# Patient Record
Sex: FEMALE | Race: WHITE | NOT HISPANIC OR LATINO | Employment: OTHER | URBAN - METROPOLITAN AREA
[De-identification: names, ages, dates, MRNs, and addresses within clinical notes are randomized per-mention and may not be internally consistent; named-entity substitution may affect disease eponyms.]

---

## 2017-03-17 ENCOUNTER — HOSPITAL ENCOUNTER (EMERGENCY)
Facility: HOSPITAL | Age: 62
Discharge: HOME | End: 2017-03-17
Attending: EMERGENCY MEDICINE

## 2017-03-17 VITALS
DIASTOLIC BLOOD PRESSURE: 79 MMHG | HEIGHT: 64 IN | RESPIRATION RATE: 20 BRPM | OXYGEN SATURATION: 94 % | TEMPERATURE: 98 F | BODY MASS INDEX: 31.58 KG/M2 | HEART RATE: 79 BPM | WEIGHT: 185 LBS | SYSTOLIC BLOOD PRESSURE: 213 MMHG

## 2017-03-17 DIAGNOSIS — I10 ESSENTIAL HYPERTENSION: Primary | ICD-10-CM

## 2017-03-17 DIAGNOSIS — J45.901 ASTHMA ATTACK: ICD-10-CM

## 2017-03-17 LAB
ALBUMIN SERPL BCP-MCNC: 3.9 G/DL
ALP SERPL-CCNC: 122 U/L
ALT SERPL W/O P-5'-P-CCNC: 55 U/L
ANION GAP SERPL CALC-SCNC: 8 MMOL/L
AST SERPL-CCNC: 35 U/L
BASOPHILS # BLD AUTO: 0 K/UL
BASOPHILS NFR BLD: 0.4 %
BILIRUB SERPL-MCNC: 0.2 MG/DL
BUN SERPL-MCNC: 15 MG/DL
CALCIUM SERPL-MCNC: 10 MG/DL
CHLORIDE SERPL-SCNC: 103 MMOL/L
CO2 SERPL-SCNC: 27 MMOL/L
CREAT SERPL-MCNC: 0.8 MG/DL
DIFFERENTIAL METHOD: ABNORMAL
EOSINOPHIL # BLD AUTO: 0.1 K/UL
EOSINOPHIL NFR BLD: 1.4 %
ERYTHROCYTE [DISTWIDTH] IN BLOOD BY AUTOMATED COUNT: 14.4 %
EST. GFR  (AFRICAN AMERICAN): >60 ML/MIN/1.73 M^2
EST. GFR  (NON AFRICAN AMERICAN): >60 ML/MIN/1.73 M^2
GLUCOSE SERPL-MCNC: 109 MG/DL
HCT VFR BLD AUTO: 41.1 %
HGB BLD-MCNC: 13.1 G/DL
LYMPHOCYTES # BLD AUTO: 1.4 K/UL
LYMPHOCYTES NFR BLD: 19.3 %
MCH RBC QN AUTO: 28 PG
MCHC RBC AUTO-ENTMCNC: 31.8 %
MCV RBC AUTO: 88 FL
MONOCYTES # BLD AUTO: 0.8 K/UL
MONOCYTES NFR BLD: 10.4 %
NEUTROPHILS # BLD AUTO: 5 K/UL
NEUTROPHILS NFR BLD: 68.5 %
PLATELET # BLD AUTO: 214 K/UL
PMV BLD AUTO: 8.1 FL
POTASSIUM SERPL-SCNC: 4.1 MMOL/L
PROT SERPL-MCNC: 7.5 G/DL
RBC # BLD AUTO: 4.66 M/UL
SODIUM SERPL-SCNC: 138 MMOL/L
WBC # BLD AUTO: 7.3 K/UL

## 2017-03-17 PROCEDURE — 99284 EMERGENCY DEPT VISIT MOD MDM: CPT | Mod: 25

## 2017-03-17 PROCEDURE — 63600175 PHARM REV CODE 636 W HCPCS: Performed by: EMERGENCY MEDICINE

## 2017-03-17 PROCEDURE — 94761 N-INVAS EAR/PLS OXIMETRY MLT: CPT

## 2017-03-17 PROCEDURE — 80053 COMPREHEN METABOLIC PANEL: CPT

## 2017-03-17 PROCEDURE — 94640 AIRWAY INHALATION TREATMENT: CPT

## 2017-03-17 PROCEDURE — 25000242 PHARM REV CODE 250 ALT 637 W/ HCPCS: Performed by: EMERGENCY MEDICINE

## 2017-03-17 PROCEDURE — 85025 COMPLETE CBC W/AUTO DIFF WBC: CPT

## 2017-03-17 PROCEDURE — 36415 COLL VENOUS BLD VENIPUNCTURE: CPT

## 2017-03-17 PROCEDURE — 96374 THER/PROPH/DIAG INJ IV PUSH: CPT

## 2017-03-17 RX ORDER — IPRATROPIUM BROMIDE AND ALBUTEROL SULFATE 2.5; .5 MG/3ML; MG/3ML
3 SOLUTION RESPIRATORY (INHALATION)
Status: DISCONTINUED | OUTPATIENT
Start: 2017-03-17 | End: 2017-03-17

## 2017-03-17 RX ORDER — ALBUTEROL SULFATE 90 UG/1
2 AEROSOL, METERED RESPIRATORY (INHALATION) EVERY 6 HOURS PRN
COMMUNITY
End: 2019-01-04

## 2017-03-17 RX ORDER — IPRATROPIUM BROMIDE AND ALBUTEROL SULFATE 2.5; .5 MG/3ML; MG/3ML
3 SOLUTION RESPIRATORY (INHALATION)
Status: COMPLETED | OUTPATIENT
Start: 2017-03-17 | End: 2017-03-17

## 2017-03-17 RX ORDER — METHYLPREDNISOLONE SOD SUCC 125 MG
125 VIAL (EA) INJECTION
Status: COMPLETED | OUTPATIENT
Start: 2017-03-17 | End: 2017-03-17

## 2017-03-17 RX ORDER — BUPRENORPHINE HYDROCHLORIDE AND NALOXONE HYDROCHLORIDE DIHYDRATE 8; 2 MG/1; MG/1
1 TABLET SUBLINGUAL 3 TIMES DAILY PRN
COMMUNITY
End: 2018-12-05 | Stop reason: DRUGHIGH

## 2017-03-17 RX ORDER — LISINOPRIL AND HYDROCHLOROTHIAZIDE 20; 25 MG/1; MG/1
1 TABLET ORAL DAILY
Qty: 30 TABLET | Refills: 0 | Status: SHIPPED | OUTPATIENT
Start: 2017-03-17 | End: 2018-10-18

## 2017-03-17 RX ORDER — BUPRENORPHINE HYDROCHLORIDE AND NALOXONE HYDROCHLORIDE DIHYDRATE 2; .5 MG/1; MG/1
TABLET SUBLINGUAL EVERY 6 HOURS PRN
COMMUNITY
End: 2017-03-17 | Stop reason: DRUGHIGH

## 2017-03-17 RX ORDER — ALBUTEROL SULFATE 90 UG/1
1-2 AEROSOL, METERED RESPIRATORY (INHALATION) EVERY 6 HOURS PRN
Qty: 1 INHALER | Refills: 0 | Status: SHIPPED | OUTPATIENT
Start: 2017-03-17 | End: 2018-03-17

## 2017-03-17 RX ORDER — PREDNISONE 20 MG/1
40 TABLET ORAL DAILY
Qty: 10 TABLET | Refills: 0 | Status: SHIPPED | OUTPATIENT
Start: 2017-03-17 | End: 2017-03-22

## 2017-03-17 RX ADMIN — METHYLPREDNISOLONE SODIUM SUCCINATE 125 MG: 125 INJECTION, POWDER, FOR SOLUTION INTRAMUSCULAR; INTRAVENOUS at 04:03

## 2017-03-17 RX ADMIN — IPRATROPIUM BROMIDE AND ALBUTEROL SULFATE 3 ML: .5; 3 SOLUTION RESPIRATORY (INHALATION) at 04:03

## 2017-03-17 NOTE — ED NOTES
MD at bedside for explanation of test results, pt verbalizes understanding of new medications and discharge instructions and reports no further questions or complaints at this time. Awaiting further instructions

## 2017-03-17 NOTE — ED PROVIDER NOTES
Encounter Date: 3/17/2017    SCRIBE #1 NOTE: ISury, am scribing for, and in the presence of, Dr. La.       History     Chief Complaint   Patient presents with    Asthma    Sore Throat     Review of patient's allergies indicates:  No Known Allergies  HPI Comments: 03/17/2017  4:25 PM     Chief Complaint: Asthma Exacerbation      The patient is a 61 y.o. female with a PMHx of asthma who is presenting with an acute exacerbation of her asthma for the past couple of days. Pt reported that she has Ventolin in nebulizer and inhaler, but she has been out of her inhaler recently. Pt first started to c/o a sore throat 4 days ago prior to exacerbation of her asthma. Associated symptoms of sinus congestion, rhinorrhea, and SOB on exertion. She also c/o sneezing that started today. No CP or leg swelling. No hx of hospitalizations for asthma exacerbation. No PCP or pulmonologist, pt denied having insurance. Pt denied taking BP medication. Social hx of smoking. Pt has no pertinent past surgical history.      The history is provided by the patient.     Past Medical History:   Diagnosis Date    Asthma      Past Surgical History:   Procedure Laterality Date    HYSTERECTOMY       History reviewed. No pertinent family history.  Social History   Substance Use Topics    Smoking status: Current Every Day Smoker    Smokeless tobacco: None    Alcohol use None     Review of Systems   Constitutional: Negative for fever.   HENT: Positive for congestion (Sinus congestion.), rhinorrhea, sneezing and sore throat.    Respiratory: Positive for shortness of breath (SOB on exertion.).         +Exacerbation of asthma.   Cardiovascular: Negative for chest pain and leg swelling.   Gastrointestinal: Negative for nausea.   Musculoskeletal: Negative for back pain.   Skin: Negative for rash.   Neurological: Negative for weakness.   Hematological: Does not bruise/bleed easily.   Psychiatric/Behavioral: Negative for confusion.   All  other systems reviewed and are negative.      Physical Exam   Initial Vitals   BP Pulse Resp Temp SpO2   03/17/17 1454 03/17/17 1454 03/17/17 1454 03/17/17 1454 03/17/17 1454   225/111 78 24 98.4 °F (36.9 °C) 97 %     Physical Exam    Nursing note and vitals reviewed.  Constitutional: She appears well-developed and well-nourished. She is not diaphoretic. No distress.   HENT:   Head: Normocephalic and atraumatic.   Neck: Neck supple.   Cardiovascular: Normal rate, regular rhythm, normal heart sounds and intact distal pulses. Exam reveals no gallop and no friction rub.    No murmur heard.  Pulmonary/Chest: She has decreased breath sounds. She has wheezes (Faint wheeze on the right.).   Poor air movement.   Musculoskeletal: Normal range of motion.   Neurological: She is alert and oriented to person, place, and time.   Skin: No rash noted. No erythema.   Psychiatric: She has a normal mood and affect.         ED Course   Procedures  Labs Reviewed - No data to display          Medical Decision Making:   Clinical Tests:   Lab Tests: Ordered and Reviewed  Radiological Study: Ordered and Reviewed            Scribe Attestation:   Scribe #1: I performed the above scribed service and the documentation accurately describes the services I performed. I attest to the accuracy of the note.    Attending Attestation:           Physician Attestation for Scribe:  Physician Attestation Statement for Scribe #1: I, Dr. La, reviewed documentation, as scribed by Sury De La Torre in my presence, and it is both accurate and complete.                 ED Course     Clinical Impression:   The primary encounter diagnosis was Essential hypertension. A diagnosis of Asthma attack was also pertinent to this visit.          61-year-old-year-old female with a history of asthma presents to the ER with an asthma exacerbation.  Shortness of breath and wheezing greatly improved after treatment.  Ambulatory around the emergency room with no distress and  no objective shortness of breath.  Markedly hypertensive in the emergency department with no sign of hypertensive emergency.  Patient has been off of her lisinopril for several years.  She is unfunded and has no primary care doctor although she does say that Dr. aviles cardiology use to see her.  Patient was started back on lisinopril and HCTZ after discussion with Dr. Latham of [facility] medicine regarding restarting the patient on antihypertensives.  Patient is acceptable for discharge home.  No indication for admission at this time.  I did discuss the case in detail with both the patient and her daughter who is at the bedside.  No sign of status asthmaticus or hypertensive emergency at this time in the ER     Harpreet La MD  03/17/17 2029

## 2017-03-17 NOTE — ED AVS SNAPSHOT
[facility] [address] [place] [address]               Kendra Lawrence   3/17/2017  4:05 PM   ED    Description:  Female : 1955   Department:  [facility]           Your Care was Coordinated By:     Provider Role From To    Harpreet La MD Attending Provider 17 2110 --      Reason for Visit     Asthma     Sore Throat           Diagnoses this Visit        Comments    Essential hypertension    -  Primary     Asthma attack           ED Disposition     None           To Do List           Follow-up Information     Follow up with [facility].    Specialty:  Emergency Medicine    Why:  As needed, If symptoms worsen    Contact information:    [address] [place] [id number] [phone]        Schedule an appointment as soon as possible for a visit with [facility].    Why:  for primary care    Contact information:    [address] [place] [id number]  [phone]         These Medications        Disp Refills Start End    albuterol 90 mcg/actuation inhaler 1 Inhaler 0 3/17/2017 3/17/2018    Inhale 1-2 puffs into the lungs every 6 (six) hours as needed for Wheezing. Rescue - Inhalation    Pharmacy: [id number] - [address] Ph #: [phone]       predniSONE (DELTASONE) 20 MG tablet 10 tablet 0 3/17/2017 3/22/2017    Take 2 tablets (40 mg total) by mouth once daily. - Oral    Pharmacy: [id number] - [address] Ph #: [phone]       lisinopril-hydrochlorothiazide (PRINZIDE,ZESTORETIC) 20-25 mg Tab 30 tablet 0 3/17/2017 3/17/2018    Take 1 tablet by mouth once daily. - Oral    Pharmacy: [id number] - [address] Ph #: [phone]         [facility]     [facility] Care [facility] -  Assistance  Registered nurses in the [facility] provide clinical advisement,  health education, appointment booking, and other advisory services.  Call for this free service at [phone].             Medications           Message regarding Medications     Verify the changes and/or additions to your medication regime listed below are the same as discussed with your clinician today.  If any of these changes or additions are incorrect, please notify your healthcare provider.        START taking these NEW medications        Refills    albuterol 90 mcg/actuation inhaler 0    Sig: Inhale 1-2 puffs into the lungs every 6 (six) hours as needed for Wheezing. Rescue    Class: Print    Route: Inhalation    predniSONE (DELTASONE) 20 MG tablet 0    Sig: Take 2 tablets (40 mg total) by mouth once daily.    Class: Print    Route: Oral    lisinopril-hydrochlorothiazide (PRINZIDE,ZESTORETIC) 20-25 mg Tab 0    Sig: Take 1 tablet by mouth once daily.    Class: Print    Route: Oral      These medications were administered today        Dose Freq    methylPREDNISolone sodium succinate injection 125 mg 125 mg ED 1 Time    Sig: Inject 125 mg into the vein ED 1 Time.    Class: Normal    Route: Intravenous    albuterol-ipratropium 2.5mg-0.5mg/3mL nebulizer solution 3 mL 3 mL Every 5 min    Sig: Take 3 mLs by nebulization every 5 (five) minutes.    Class: Normal    Route: Nebulization      STOP taking these medications     buprenorphine-naloxone 2-0.5 mg (SUBOXONE) 2-0.5 mg Subl Place under the tongue every 6 (six) hours as needed.           Verify that the below list of medications is an accurate representation of the medications you are currently taking.  If none reported, the list may be blank. If incorrect, please contact your healthcare provider. Carry this list with you in case of emergency.           Current Medications     albuterol (VENTOLIN HFA) 90 mcg/actuation inhaler Inhale 2 puffs into the lungs every 6 (six) hours as needed for Wheezing. Rescue    buprenorphine-naloxone 8-2 mg (SUBOXONE) 8-2 mg Subl  "Place 1 tablet under the tongue 3 (three) times daily as needed (pain).     albuterol 90 mcg/actuation inhaler Inhale 1-2 puffs into the lungs every 6 (six) hours as needed for Wheezing. Rescue    lisinopril-hydrochlorothiazide (PRINZIDE,ZESTORETIC) 20-25 mg Tab Take 1 tablet by mouth once daily.    predniSONE (DELTASONE) 20 MG tablet Take 2 tablets (40 mg total) by mouth once daily.           Clinical Reference Information           Your Vitals Were     BP Pulse Temp Resp Height Weight    223/102 86 98.4 °F (36.9 °C) (Oral) 20 5' 4" (1.626 m) 83.9 kg (185 lb)    SpO2 BMI             97% 31.76 kg/m2         Allergies as of 3/17/2017     No Known Allergies      Immunizations Administered on Date of Encounter - 3/17/2017     None      ED Micro, Lab, POCT     Start Ordered       Status Ordering Provider    03/17/17 1631 03/17/17 1631  CBC auto differential  STAT      Final result     03/17/17 1631 03/17/17 1631  Comprehensive metabolic panel  STAT      Final result       ED Imaging Orders     Start Ordered       Status Ordering Provider    03/17/17 1631 03/17/17 1631  X-Ray Chest AP Portable  1 time imaging      Final result         Discharge Instructions         Asthma (Adult)  Asthma is a disease where the medium and  small air passages within the lung go into spasm and restrict the flow of air. Inflammation and swelling of the airways cause further restriction. During an acute asthma attack, these factors cause difficulty breathing, wheezing, cough and chest tightness.    An asthma attack can be triggered by many things. Common triggers include infections such as the common cold, bronchitis, pneumonia. Irritants such as smoke or pollutants in the air, emotional upset, and exercise can also trigger an attack. In many adults with asthma, allergies to dust, mold, pollen and animal dander can cause an asthma attack. Skipping doses of daily asthma medicine can also bring on an asthma attack.  Asthma can be controlled " "using the proper medicines prescribed by your healthcare provider and avoiding exposure to known triggers including allergens and irritants.  Home care  · Take prescribed medicine exactly at the times advised. If you need medicine such as from a hand held inhaler or aerosol breathing machine more than every 4 hours, contact your healthcare provider or seek immediate medical attention. If prescribed an antibiotic or prednisone, take all of the medicine as prescribed, even if you are feeling better after a few days.  · Do not smoke. Avoid being exposed to the smoke of others.  · Some people with asthma have worsening of their symptoms when they take aspirin and non-steroidal or fever-reducing medicines like ibuprofen and naproxen. Talk to your healthcare provider if you think this may apply to you.  Follow-up care  Follow up with your healthcare provider, or as advised. Always bring all of your current medicines to any appointments with your healthcare provider. Also bring a complete list of medications even those not taken for asthma. If you do not already have one, talk to your healthcare provider about developing a personalized "Asthma Action Plan."  A pneumococcal (pneumonia) vaccine and yearly flu shot (every fall) are recommended. Ask your doctor about this.  When to seek medical advice  Call your healthcare provider right away if any of these occur:   · Increased wheezing or shortness of breath  · Need to use your inhalers more often than usual without relief  · Fever of 100.4ºF (38ºC) or higher, or as directed by your healthcare provider  · Coughing up lots of dark-colored or bloody sputum (mucus)  · Chest pain with each breath  · If you use a peak flow meter as part of an Asthma Action Plan, and you are still in the yellow zone (50% to 80%) 15 minutes after using inhaler medicine.  Call 911  Call 911 if any of the following occur  · Trouble walking or talking because of shortness of breath  · If you use a " peak flow meter as part of an Asthma Action Plan and you are still in the red zone (less than 50%) 15 minutes after using inhaler medicine  · Lips or fingernails turning gray or blue  Date Last Reviewed: 12/2/2015  © 7501-3032 [employer]. [address] [place] [address], [place] [address]. All rights reserved. This information is not intended as a substitute for professional medical care. Always follow your healthcare professional's instructions.        Discharge Instructions for High Blood Pressure (Hypertension)  You have been diagnosed with high blood pressure (also called hypertension). This means the force of blood against your artery walls is too strong. It also means your heart is working hard to move blood. High blood pressure usually has no symptoms, but over time, it can damage your heart, blood vessels, eyes, kidneys, and other organs. With help from your doctor, you can manage your blood pressure and protect your health.  Taking medicine  · Learn to take your own blood pressure. Keep a record of your results. Ask your doctor which readings mean that you need medical attention.  · Take your blood pressure medicine exactly as directed. Dont skip doses. Missing doses can cause your blood pressure to get out of control.  · If you do miss a dose (or doses) check with your healthcare provider about what to do.  · Avoid medicine that contain heart stimulants, including over-the-counter drugs. Check for warnings about high blood pressure on the label. Ask the pharmacist before purchasing something you haven't used before  · Check with your doctor or pharmacist before taking a decongestant. Some decongestants can worsen high blood pressure.  Lifestyle changes  · Maintain a healthy weight. Get help to lose any extra pounds.  · Cut back on salt.  ¨ Limit canned, dried, packaged, and fast foods.  ¨ Dont add salt to your food at the table.  ¨ Season foods with herbs instead of salt when you cook.  ¨ Request  "no added salt when you go to a restaurant.  ¨ The American Heart Associations (AHA) "ideal" sodium intake recommendation is 1,500 milligrams per day.  However, since American's eat so much salt, the AHA says a positive change can occur by cutting back to even 2,400 milligrams of sodium a day.   · Follow the DASH (Dietary Approaches to Stop Hypertension) eating plan. This plan recommends vegetables, fruits, whole gains, and other heart healthy foods.  · Begin an exercise program. Ask your doctor how to get started. The American Heart Association recommends aerobic exercise 3 to 4 times a week for an average of 40 minutes at a time, with your doctor's approval. Simple activities like walking or gardening can help.  · Break the smoking habit. Enroll in a stop-smoking program to improve your chances of success. Ask your healthcare provider about programs and medicines to help you stop smoking.  · Limit drinks that contain caffeine (coffee, black or green tea, cola) to 2 per day.  · Never take stimulants such as amphetamines or cocaine; these drugs can be deadly for someone with high blood pressure.  · Control your stress. Learn stress-management techniques.  · Limit alcohol to no more than 1 drink a day for women and 2 drinks a day for men.  Follow-up care  Make a follow-up appointment as directed by our staff.     When to seek medical care  Call your doctor immediately or seek emergency care if you have any of the following:  · Chest pain or shortness of breath (call 911)  · Moderate to severe headache  · Weakness in the muscles of your face, arms, or legs  · Trouble speaking  · Extreme drowsiness  · Confusion  · Fainting or dizziness  · Pulsating or rushing sound in your ears  · Unexplained nosebleed  · Weakness, tingling, or numbness of your face, arms, or legs  · Change in vision  · Blood pressure measured at home that is greater than 180/110   Date Last Reviewed: 4/27/2016  © 8105-6394 [employer] " [address] [place] [address]. All rights reserved. This information is not intended as a substitute for professional medical care. Always follow your healthcare professional's instructions.          [facility] Sign-Up     Activating your [facility] account is as easy as 1-2-3!     1) Visit [email or web address], select Sign Up Now, enter this activation code and your date of birth, then select Next.  [email or web address]  Expires: 5/1/2017  6:31 PM      2) Create a username and password to use when you visit [facility] in the future and select a security question in case you lose your password and select Next.    3) Enter your e-mail address and click Sign Up!    Additional Information  If you have questions, please e-mail [email or web address] or call [phone] to talk to our [facility] staff. Remember, [facility] is NOT to be used for urgent needs. For medical emergencies, dial 911.          [facility] complies with applicable Federal civil rights laws and does not discriminate on the basis of race, color, national origin, age, disability, or sex.        Language Assistance Services     ATTENTION: Language assistance services are available, free of charge. Please call [phone].      ATENCIÓN: Si habla español, tiene a howell disposición servicios gratuitos de asistencia lingüística. Llame al [phone].     CHÚ Ý: N?u b?n nói Ti?ng Vi?t, có các d?ch v? h? tr? ngôn ng? mi?n phí dành cho b?n. G?i s? [phone].

## 2017-03-17 NOTE — ED NOTES
Patient identifiers for Kendra Lawrence checked and correct.  LOC: Patient is awake, alert, and aware of environment with an appropriate affect. Patient is oriented x 3 and speaking appropriately.  APPEARANCE: Patient resting comfortably and in no acute distress. Patient is clean and well groomed, patient's clothing is properly fastened.  SKIN: The skin is warm and dry. Patient has normal skin turgor and moist mucus membrances. Skin is intact; no bruising or breakdown noted.  MUSCULOSKELETAL: Patient is moving all extremities well, no obvious deformities noted. Pulses intact.   RESPIRATORY: Airway is open and patent. Respirations are spontaneous and non-labored with normal effort and rate.  CARDIAC: Patient has a normal rate and rhythm. No peripheral edema noted. Capillary refill < 3 seconds. HTN noted on the monitor, NSR without ectopy, HR 83  ABDOMEN: No distention noted. Bowel sounds active in all 4 quadrants. Soft and non-tender upon palpation.  NEUROLOGICAL: PERRL. Facial expression is symmetrical. Hand grasps are equal bilaterally. Normal sensation in all extremities when touched with finger.  Allergies reported: Review of patient's allergies indicates:  No Known Allergies

## 2017-03-17 NOTE — DISCHARGE INSTRUCTIONS
"  Asthma (Adult)  Asthma is a disease where the medium and  small air passages within the lung go into spasm and restrict the flow of air. Inflammation and swelling of the airways cause further restriction. During an acute asthma attack, these factors cause difficulty breathing, wheezing, cough and chest tightness.    An asthma attack can be triggered by many things. Common triggers include infections such as the common cold, bronchitis, pneumonia. Irritants such as smoke or pollutants in the air, emotional upset, and exercise can also trigger an attack. In many adults with asthma, allergies to dust, mold, pollen and animal dander can cause an asthma attack. Skipping doses of daily asthma medicine can also bring on an asthma attack.  Asthma can be controlled using the proper medicines prescribed by your healthcare provider and avoiding exposure to known triggers including allergens and irritants.  Home care  · Take prescribed medicine exactly at the times advised. If you need medicine such as from a hand held inhaler or aerosol breathing machine more than every 4 hours, contact your healthcare provider or seek immediate medical attention. If prescribed an antibiotic or prednisone, take all of the medicine as prescribed, even if you are feeling better after a few days.  · Do not smoke. Avoid being exposed to the smoke of others.  · Some people with asthma have worsening of their symptoms when they take aspirin and non-steroidal or fever-reducing medicines like ibuprofen and naproxen. Talk to your healthcare provider if you think this may apply to you.  Follow-up care  Follow up with your healthcare provider, or as advised. Always bring all of your current medicines to any appointments with your healthcare provider. Also bring a complete list of medications even those not taken for asthma. If you do not already have one, talk to your healthcare provider about developing a personalized "Asthma Action Plan."  A " pneumococcal (pneumonia) vaccine and yearly flu shot (every fall) are recommended. Ask your doctor about this.  When to seek medical advice  Call your healthcare provider right away if any of these occur:   · Increased wheezing or shortness of breath  · Need to use your inhalers more often than usual without relief  · Fever of 100.4ºF (38ºC) or higher, or as directed by your healthcare provider  · Coughing up lots of dark-colored or bloody sputum (mucus)  · Chest pain with each breath  · If you use a peak flow meter as part of an Asthma Action Plan, and you are still in the yellow zone (50% to 80%) 15 minutes after using inhaler medicine.  Call 911  Call 911 if any of the following occur  · Trouble walking or talking because of shortness of breath  · If you use a peak flow meter as part of an Asthma Action Plan and you are still in the red zone (less than 50%) 15 minutes after using inhaler medicine  · Lips or fingernails turning gray or blue  Date Last Reviewed: 12/2/2015  © 1728-6623 [employer]. [address] [place] [address]. All rights reserved. This information is not intended as a substitute for professional medical care. Always follow your healthcare professional's instructions.        Discharge Instructions for High Blood Pressure (Hypertension)  You have been diagnosed with high blood pressure (also called hypertension). This means the force of blood against your artery walls is too strong. It also means your heart is working hard to move blood. High blood pressure usually has no symptoms, but over time, it can damage your heart, blood vessels, eyes, kidneys, and other organs. With help from your doctor, you can manage your blood pressure and protect your health.  Taking medicine  · Learn to take your own blood pressure. Keep a record of your results. Ask your doctor which readings mean that you need medical attention.  · Take your blood pressure medicine exactly as directed. Dont  "skip doses. Missing doses can cause your blood pressure to get out of control.  · If you do miss a dose (or doses) check with your healthcare provider about what to do.  · Avoid medicine that contain heart stimulants, including over-the-counter drugs. Check for warnings about high blood pressure on the label. Ask the pharmacist before purchasing something you haven't used before  · Check with your doctor or pharmacist before taking a decongestant. Some decongestants can worsen high blood pressure.  Lifestyle changes  · Maintain a healthy weight. Get help to lose any extra pounds.  · Cut back on salt.  ¨ Limit canned, dried, packaged, and fast foods.  ¨ Dont add salt to your food at the table.  ¨ Season foods with herbs instead of salt when you cook.  ¨ Request no added salt when you go to a restaurant.  ¨ The American Heart Associations (AHA) "ideal" sodium intake recommendation is 1,500 milligrams per day.  However, since American's eat so much salt, the AHA says a positive change can occur by cutting back to even 2,400 milligrams of sodium a day.   · Follow the DASH (Dietary Approaches to Stop Hypertension) eating plan. This plan recommends vegetables, fruits, whole gains, and other heart healthy foods.  · Begin an exercise program. Ask your doctor how to get started. The American Heart Association recommends aerobic exercise 3 to 4 times a week for an average of 40 minutes at a time, with your doctor's approval. Simple activities like walking or gardening can help.  · Break the smoking habit. Enroll in a stop-smoking program to improve your chances of success. Ask your healthcare provider about programs and medicines to help you stop smoking.  · Limit drinks that contain caffeine (coffee, black or green tea, cola) to 2 per day.  · Never take stimulants such as amphetamines or cocaine; these drugs can be deadly for someone with high blood pressure.  · Control your stress. Learn stress-management " techniques.  · Limit alcohol to no more than 1 drink a day for women and 2 drinks a day for men.  Follow-up care  Make a follow-up appointment as directed by our staff.     When to seek medical care  Call your doctor immediately or seek emergency care if you have any of the following:  · Chest pain or shortness of breath (call 911)  · Moderate to severe headache  · Weakness in the muscles of your face, arms, or legs  · Trouble speaking  · Extreme drowsiness  · Confusion  · Fainting or dizziness  · Pulsating or rushing sound in your ears  · Unexplained nosebleed  · Weakness, tingling, or numbness of your face, arms, or legs  · Change in vision  · Blood pressure measured at home that is greater than 180/110   Date Last Reviewed: 4/27/2016  © 7535-5772 [employer]. [address] [place] [address], [place] [address]. All rights reserved. This information is not intended as a substitute for professional medical care. Always follow your healthcare professional's instructions.

## 2017-03-17 NOTE — PLAN OF CARE
03/17/17 1657   Patient Assessment/Suction   Level of Consciousness (AVPU) alert   Respiratory Effort Shallow   Expansion/Accessory Muscles/Retractions no retractions;no use of accessory muscles   All Lung Fields Breath Sounds diminished   Rhythm/Pattern, Respiratory shortness of breath reported   Cough Frequency frequent   Cough Type good;nonproductive   PRE-TX-O2-ETCO2   O2 Device (Oxygen Therapy) room air   SpO2 100 %   Pulse Oximetry Type Continuous   Pulse 75   Resp 20   Aerosol Therapy   $ Aerosol Therapy Charges Aerosol Treatment   Respiratory Treatment Status given   SVN/Inhaler Treatment Route mask;with oxygen   Position During Treatment HOB at 45 degrees   Patient Tolerance good   Post-Treatment   Post-treatment Heart Rate (beats/min) 71   Post-treatment Resp Rate (breaths/min) 20   All Fields Breath Sounds aeration increased       Aerosol treatments x3 completed. Patient tolerated all treatments well.

## 2018-08-22 ENCOUNTER — DOCUMENTATION ONLY (OUTPATIENT)
Dept: FAMILY MEDICINE | Facility: CLINIC | Age: 63
End: 2018-08-22

## 2018-10-03 ENCOUNTER — PATIENT OUTREACH (OUTPATIENT)
Dept: ADMINISTRATIVE | Facility: HOSPITAL | Age: 63
End: 2018-10-03

## 2018-10-03 NOTE — LETTER
October 15, 2018    Kendra Lawrence  [address] [place] [address]             [facility]  [address]  [place] [address]  Phone: [phone] Kendra Lawrence       [facility].  To help you get the most out of each of your visits, we will review your information to make sure you are up to date on all of your recommended tests and/or procedures.       As a new patient to Dr. Urena, we may not have your complete medical records.  He has found that your chart shows you may be due for Colonoscopy,Dex Scan, Mammogram, Lipid Panel, & Hep C. Screen.     If you have had any of the above done at another facility, please bring the records or information with you so that your record at [facility] will be complete.       If you are currently taking medication, please bring it with you to your appointment for review.     Also, if you have any type of Advanced Directives, please bring them with you to your office visit so we may scan them into your chart.         Keke Becerra LPN Clinical Care Coordinator   [facility] [address] [place] [address]   Phone [phone]   Fax [phone]

## 2018-10-15 ENCOUNTER — OFFICE VISIT (OUTPATIENT)
Dept: FAMILY MEDICINE | Facility: CLINIC | Age: 63
End: 2018-10-15
Payer: MEDICARE

## 2018-10-15 ENCOUNTER — DOCUMENTATION ONLY (OUTPATIENT)
Dept: FAMILY MEDICINE | Facility: CLINIC | Age: 63
End: 2018-10-15

## 2018-10-15 VITALS
BODY MASS INDEX: 27.89 KG/M2 | HEIGHT: 64 IN | HEART RATE: 80 BPM | TEMPERATURE: 98 F | RESPIRATION RATE: 16 BRPM | DIASTOLIC BLOOD PRESSURE: 102 MMHG | OXYGEN SATURATION: 94 % | SYSTOLIC BLOOD PRESSURE: 148 MMHG | WEIGHT: 163.38 LBS

## 2018-10-15 DIAGNOSIS — Z78.0 POSTMENOPAUSAL: ICD-10-CM

## 2018-10-15 DIAGNOSIS — Z11.59 ENCOUNTER FOR HEPATITIS C SCREENING TEST FOR LOW RISK PATIENT: ICD-10-CM

## 2018-10-15 DIAGNOSIS — F32.A ANXIETY AND DEPRESSION: ICD-10-CM

## 2018-10-15 DIAGNOSIS — F11.20 NARCOTIC DEPENDENCE: Primary | ICD-10-CM

## 2018-10-15 DIAGNOSIS — Z12.39 BREAST CANCER SCREENING: ICD-10-CM

## 2018-10-15 DIAGNOSIS — F41.9 ANXIETY AND DEPRESSION: ICD-10-CM

## 2018-10-15 DIAGNOSIS — R10.13 EPIGASTRIC PAIN: ICD-10-CM

## 2018-10-15 DIAGNOSIS — I10 ESSENTIAL HYPERTENSION: ICD-10-CM

## 2018-10-15 PROCEDURE — 99214 OFFICE O/P EST MOD 30 MIN: CPT | Mod: 25,S$GLB,, | Performed by: INTERNAL MEDICINE

## 2018-10-15 PROCEDURE — 80305 DRUG TEST PRSMV DIR OPT OBS: CPT | Mod: QW,S$GLB,, | Performed by: INTERNAL MEDICINE

## 2018-10-15 RX ORDER — LISINOPRIL 10 MG/1
10 TABLET ORAL DAILY
Qty: 90 TABLET | Refills: 3 | Status: SHIPPED | OUTPATIENT
Start: 2018-10-15 | End: 2019-01-04

## 2018-10-15 RX ORDER — DULOXETIN HYDROCHLORIDE 30 MG/1
30 CAPSULE, DELAYED RELEASE ORAL DAILY
Qty: 30 CAPSULE | Refills: 11 | Status: SHIPPED | OUTPATIENT
Start: 2018-10-15 | End: 2019-01-04

## 2018-10-15 RX ORDER — AMITRIPTYLINE HYDROCHLORIDE 25 MG/1
1 TABLET, FILM COATED ORAL DAILY
Refills: 5 | COMMUNITY
Start: 2018-10-03 | End: 2018-10-15

## 2018-10-15 RX ORDER — NALOXONE HYDROCHLORIDE 4 MG/.1ML
1 SPRAY NASAL ONCE
Qty: 1 EACH | Refills: 0 | Status: SHIPPED | OUTPATIENT
Start: 2018-10-15 | End: 2018-10-15

## 2018-10-15 RX ORDER — PANTOPRAZOLE SODIUM 40 MG/1
40 TABLET, DELAYED RELEASE ORAL DAILY
Qty: 30 TABLET | Refills: 11 | Status: SHIPPED | OUTPATIENT
Start: 2018-10-15 | End: 2019-01-04
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
## 2018-10-15 NOTE — PATIENT INSTRUCTIONS
Senokot over the counter for constipation twice a day.      Stop Elavil    You need a  note from both previous providers of Suboxone they will no longer write you for the Suboxone

## 2018-10-15 NOTE — PROGRESS NOTES
Health Maintenance Due   Topic Date Due    Hepatitis C Screening  1955    Lipid Panel  1955    TETANUS VACCINE  09/28/1971    Mammogram  09/28/1993    DEXA SCAN  09/28/1993    Colonoscopy  09/28/2003    Zoster Vaccine  09/28/2013    Influenza Vaccine  08/01/2018    Pneumococcal (65+) (1 of 2 - PCV13) 09/28/2018

## 2018-10-16 ENCOUNTER — PATIENT OUTREACH (OUTPATIENT)
Dept: ADMINISTRATIVE | Facility: HOSPITAL | Age: 63
End: 2018-10-16

## 2018-10-16 DIAGNOSIS — Z12.11 COLON CANCER SCREENING: Primary | ICD-10-CM

## 2018-10-16 LAB
AMP D-AMPHETAMINE 1000 NG/ML: NEGATIVE
BAR SECOBARBITAL 300 NG/ML: NEGATIVE
BUP BUPRENORPHINE 10 NG/ML: POSITIVE
BZO OXAZEPAM 300 NG/ML: NEGATIVE
COC BENZOYLECGONINE 300 NG/ML: NEGATIVE
CTP QC/QA: YES
MET D-METHAMPHETAMINE 500 NG/ML: NEGATIVE
MOP MORPHINE 300 NG/ML: NEGATIVE
MTD METHADONE 300 NG/ML: NEGATIVE
QXY OXYCODONE 100 NG/ML: NEGATIVE
THC 11-NOR-9-TETRAHYDROCANNABINOL-9-CARBOXYLIC ACID: NEGATIVE

## 2018-10-16 NOTE — LETTER
October 24, 2018    Kendra Lawrence  [address] [place] [address]             [facility]  [address]  [place] [address]  Phone: [phone]   [facility] is committed to your overall health and would like to ensure that you are up to date on your recommended test and/or procedures.   Louie Urena MD  has found that your chart shows you may be due for the following:     MAMMOGRAM   DEXA SCREEN   Colorectal Cancer Screening   LIPID PANEL   HEP. C SCREENING     If you have had any of the above done at another facility, please let us know so that we may obtain copies from that facility.  If you have a copy of these records, please provide a copy for us to scan into your chart.  You are welcome to request that the report be faxed to us at  ([phone]).     Otherwise, please schedule these appointments at your earliest convenience by calling [phone] or going to [email or web address].         Sincerely,   [facility]   MD Keke Moreno LPN Clinical Care Coordinator   [facility]   [address] [place] [address]   Phone [phone]   Fax [phone]

## 2018-10-18 ENCOUNTER — OFFICE VISIT (OUTPATIENT)
Dept: FAMILY MEDICINE | Facility: CLINIC | Age: 63
End: 2018-10-18
Payer: MEDICARE

## 2018-10-18 VITALS
WEIGHT: 162.94 LBS | HEART RATE: 80 BPM | DIASTOLIC BLOOD PRESSURE: 88 MMHG | BODY MASS INDEX: 27.82 KG/M2 | OXYGEN SATURATION: 94 % | RESPIRATION RATE: 16 BRPM | TEMPERATURE: 98 F | HEIGHT: 64 IN | SYSTOLIC BLOOD PRESSURE: 140 MMHG

## 2018-10-18 DIAGNOSIS — R11.2 NON-INTRACTABLE VOMITING WITH NAUSEA, UNSPECIFIED VOMITING TYPE: ICD-10-CM

## 2018-10-18 DIAGNOSIS — Z23 NEED FOR VACCINATION WITH 13-POLYVALENT PNEUMOCOCCAL CONJUGATE VACCINE: ICD-10-CM

## 2018-10-18 DIAGNOSIS — Z23 NEEDS FLU SHOT: ICD-10-CM

## 2018-10-18 DIAGNOSIS — J44.9 CHRONIC OBSTRUCTIVE PULMONARY DISEASE, UNSPECIFIED COPD TYPE: ICD-10-CM

## 2018-10-18 DIAGNOSIS — K86.1 CHRONIC PANCREATITIS, UNSPECIFIED PANCREATITIS TYPE: Primary | ICD-10-CM

## 2018-10-18 PROCEDURE — G0009 ADMIN PNEUMOCOCCAL VACCINE: HCPCS | Mod: S$GLB,,, | Performed by: INTERNAL MEDICINE

## 2018-10-18 PROCEDURE — 99214 OFFICE O/P EST MOD 30 MIN: CPT | Mod: 25,S$GLB,, | Performed by: INTERNAL MEDICINE

## 2018-10-18 PROCEDURE — 90662 IIV NO PRSV INCREASED AG IM: CPT | Mod: S$GLB,,, | Performed by: INTERNAL MEDICINE

## 2018-10-18 PROCEDURE — G0008 ADMIN INFLUENZA VIRUS VAC: HCPCS | Mod: S$GLB,,, | Performed by: INTERNAL MEDICINE

## 2018-10-18 PROCEDURE — 90670 PCV13 VACCINE IM: CPT | Mod: S$GLB,,, | Performed by: INTERNAL MEDICINE

## 2018-10-18 RX ORDER — ONDANSETRON 4 MG/1
4 TABLET, FILM COATED ORAL EVERY 8 HOURS PRN
Qty: 50 TABLET | Refills: 5 | Status: SHIPPED | OUTPATIENT
Start: 2018-10-18 | End: 2018-12-05 | Stop reason: SDUPTHER

## 2018-10-18 NOTE — PROGRESS NOTES
Subjective:       Patient ID: Kendra Lawrence is a 63 y.o. female.    Chief Complaint: Establish Care and Nausea    HPI       CHIEF COMPLAINT: Dyspnea    .   HPI:     ONSET/TIMIN y        ago.  It occurs with: .    DURATION: . intermittant    QUALITY/COURSE:   unchanged      INTENSITY/SEVERITY:  5 (0 to 10)               MODIFIERS/TREATMENTS:Precipitating factors: exercise and exertion,  Weights:   Wt Readings from Last 1 Encounters:   10/18/18 1128 73.9 kg (162 lb 14.7 oz)     BNP    @LABRCNTIP(BNP,BNPTRIAGEBLO)@  D-dimer  No results found for: DDIMER      SYMPTOMS/RELATED: . --Possible medication side effects include:    The following symptoms/statements are positive if in BOLD, negative if not.          CONTEXT/WHEN:  Similar_problems . Tobacco_use. Seasonal_pattern.  Copd. CHF.   thomboembolic disease.  Allergies/Hayfever.. Sinusitis. . Asthma.  Exposure_to_others_with_similar_symptoms.      TREATMENTS:  OTC_Cough/Decongestants..  Rx_Cough/Decongestants. Bronchodilators.. Inhaled_Corticosteroids. Oral_Corticosteroids... .Antibiotics. Diuretics. Ace inhibitor or ARB. Beta-blocker.     REVIEW OF SYMPTOMS:    . Dyspnea on exertion. Paroxysmal_Nocturnal_Dyspnea.. Orthopnea...  Purulent_Sputum. . Hemoptysis.  Rhinorrhea/Purulent.   Stressed. Weight_loss. Weight_gain. Leg_Pain.     \          CHIEF COMPLAINT: nausea  HPI:     ONSET/TIMING: Trauma: no. . Onset   20 y     ago. Sudden: no.      DURATION: .  Intermittent     QUALITY/COURSE:    unchanged  .     LOCATION:     INTENSITY/SEVERITY:  #   5   / 10 (on 1 to 10 scale).  Vomited:  0 x/day.  diarhia:  0 x/d.            The following symptoms/statements  are positive if BOLD, negative otherwise.      MODIFIERS/TREATMENTS: . Eating worsens.. . Drinking worsens.  Possible contaminated food.      CONTEXT/WHEN: . Similar problems.  Exposure_to_others_with_similar_symptoms. . .  Recent_contaminated_food. .  Alcohol_ingestion.   Stopped_adrenal_steroids . .  Antibiotics.  Travel    REVIEW OF SYMPTOMS:    . Fever(subjective) .  Vertigo .  Headache . Hematemesis .. Bloody_stools . Watery_stools . Loose_stools . Abdominal_pain hx of ulcer on pancreas, 8/10 chronic since 2002, had surgery.. . Chest_Pain . Dyspnea .  Urinary_Problems . Jaundice . dizziness .      Abdominal Pain.    ONSET:  20 y   ago.    DURATION:      QUALITY/COURSE: . unchanged    LOCATION:   Left upper quadrant  .--Radiation:  Left scapular area    INTENSITY/SEVERITY: .Severity is #   8   (10 point scale).  Character:  Sharp  CONTEXT/WHEN: .--Similar problems: no. Trauma: no.    The following symptoms/statements  are positive if BOLD, negative otherwise.    AGGRAVATING FACTORS: food . medications. alcohol. movement. position . bowel movements . emotional stress .  RELIEF WITH: food or milk, antacids . medications .  position . bowel movements . Eructation.  passing gas .  PAST TREATMENT OR EVALUATION: barium+_enema . Upper_gastrointestinal_series . CT_scans . sonograms . Endoscopic_procedures .  ASSOCIATED SYMPTOMS:      Weight_loss . jaundice .   he  HISTORY OF: Diabetes. CAD. prior abdomina surgery. Kidney_stones.  Gallbladder_disease. Hiatal_hernia. Peptic_ulcer. colitis. Liver_disease.  FH  Colitis . . enteritis .     Last labs:  Lab Results   Component Value Date    WBC 7.30 03/17/2017    HGB 13.1 03/17/2017    HCT 41.1 03/17/2017     03/17/2017    ALT 55 (H) 03/17/2017    AST 35 03/17/2017     03/17/2017    K 4.1 03/17/2017     03/17/2017    CREATININE 0.8 03/17/2017    BUN 15 03/17/2017    CO2 27 03/17/2017                           CHIEF COMPLAINT: Hypertension  HPI:     ONSET:      QUALITY/COURSE:   Unchanged.     INTENSITY/SEVERITY:  Average blood pressure is ? .     MODIFIERS/TREATMENTS:  Taking medications: yes. .High sodium intake: no. alcohol: no      The following symptoms are positive only if BOLDED, otherwise are negative.      SYMPTOMS/RELATED: Possible medication side  "effects include:   Depression..  . Cough. . Constipation.    REVIEW OF SYMPTOMS: . Weight_loss . Weight_gain . Leg_cramps .Potency_problems .    TARGET ORGAN DAMAGE:: angina/ prior myocardial infarction, chronic kidney disease, heart failure, left ventricular hypertrophy, peripheral artery disease, prior coronary revascularization, retinopathy, stroke. transient ischemic attack.        Review of Systems   Constitutional: Positive for fatigue. Negative for chills, diaphoresis and fever.   HENT: Negative for sore throat.    Respiratory: Positive for cough, shortness of breath and wheezing. Negative for chest tightness.    Cardiovascular: Negative for chest pain and palpitations.   Gastrointestinal: Positive for diarrhea (Stools frequently float), nausea and vomiting (Occasional twice last night).   Neurological: Negative for dizziness and weakness.   Psychiatric/Behavioral: The patient is not nervous/anxious.          Objective:      Vitals:    10/18/18 1128   BP: (!) 140/88   Pulse: 80   Resp: 16   Temp: 98.1 °F (36.7 °C)   TempSrc: Oral   SpO2: (!) 94%   Weight: 73.9 kg (162 lb 14.7 oz)   Height: 5' 4" (1.626 m)   PainSc:   8   PainLoc: Abdomen     Physical Exam   Constitutional: She appears well-developed and well-nourished.   Cardiovascular: Normal rate, regular rhythm and normal heart sounds.   Pulmonary/Chest: Effort normal and breath sounds normal.   Abdominal: Soft. There is tenderness (left upper quadrant tenderness).   Neurological: She is alert.   Psychiatric: She has a normal mood and affect. Her behavior is normal. Thought content normal.   Nursing note and vitals reviewed.        Assessment:       1. Chronic pancreatitis, unspecified pancreatitis type    2. Chronic obstructive pulmonary disease, unspecified COPD type    3. Non-intractable vomiting with nausea, unspecified vomiting type    4. Needs flu shot    5. Need for vaccination with 13-polyvalent pneumococcal conjugate vaccine          Plan: "       Chronic pancreatitis, unspecified pancreatitis type  -     Ambulatory consult to Gastroenterology  -     lipase-protease-amylase 24,000-76,000-120,000 units (CREON) 24,000-76,000 -120,000 unit capsule; Take 1 capsule by mouth 3 (three) times daily with meals.  Dispense: 90 capsule; Refill: 11    Chronic obstructive pulmonary disease, unspecified COPD type  -     fluticasone-umeclidin-vilanter (TRELEGY ELLIPTA) 100-62.5-25 mcg DsDv; Inhale 1 puff into the lungs once daily.  Dispense: 3 each; Refill: 1  -     Complete PFT with bronchodilator; Future  -     inhalation spacing device; Use as directed for inhalation.  Dispense: 1 Device; Refill: 0    Non-intractable vomiting with nausea, unspecified vomiting type  -     ondansetron (ZOFRAN) 4 MG tablet; Take 1 tablet (4 mg total) by mouth every 8 (eight) hours as needed for Nausea.  Dispense: 50 tablet; Refill: 5    Needs flu shot  -     Influenza - High Dose (65+) (PF) (IM)    Need for vaccination with 13-polyvalent pneumococcal conjugate vaccine  -     Pneumococcal Conjugate Vaccine (13 Valent) (IM)      Follow-up in about 2 weeks (around 11/1/2018).

## 2018-10-30 ENCOUNTER — OFFICE VISIT (OUTPATIENT)
Dept: FAMILY MEDICINE | Facility: CLINIC | Age: 63
End: 2018-10-30
Payer: MEDICARE

## 2018-10-30 ENCOUNTER — DOCUMENTATION ONLY (OUTPATIENT)
Dept: FAMILY MEDICINE | Facility: CLINIC | Age: 63
End: 2018-10-30

## 2018-10-30 VITALS
SYSTOLIC BLOOD PRESSURE: 158 MMHG | BODY MASS INDEX: 27.21 KG/M2 | DIASTOLIC BLOOD PRESSURE: 92 MMHG | OXYGEN SATURATION: 96 % | WEIGHT: 159.38 LBS | RESPIRATION RATE: 16 BRPM | HEART RATE: 71 BPM | TEMPERATURE: 98 F | HEIGHT: 64 IN

## 2018-10-30 DIAGNOSIS — F11.20 NARCOTIC DEPENDENCE: Primary | ICD-10-CM

## 2018-10-30 PROCEDURE — 99214 OFFICE O/P EST MOD 30 MIN: CPT | Mod: S$GLB,,, | Performed by: INTERNAL MEDICINE

## 2018-10-30 PROCEDURE — 80305 DRUG TEST PRSMV DIR OPT OBS: CPT | Mod: QW,S$GLB,, | Performed by: INTERNAL MEDICINE

## 2018-10-30 RX ORDER — BUPRENORPHINE AND NALOXONE 8; 2 MG/1; MG/1
FILM, SOLUBLE BUCCAL; SUBLINGUAL
Qty: 75 PACKET | Refills: 0 | Status: SHIPPED | OUTPATIENT
Start: 2018-10-30 | End: 2018-12-05 | Stop reason: SDUPTHER

## 2018-10-30 NOTE — PROGRESS NOTES
Health Maintenance Due   Topic Date Due    Hepatitis C Screening  1955    Lipid Panel  1955    TETANUS VACCINE  09/28/1971    Mammogram  09/28/1993    DEXA SCAN  09/28/1993    Colonoscopy  09/28/2003    Zoster Vaccine  09/28/2013
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
## 2018-10-31 ENCOUNTER — HOSPITAL ENCOUNTER (OUTPATIENT)
Dept: RADIOLOGY | Facility: CLINIC | Age: 63
Discharge: HOME | End: 2018-10-31
Attending: INTERNAL MEDICINE
Payer: MEDICARE

## 2018-10-31 DIAGNOSIS — Z12.39 BREAST CANCER SCREENING: ICD-10-CM

## 2018-10-31 PROCEDURE — 77067 SCR MAMMO BI INCL CAD: CPT | Mod: TC,PO

## 2018-10-31 PROCEDURE — 77063 BREAST TOMOSYNTHESIS BI: CPT | Mod: 26,,, | Performed by: RADIOLOGY

## 2018-10-31 PROCEDURE — 77067 SCR MAMMO BI INCL CAD: CPT | Mod: 26,,, | Performed by: RADIOLOGY

## 2018-10-31 PROCEDURE — 77063 BREAST TOMOSYNTHESIS BI: CPT | Mod: TC,PO

## 2018-11-15 ENCOUNTER — HOSPITAL ENCOUNTER (OUTPATIENT)
Dept: RADIOLOGY | Facility: CLINIC | Age: 63
Discharge: HOME | End: 2018-11-15
Attending: INTERNAL MEDICINE
Payer: MEDICARE

## 2018-11-15 DIAGNOSIS — R10.13 EPIGASTRIC PAIN: ICD-10-CM

## 2018-11-15 DIAGNOSIS — Z78.0 POSTMENOPAUSAL: ICD-10-CM

## 2018-11-15 PROCEDURE — 77080 DXA BONE DENSITY AXIAL: CPT | Mod: TC,PO

## 2018-11-15 PROCEDURE — 77080 DXA BONE DENSITY AXIAL: CPT | Mod: 26,,, | Performed by: RADIOLOGY

## 2018-11-15 PROCEDURE — 76700 US EXAM ABDOM COMPLETE: CPT | Mod: 26,,, | Performed by: RADIOLOGY

## 2018-11-15 PROCEDURE — 76700 US EXAM ABDOM COMPLETE: CPT | Mod: TC,PO

## 2018-11-16 DIAGNOSIS — R10.12 LEFT UPPER QUADRANT ABDOMINAL PAIN OF UNKNOWN ETIOLOGY: ICD-10-CM

## 2018-11-16 DIAGNOSIS — M81.0 AGE-RELATED OSTEOPOROSIS WITHOUT CURRENT PATHOLOGICAL FRACTURE: ICD-10-CM

## 2018-11-16 DIAGNOSIS — M81.0 OSTEOPOROSIS, UNSPECIFIED OSTEOPOROSIS TYPE, UNSPECIFIED PATHOLOGICAL FRACTURE PRESENCE: Primary | ICD-10-CM

## 2018-11-16 RX ORDER — ALENDRONATE SODIUM 70 MG/1
70 TABLET ORAL
Qty: 4 TABLET | Refills: 11 | Status: SHIPPED | OUTPATIENT
Start: 2018-11-16 | End: 2019-06-03 | Stop reason: SDUPTHER

## 2018-11-27 ENCOUNTER — HOSPITAL ENCOUNTER (OUTPATIENT)
Dept: RADIOLOGY | Facility: HOSPITAL | Age: 63
Discharge: HOME | End: 2018-11-27
Attending: INTERNAL MEDICINE
Payer: MEDICARE

## 2018-11-27 DIAGNOSIS — R10.12 LEFT UPPER QUADRANT ABDOMINAL PAIN OF UNKNOWN ETIOLOGY: ICD-10-CM

## 2018-11-27 PROCEDURE — 74170 CT ABD WO CNTRST FLWD CNTRST: CPT | Mod: TC

## 2018-11-27 PROCEDURE — 74170 CT ABD WO CNTRST FLWD CNTRST: CPT | Mod: 26,,, | Performed by: RADIOLOGY

## 2018-11-27 PROCEDURE — 25500020 PHARM REV CODE 255

## 2018-11-27 RX ORDER — SODIUM CHLORIDE 9 MG/ML
INJECTION, SOLUTION INTRAVENOUS
Status: DISPENSED
Start: 2018-11-27 | End: 2018-11-28

## 2018-11-27 RX ADMIN — IOHEXOL 75 ML: 350 INJECTION, SOLUTION INTRAVENOUS at 01:11

## 2018-12-05 ENCOUNTER — DOCUMENTATION ONLY (OUTPATIENT)
Dept: FAMILY MEDICINE | Facility: CLINIC | Age: 63
End: 2018-12-05

## 2018-12-05 ENCOUNTER — OFFICE VISIT (OUTPATIENT)
Dept: FAMILY MEDICINE | Facility: CLINIC | Age: 63
End: 2018-12-05
Payer: MEDICARE

## 2018-12-05 VITALS
HEART RATE: 89 BPM | BODY MASS INDEX: 27.36 KG/M2 | WEIGHT: 160.25 LBS | DIASTOLIC BLOOD PRESSURE: 92 MMHG | OXYGEN SATURATION: 95 % | HEIGHT: 64 IN | RESPIRATION RATE: 16 BRPM | SYSTOLIC BLOOD PRESSURE: 156 MMHG

## 2018-12-05 DIAGNOSIS — K86.89 PANCREATIC MASS: Primary | ICD-10-CM

## 2018-12-05 DIAGNOSIS — R11.2 NON-INTRACTABLE VOMITING WITH NAUSEA, UNSPECIFIED VOMITING TYPE: ICD-10-CM

## 2018-12-05 DIAGNOSIS — F11.20 NARCOTIC DEPENDENCE: ICD-10-CM

## 2018-12-05 PROCEDURE — 99214 OFFICE O/P EST MOD 30 MIN: CPT | Mod: S$GLB,,, | Performed by: INTERNAL MEDICINE

## 2018-12-05 RX ORDER — ONDANSETRON 4 MG/1
4 TABLET, FILM COATED ORAL EVERY 8 HOURS PRN
Qty: 50 TABLET | Refills: 5 | Status: SHIPPED | OUTPATIENT
Start: 2018-12-05 | End: 2019-01-01

## 2018-12-05 RX ORDER — BUPRENORPHINE AND NALOXONE 8; 2 MG/1; MG/1
FILM, SOLUBLE BUCCAL; SUBLINGUAL
Qty: 70 PACKET | Refills: 0 | Status: SHIPPED | OUTPATIENT
Start: 2018-12-05 | End: 2019-01-04 | Stop reason: SDUPTHER

## 2018-12-05 NOTE — PROGRESS NOTES
Health Maintenance Due   Topic Date Due    TETANUS VACCINE  09/28/1971    Colonoscopy  09/28/2003    Zoster Vaccine  09/28/2013

## 2018-12-05 NOTE — PROGRESS NOTES
"Subjective:       Patient ID: Kendra Lawrence is a 63 y.o. female.    Chief Complaint: opioid dependence    HPI     The patient presents for medical management of opioid dependency. she is receiving maintenance therapy with buprenorphine.      CHIEF COMPLAINT: opoid dependence  HPI:     ONSET/TIMING:     DURATION: . Continuous(+).    QUALITY/COURSE:  unchanged.     INTENSITY/SEVERITY:  controlled.    CONTEXT/WHEN:     MODIFIERS/TREATMENTS:  Taking medications(+) Suboxone  8/2 # 75,   last rx given 10/31/18. . .    SYMPTOMS/RELATED: no withdrawal symptoms. no cravings . No substance abuse.  No alcohol use     pnp checked: last month:  yes      Review of Systems   Constitutional: Negative for diaphoresis, fatigue and unexpected weight change.   Gastrointestinal: Positive for abdominal pain and nausea. Negative for constipation, diarrhea and vomiting.   Neurological: Negative for dizziness, light-headedness and headaches.   Psychiatric/Behavioral: Negative for dysphoric mood and sleep disturbance. The patient is not nervous/anxious.          Objective:      Vitals:    12/05/18 1501   BP: (!) 156/92   Pulse: 89   Resp: 16   SpO2: 95%   Weight: 72.7 kg (160 lb 4.4 oz)   Height: 5' 4" (1.626 m)   PainSc: 0-No pain     Physical Exam   Constitutional: She appears well-developed and well-nourished.   Cardiovascular: Normal rate, regular rhythm and normal heart sounds.   Pulmonary/Chest: Effort normal and breath sounds normal.   Abdominal: Soft. There is tenderness (epigastric).   Neurological: She is alert.   Psychiatric: She has a normal mood and affect. Her behavior is normal. Thought content normal.   Nursing note and vitals reviewed.        Assessment:       1. Pancreatic mass    2. Narcotic dependence    3. Non-intractable vomiting with nausea, unspecified vomiting type          Plan:   (+) pt taking medication as prescribed.    (+) dose is approproate.    (-) urine drug screen done. Our fault , will get full " prescription.     (+) discussed risks of buprenorphine, including to others.     (+) assessed if benefits outweigh risks of buprenorphine. .     (+) reviewed safe storage of medication.     (+) discussed proper use of buprenorphine, including missed doses.     Pancreatic mass  -     Ambulatory Referral to Gastroenterology    Narcotic dependence  -     buprenorphine-naloxone (SUBOXONE) 8-2 mg Film; 1 sl qam and  QHS, half sl q 3 pm.  Dispense: 70 packet; Refill: 0    Non-intractable vomiting with nausea, unspecified vomiting type  -     ondansetron (ZOFRAN) 4 MG tablet; Take 1 tablet (4 mg total) by mouth every 8 (eight) hours as needed for Nausea.  Dispense: 50 tablet; Refill: 5      More than 20 min spent with the patient over half in counseling.    Follow-up in about 1 month (around 1/5/2019).

## 2018-12-11 ENCOUNTER — TELEPHONE (OUTPATIENT)
Dept: FAMILY MEDICINE | Facility: CLINIC | Age: 63
End: 2018-12-11

## 2018-12-11 NOTE — TELEPHONE ENCOUNTER
----- Message from Ezio Wood sent at 12/11/2018 10:15 AM CST -----  Type: Needs Medical Advice    Who Called:  Daughter  Best Call Back Number: [phone]  Additional Information: Patient needs new referral - Dr. Valera is out of the office. Please call to advise.

## 2018-12-12 ENCOUNTER — TELEPHONE (OUTPATIENT)
Dept: FAMILY MEDICINE | Facility: CLINIC | Age: 63
End: 2018-12-12

## 2018-12-12 NOTE — TELEPHONE ENCOUNTER
----- Message from Oralia Francois sent at 12/12/2018  8:55 AM CST -----  Contact: Patients daughter  Type: Needs Medical Advice    Who Called:  Patients daughter  Symptoms (please be specific):  na  How long has patient had these symptoms:  scottie  Pharmacy name and phone #:  [facility]  Best Call Back Number: [phone]    Additional Information: Patient needs a new referral for Gastro, Dr. Valera is out on maternity leave until January. Patients daughter is requesting a call to update. Please call to advise. Thank you!

## 2018-12-12 NOTE — TELEPHONE ENCOUNTER
Message sent to providers on daniele cota.  Daughter found provider at [facility] also.

## 2018-12-14 ENCOUNTER — TELEPHONE (OUTPATIENT)
Dept: ENDOSCOPY | Facility: HOSPITAL | Age: 63
End: 2018-12-14

## 2018-12-14 DIAGNOSIS — R93.89 ABNORMAL FINDING ON IMAGING: Primary | ICD-10-CM

## 2018-12-14 NOTE — TELEPHONE ENCOUNTER
----- Message from Yariel Arango MD sent at 12/12/2018  4:47 PM CST -----  Regarding: RE: pancreatic mass  Need EUS for abnormal CT scan suspected pancreas mass.  Yariel Arango MD  ----- Message -----  From: Shelby Lara MA  Sent: 12/12/2018   2:35 PM  To: Yariel Arango MD  Subject: FW: pancreatic mass                               Please review and advise  ----- Message -----  From: Kavita Neumann LPN  Sent: 12/12/2018   2:25 PM  To: Jorge ELIZALDE Staff [facility], #  Subject: pancreatic mass                                  I scheduled first available.  Can you please contact patient for sooner appointment if possible.  Thanks for your help

## 2018-12-17 ENCOUNTER — TELEPHONE (OUTPATIENT)
Dept: FAMILY MEDICINE | Facility: CLINIC | Age: 63
End: 2018-12-17

## 2018-12-17 NOTE — TELEPHONE ENCOUNTER
----- Message from Carl Rios sent at 12/17/2018 10:24 AM CST -----  Contact: Patient  Advised she is having an endoscope with Dr. Negrete on 12-18-18 at 2:PM.  Patient wanted to let  know about this procedure.    Please call [phone] (C)

## 2018-12-18 PROBLEM — R93.3 ABNORMAL FINDINGS ON DIAGNOSTIC IMAGING OF DIGESTIVE SYSTEM: Status: ACTIVE | Noted: 2018-12-18

## 2018-12-20 ENCOUNTER — TELEPHONE (OUTPATIENT)
Dept: FAMILY MEDICINE | Facility: CLINIC | Age: 63
End: 2018-12-20

## 2018-12-21 ENCOUNTER — CLINICAL SUPPORT (OUTPATIENT)
Dept: FAMILY MEDICINE | Facility: CLINIC | Age: 63
End: 2018-12-21
Payer: MEDICARE

## 2018-12-21 DIAGNOSIS — F11.20 NARCOTIC DEPENDENCE: Primary | ICD-10-CM

## 2018-12-21 PROBLEM — R93.5 ABNORMAL FINDINGS ON DIAGNOSTIC IMAGING OF ABDOMEN: Status: ACTIVE | Noted: 2018-12-21

## 2018-12-21 PROCEDURE — 80305 DRUG TEST PRSMV DIR OPT OBS: CPT | Mod: QW,S$GLB,, | Performed by: INTERNAL MEDICINE

## 2019-01-01 ENCOUNTER — CLINICAL SUPPORT (OUTPATIENT)
Dept: FAMILY MEDICINE | Facility: CLINIC | Age: 64
End: 2019-01-01
Payer: MEDICARE

## 2019-01-01 ENCOUNTER — DOCUMENTATION ONLY (OUTPATIENT)
Dept: FAMILY MEDICINE | Facility: CLINIC | Age: 64
End: 2019-01-01

## 2019-01-01 ENCOUNTER — OFFICE VISIT (OUTPATIENT)
Dept: FAMILY MEDICINE | Facility: CLINIC | Age: 64
End: 2019-01-01
Payer: MEDICARE

## 2019-01-01 ENCOUNTER — TELEPHONE (OUTPATIENT)
Dept: FAMILY MEDICINE | Facility: CLINIC | Age: 64
End: 2019-01-01

## 2019-01-01 VITALS
SYSTOLIC BLOOD PRESSURE: 136 MMHG | OXYGEN SATURATION: 97 % | TEMPERATURE: 98 F | BODY MASS INDEX: 28.68 KG/M2 | WEIGHT: 168 LBS | HEART RATE: 80 BPM | RESPIRATION RATE: 16 BRPM | HEIGHT: 64 IN | DIASTOLIC BLOOD PRESSURE: 66 MMHG

## 2019-01-01 VITALS
HEIGHT: 64 IN | HEART RATE: 83 BPM | WEIGHT: 170 LBS | RESPIRATION RATE: 16 BRPM | DIASTOLIC BLOOD PRESSURE: 68 MMHG | OXYGEN SATURATION: 96 % | BODY MASS INDEX: 29.02 KG/M2 | SYSTOLIC BLOOD PRESSURE: 130 MMHG | TEMPERATURE: 98 F

## 2019-01-01 DIAGNOSIS — F11.20 NARCOTIC DEPENDENCE: Primary | ICD-10-CM

## 2019-01-01 DIAGNOSIS — R11.2 NON-INTRACTABLE VOMITING WITH NAUSEA, UNSPECIFIED VOMITING TYPE: ICD-10-CM

## 2019-01-01 DIAGNOSIS — F11.20 NARCOTIC DEPENDENCE: ICD-10-CM

## 2019-01-01 DIAGNOSIS — R53.83 FATIGUE, UNSPECIFIED TYPE: ICD-10-CM

## 2019-01-01 DIAGNOSIS — Z23 NEED FOR 23-POLYVALENT PNEUMOCOCCAL POLYSACCHARIDE VACCINE: Primary | ICD-10-CM

## 2019-01-01 DIAGNOSIS — K59.03 DRUG-INDUCED CONSTIPATION: ICD-10-CM

## 2019-01-01 LAB
AMP D-AMPHETAMINE 1000 NG/ML: NEGATIVE
AMP D-AMPHETAMINE 1000 NG/ML: NEGATIVE
BAR SECOBARBITAL 300 NG/ML: NEGATIVE
BAR SECOBARBITAL 300 NG/ML: NEGATIVE
BUP BUPRENORPHINE 10 NG/ML: POSITIVE
BUP BUPRENORPHINE 10 NG/ML: POSITIVE
BZO OXAZEPAM 300 NG/ML: NEGATIVE
BZO OXAZEPAM 300 NG/ML: NEGATIVE
COC BENZOYLECGONINE 300 NG/ML: NEGATIVE
COC BENZOYLECGONINE 300 NG/ML: NEGATIVE
CTP QC/QA: YES
MET D-METHAMPHETAMINE 500 NG/ML: NEGATIVE
MET D-METHAMPHETAMINE 500 NG/ML: NEGATIVE
MOP MORPHINE 300 NG/ML: NEGATIVE
MOP MORPHINE 300 NG/ML: NEGATIVE
MTD METHADONE 300 NG/ML: NEGATIVE
MTD METHADONE 300 NG/ML: NEGATIVE
QXY OXYCODONE 100 NG/ML: NEGATIVE
QXY OXYCODONE 100 NG/ML: NEGATIVE
THC 11-NOR-9-TETRAHYDROCANNABINOL-9-CARBOXYLIC ACID: NEGATIVE
THC 11-NOR-9-TETRAHYDROCANNABINOL-9-CARBOXYLIC ACID: NEGATIVE

## 2019-01-01 PROCEDURE — 1125F PR PAIN SEVERITY QUANTIFIED, PAIN PRESENT: ICD-10-PCS | Mod: S$GLB,,, | Performed by: INTERNAL MEDICINE

## 2019-01-01 PROCEDURE — 99213 PR OFFICE/OUTPT VISIT, EST, LEVL III, 20-29 MIN: ICD-10-PCS | Mod: 25,S$GLB,, | Performed by: INTERNAL MEDICINE

## 2019-01-01 PROCEDURE — 80305 POCT BUP URINE DRUG TEST: ICD-10-PCS | Mod: QW,S$GLB,, | Performed by: INTERNAL MEDICINE

## 2019-01-01 PROCEDURE — G0009 PNEUMOCOCCAL POLYSACCHARIDE VACCINE 23-VALENT =>2YO SQ IM: ICD-10-PCS | Mod: S$GLB,,, | Performed by: INTERNAL MEDICINE

## 2019-01-01 PROCEDURE — 1125F AMNT PAIN NOTED PAIN PRSNT: CPT | Mod: S$GLB,,, | Performed by: INTERNAL MEDICINE

## 2019-01-01 PROCEDURE — 99213 OFFICE O/P EST LOW 20 MIN: CPT | Mod: 25,S$GLB,, | Performed by: INTERNAL MEDICINE

## 2019-01-01 PROCEDURE — 80305 DRUG TEST PRSMV DIR OPT OBS: CPT | Mod: QW,S$GLB,, | Performed by: INTERNAL MEDICINE

## 2019-01-01 PROCEDURE — 1159F PR MEDICATION LIST DOCUMENTED IN MEDICAL RECORD: ICD-10-PCS | Mod: S$GLB,,, | Performed by: INTERNAL MEDICINE

## 2019-01-01 PROCEDURE — G0009 ADMIN PNEUMOCOCCAL VACCINE: HCPCS | Mod: S$GLB,,, | Performed by: INTERNAL MEDICINE

## 2019-01-01 PROCEDURE — 99213 OFFICE O/P EST LOW 20 MIN: CPT | Mod: S$GLB,,, | Performed by: INTERNAL MEDICINE

## 2019-01-01 PROCEDURE — 90732 PPSV23 VACC 2 YRS+ SUBQ/IM: CPT | Mod: S$GLB,,, | Performed by: INTERNAL MEDICINE

## 2019-01-01 PROCEDURE — 90732 PNEUMOCOCCAL POLYSACCHARIDE VACCINE 23-VALENT =>2YO SQ IM: ICD-10-PCS | Mod: S$GLB,,, | Performed by: INTERNAL MEDICINE

## 2019-01-01 PROCEDURE — 99213 PR OFFICE/OUTPT VISIT, EST, LEVL III, 20-29 MIN: ICD-10-PCS | Mod: S$GLB,,, | Performed by: INTERNAL MEDICINE

## 2019-01-01 PROCEDURE — 1159F MED LIST DOCD IN RCRD: CPT | Mod: S$GLB,,, | Performed by: INTERNAL MEDICINE

## 2019-01-01 RX ORDER — BUPRENORPHINE AND NALOXONE 8; 2 MG/1; MG/1
1 FILM, SOLUBLE BUCCAL; SUBLINGUAL 2 TIMES DAILY
Qty: 60 PACKET | Refills: 0 | Status: SHIPPED | OUTPATIENT
Start: 2019-01-01 | End: 2019-01-01 | Stop reason: SDUPTHER

## 2019-01-01 RX ORDER — AMOXICILLIN 250 MG
2 CAPSULE ORAL 2 TIMES DAILY
Qty: 60 TABLET | Refills: 5 | Status: ON HOLD | COMMUNITY
Start: 2019-01-01 | End: 2020-01-01 | Stop reason: HOSPADM

## 2019-01-01 RX ORDER — BUPRENORPHINE AND NALOXONE 8; 2 MG/1; MG/1
1 FILM, SOLUBLE BUCCAL; SUBLINGUAL 2 TIMES DAILY
Qty: 60 PACKET | Refills: 0 | Status: SHIPPED | OUTPATIENT
Start: 2019-01-01 | End: 2020-01-01 | Stop reason: SDUPTHER

## 2019-01-01 RX ORDER — ONDANSETRON 4 MG/1
TABLET, FILM COATED ORAL
Qty: 50 TABLET | Refills: 5 | Status: SHIPPED | OUTPATIENT
Start: 2019-01-01 | End: 2020-01-01

## 2019-01-04 ENCOUNTER — OFFICE VISIT (OUTPATIENT)
Dept: FAMILY MEDICINE | Facility: CLINIC | Age: 64
End: 2019-01-04
Payer: MEDICARE

## 2019-01-04 VITALS
WEIGHT: 160.25 LBS | SYSTOLIC BLOOD PRESSURE: 128 MMHG | HEIGHT: 64 IN | RESPIRATION RATE: 14 BRPM | HEART RATE: 81 BPM | DIASTOLIC BLOOD PRESSURE: 68 MMHG | TEMPERATURE: 98 F | BODY MASS INDEX: 27.36 KG/M2 | OXYGEN SATURATION: 96 %

## 2019-01-04 DIAGNOSIS — F32.A ANXIETY AND DEPRESSION: ICD-10-CM

## 2019-01-04 DIAGNOSIS — F41.9 ANXIETY AND DEPRESSION: ICD-10-CM

## 2019-01-04 DIAGNOSIS — F11.20 NARCOTIC DEPENDENCE: Primary | ICD-10-CM

## 2019-01-04 PROCEDURE — 99213 OFFICE O/P EST LOW 20 MIN: CPT | Mod: S$GLB,,, | Performed by: INTERNAL MEDICINE

## 2019-01-04 PROCEDURE — 99213 PR OFFICE/OUTPT VISIT, EST, LEVL III, 20-29 MIN: ICD-10-PCS | Mod: S$GLB,,, | Performed by: INTERNAL MEDICINE

## 2019-01-04 RX ORDER — BUPRENORPHINE AND NALOXONE 8; 2 MG/1; MG/1
FILM, SOLUBLE BUCCAL; SUBLINGUAL
Qty: 65 PACKET | Refills: 0 | Status: SHIPPED | OUTPATIENT
Start: 2019-01-04 | End: 2019-02-01 | Stop reason: SDUPTHER

## 2019-01-04 RX ORDER — LISINOPRIL AND HYDROCHLOROTHIAZIDE 20; 25 MG/1; MG/1
TABLET ORAL
COMMUNITY
End: 2019-01-04

## 2019-01-04 RX ORDER — PANTOPRAZOLE SODIUM 40 MG/1
TABLET, DELAYED RELEASE ORAL
COMMUNITY
End: 2019-04-02

## 2019-01-04 RX ORDER — DULOXETIN HYDROCHLORIDE 60 MG/1
60 CAPSULE, DELAYED RELEASE ORAL DAILY
Qty: 30 CAPSULE | Refills: 11 | Status: SHIPPED | OUTPATIENT
Start: 2019-01-04 | End: 2020-01-01

## 2019-01-04 NOTE — PROGRESS NOTES
"Subjective:       Patient ID: Kendra Lawrence is a 63 y.o. female.    Chief Complaint: Drug Problem    Drug Problem   Associated symptoms include nausea. Pertinent negatives include no vomiting.      The patient presents for medical management of opioid dependency. she is receiving maintenance therapy with buprenorphine.      CHIEF COMPLAINT: opoid dependence  HPI:     ONSET/TIMING:     DURATION: . Continuous(+).    QUALITY/COURSE:  unchanged.     INTENSITY/SEVERITY:  controlled.    CONTEXT/WHEN:     MODIFIERS/TREATMENTS:  Taking medications(+) Suboxone  8/2 # 70,   last rx given 12/5/18    SYMPTOMS/RELATED: no withdrawal symptoms. no cravings . No substance abuse.  No alcohol use     pnp checked: last month:  yes      Review of Systems   Constitutional: Negative for diaphoresis, fatigue and unexpected weight change.   Gastrointestinal: Positive for abdominal pain and nausea. Negative for constipation, diarrhea and vomiting.   Neurological: Negative for dizziness, light-headedness and headaches.   Psychiatric/Behavioral: Negative for dysphoric mood and sleep disturbance. The patient is not nervous/anxious.          Objective:      Vitals:    01/04/19 1335   BP: 128/68   Pulse: 81   Resp: 14   Temp: 97.9 °F (36.6 °C)   TempSrc: Oral   SpO2: 96%   Weight: 72.7 kg (160 lb 4.4 oz)   Height: 5' 4" (1.626 m)   PainSc: 0-No pain     Physical Exam   Constitutional: She appears well-developed and well-nourished.   Cardiovascular: Normal rate, regular rhythm and normal heart sounds.   Pulmonary/Chest: Effort normal and breath sounds normal.   Abdominal: Soft. There is no tenderness.   Neurological: She is alert.   Psychiatric: She has a normal mood and affect. Her behavior is normal. Thought content normal.   Nursing note and vitals reviewed.        Assessment:       1. Narcotic dependence    2. Anxiety and depression          Plan:   (+) pt taking medication as prescribed.    (+) dose is approproate.    (+) urine drug screen " done.     (+) discussed risks of buprenorphine, including to others.     (+) assessed if benefits outweigh risks of buprenorphine. .     (+) reviewed safe storage of medication.     (+) discussed proper use of buprenorphine, including missed doses.     Narcotic dependence  -     buprenorphine-naloxone (SUBOXONE) 8-2 mg Film; 1 sl qam and  QHS, half sl q 3 pm.  Dispense: 65 packet; Refill: 0    Anxiety and depression  -     DULoxetine (CYMBALTA) 60 MG capsule; Take 1 capsule (60 mg total) by mouth once daily.  Dispense: 30 capsule; Refill: 11      More than 20 min spent with the patient over half in counseling.    Follow-up in about 1 month (around 2/4/2019).

## 2019-01-08 ENCOUNTER — TELEPHONE (OUTPATIENT)
Dept: SURGERY | Facility: CLINIC | Age: 64
End: 2019-01-08

## 2019-01-09 ENCOUNTER — TELEPHONE (OUTPATIENT)
Dept: FAMILY MEDICINE | Facility: CLINIC | Age: 64
End: 2019-01-09

## 2019-01-16 ENCOUNTER — INITIAL CONSULT (OUTPATIENT)
Dept: SURGICAL ONCOLOGY | Facility: CLINIC | Age: 64
End: 2019-01-16
Payer: MEDICARE

## 2019-01-16 ENCOUNTER — TELEPHONE (OUTPATIENT)
Dept: FAMILY MEDICINE | Facility: CLINIC | Age: 64
End: 2019-01-16

## 2019-01-16 VITALS
HEART RATE: 71 BPM | HEIGHT: 63 IN | BODY MASS INDEX: 28.82 KG/M2 | TEMPERATURE: 98 F | SYSTOLIC BLOOD PRESSURE: 189 MMHG | WEIGHT: 162.69 LBS | DIASTOLIC BLOOD PRESSURE: 93 MMHG

## 2019-01-16 DIAGNOSIS — K31.84 GASTROPARESIS: ICD-10-CM

## 2019-01-16 DIAGNOSIS — R93.3 ABNORMAL FINDINGS ON DIAGNOSTIC IMAGING OF DIGESTIVE SYSTEM: Primary | ICD-10-CM

## 2019-01-16 PROCEDURE — 99999 PR PBB SHADOW E&M-EST. PATIENT-LVL III: CPT | Mod: PBBFAC,,, | Performed by: SURGERY

## 2019-01-16 PROCEDURE — 99213 OFFICE O/P EST LOW 20 MIN: CPT | Mod: PBBFAC,PN | Performed by: SURGERY

## 2019-01-16 PROCEDURE — 99999 PR PBB SHADOW E&M-EST. PATIENT-LVL III: ICD-10-PCS | Mod: PBBFAC,,, | Performed by: SURGERY

## 2019-01-16 PROCEDURE — 99204 PR OFFICE/OUTPT VISIT, NEW, LEVL IV, 45-59 MIN: ICD-10-PCS | Mod: S$PBB,,, | Performed by: SURGERY

## 2019-01-16 PROCEDURE — 99204 OFFICE O/P NEW MOD 45 MIN: CPT | Mod: S$PBB,,, | Performed by: SURGERY

## 2019-01-16 RX ORDER — LISINOPRIL 10 MG/1
10 TABLET ORAL DAILY
COMMUNITY
End: 2019-06-26

## 2019-01-16 NOTE — LETTER
January 16, 2019      Paras Negrete MD  [address]   [facility], [facility] [address]           [facility]  [facility], [address]  [facility] [address]  Phone: [phone]  Fax: [phone]          Patient: Kendra Lawrence   MR Number: [id number]   YOB: 1955   Date of Visit: 1/16/2019       Dear Dr. Paras Negrete:    Thank you for referring Kendra Lawrence to me for evaluation. Attached you will find relevant portions of my assessment and plan of care.    If you have questions, please do not hesitate to call me. I look forward to following Kendra Lawrence along with you.    Sincerely,    Haseeb Su MD    Enclosure  CC:  No Recipients    If you would like to receive this communication electronically, please contact [email or web address] or [phone] to request more information on [facility] Link access.    For providers and/or their staff who would like to refer a patient to [facility], please contact us through our one-stop-shop provider referral line, [facility], at [phone].    If you feel you have received this communication in error or would no longer like to receive these types of communications, please e-mail [email or web address]

## 2019-01-16 NOTE — PROGRESS NOTES
"64 yo lady, self-referred, for evaluation of possible periampullary mass. She has a long history of bloating, nausea, and periodic vomiting and as part of the workup of this an ultrasound was done showing biliary dilation. This lead to a CT scan in late November which confirmed dilation of the intrahepatic and extrahepatic biliary tree down to the ampulla. The PD was only minimally dilated, and the pancreas was generally atrophic with no mass. The radiologist thought there might be a small mass at the ampulla (dictated as "suspected" and "probable"). Also noted was rather massive dilation of the stomach with retained food.   In 2002, Mrs Lawrence underwent urgent surgery at [facility] by Dr Ruiz for what she was told was pancreatic cancer. Postop, she was told that a large stomach ulcer was found and "repaired". She does not know any other details. The hospital records were lost during [facility]. Prior to that surgery, she was having bloating, nausea, and vomiting much like she is today. She tells me that these symptoms got much better after the surgery and remained better for years, but have started to get worse over the past few years.   In spite of her epigastric symptoms, her weight is stable, she has no pain, and is fully active. She does vomit, but infrequently (once or twice a month). Overall, she is living pretty well with he GI symptoms,  But now is frightened that she may have cancer.  She is on Suboxone as a recovering opiod addict. Initially became addicted because she has had many ('more than twenty") various operation during the years and became addicted as a result. She does not currently have a chronic pain problem.     Past Medical History:   Diagnosis Date    Asthma     COPD (chronic obstructive pulmonary disease)     Hypertension    opiod addiction       Medication List           Accurate as of 1/16/19  2:46 PM. If you have any questions, ask your nurse or doctor.             " "  CONTINUE taking these medications    alendronate 70 MG tablet  Commonly known as:  FOSAMAX  Take 1 tablet (70 mg total) by mouth every 7 days.     buprenorphine-naloxone 8-2 mg Film  Commonly known as:  SUBOXONE  1 sl qam and  QHS, half sl q 3 pm.     DULoxetine 60 MG capsule  Commonly known as:  CYMBALTA  Take 1 capsule (60 mg total) by mouth once daily.     fluticasone-umeclidin-vilanter 100-62.5-25 mcg Dsdv  Commonly known as:  TRELEGY ELLIPTA  Inhale 1 puff into the lungs once daily.     inhalation spacing device  Use as directed for inhalation.     LINZESS 290 mcg Cap  Generic drug:  linaclotide     lipase-protease-amylase 24,000-76,000-120,000 units 24,000-76,000 -120,000 unit capsule  Commonly known as:  CREON  Take 1 capsule by mouth 3 (three) times daily with meals.     lisinopril 10 MG tablet     ondansetron 4 MG tablet  Commonly known as:  ZOFRAN  Take 1 tablet (4 mg total) by mouth every 8 (eight) hours as needed for Nausea.     pantoprazole 40 MG tablet  Commonly known as:  PROTONIX     polyethylene glycol 17 gram/dose powder  Commonly known as:  GLYCOLAX  Take 17 g by mouth once daily.          Review of patient's allergies indicates:  No Known Allergies    ROS negative other than HPI    Vitals:    01/16/19 1410   BP: (!) 189/93   Pulse: 71   Temp: 98 °F (36.7 °C)     WD, mildly overweight lady in NAD  Anicteric sclera  Neck supple  Chest clear to ausc  Heart: RR&R with no m  Abd: obese, soft, with well healed Chevron incision  Ext neg    Labs include normal LFTs. I personally reviewed her CT scan with the finding noted above. The "mass" at the ampulla is very subtle, and I am not certain that a mass is present.     Imp:  1. Gastroparesis  2. Dilated biliary tree, of unknown clinical significance in view of normal LFTs  3. ?? Ampullary mass??    Rec:  Dr Negrete has not been able to do either an EUS (because of retained food stuff in the stomach), or a direct endoscopic visualization with biopsy of " the ampulla (probably because of altered post-surgical anatomy (could she have a B-II reconstruction?), so we have not been able to confirm (or refute) the presence of a neoplasm at the [facility]. I agree with Dr Negrete's recommendation for MRI/MRCP. I'm not suure this will provide a definitive answer. She may need a third attempt at direct endoscopic visualization of the papilla and/or EUS. I do not think surgical explorations/Whipple is indicated with more evidence that a clinically significant (I.e. Neoplasm) problem exists   Long talk with patient and her daughter.     Copy Dr Negrete

## 2019-01-17 ENCOUNTER — CLINICAL SUPPORT (OUTPATIENT)
Dept: FAMILY MEDICINE | Facility: CLINIC | Age: 64
End: 2019-01-17
Payer: MEDICARE

## 2019-01-17 DIAGNOSIS — F11.20 NARCOTIC DEPENDENCE: Primary | ICD-10-CM

## 2019-01-17 PROCEDURE — 80305 DRUG TEST PRSMV DIR OPT OBS: CPT | Mod: QW,S$GLB,, | Performed by: INTERNAL MEDICINE

## 2019-01-17 PROCEDURE — 80305 POCT BUP URINE DRUG TEST: ICD-10-PCS | Mod: QW,S$GLB,, | Performed by: INTERNAL MEDICINE

## 2019-01-21 ENCOUNTER — PATIENT OUTREACH (OUTPATIENT)
Dept: ADMINISTRATIVE | Facility: HOSPITAL | Age: 64
End: 2019-01-21

## 2019-02-01 ENCOUNTER — DOCUMENTATION ONLY (OUTPATIENT)
Dept: FAMILY MEDICINE | Facility: CLINIC | Age: 64
End: 2019-02-01

## 2019-02-01 ENCOUNTER — OFFICE VISIT (OUTPATIENT)
Dept: FAMILY MEDICINE | Facility: CLINIC | Age: 64
End: 2019-02-01
Payer: MEDICARE

## 2019-02-01 VITALS
OXYGEN SATURATION: 96 % | WEIGHT: 161.81 LBS | HEIGHT: 63 IN | SYSTOLIC BLOOD PRESSURE: 136 MMHG | BODY MASS INDEX: 28.67 KG/M2 | DIASTOLIC BLOOD PRESSURE: 88 MMHG | HEART RATE: 79 BPM | RESPIRATION RATE: 16 BRPM

## 2019-02-01 DIAGNOSIS — F11.20 NARCOTIC DEPENDENCE: ICD-10-CM

## 2019-02-01 PROCEDURE — 99213 PR OFFICE/OUTPT VISIT, EST, LEVL III, 20-29 MIN: ICD-10-PCS | Mod: S$GLB,,, | Performed by: INTERNAL MEDICINE

## 2019-02-01 PROCEDURE — 99213 OFFICE O/P EST LOW 20 MIN: CPT | Mod: S$GLB,,, | Performed by: INTERNAL MEDICINE

## 2019-02-01 RX ORDER — BUPRENORPHINE AND NALOXONE 8; 2 MG/1; MG/1
1 FILM, SOLUBLE BUCCAL; SUBLINGUAL 2 TIMES DAILY
Qty: 60 PACKET | Refills: 0 | Status: SHIPPED | OUTPATIENT
Start: 2019-02-01 | End: 2019-03-01 | Stop reason: SDUPTHER

## 2019-02-01 RX ORDER — BUPRENORPHINE AND NALOXONE 8; 2 MG/1; MG/1
FILM, SOLUBLE BUCCAL; SUBLINGUAL
Qty: 60 PACKET | Refills: 0 | Status: SHIPPED | OUTPATIENT
Start: 2019-02-01 | End: 2019-02-01

## 2019-02-01 NOTE — PROGRESS NOTES
"Subjective:       Patient ID: eKndra Lawrence is a 63 y.o. female.    Chief Complaint: opioid dependence    Drug Problem   Associated symptoms include nausea. Pertinent negatives include no vomiting.      The patient presents for medical management of opioid dependency. she is receiving maintenance therapy with buprenorphine.      CHIEF COMPLAINT: opoid dependence  HPI:     ONSET/TIMING:     DURATION: . Continuous(+).    QUALITY/COURSE:  unchanged.     INTENSITY/SEVERITY:  controlled.    CONTEXT/WHEN:     MODIFIERS/TREATMENTS:  Taking medications(+) Suboxone  8/2 # 65,   last rx given 1/4/19    SYMPTOMS/RELATED: no withdrawal symptoms. no cravings . No substance abuse.  No alcohol use     pnp checked: last month:  yes      Review of Systems   Constitutional: Negative for diaphoresis, fatigue and unexpected weight change.   Gastrointestinal: Positive for abdominal pain and nausea. Negative for constipation, diarrhea and vomiting.   Neurological: Negative for dizziness, light-headedness and headaches.   Psychiatric/Behavioral: Negative for dysphoric mood and sleep disturbance. The patient is not nervous/anxious.          Objective:      Vitals:    02/01/19 1546   BP: 136/88   Pulse: 79   Resp: 16   SpO2: 96%   Weight: 73.4 kg (161 lb 13.1 oz)   Height: 5' 3" (1.6 m)   PainSc: 0-No pain     Physical Exam   Constitutional: She appears well-developed and well-nourished.   Cardiovascular: Normal rate, regular rhythm and normal heart sounds.   Pulmonary/Chest: Effort normal and breath sounds normal.   Abdominal: Soft. There is no tenderness.   Neurological: She is alert.   Psychiatric: She has a normal mood and affect. Her behavior is normal. Thought content normal.   Nursing note and vitals reviewed.        Assessment:       1. Narcotic dependence          Plan:   (+) pt taking medication as prescribed.    (+) dose is approproate.    (+) urine drug screen done.     (+) discussed risks of buprenorphine, including to others. "     (+) assessed if benefits outweigh risks of buprenorphine. .     (+) reviewed safe storage of medication.     (+) discussed proper use of buprenorphine, including missed doses.     Narcotic dependence  -     buprenorphine-naloxone (SUBOXONE) 8-2 mg Film; 1 sl qam and  QHS, half sl q 3 pm.  Dispense: 60 packet; Refill: 0          Follow-up in about 1 month (around 3/1/2019).

## 2019-02-27 ENCOUNTER — TELEPHONE (OUTPATIENT)
Dept: FAMILY MEDICINE | Facility: CLINIC | Age: 64
End: 2019-02-27

## 2019-02-27 ENCOUNTER — CLINICAL SUPPORT (OUTPATIENT)
Dept: FAMILY MEDICINE | Facility: CLINIC | Age: 64
End: 2019-02-27
Payer: MEDICARE

## 2019-02-27 DIAGNOSIS — F11.20 NARCOTIC DEPENDENCE: Primary | ICD-10-CM

## 2019-02-27 PROCEDURE — 80305 POCT BUP URINE DRUG TEST: ICD-10-PCS | Mod: QW,S$GLB,, | Performed by: INTERNAL MEDICINE

## 2019-02-27 PROCEDURE — 80305 DRUG TEST PRSMV DIR OPT OBS: CPT | Mod: QW,S$GLB,, | Performed by: INTERNAL MEDICINE

## 2019-03-01 ENCOUNTER — OFFICE VISIT (OUTPATIENT)
Dept: FAMILY MEDICINE | Facility: CLINIC | Age: 64
End: 2019-03-01
Payer: MEDICARE

## 2019-03-01 ENCOUNTER — DOCUMENTATION ONLY (OUTPATIENT)
Dept: FAMILY MEDICINE | Facility: CLINIC | Age: 64
End: 2019-03-01

## 2019-03-01 VITALS
RESPIRATION RATE: 16 BRPM | OXYGEN SATURATION: 96 % | SYSTOLIC BLOOD PRESSURE: 126 MMHG | HEIGHT: 64 IN | DIASTOLIC BLOOD PRESSURE: 68 MMHG | BODY MASS INDEX: 27.92 KG/M2 | WEIGHT: 163.56 LBS | HEART RATE: 85 BPM

## 2019-03-01 DIAGNOSIS — F11.20 NARCOTIC DEPENDENCE: ICD-10-CM

## 2019-03-01 PROCEDURE — 99213 OFFICE O/P EST LOW 20 MIN: CPT | Mod: S$GLB,,, | Performed by: INTERNAL MEDICINE

## 2019-03-01 PROCEDURE — 99213 PR OFFICE/OUTPT VISIT, EST, LEVL III, 20-29 MIN: ICD-10-PCS | Mod: S$GLB,,, | Performed by: INTERNAL MEDICINE

## 2019-03-01 RX ORDER — SUCRALFATE 1 G/1
1 TABLET ORAL 2 TIMES DAILY
Refills: 3 | COMMUNITY
Start: 2019-02-28 | End: 2020-01-01 | Stop reason: CLARIF

## 2019-03-01 RX ORDER — BUPRENORPHINE AND NALOXONE 8; 2 MG/1; MG/1
1 FILM, SOLUBLE BUCCAL; SUBLINGUAL 2 TIMES DAILY
Qty: 60 PACKET | Refills: 0 | Status: SHIPPED | OUTPATIENT
Start: 2019-03-01 | End: 2019-03-31

## 2019-03-01 RX ORDER — AMITRIPTYLINE HYDROCHLORIDE 10 MG/1
10 TABLET, FILM COATED ORAL NIGHTLY PRN
Qty: 30 TABLET | Refills: 1 | Status: SHIPPED | OUTPATIENT
Start: 2019-03-01 | End: 2019-04-26 | Stop reason: SDUPTHER

## 2019-03-01 RX ORDER — OMEPRAZOLE 40 MG/1
1 CAPSULE, DELAYED RELEASE ORAL 2 TIMES DAILY
Refills: 5 | COMMUNITY
Start: 2019-02-28 | End: 2020-01-01

## 2019-03-01 NOTE — PROGRESS NOTES
"Subjective:       Patient ID: Kendra Lawrence is a 63 y.o. female.    Chief Complaint: opioid dependence    Drug Problem   Associated symptoms include nausea. Pertinent negatives include no vomiting.      The patient presents for medical management of opioid dependency. she is receiving maintenance therapy with buprenorphine.      CHIEF COMPLAINT: opoid dependence  HPI: having abdominal pain making her feel like the suboxone is not helping the pain.     ONSET/TIMING:     DURATION: . Continuous(+).    QUALITY/COURSE:  unchanged.     INTENSITY/SEVERITY:  controlled.    CONTEXT/WHEN:     MODIFIERS/TREATMENTS:  Taking medications(+) Suboxone  8/2 # 65,   last rx given 1/4/19    SYMPTOMS/RELATED: no withdrawal symptoms.  cravings . No substance abuse.  No alcohol use     pnp checked: last month:  yes        The patient has epigastric pain and is now on Nexium twice a day.  However she takes 2 Motrin in the morning.  This is for arthritis.  Review of Systems   Constitutional: Positive for activity change. Negative for diaphoresis, fatigue and unexpected weight change.   Gastrointestinal: Positive for abdominal pain (had egd, taken off protonix and on bid nexium. ) and nausea. Negative for constipation, diarrhea and vomiting.   Neurological: Negative for dizziness, light-headedness and headaches.   Psychiatric/Behavioral: Negative for dysphoric mood and sleep disturbance. The patient is not nervous/anxious.          Objective:      Vitals:    03/01/19 1610   BP: 126/68   Pulse: 85   Resp: 16   SpO2: 96%   Weight: 74.2 kg (163 lb 9.3 oz)   Height: 5' 4" (1.626 m)   PainSc:   8   PainLoc: Back     Physical Exam   Constitutional: She appears well-developed and well-nourished.   Cardiovascular: Normal rate, regular rhythm and normal heart sounds.   Pulmonary/Chest: Effort normal and breath sounds normal.   Abdominal: Soft. There is tenderness (Epigastric).   Neurological: She is alert.   Psychiatric: She has a normal mood and " affect. Her behavior is normal. Thought content normal.   Nursing note and vitals reviewed.        Assessment:       1. Narcotic dependence          Plan:   (+) pt taking medication as prescribed.    (+) dose is approproate.    (+) urine drug screen done.     (+) discussed risks of buprenorphine, including to others.     (+) assessed if benefits outweigh risks of buprenorphine. .     (+) reviewed safe storage of medication.     (+) discussed proper use of buprenorphine, including missed doses.     Narcotic dependence  -     buprenorphine-naloxone (SUBOXONE) 8-2 mg Film; Place 1 packet (1 each total) under the tongue 2 (two) times daily.  Dispense: 60 packet; Refill: 0    Other orders  -     amitriptyline (ELAVIL) 10 MG tablet; Take 1 tablet (10 mg total) by mouth nightly as needed for Insomnia.  Dispense: 30 tablet; Refill: 1          Follow-up in about 1 month (around 4/1/2019).

## 2019-03-01 NOTE — PATIENT INSTRUCTIONS
Stop Motrin.  Stop smoking. An order for a urine drug screen with buprenorphine was given.  The patient is to use the order when called, on a random day.

## 2019-03-14 ENCOUNTER — TELEPHONE (OUTPATIENT)
Dept: FAMILY MEDICINE | Facility: CLINIC | Age: 64
End: 2019-03-14

## 2019-03-14 ENCOUNTER — CLINICAL SUPPORT (OUTPATIENT)
Dept: FAMILY MEDICINE | Facility: CLINIC | Age: 64
End: 2019-03-14
Payer: MEDICARE

## 2019-03-14 DIAGNOSIS — F11.20 NARCOTIC DEPENDENCE: Primary | ICD-10-CM

## 2019-03-14 PROCEDURE — 80305 POCT BUP URINE DRUG TEST: ICD-10-PCS | Mod: QW,S$GLB,, | Performed by: INTERNAL MEDICINE

## 2019-03-14 PROCEDURE — 80305 DRUG TEST PRSMV DIR OPT OBS: CPT | Mod: QW,S$GLB,, | Performed by: INTERNAL MEDICINE

## 2019-03-14 NOTE — TELEPHONE ENCOUNTER
----- Message from Omari Carreno sent at 3/14/2019  2:11 PM CDT -----  Contact: patient  Type:  Patient Returning Call    Who Called:  Patient  Who Left Message for Patient:  Kavita  Does the patient know what this is regarding?:  yes  Best Call Back Number:  [phone]  Additional Information:  Place call to pod,patient stated that she need to know if she needs to come back in?

## 2019-03-19 ENCOUNTER — PATIENT OUTREACH (OUTPATIENT)
Dept: ADMINISTRATIVE | Facility: HOSPITAL | Age: 64
End: 2019-03-19

## 2019-04-02 ENCOUNTER — DOCUMENTATION ONLY (OUTPATIENT)
Dept: FAMILY MEDICINE | Facility: CLINIC | Age: 64
End: 2019-04-02

## 2019-04-02 ENCOUNTER — OFFICE VISIT (OUTPATIENT)
Dept: FAMILY MEDICINE | Facility: CLINIC | Age: 64
End: 2019-04-02
Payer: MEDICARE

## 2019-04-02 VITALS
RESPIRATION RATE: 16 BRPM | WEIGHT: 162.69 LBS | SYSTOLIC BLOOD PRESSURE: 126 MMHG | HEIGHT: 64 IN | OXYGEN SATURATION: 97 % | DIASTOLIC BLOOD PRESSURE: 86 MMHG | BODY MASS INDEX: 27.77 KG/M2 | HEART RATE: 70 BPM

## 2019-04-02 DIAGNOSIS — Z12.11 COLON CANCER SCREENING: ICD-10-CM

## 2019-04-02 DIAGNOSIS — F11.20 NARCOTIC DEPENDENCE: Primary | ICD-10-CM

## 2019-04-02 PROCEDURE — 99213 PR OFFICE/OUTPT VISIT, EST, LEVL III, 20-29 MIN: ICD-10-PCS | Mod: S$GLB,,, | Performed by: INTERNAL MEDICINE

## 2019-04-02 PROCEDURE — 99213 OFFICE O/P EST LOW 20 MIN: CPT | Mod: S$GLB,,, | Performed by: INTERNAL MEDICINE

## 2019-04-02 RX ORDER — BUPRENORPHINE AND NALOXONE 8; 2 MG/1; MG/1
1 FILM, SOLUBLE BUCCAL; SUBLINGUAL 2 TIMES DAILY
Qty: 60 PACKET | Refills: 0 | Status: SHIPPED | OUTPATIENT
Start: 2019-04-02 | End: 2019-05-01 | Stop reason: SDUPTHER

## 2019-04-02 NOTE — PROGRESS NOTES
"Subjective:       Patient ID: Kendra Lawrence is a 63 y.o. female.    Chief Complaint: opioid dependence    HPI   The patient presents for medical management of opioid dependency. she is receiving maintenance therapy with buprenorphine.      CHIEF COMPLAINT: opoid dependence  HPI:     ONSET/TIMING:     DURATION: . Continuous(+).    QUALITY/COURSE:  unchanged.     INTENSITY/SEVERITY:  controlled.    CONTEXT/WHEN:     MODIFIERS/TREATMENTS:  Taking medications(+) Suboxone  8/2 # 60,   last rx given 3/1/19. . .    SYMPTOMS/RELATED: no withdrawal symptoms. no cravings . No substance abuse.  No alcohol use     pnp checked: last month:  yes    Review of Systems   Constitutional: Negative for activity change, diaphoresis, fatigue and unexpected weight change.   HENT: Positive for rhinorrhea. Negative for hearing loss and trouble swallowing.    Eyes: Negative for discharge and visual disturbance.   Respiratory: Negative for chest tightness and wheezing.    Cardiovascular: Negative for chest pain and palpitations.   Gastrointestinal: Positive for diarrhea. Negative for blood in stool, constipation, nausea and vomiting.   Endocrine: Negative for polydipsia and polyuria.   Genitourinary: Negative for difficulty urinating, dysuria, hematuria and menstrual problem.   Musculoskeletal: Negative for arthralgias, joint swelling and neck pain.   Neurological: Negative for dizziness, weakness, light-headedness and headaches.   Psychiatric/Behavioral: Negative for confusion, dysphoric mood and sleep disturbance. The patient is not nervous/anxious.          Objective:      Vitals:    04/02/19 1346   BP: 126/86   Pulse: 70   Resp: 16   SpO2: 97%   Weight: 73.8 kg (162 lb 11.2 oz)   Height: 5' 4" (1.626 m)   PainSc: 0-No pain     Physical Exam   Constitutional: She appears well-developed and well-nourished.   Cardiovascular: Normal rate, regular rhythm and normal heart sounds.   Pulmonary/Chest: Effort normal and breath sounds normal. "   Abdominal: Soft. There is no tenderness.   Neurological: She is alert.   Psychiatric: She has a normal mood and affect. Her behavior is normal. Thought content normal.   Nursing note and vitals reviewed.        Assessment:       1. Narcotic dependence    2. Colon cancer screening          Plan:   (+) pt taking medication as prescribed.    (+) dose is approproate.    (+) urine drug screen done.   (+) discussed risks of buprenorphine, including to others.     (+) assessed if benefits outweigh risks of buprenorphine. .     (+) reviewed safe storage of medication.     (+) discussed proper use of buprenorphine, including missed doses.      Narcotic dependence  -     buprenorphine-naloxone (SUBOXONE) 8-2 mg Film; Place 1 packet (1 each total) under the tongue 2 (two) times daily.  Dispense: 60 packet; Refill: 0    Colon cancer screening  -     Fecal Immunochemical Test (iFOBT); Future; Expected date: 04/02/2019      Follow up in about 1 month (around 5/2/2019).

## 2019-04-12 ENCOUNTER — DOCUMENTATION ONLY (OUTPATIENT)
Dept: FAMILY MEDICINE | Facility: CLINIC | Age: 64
End: 2019-04-12

## 2019-04-24 ENCOUNTER — TELEPHONE (OUTPATIENT)
Dept: FAMILY MEDICINE | Facility: CLINIC | Age: 64
End: 2019-04-24

## 2019-04-25 ENCOUNTER — CLINICAL SUPPORT (OUTPATIENT)
Dept: FAMILY MEDICINE | Facility: CLINIC | Age: 64
End: 2019-04-25
Payer: MEDICARE

## 2019-04-25 DIAGNOSIS — F11.20 NARCOTIC DEPENDENCE: Primary | ICD-10-CM

## 2019-04-25 PROCEDURE — 80305 POCT BUP URINE DRUG TEST: ICD-10-PCS | Mod: QW,S$GLB,, | Performed by: INTERNAL MEDICINE

## 2019-04-25 PROCEDURE — 80305 DRUG TEST PRSMV DIR OPT OBS: CPT | Mod: QW,S$GLB,, | Performed by: INTERNAL MEDICINE

## 2019-04-26 RX ORDER — AMITRIPTYLINE HYDROCHLORIDE 10 MG/1
TABLET, FILM COATED ORAL
Qty: 30 TABLET | Refills: 1 | Status: SHIPPED | OUTPATIENT
Start: 2019-04-26 | End: 2019-07-01 | Stop reason: SDUPTHER

## 2019-05-01 ENCOUNTER — OFFICE VISIT (OUTPATIENT)
Dept: FAMILY MEDICINE | Facility: CLINIC | Age: 64
End: 2019-05-01
Payer: MEDICARE

## 2019-05-01 ENCOUNTER — DOCUMENTATION ONLY (OUTPATIENT)
Dept: FAMILY MEDICINE | Facility: CLINIC | Age: 64
End: 2019-05-01

## 2019-05-01 VITALS
BODY MASS INDEX: 28.27 KG/M2 | WEIGHT: 165.56 LBS | SYSTOLIC BLOOD PRESSURE: 132 MMHG | HEIGHT: 64 IN | TEMPERATURE: 98 F | DIASTOLIC BLOOD PRESSURE: 94 MMHG | RESPIRATION RATE: 16 BRPM | OXYGEN SATURATION: 99 % | HEART RATE: 76 BPM

## 2019-05-01 DIAGNOSIS — F11.20 NARCOTIC DEPENDENCE: ICD-10-CM

## 2019-05-01 PROCEDURE — 99213 OFFICE O/P EST LOW 20 MIN: CPT | Mod: S$GLB,,, | Performed by: INTERNAL MEDICINE

## 2019-05-01 PROCEDURE — 99213 PR OFFICE/OUTPT VISIT, EST, LEVL III, 20-29 MIN: ICD-10-PCS | Mod: S$GLB,,, | Performed by: INTERNAL MEDICINE

## 2019-05-01 RX ORDER — BUPRENORPHINE AND NALOXONE 8; 2 MG/1; MG/1
1 FILM, SOLUBLE BUCCAL; SUBLINGUAL 2 TIMES DAILY
Qty: 60 PACKET | Refills: 0 | Status: SHIPPED | OUTPATIENT
Start: 2019-05-01 | End: 2019-05-20 | Stop reason: SDUPTHER

## 2019-05-01 NOTE — PROGRESS NOTES
"Subjective:       Patient ID: Kendra Lawrence is a 63 y.o. female.    Chief Complaint: opioid dependence    HPI     The patient presents for medical management of opioid dependency. she is receiving maintenance therapy with buprenorphine.      CHIEF COMPLAINT: opoid dependence  HPI:     ONSET/TIMING:     DURATION: . Continuous(+).    QUALITY/COURSE:  unchanged.     INTENSITY/SEVERITY:  controlled.    CONTEXT/WHEN:     MODIFIERS/TREATMENTS:  Taking medications(+) Suboxone  8/2 # 60,   last rx given 4/2/19. . .    SYMPTOMS/RELATED: no withdrawal symptoms. no cravings . No substance abuse.  No alcohol use     pnp checked: last month:  yes    Review of Systems   Constitutional: Negative for diaphoresis, fatigue and unexpected weight change.   Gastrointestinal: Negative for constipation, diarrhea, nausea and vomiting.   Neurological: Positive for headaches. Negative for dizziness and light-headedness.   Psychiatric/Behavioral: Negative for dysphoric mood and sleep disturbance. The patient is not nervous/anxious.          Objective:      Vitals:    05/01/19 1559   BP: (!) 132/94   Pulse: 76   Resp: 16   Temp: 97.8 °F (36.6 °C)   TempSrc: Oral   SpO2: 99%   Weight: 75.1 kg (165 lb 9.1 oz)   Height: 5' 4" (1.626 m)   PainSc: 0-No pain     Physical Exam   Constitutional: She appears well-developed and well-nourished.   Cardiovascular: Normal rate, regular rhythm and normal heart sounds.   Pulmonary/Chest: Effort normal and breath sounds normal.   Abdominal: Soft. There is no tenderness.   Neurological: She is alert.   Psychiatric: She has a normal mood and affect. Her behavior is normal. Thought content normal.   Nursing note and vitals reviewed.        Assessment:       1. Narcotic dependence          Plan:   (+) pt taking medication as prescribed.    (+) dose is approproate.    (+) urine drug screen done.   (+) discussed risks of buprenorphine, including to others.     (+) assessed if benefits outweigh risks of " buprenorphine. .     (+) reviewed safe storage of medication.     (+) discussed proper use of buprenorphine, including missed doses.      Narcotic dependence  -     buprenorphine-naloxone (SUBOXONE) 8-2 mg Film; Place 1 packet (1 each total) under the tongue 2 (two) times daily.  Dispense: 60 packet; Refill: 0      Follow up in about 1 month (around 6/1/2019).

## 2019-05-14 ENCOUNTER — LAB VISIT (OUTPATIENT)
Dept: LAB | Facility: HOSPITAL | Age: 64
End: 2019-05-14
Attending: INTERNAL MEDICINE
Payer: MEDICARE

## 2019-05-14 ENCOUNTER — TELEPHONE (OUTPATIENT)
Dept: FAMILY MEDICINE | Facility: CLINIC | Age: 64
End: 2019-05-14

## 2019-05-14 DIAGNOSIS — Z12.11 COLON CANCER SCREENING: ICD-10-CM

## 2019-05-14 DIAGNOSIS — R19.5 POSITIVE FIT (FECAL IMMUNOCHEMICAL TEST): Primary | ICD-10-CM

## 2019-05-14 LAB — HEMOCCULT STL QL IA: POSITIVE

## 2019-05-14 PROCEDURE — 82274 ASSAY TEST FOR BLOOD FECAL: CPT

## 2019-05-15 ENCOUNTER — CLINICAL SUPPORT (OUTPATIENT)
Dept: FAMILY MEDICINE | Facility: CLINIC | Age: 64
End: 2019-05-15
Payer: MEDICARE

## 2019-05-15 DIAGNOSIS — F11.20 NARCOTIC DEPENDENCE: Primary | ICD-10-CM

## 2019-05-15 PROCEDURE — 80305 POCT BUP URINE DRUG TEST: ICD-10-PCS | Mod: QW,S$GLB,, | Performed by: INTERNAL MEDICINE

## 2019-05-15 PROCEDURE — 80305 DRUG TEST PRSMV DIR OPT OBS: CPT | Mod: QW,S$GLB,, | Performed by: INTERNAL MEDICINE

## 2019-05-20 ENCOUNTER — OFFICE VISIT (OUTPATIENT)
Dept: FAMILY MEDICINE | Facility: CLINIC | Age: 64
End: 2019-05-20
Payer: MEDICARE

## 2019-05-20 ENCOUNTER — DOCUMENTATION ONLY (OUTPATIENT)
Dept: FAMILY MEDICINE | Facility: CLINIC | Age: 64
End: 2019-05-20

## 2019-05-20 VITALS
DIASTOLIC BLOOD PRESSURE: 86 MMHG | OXYGEN SATURATION: 97 % | BODY MASS INDEX: 29.06 KG/M2 | SYSTOLIC BLOOD PRESSURE: 142 MMHG | RESPIRATION RATE: 16 BRPM | TEMPERATURE: 98 F | HEART RATE: 74 BPM | WEIGHT: 170.19 LBS | HEIGHT: 64 IN

## 2019-05-20 DIAGNOSIS — F11.20 NARCOTIC DEPENDENCE: ICD-10-CM

## 2019-05-20 PROCEDURE — 99213 OFFICE O/P EST LOW 20 MIN: CPT | Mod: S$GLB,,, | Performed by: INTERNAL MEDICINE

## 2019-05-20 PROCEDURE — 99213 PR OFFICE/OUTPT VISIT, EST, LEVL III, 20-29 MIN: ICD-10-PCS | Mod: S$GLB,,, | Performed by: INTERNAL MEDICINE

## 2019-05-20 RX ORDER — BUPRENORPHINE AND NALOXONE 8; 2 MG/1; MG/1
1 FILM, SOLUBLE BUCCAL; SUBLINGUAL 2 TIMES DAILY
Qty: 60 PACKET | Refills: 0 | Status: SHIPPED | OUTPATIENT
Start: 2019-05-20 | End: 2019-06-19

## 2019-05-20 NOTE — PROGRESS NOTES
"Subjective:       Patient ID: Kendra Lawrence is a 63 y.o. female.    Chief Complaint: opioid dependence    HPI     The patient presents for medical management of opioid dependency. she is receiving maintenance therapy with buprenorphine.      CHIEF COMPLAINT: opoid dependence  HPI:     ONSET/TIMING:     DURATION: . Continuous(+).    QUALITY/COURSE:  unchanged.     INTENSITY/SEVERITY:  controlled.    CONTEXT/WHEN:     MODIFIERS/TREATMENTS:  Taking medications(+) Suboxone  8/2 # 60,   last rx given 5/1/19. . .    SYMPTOMS/RELATED: no withdrawal symptoms. no cravings . No substance abuse.  No alcohol use     pnp checked: last month:  yes    Review of Systems   Constitutional: Negative for diaphoresis, fatigue and unexpected weight change.   Gastrointestinal: Negative for constipation, diarrhea, nausea and vomiting.   Neurological: Positive for headaches. Negative for dizziness and light-headedness.   Psychiatric/Behavioral: Negative for dysphoric mood and sleep disturbance. The patient is not nervous/anxious.          Objective:      Vitals:    05/20/19 1532   BP: (!) 140/92   Pulse: 74   Resp: 16   Temp: 98.1 °F (36.7 °C)   TempSrc: Oral   SpO2: 97%   Weight: 77.2 kg (170 lb 3.1 oz)   Height: 5' 4" (1.626 m)   PainSc: 0-No pain     Physical Exam   Constitutional: She appears well-developed and well-nourished.   Cardiovascular: Normal rate, regular rhythm and normal heart sounds.   Pulmonary/Chest: Effort normal and breath sounds normal.   Abdominal: Soft. There is no tenderness.   Neurological: She is alert.   Psychiatric: She has a normal mood and affect. Her behavior is normal. Thought content normal.   Nursing note and vitals reviewed.    Urine drug screen negative except buprenorphine.        Assessment:       1. Narcotic dependence          Plan:   (+) pt taking medication as prescribed.    (+) dose is approproate.    (+) urine drug screen done.   (+) discussed risks of buprenorphine, including to others.     (+) " assessed if benefits outweigh risks of buprenorphine. .     (+) reviewed safe storage of medication.     (+) discussed proper use of buprenorphine, including missed doses.      Narcotic dependence  -     buprenorphine-naloxone (SUBOXONE) 8-2 mg Film; Place 1 packet (1 each total) under the tongue 2 (two) times daily.  Dispense: 60 packet; Refill: 0      Follow up in about 1 month (around 6/20/2019).

## 2019-05-20 NOTE — PROGRESS NOTES
Health Maintenance Due   Topic Date Due    TETANUS VACCINE  09/28/1973    Colonoscopy  09/28/2003

## 2019-05-29 ENCOUNTER — TELEPHONE (OUTPATIENT)
Dept: FAMILY MEDICINE | Facility: CLINIC | Age: 64
End: 2019-05-29

## 2019-05-29 NOTE — TELEPHONE ENCOUNTER
----- Message from Luzma Dyson sent at 5/29/2019  1:14 PM CDT -----  Contact: [phone]  Patient is returning nurse's phone call.  Please call patient back at [phone].

## 2019-06-03 DIAGNOSIS — M81.0 OSTEOPOROSIS, UNSPECIFIED OSTEOPOROSIS TYPE, UNSPECIFIED PATHOLOGICAL FRACTURE PRESENCE: ICD-10-CM

## 2019-06-04 RX ORDER — ALENDRONATE SODIUM 70 MG/1
70 TABLET ORAL
Qty: 12 TABLET | Refills: 3 | Status: SHIPPED | OUTPATIENT
Start: 2019-06-04 | End: 2020-01-01

## 2019-06-06 ENCOUNTER — CLINICAL SUPPORT (OUTPATIENT)
Dept: FAMILY MEDICINE | Facility: CLINIC | Age: 64
End: 2019-06-06
Payer: MEDICARE

## 2019-06-06 ENCOUNTER — TELEPHONE (OUTPATIENT)
Dept: FAMILY MEDICINE | Facility: CLINIC | Age: 64
End: 2019-06-06

## 2019-06-06 DIAGNOSIS — F11.20 NARCOTIC DEPENDENCE: Primary | ICD-10-CM

## 2019-06-06 PROCEDURE — 80305 DRUG TEST PRSMV DIR OPT OBS: CPT | Mod: QW,S$GLB,, | Performed by: INTERNAL MEDICINE

## 2019-06-06 PROCEDURE — 80305 POCT BUP URINE DRUG TEST: ICD-10-PCS | Mod: QW,S$GLB,, | Performed by: INTERNAL MEDICINE

## 2019-06-26 ENCOUNTER — DOCUMENTATION ONLY (OUTPATIENT)
Dept: FAMILY MEDICINE | Facility: CLINIC | Age: 64
End: 2019-06-26

## 2019-06-26 ENCOUNTER — OFFICE VISIT (OUTPATIENT)
Dept: FAMILY MEDICINE | Facility: CLINIC | Age: 64
End: 2019-06-26
Payer: MEDICARE

## 2019-06-26 VITALS
HEART RATE: 84 BPM | DIASTOLIC BLOOD PRESSURE: 115 MMHG | TEMPERATURE: 98 F | WEIGHT: 163.38 LBS | SYSTOLIC BLOOD PRESSURE: 196 MMHG | HEIGHT: 64 IN | OXYGEN SATURATION: 96 % | BODY MASS INDEX: 27.89 KG/M2 | RESPIRATION RATE: 16 BRPM

## 2019-06-26 DIAGNOSIS — F11.20 NARCOTIC DEPENDENCE: Primary | ICD-10-CM

## 2019-06-26 DIAGNOSIS — I10 ACCELERATED HYPERTENSION: ICD-10-CM

## 2019-06-26 PROCEDURE — 99213 PR OFFICE/OUTPT VISIT, EST, LEVL III, 20-29 MIN: ICD-10-PCS | Mod: S$GLB,,, | Performed by: INTERNAL MEDICINE

## 2019-06-26 PROCEDURE — 99213 OFFICE O/P EST LOW 20 MIN: CPT | Mod: S$GLB,,, | Performed by: INTERNAL MEDICINE

## 2019-06-26 RX ORDER — BUPRENORPHINE AND NALOXONE 8; 2 MG/1; MG/1
FILM, SOLUBLE BUCCAL; SUBLINGUAL 2 TIMES DAILY
COMMUNITY
End: 2019-06-26 | Stop reason: SDUPTHER

## 2019-06-26 RX ORDER — BUPRENORPHINE AND NALOXONE 8; 2 MG/1; MG/1
1 FILM, SOLUBLE BUCCAL; SUBLINGUAL 2 TIMES DAILY
Qty: 60 PACKET | Refills: 0 | Status: SHIPPED | OUTPATIENT
Start: 2019-06-26 | End: 2019-07-24 | Stop reason: SDUPTHER

## 2019-06-26 RX ORDER — LOSARTAN POTASSIUM AND HYDROCHLOROTHIAZIDE 25; 100 MG/1; MG/1
1 TABLET ORAL DAILY
Qty: 90 TABLET | Refills: 3 | Status: SHIPPED | OUTPATIENT
Start: 2019-06-26 | End: 2020-01-01 | Stop reason: CLARIF

## 2019-06-26 NOTE — PROGRESS NOTES
"Subjective:       Patient ID: Kendra Lawrence is a 63 y.o. female.    Chief Complaint: narcotic dependence    HPI     The patient presents for medical management of opioid dependency. she is receiving maintenance therapy with buprenorphine.      CHIEF COMPLAINT: opoid dependence  HPI:     ONSET/TIMING:     DURATION: . Continuous(+).    QUALITY/COURSE:  unchanged.     INTENSITY/SEVERITY:  controlled.    CONTEXT/WHEN:     MODIFIERS/TREATMENTS:  Taking medications(+) Suboxone  8/2 # 60,   last rx given 5/20/19. . .    SYMPTOMS/RELATED: no withdrawal symptoms. no cravings . No substance abuse.  No alcohol use     pnp checked: last month:  yes    Review of Systems   Constitutional: Negative for activity change, diaphoresis, fatigue and unexpected weight change.   HENT: Positive for rhinorrhea. Negative for hearing loss and trouble swallowing.    Eyes: Negative for discharge and visual disturbance.   Respiratory: Negative for chest tightness, shortness of breath and wheezing.    Cardiovascular: Negative for chest pain and palpitations.   Gastrointestinal: Negative for blood in stool, constipation, diarrhea, nausea and vomiting.   Endocrine: Negative for polydipsia and polyuria.   Genitourinary: Negative for difficulty urinating, dysuria, hematuria and menstrual problem.   Musculoskeletal: Negative for arthralgias, joint swelling and neck pain.   Neurological: Positive for headaches. Negative for dizziness, weakness and light-headedness.   Psychiatric/Behavioral: Negative for confusion, dysphoric mood and sleep disturbance. The patient is not nervous/anxious.          Objective:      Vitals:    06/26/19 1307   BP: (!) 166/110   Pulse: 84   Resp: 16   Temp: 98.1 °F (36.7 °C)   TempSrc: Oral   SpO2: 96%   Weight: 74.1 kg (163 lb 5.8 oz)   Height: 5' 4" (1.626 m)   PainSc: 0-No pain     Physical Exam   Constitutional: She appears well-developed and well-nourished.   Cardiovascular: Normal rate, regular rhythm and normal heart " sounds.   Pulmonary/Chest: Effort normal and breath sounds normal.   Abdominal: Soft. There is no tenderness.   Neurological: She is alert.   Psychiatric: She has a normal mood and affect. Her behavior is normal. Thought content normal.   Nursing note and vitals reviewed.    Urine drug screen negative except buprenorphine.        Assessment:       1. Narcotic dependence    2. Accelerated hypertension          Plan:   (+) pt taking medication as prescribed.    (+) dose is approproate.    (+) urine drug screen done.   (+) discussed risks of buprenorphine, including to others.     (+) assessed if benefits outweigh risks of buprenorphine. .     (+) reviewed safe storage of medication.     (+) discussed proper use of buprenorphine, including missed doses.      Narcotic dependence  -     buprenorphine-naloxone (SUBOXONE) 8-2 mg Film; Place 1 packet (1 each total) under the tongue 2 (two) times daily.  Dispense: 60 packet; Refill: 0    Accelerated hypertension  -     losartan-hydrochlorothiazide 100-25 mg (HYZAAR) 100-25 mg per tablet; Take 1 tablet by mouth once daily.  Dispense: 90 tablet; Refill: 3      Follow up in about 1 month (around 7/26/2019).

## 2019-06-26 NOTE — PATIENT INSTRUCTIONS
An order for a urine drug screen with buprenorphine was given.  The patient is to use the order when called, on a random day.       Stop lisinopril

## 2019-07-01 RX ORDER — AMITRIPTYLINE HYDROCHLORIDE 10 MG/1
TABLET, FILM COATED ORAL
Qty: 30 TABLET | Refills: 1 | Status: SHIPPED | OUTPATIENT
Start: 2019-07-01 | End: 2019-08-14 | Stop reason: SDUPTHER

## 2019-07-09 ENCOUNTER — CLINICAL SUPPORT (OUTPATIENT)
Dept: FAMILY MEDICINE | Facility: CLINIC | Age: 64
End: 2019-07-09
Payer: MEDICARE

## 2019-07-09 VITALS — DIASTOLIC BLOOD PRESSURE: 88 MMHG | SYSTOLIC BLOOD PRESSURE: 136 MMHG

## 2019-07-09 DIAGNOSIS — I10 ACCELERATED HYPERTENSION: Primary | ICD-10-CM

## 2019-07-09 NOTE — PROGRESS NOTES
Patient came in for a nurse BP check. Patient's BP was 136/88 manually on RA. Advised patient to continue current medication and f/u as scheduled.

## 2019-07-23 ENCOUNTER — TELEPHONE (OUTPATIENT)
Dept: FAMILY MEDICINE | Facility: CLINIC | Age: 64
End: 2019-07-23

## 2019-07-24 ENCOUNTER — DOCUMENTATION ONLY (OUTPATIENT)
Dept: FAMILY MEDICINE | Facility: CLINIC | Age: 64
End: 2019-07-24

## 2019-07-24 ENCOUNTER — OFFICE VISIT (OUTPATIENT)
Dept: FAMILY MEDICINE | Facility: CLINIC | Age: 64
End: 2019-07-24
Payer: MEDICARE

## 2019-07-24 VITALS
HEIGHT: 64 IN | TEMPERATURE: 98 F | WEIGHT: 158.5 LBS | BODY MASS INDEX: 27.06 KG/M2 | DIASTOLIC BLOOD PRESSURE: 76 MMHG | SYSTOLIC BLOOD PRESSURE: 128 MMHG | RESPIRATION RATE: 16 BRPM | OXYGEN SATURATION: 98 % | HEART RATE: 89 BPM

## 2019-07-24 DIAGNOSIS — F11.20 NARCOTIC DEPENDENCE: ICD-10-CM

## 2019-07-24 DIAGNOSIS — K59.03 DRUG-INDUCED CONSTIPATION: Primary | ICD-10-CM

## 2019-07-24 PROCEDURE — 80305 POCT BUP URINE DRUG TEST: ICD-10-PCS | Mod: QW,S$GLB,, | Performed by: INTERNAL MEDICINE

## 2019-07-24 PROCEDURE — 99213 OFFICE O/P EST LOW 20 MIN: CPT | Mod: S$GLB,,, | Performed by: INTERNAL MEDICINE

## 2019-07-24 PROCEDURE — 99213 PR OFFICE/OUTPT VISIT, EST, LEVL III, 20-29 MIN: ICD-10-PCS | Mod: S$GLB,,, | Performed by: INTERNAL MEDICINE

## 2019-07-24 PROCEDURE — 80305 DRUG TEST PRSMV DIR OPT OBS: CPT | Mod: QW,S$GLB,, | Performed by: INTERNAL MEDICINE

## 2019-07-24 RX ORDER — AMOXICILLIN 250 MG
1 CAPSULE ORAL 2 TIMES DAILY
Qty: 60 TABLET | Refills: 5 | COMMUNITY
Start: 2019-07-24 | End: 2020-01-01 | Stop reason: CLARIF

## 2019-07-24 RX ORDER — BUPRENORPHINE AND NALOXONE 8; 2 MG/1; MG/1
1 FILM, SOLUBLE BUCCAL; SUBLINGUAL 2 TIMES DAILY
Qty: 60 PACKET | Refills: 0 | Status: SHIPPED | OUTPATIENT
Start: 2019-07-24 | End: 2019-08-20 | Stop reason: SDUPTHER

## 2019-07-24 NOTE — PROGRESS NOTES
Patient came in for random drug screen. 2 Patient identifiers used. Patient witnessed testing process.

## 2019-07-24 NOTE — PATIENT INSTRUCTIONS
An order for a urine drug screen with buprenorphine was given.  The patient is to use the order when called, on a random day.           Please fill out the patient experience survey.

## 2019-07-24 NOTE — PROGRESS NOTES
"Subjective:       Patient ID: Kendra Lawrence is a 63 y.o. female.    Chief Complaint: narcotic dependence    HPI     The patient presents for medical management of opioid dependency. she is receiving maintenance therapy with buprenorphine.      CHIEF COMPLAINT: opoid dependence  HPI:     ONSET/TIMING:     DURATION: . Continuous(+).    QUALITY/COURSE:  unchanged.     INTENSITY/SEVERITY:  controlled.    CONTEXT/WHEN:     MODIFIERS/TREATMENTS:  Taking medications(+) Suboxone  8/2 # 60,   last rx given 6/26/19. . .    SYMPTOMS/RELATED: no withdrawal symptoms. no cravings . No substance abuse.  No alcohol use     pnp checked: last month:  yes    Review of Systems   Constitutional: Negative for activity change, diaphoresis, fatigue and unexpected weight change.   HENT: Positive for rhinorrhea. Negative for hearing loss and trouble swallowing.    Eyes: Negative for discharge and visual disturbance.   Respiratory: Negative for chest tightness, shortness of breath and wheezing.    Cardiovascular: Negative for chest pain and palpitations.   Gastrointestinal: Positive for constipation. Negative for blood in stool, diarrhea, nausea and vomiting.   Endocrine: Negative for polydipsia and polyuria.   Genitourinary: Negative for difficulty urinating, dysuria, hematuria and menstrual problem.   Musculoskeletal: Negative for arthralgias, joint swelling and neck pain.   Neurological: Positive for headaches. Negative for dizziness, weakness and light-headedness.   Psychiatric/Behavioral: Negative for confusion, dysphoric mood and sleep disturbance. The patient is not nervous/anxious.          Objective:      Vitals:    07/24/19 1518   BP: 128/76   Pulse: 89   Resp: 16   Temp: 97.8 °F (36.6 °C)   TempSrc: Oral   SpO2: 98%   Weight: 71.9 kg (158 lb 8.2 oz)   Height: 5' 4" (1.626 m)   PainSc: 0-No pain     Physical Exam   Constitutional: She appears well-developed and well-nourished.   Cardiovascular: Normal rate, regular rhythm and normal " heart sounds.   Pulmonary/Chest: Effort normal and breath sounds normal.   Abdominal: Soft. There is no tenderness.   Neurological: She is alert.   Psychiatric: She has a normal mood and affect. Her behavior is normal. Thought content normal.   Nursing note and vitals reviewed.    Urine drug screen negative except buprenorphine.        Assessment:       1. Drug-induced constipation    2. Narcotic dependence          Plan:   (+) pt taking medication as prescribed.    (+) dose is approproate.    (+) urine drug screen done.   (+) discussed risks of buprenorphine, including to others.     (+) assessed if benefits outweigh risks of buprenorphine. .     (+) reviewed safe storage of medication.     (+) discussed proper use of buprenorphine, including missed doses.      Drug-induced constipation  -     linaCLOtide (LINZESS) 290 mcg Cap capsule; Linzess 290 mcg capsule   Take 1 capsule every day by oral route.  Dispense: 30 capsule; Refill: 1  -     senna-docusate 8.6-50 mg (SENNA WITH DOCUSATE SODIUM) 8.6-50 mg per tablet; Take 1 tablet by mouth 2 (two) times daily.  Dispense: 60 tablet; Refill: 5    Narcotic dependence  -     buprenorphine-naloxone (SUBOXONE) 8-2 mg Film; Place 1 packet (1 each total) under the tongue 2 (two) times daily.  Dispense: 60 packet; Refill: 0      Follow up in about 1 month (around 8/24/2019).

## 2019-07-28 ENCOUNTER — HOSPITAL ENCOUNTER (EMERGENCY)
Facility: HOSPITAL | Age: 64
Discharge: HOME | End: 2019-07-28
Attending: EMERGENCY MEDICINE
Payer: MEDICARE

## 2019-07-28 VITALS
HEART RATE: 93 BPM | DIASTOLIC BLOOD PRESSURE: 87 MMHG | SYSTOLIC BLOOD PRESSURE: 138 MMHG | BODY MASS INDEX: 26.98 KG/M2 | TEMPERATURE: 98 F | HEIGHT: 64 IN | WEIGHT: 158 LBS | OXYGEN SATURATION: 95 % | RESPIRATION RATE: 16 BRPM

## 2019-07-28 DIAGNOSIS — R52 PAIN: ICD-10-CM

## 2019-07-28 DIAGNOSIS — S60.222A CONTUSION OF LEFT HAND, INITIAL ENCOUNTER: Primary | ICD-10-CM

## 2019-07-28 PROCEDURE — 99283 EMERGENCY DEPT VISIT LOW MDM: CPT

## 2019-07-28 PROCEDURE — 25000003 PHARM REV CODE 250: Performed by: NURSE PRACTITIONER

## 2019-07-28 RX ORDER — ACETAMINOPHEN 500 MG
1000 TABLET ORAL
Status: COMPLETED | OUTPATIENT
Start: 2019-07-28 | End: 2019-07-28

## 2019-07-28 RX ADMIN — ACETAMINOPHEN 1000 MG: 500 TABLET ORAL at 03:07

## 2019-07-28 NOTE — ED PROVIDER NOTES
Encounter Date: 2019    SCRIBE #1 NOTE: I, Haseeb Busby, am scribing for, and in the presence of, Lilibeth TOMLIN.       History     Chief Complaint   Patient presents with    Hand Injury     object fell on  left hand      Time seen by provider: 3:07 PM on 2019      Kendra Lawrence is a 63 y.o. female with a PMHx of COPD, HTN, CAD, and GERD who presents to the ED for left hand pain that started < 1 hour PTA. The patient reports that she was moving and object, when a shadow box fell off the wall and struck her left hand. The patient reports that the pain is mainly on the dorsum side of the left hand. She states that the pain is more of a soreness like pain that is worst with movement. She denies numbness, weakness, swelling, color change, or any other complaint at this time. The patient has a PSHx of EGD, hysterectomy, cholecystectomy, and . The patient does admit to being on Suboxone.    The history is provided by the patient.     Review of patient's allergies indicates:  No Known Allergies  Past Medical History:   Diagnosis Date    Asthma     Cardiac angina     Chronic abdominal pain     Claustrophobia     COPD (chronic obstructive pulmonary disease)     Coronary artery disease     GERD (gastroesophageal reflux disease)     History of drug dependence     Hypertension      Past Surgical History:   Procedure Laterality Date    BREAST BIOPSY Bilateral 20 yrs ago    benign     SECTION      CHOLECYSTECTOMY      EGD (ESOPHAGOGASTRODUODENOSCOPY) N/A 2018    [facility]    EGD (ESOPHAGOGASTRODUODENOSCOPY) N/A 2018    [facility] ENDO    HYSTERECTOMY      MRI (Magnetic Resonance Imagine) needs anesthesia N/A 2019    Per[facility]affy Charles [facility] at [facility] CATH    OOPHORECTOMY      TEMPOROMANDIBULAR JOINT SURGERY      TONSILLECTOMY      ULTRASOUND, UPPER GI TRACT, ENDOSCOPIC Left  12/21/2018    [facility]     Family History   Problem Relation Age of Onset    Breast cancer Sister 55    Breast cancer Sister 50     Social History     Tobacco Use    Smoking status: Current Every Day Smoker     Types: Vaping w/o nicotine    Smokeless tobacco: Never Used   Substance Use Topics    Alcohol use: No     Frequency: Never    Drug use: Yes     Types: Hydrocodone     Review of Systems   Constitutional: Negative for activity change, appetite change, chills and fever.   HENT: Negative for congestion, rhinorrhea and sore throat.    Eyes: Negative for redness and visual disturbance.   Respiratory: Negative for cough, chest tightness and shortness of breath.    Cardiovascular: Negative for chest pain.   Gastrointestinal: Negative for abdominal pain, diarrhea, nausea and vomiting.   Genitourinary: Negative for dysuria and frequency.   Musculoskeletal: Positive for arthralgias. Negative for back pain, neck pain and neck stiffness.   Skin: Negative for rash.   Neurological: Negative for dizziness, syncope, numbness and headaches.       Physical Exam     Initial Vitals [07/28/19 1456]   BP Pulse Resp Temp SpO2   138/87 93 16 98.4 °F (36.9 °C) 95 %      MAP       --         Physical Exam    Nursing note and vitals reviewed.  Constitutional: Vital signs are normal. She appears well-developed and well-nourished.   HENT:   Head: Normocephalic and atraumatic.   Eyes: Pupils are equal, round, and reactive to light.   Neck: Neck supple.   Cardiovascular: Normal rate, regular rhythm, normal heart sounds and intact distal pulses. Exam reveals no gallop and no friction rub.    No murmur heard.  Pulmonary/Chest: Breath sounds normal. She has no wheezes. She has no rhonchi. She has no rales.   Abdominal: Normal appearance.   Musculoskeletal: Normal range of motion. She exhibits tenderness.        Left wrist: Normal.        Left hand: She exhibits tenderness, bony tenderness and  swelling. She exhibits normal range of motion, normal two-point discrimination, normal capillary refill, no deformity and no laceration. Normal sensation noted. Normal strength noted.   Neurological: She is alert and oriented to person, place, and time. She has normal strength.   Skin: Skin is warm and dry. Capillary refill takes less than 2 seconds. Abrasion noted.        Psychiatric: She has a normal mood and affect. Her speech is normal and behavior is normal.         ED Course   Procedures  Labs Reviewed - No data to display       Imaging Results    None          Medical Decision Making:   History:   Old Medical Records: I decided to obtain old medical records.  Differential Diagnosis:   Fracture  Contusion  Dislocation   Clinical Tests:   Radiological Study: Ordered and Reviewed       APC / Resident Notes:   Patient is a 63 y.o. female who presents to the ED 07/28/2019 who underwent emergent evaluation for left hand pain status post something falling on her pain and today.  On her left 4th finger she has tenderness at the PIP joint with a mild amount of swelling.  She has normal flexion and extension at this joint.  She also has tenderness at the base of this finger with some swelling and ecchymosis. I do not suspect tendon injury. Patient has normal 2 point discrimination in sensation over the left hand.  She has +2 radial pulse to the left hand with no signs of neurovascular compromise and no pain out of proportion.  Patient has x-rays of left hand without acute findings.  I do not think acute fracture dislocation.  There is no apparent laceration.  Patient does have small abrasion.  Patient instructed to continue use of ice and pain medication at home and follow up with her PCP for this. Based on my clinical evaluation, I do not appreciate any immediate, emergent, or life threatening condition or etiology that warrants additional workup today and feel that the patient can be discharged with close follow up  care. Case discussed with Dr. Lezama who also evaluated patient and who is agreeable to plan of care. Follow up and return precautions discussed; patient verbalized understanding and is agreeable to plan of care. Patient discharged home in stable condition.               Scribe Attestation:   Scribe #1: I performed the above scribed service and the documentation accurately describes the services I performed. I attest to the accuracy of the note.    Attending Attestation:     Physician Attestation Statement for NP/PA:   I have conducted a face to face encounter with this patient in addition to the NP/PA, due to NP/PA Request    Other NP/PA Attestation Additions:      Medical Decision Making: Kendra Lawrence is a 63 y.o. female presenting with left hand contusion with pain of the left finger and left metacarpal.  This is of the 4th finger.  There is some ecchymosis and edema to the finger.  She has full active range of motion of the finger.  Flexion extension tested individually and intact at the MCP, PIP, and the DIP of the 4th finger.  Brisk distal capillary refill.  No sign of fracture or dislocation on hand x-ray.  I suspect contusion with sprain.  I do not think splint immobilization is indicated.  Follow up with PCP or orthopedics.  Return precautions reviewed.       Physician Attestation for Scribe:  Physician Attestation Statement for Scribe #1: I, Lilibeth Earl, reviewed documentation, as scribed by in my presence, and it is both accurate and complete.     Comments: I, NABOR Blevins, personally performed the services described in this documentation. All medical record entries made by the scribe were at my direction and in my presence.  I have reviewed the chart and agree that the record reflects my personal performance and is accurate and complete. NABOR Blevins.  7:53 PM 07/28/2019 e            ED Course as of Jul 28 1807   Sun Jul 28, 2019   1531 XR L hand: No fx or dislocation.  (my read)     [MR]      ED Course User Index  [MR] Garcia Lezama MD     Clinical Impression:       ICD-10-CM ICD-9-CM   1. Contusion of left hand, initial encounter S60.222A 923.20   2. Pain R52 780.96         Disposition:   Disposition: Discharged  Condition: Stable                        Lilibeth Earl NP  07/1955

## 2019-08-14 RX ORDER — AMITRIPTYLINE HYDROCHLORIDE 10 MG/1
TABLET, FILM COATED ORAL
Qty: 30 TABLET | Refills: 1 | Status: SHIPPED | OUTPATIENT
Start: 2019-08-14

## 2019-08-20 ENCOUNTER — OFFICE VISIT (OUTPATIENT)
Dept: FAMILY MEDICINE | Facility: CLINIC | Age: 64
End: 2019-08-20
Payer: MEDICARE

## 2019-08-20 ENCOUNTER — DOCUMENTATION ONLY (OUTPATIENT)
Dept: FAMILY MEDICINE | Facility: CLINIC | Age: 64
End: 2019-08-20

## 2019-08-20 VITALS
SYSTOLIC BLOOD PRESSURE: 138 MMHG | HEART RATE: 91 BPM | RESPIRATION RATE: 16 BRPM | HEIGHT: 64 IN | BODY MASS INDEX: 27.25 KG/M2 | DIASTOLIC BLOOD PRESSURE: 84 MMHG | OXYGEN SATURATION: 96 % | WEIGHT: 159.63 LBS | TEMPERATURE: 98 F

## 2019-08-20 DIAGNOSIS — F11.20 NARCOTIC DEPENDENCE: ICD-10-CM

## 2019-08-20 PROCEDURE — 99213 PR OFFICE/OUTPT VISIT, EST, LEVL III, 20-29 MIN: ICD-10-PCS | Mod: S$GLB,,, | Performed by: INTERNAL MEDICINE

## 2019-08-20 PROCEDURE — 99213 OFFICE O/P EST LOW 20 MIN: CPT | Mod: S$GLB,,, | Performed by: INTERNAL MEDICINE

## 2019-08-20 RX ORDER — BUPRENORPHINE AND NALOXONE 8; 2 MG/1; MG/1
1 FILM, SOLUBLE BUCCAL; SUBLINGUAL 2 TIMES DAILY
Qty: 60 PACKET | Refills: 0 | Status: SHIPPED | OUTPATIENT
Start: 2019-08-20 | End: 2019-09-20 | Stop reason: SDUPTHER

## 2019-08-20 NOTE — PROGRESS NOTES
"Subjective:       Patient ID: Kendra Lawrence is a 63 y.o. female.    Chief Complaint: narcotic dependence    HPI     The patient presents for medical management of opioid dependency. she is receiving maintenance therapy with buprenorphine.      CHIEF COMPLAINT: opoid dependence  HPI:     ONSET/TIMING:     DURATION: . Continuous(+).    QUALITY/COURSE:  unchanged.     INTENSITY/SEVERITY:  controlled.    CONTEXT/WHEN:     MODIFIERS/TREATMENTS:  Taking medications(+) Suboxone  8/2 # 60,   last rx given 7/24/19. . .    SYMPTOMS/RELATED: no withdrawal symptoms. no cravings . No substance abuse.  No alcohol use     pnp checked: last month:  yes    Review of Systems   Constitutional: Negative for diaphoresis, fatigue and unexpected weight change.   Gastrointestinal: Positive for nausea (chronic). Negative for constipation, diarrhea and vomiting.   Neurological: Negative for dizziness, light-headedness and headaches.   Psychiatric/Behavioral: Negative for dysphoric mood and sleep disturbance. The patient is not nervous/anxious.          Objective:      Vitals:    08/20/19 1122   BP: 138/84   Pulse: 91   Resp: 16   Temp: 98 °F (36.7 °C)   TempSrc: Oral   SpO2: 96%   Weight: 72.4 kg (159 lb 9.8 oz)   Height: 5' 4" (1.626 m)   PainSc: 0-No pain     Physical Exam   Constitutional: She appears well-developed and well-nourished.   Cardiovascular: Normal rate, regular rhythm and normal heart sounds.   Pulmonary/Chest: Effort normal and breath sounds normal.   Abdominal: Soft. There is no tenderness.   Neurological: She is alert.   Psychiatric: She has a normal mood and affect. Her behavior is normal. Thought content normal.   Nursing note and vitals reviewed.    Urine drug screen negative except buprenorphine.        Assessment:       1. Narcotic dependence          Plan:   (+) pt taking medication as prescribed.    (+) dose is approproate.    (+) urine drug screen done.   (+) discussed risks of buprenorphine, including to others. "     (+) assessed if benefits outweigh risks of buprenorphine. .     (+) reviewed safe storage of medication.     (+) discussed proper use of buprenorphine, including missed doses.      Narcotic dependence  -     buprenorphine-naloxone (SUBOXONE) 8-2 mg Film; Place 1 packet (1 each total) under the tongue 2 (two) times daily.  Dispense: 60 packet; Refill: 0      Follow up in about 1 month (around 9/20/2019).

## 2019-08-22 DIAGNOSIS — K59.03 DRUG-INDUCED CONSTIPATION: ICD-10-CM

## 2019-08-29 ENCOUNTER — TELEPHONE (OUTPATIENT)
Dept: FAMILY MEDICINE | Facility: CLINIC | Age: 64
End: 2019-08-29

## 2019-09-17 ENCOUNTER — CLINICAL SUPPORT (OUTPATIENT)
Dept: FAMILY MEDICINE | Facility: CLINIC | Age: 64
End: 2019-09-17
Payer: MEDICARE

## 2019-09-17 ENCOUNTER — TELEPHONE (OUTPATIENT)
Dept: FAMILY MEDICINE | Facility: CLINIC | Age: 64
End: 2019-09-17

## 2019-09-17 DIAGNOSIS — F11.20 NARCOTIC DEPENDENCE: Primary | ICD-10-CM

## 2019-09-17 LAB
AMP D-AMPHETAMINE 1000 NG/ML: NEGATIVE
BAR SECOBARBITAL 300 NG/ML: NEGATIVE
BUP BUPRENORPHINE 10 NG/ML: POSITIVE
BZO OXAZEPAM 300 NG/ML: NEGATIVE
COC BENZOYLECGONINE 300 NG/ML: NEGATIVE
CTP QC/QA: YES
MET D-METHAMPHETAMINE 500 NG/ML: POSITIVE
MOP MORPHINE 300 NG/ML: NEGATIVE
MTD METHADONE 300 NG/ML: NEGATIVE
QXY OXYCODONE 100 NG/ML: NEGATIVE
THC 11-NOR-9-TETRAHYDROCANNABINOL-9-CARBOXYLIC ACID: NEGATIVE

## 2019-09-17 PROCEDURE — 80305 DRUG TEST PRSMV DIR OPT OBS: CPT | Mod: QW,S$GLB,, | Performed by: INTERNAL MEDICINE

## 2019-09-17 PROCEDURE — 80305 POCT BUP URINE DRUG TEST: ICD-10-PCS | Mod: QW,S$GLB,, | Performed by: INTERNAL MEDICINE

## 2019-09-20 ENCOUNTER — OFFICE VISIT (OUTPATIENT)
Dept: FAMILY MEDICINE | Facility: CLINIC | Age: 64
End: 2019-09-20
Payer: MEDICARE

## 2019-09-20 ENCOUNTER — DOCUMENTATION ONLY (OUTPATIENT)
Dept: FAMILY MEDICINE | Facility: CLINIC | Age: 64
End: 2019-09-20

## 2019-09-20 VITALS
OXYGEN SATURATION: 96 % | TEMPERATURE: 98 F | SYSTOLIC BLOOD PRESSURE: 132 MMHG | HEIGHT: 64 IN | WEIGHT: 162.94 LBS | RESPIRATION RATE: 16 BRPM | HEART RATE: 72 BPM | BODY MASS INDEX: 27.82 KG/M2 | DIASTOLIC BLOOD PRESSURE: 84 MMHG

## 2019-09-20 DIAGNOSIS — F11.20 NARCOTIC DEPENDENCE: ICD-10-CM

## 2019-09-20 PROCEDURE — G0008 ADMIN INFLUENZA VIRUS VAC: HCPCS | Mod: S$GLB,,, | Performed by: INTERNAL MEDICINE

## 2019-09-20 PROCEDURE — 90662 IIV NO PRSV INCREASED AG IM: CPT | Mod: S$GLB,,, | Performed by: INTERNAL MEDICINE

## 2019-09-20 PROCEDURE — 90662 FLU VACCINE - HIGH DOSE (65+) PRESERVATIVE FREE IM: ICD-10-PCS | Mod: S$GLB,,, | Performed by: INTERNAL MEDICINE

## 2019-09-20 PROCEDURE — 99213 OFFICE O/P EST LOW 20 MIN: CPT | Mod: S$GLB,,, | Performed by: INTERNAL MEDICINE

## 2019-09-20 PROCEDURE — 99213 PR OFFICE/OUTPT VISIT, EST, LEVL III, 20-29 MIN: ICD-10-PCS | Mod: S$GLB,,, | Performed by: INTERNAL MEDICINE

## 2019-09-20 PROCEDURE — G0008 FLU VACCINE - HIGH DOSE (65+) PRESERVATIVE FREE IM: ICD-10-PCS | Mod: S$GLB,,, | Performed by: INTERNAL MEDICINE

## 2019-09-20 RX ORDER — BUPRENORPHINE AND NALOXONE 8; 2 MG/1; MG/1
1 FILM, SOLUBLE BUCCAL; SUBLINGUAL 2 TIMES DAILY
Qty: 60 PACKET | Refills: 0 | Status: SHIPPED | OUTPATIENT
Start: 2019-09-20 | End: 2019-10-18 | Stop reason: SDUPTHER

## 2019-09-20 NOTE — PROGRESS NOTES
"Subjective:       Patient ID: Kendra Lawrence is a 63 y.o. female.    Chief Complaint: narcotic dependence    HPI     The patient presents for medical management of opioid dependency. she is receiving maintenance therapy with buprenorphine.      CHIEF COMPLAINT: opoid dependence  HPI:     ONSET/TIMING:     DURATION: . Continuous(+).    QUALITY/COURSE:  unchanged.     INTENSITY/SEVERITY:  controlled.    CONTEXT/WHEN:     MODIFIERS/TREATMENTS:  Taking medications(+) Suboxone  8/2 # 60,   last rx given 8.20.19 . .    SYMPTOMS/RELATED: no withdrawal symptoms. no cravings . No substance abuse.  No alcohol use     pnp checked: last month:  yes    Review of Systems   Constitutional: Negative for diaphoresis, fatigue and unexpected weight change.   Gastrointestinal: Positive for nausea (chronic). Negative for constipation, diarrhea and vomiting.   Neurological: Negative for dizziness, light-headedness and headaches.   Psychiatric/Behavioral: Negative for dysphoric mood and sleep disturbance. The patient is not nervous/anxious.          Objective:      Vitals:    09/20/19 1649   BP: 132/84   Pulse: 72   Resp: 16   Temp: 98.3 °F (36.8 °C)   TempSrc: Oral   SpO2: 96%   Weight: 73.9 kg (162 lb 14.7 oz)   Height: 5' 4" (1.626 m)   PainSc: 0-No pain     Physical Exam   Constitutional: She appears well-developed and well-nourished.   Cardiovascular: Normal rate, regular rhythm and normal heart sounds.   Pulmonary/Chest: Effort normal and breath sounds normal.   Abdominal: Soft. There is no tenderness.   Neurological: She is alert.   Psychiatric: She has a normal mood and affect. Her behavior is normal. Thought content normal.   Nursing note and vitals reviewed.    Urine drug screen negative except buprenorphine.  And methamphetamine but no amphetamine      Assessment:       No diagnosis found.      Plan:   Since methamphetamine breaks down into amphetamine doubtful that she is taking methamphetamine illegally    (+) pt taking " medication as prescribed.    (+) dose is approproate.    (+) urine drug screen done.   (+) discussed risks of buprenorphine, including to others.     (+) assessed if benefits outweigh risks of buprenorphine. .     (+) reviewed safe storage of medication.     (+) discussed proper use of buprenorphine, including missed doses.      There are no diagnoses linked to this encounter.  No follow-ups on file.

## 2019-09-20 NOTE — PATIENT INSTRUCTIONS
An order for a urine drug screen with buprenorphine was given.  The patient is to use the order when called, on a random day.       Thank you for choosing [facility].     Please fill out the patient experience survey.

## 2019-10-18 ENCOUNTER — OFFICE VISIT (OUTPATIENT)
Dept: FAMILY MEDICINE | Facility: CLINIC | Age: 64
End: 2019-10-18
Payer: MEDICARE

## 2019-10-18 VITALS
HEIGHT: 64 IN | RESPIRATION RATE: 16 BRPM | TEMPERATURE: 98 F | BODY MASS INDEX: 28.83 KG/M2 | SYSTOLIC BLOOD PRESSURE: 134 MMHG | DIASTOLIC BLOOD PRESSURE: 74 MMHG | WEIGHT: 168.88 LBS | OXYGEN SATURATION: 95 % | HEART RATE: 80 BPM

## 2019-10-18 DIAGNOSIS — F11.20 NARCOTIC DEPENDENCE: ICD-10-CM

## 2019-10-18 PROCEDURE — 99213 PR OFFICE/OUTPT VISIT, EST, LEVL III, 20-29 MIN: ICD-10-PCS | Mod: S$GLB,,, | Performed by: INTERNAL MEDICINE

## 2019-10-18 PROCEDURE — 99213 OFFICE O/P EST LOW 20 MIN: CPT | Mod: S$GLB,,, | Performed by: INTERNAL MEDICINE

## 2019-10-18 RX ORDER — BUPRENORPHINE AND NALOXONE 8; 2 MG/1; MG/1
1 FILM, SOLUBLE BUCCAL; SUBLINGUAL 2 TIMES DAILY
Qty: 60 PACKET | Refills: 0 | Status: SHIPPED | OUTPATIENT
Start: 2019-10-18 | End: 2019-01-01 | Stop reason: SDUPTHER

## 2019-10-18 NOTE — PROGRESS NOTES
"Subjective:       Patient ID: Kendra Lawrence is a 64 y.o. female.    Chief Complaint: narcotic dependence    HPI     The patient presents for medical management of opioid dependency. she is receiving maintenance therapy with buprenorphine.      CHIEF COMPLAINT: opoid dependence  HPI:     ONSET/TIMING:     DURATION: . Continuous(+).    QUALITY/COURSE:  unchanged.     INTENSITY/SEVERITY:  controlled.    CONTEXT/WHEN:     MODIFIERS/TREATMENTS:  Taking medications(+) Suboxone  8/2 # 60,   last rx given 9.20.19 . .    SYMPTOMS/RELATED: no withdrawal symptoms. no cravings . No substance abuse.  No alcohol use     pnp checked: last month:  yes    Review of Systems   Constitutional: Negative for diaphoresis, fatigue and unexpected weight change.   Gastrointestinal: Positive for nausea (chronic). Negative for constipation, diarrhea and vomiting.   Neurological: Negative for dizziness, light-headedness and headaches.   Psychiatric/Behavioral: Negative for dysphoric mood and sleep disturbance. The patient is not nervous/anxious.          Objective:      Vitals:    10/18/19 1403   BP: 134/74   Pulse: 80   Resp: 16   Temp: 98.3 °F (36.8 °C)   TempSrc: Oral   SpO2: 95%   Weight: 76.6 kg (168 lb 14 oz)   Height: 5' 4" (1.626 m)   PainSc: 0-No pain     Physical Exam   Constitutional: She appears well-developed and well-nourished.   Cardiovascular: Normal rate, regular rhythm and normal heart sounds.   Pulmonary/Chest: Effort normal and breath sounds normal.   Abdominal: Soft. There is no tenderness.   Neurological: She is alert.   Psychiatric: She has a normal mood and affect. Her behavior is normal. Thought content normal.   Nursing note and vitals reviewed.    Urine drug screen negative except buprenorphine.  And methamphetamine but no amphetamine      Assessment:       1. Narcotic dependence          Plan:   Since methamphetamine breaks down into amphetamine doubtful that she is taking methamphetamine illegally    (+) pt taking " medication as prescribed.    (+) dose is approproate.    (+) urine drug screen done.   (+) discussed risks of buprenorphine, including to others.     (+) assessed if benefits outweigh risks of buprenorphine. .     (+) reviewed safe storage of medication.     (+) discussed proper use of buprenorphine, including missed doses.      Narcotic dependence  -     buprenorphine-naloxone (SUBOXONE) 8-2 mg Film; Place 1 packet (1 each total) under the tongue 2 (two) times daily.  Dispense: 60 packet; Refill: 0      Follow up in about 1 month (around 11/18/2019).

## 2019-11-15 NOTE — PROGRESS NOTES
"Subjective:       Patient ID: Kendra Lawrence is a 64 y.o. female.    Chief Complaint: narcotic dependence    HPI     The patient presents for medical management of opioid dependency. she is receiving maintenance therapy with buprenorphine.      CHIEF COMPLAINT: opoid dependence  HPI:     ONSET/TIMING:     DURATION: . Continuous(+).    QUALITY/COURSE:  unchanged.     INTENSITY/SEVERITY:  controlled.    CONTEXT/WHEN:     MODIFIERS/TREATMENTS:  Taking medications(+) Suboxone  8/2 # 60,   last rx given 9.20.19 . .    SYMPTOMS/RELATED: no withdrawal symptoms. no cravings . No substance abuse.  No alcohol use     pnp checked: last month:  Yes    Patient says she is always tired and mid to late afternoon and is sleepy now.    Review of Systems   Constitutional: Negative for diaphoresis, fatigue and unexpected weight change.   Gastrointestinal: Positive for constipation and nausea (chronic). Negative for diarrhea and vomiting.   Neurological: Positive for headaches (chronic). Negative for dizziness and light-headedness.   Psychiatric/Behavioral: Negative for dysphoric mood and sleep disturbance. The patient is not nervous/anxious.          Objective:      Vitals:    11/15/19 1546   BP: 136/66   Pulse: 80   Resp: 16   Temp: 98 °F (36.7 °C)   TempSrc: Oral   SpO2: 97%   Weight: 76.2 kg (167 lb 15.9 oz)   Height: 5' 4" (1.626 m)   PainSc: 0-No pain     Physical Exam   Constitutional: She appears well-developed and well-nourished.   Cardiovascular: Normal rate, regular rhythm and normal heart sounds.   Pulmonary/Chest: Effort normal and breath sounds normal.   Abdominal: Soft. There is no tenderness.   Neurological: She is alert.   Psychiatric: She has a normal mood and affect. Her behavior is normal. Thought content normal.   Nursing note and vitals reviewed.     No drug screen done last month because he was not called.  No test strips available.  Glucose 107      Assessment:       1. Narcotic dependence    2. Drug-induced " constipation    3. Fatigue, unspecified type          Plan:       (+) pt taking medication as prescribed.    (+) dose is approproate.    (-) urine drug screen done.   (+) discussed risks of buprenorphine, including to others.  The    (+) assessed if benefits outweigh risks of buprenorphine. .     (+) reviewed safe storage of medication.     (+) discussed proper use of buprenorphine, including missed doses.      Narcotic dependence  -     buprenorphine-naloxone (SUBOXONE) 8-2 mg Film; Place 1 packet (1 each total) under the tongue 2 (two) times daily.  Dispense: 60 packet; Refill: 0    Drug-induced constipation  -     senna-docusate 8.6-50 mg (SENNA WITH DOCUSATE SODIUM) 8.6-50 mg per tablet; Take 2 tablets by mouth 2 (two) times daily.  Dispense: 60 tablet; Refill: 5    Fatigue, unspecified type  -     POCT Glucose, Hand-Held Device      Follow up in about 1 month (around 12/15/2019).

## 2019-12-13 NOTE — PROGRESS NOTES
Health Maintenance Due   Topic Date Due    TETANUS VACCINE  09/28/1973    Colonoscopy  09/28/2003    Pneumococcal Vaccine (65+ Low/Medium Risk) (2 of 2 - PPSV23) 10/18/2019

## 2019-12-13 NOTE — PROGRESS NOTES
"Subjective:       Patient ID: Kendra Lawrence is a 64 y.o. female.    Chief Complaint: narcotic dependence    HPI     The patient presents for medical management of opioid dependency. she is receiving maintenance therapy with buprenorphine.      CHIEF COMPLAINT: opoid dependence  HPI:     ONSET/TIMING:     DURATION: . Continuous(+).    QUALITY/COURSE:  unchanged.     INTENSITY/SEVERITY:  controlled.    CONTEXT/WHEN:     MODIFIERS/TREATMENTS:  Taking medications(+) Suboxone  8/2 # 60,   last rx given 11.15.19    SYMPTOMS/RELATED: no withdrawal symptoms. no cravings . No substance abuse.  No alcohol use     pnp checked: last month:  Yes    Patient says she is always tired and mid to late afternoon and is sleepy now.    Review of Systems   Constitutional: Negative for diaphoresis, fatigue and unexpected weight change.   Gastrointestinal: Positive for nausea (chronic). Negative for constipation, diarrhea and vomiting.   Neurological: Positive for headaches (chronic due to neck pain). Negative for dizziness and light-headedness.   Psychiatric/Behavioral: Negative for dysphoric mood and sleep disturbance. The patient is not nervous/anxious.          Objective:      Vitals:    12/13/19 1040   BP: 130/68   Pulse: 83   Resp: 16   Temp: 98.2 °F (36.8 °C)   TempSrc: Oral   SpO2: 96%   Weight: 77.1 kg (169 lb 15.6 oz)   Height: 5' 4" (1.626 m)   PainSc:   4   PainLoc: Neck     Physical Exam   Constitutional: She appears well-developed and well-nourished.   Cardiovascular: Normal rate, regular rhythm and normal heart sounds.   Pulmonary/Chest: Effort normal and breath sounds normal.   Abdominal: Soft. There is no tenderness.   Neurological: She is alert.   Psychiatric: She has a normal mood and affect. Her behavior is normal. Thought content normal.   Nursing note and vitals reviewed.             Assessment:       1. Narcotic dependence          Plan:       (+) pt taking medication as prescribed.    (+) dose is approproate.    (+) " urine drug screen done.   (+) discussed risks of buprenorphine, including to others.      (+) assessed if benefits outweigh risks of buprenorphine. .     (+) reviewed safe storage of medication.     (+) discussed proper use of buprenorphine, including missed doses.      Narcotic dependence  -     buprenorphine-naloxone (SUBOXONE) 8-2 mg Film; Place 1 packet (1 each total) under the tongue 2 (two) times daily.  Dispense: 60 packet; Refill: 0      Follow up in about 1 month (around 1/13/2020).

## 2020-01-01 ENCOUNTER — PATIENT MESSAGE (OUTPATIENT)
Dept: HEMATOLOGY/ONCOLOGY | Facility: CLINIC | Age: 65
End: 2020-01-01

## 2020-01-01 ENCOUNTER — HOSPITAL ENCOUNTER (INPATIENT)
Facility: HOSPITAL | Age: 65
LOS: 12 days | Discharge: ANOTHER HOSPITAL | DRG: 871 | End: 2020-08-25
Attending: EMERGENCY MEDICINE | Admitting: HOSPITALIST
Payer: MEDICARE

## 2020-01-01 ENCOUNTER — TREATMENT (OUTPATIENT)
Dept: RADIATION ONCOLOGY | Facility: CLINIC | Age: 65
End: 2020-01-01
Payer: MEDICARE

## 2020-01-01 ENCOUNTER — TELEPHONE (OUTPATIENT)
Dept: HEMATOLOGY/ONCOLOGY | Facility: CLINIC | Age: 65
End: 2020-01-01

## 2020-01-01 ENCOUNTER — LAB VISIT (OUTPATIENT)
Dept: LAB | Facility: HOSPITAL | Age: 65
End: 2020-01-01
Attending: INTERNAL MEDICINE
Payer: MEDICARE

## 2020-01-01 ENCOUNTER — TELEPHONE (OUTPATIENT)
Dept: FAMILY MEDICINE | Facility: CLINIC | Age: 65
End: 2020-01-01

## 2020-01-01 ENCOUNTER — ANESTHESIA EVENT (OUTPATIENT)
Dept: ENDOSCOPY | Facility: HOSPITAL | Age: 65
DRG: 871 | End: 2020-01-01
Payer: MEDICARE

## 2020-01-01 ENCOUNTER — DOCUMENT SCAN (OUTPATIENT)
Dept: HOME HEALTH SERVICES | Facility: HOSPITAL | Age: 65
End: 2020-01-01
Payer: MEDICARE

## 2020-01-01 ENCOUNTER — OFFICE VISIT (OUTPATIENT)
Dept: HEMATOLOGY/ONCOLOGY | Facility: CLINIC | Age: 65
End: 2020-01-01
Payer: MEDICARE

## 2020-01-01 ENCOUNTER — INFUSION (OUTPATIENT)
Dept: INFUSION THERAPY | Facility: HOSPITAL | Age: 65
End: 2020-01-01
Attending: INTERNAL MEDICINE
Payer: MEDICARE

## 2020-01-01 ENCOUNTER — TELEPHONE (OUTPATIENT)
Dept: INFUSION THERAPY | Facility: HOSPITAL | Age: 65
End: 2020-01-01

## 2020-01-01 ENCOUNTER — OFFICE VISIT (OUTPATIENT)
Dept: FAMILY MEDICINE | Facility: CLINIC | Age: 65
End: 2020-01-01
Payer: MEDICARE

## 2020-01-01 ENCOUNTER — DOCUMENTATION ONLY (OUTPATIENT)
Dept: HEMATOLOGY/ONCOLOGY | Facility: CLINIC | Age: 65
End: 2020-01-01

## 2020-01-01 ENCOUNTER — CLINICAL SUPPORT (OUTPATIENT)
Dept: FAMILY MEDICINE | Facility: CLINIC | Age: 65
End: 2020-01-01
Payer: MEDICARE

## 2020-01-01 ENCOUNTER — HOSPITAL ENCOUNTER (INPATIENT)
Facility: HOSPITAL | Age: 65
LOS: 1 days | DRG: 871 | End: 2020-11-01
Attending: INTERNAL MEDICINE | Admitting: INTERNAL MEDICINE
Payer: MEDICARE

## 2020-01-01 ENCOUNTER — HOSPITAL ENCOUNTER (OUTPATIENT)
Dept: RADIOLOGY | Facility: HOSPITAL | Age: 65
Discharge: HOME | End: 2020-09-17
Attending: INTERNAL MEDICINE
Payer: MEDICARE

## 2020-01-01 ENCOUNTER — PATIENT OUTREACH (OUTPATIENT)
Dept: ADMINISTRATIVE | Facility: HOSPITAL | Age: 65
End: 2020-01-01

## 2020-01-01 ENCOUNTER — TELEPHONE (OUTPATIENT)
Dept: BARIATRICS | Facility: CLINIC | Age: 65
End: 2020-01-01

## 2020-01-01 ENCOUNTER — LAB VISIT (OUTPATIENT)
Dept: LAB | Facility: HOSPITAL | Age: 65
End: 2020-01-01
Attending: CHIROPRACTOR
Payer: MEDICARE

## 2020-01-01 ENCOUNTER — ANESTHESIA (OUTPATIENT)
Dept: ENDOSCOPY | Facility: HOSPITAL | Age: 65
DRG: 871 | End: 2020-01-01
Payer: MEDICARE

## 2020-01-01 ENCOUNTER — PATIENT MESSAGE (OUTPATIENT)
Dept: ADMINISTRATIVE | Facility: HOSPITAL | Age: 65
End: 2020-01-01

## 2020-01-01 ENCOUNTER — PATIENT MESSAGE (OUTPATIENT)
Dept: FAMILY MEDICINE | Facility: CLINIC | Age: 65
End: 2020-01-01

## 2020-01-01 ENCOUNTER — TELEPHONE (OUTPATIENT)
Dept: MEDSURG UNIT | Facility: HOSPITAL | Age: 65
End: 2020-01-01

## 2020-01-01 ENCOUNTER — OUTSIDE PLACE OF SERVICE (OUTPATIENT)
Dept: PULMONOLOGY | Facility: CLINIC | Age: 65
End: 2020-01-01
Payer: MEDICARE

## 2020-01-01 ENCOUNTER — HOSPITAL ENCOUNTER (INPATIENT)
Facility: HOSPITAL | Age: 65
LOS: 2 days | Discharge: HOSPICE | DRG: 871 | End: 2020-11-01
Attending: EMERGENCY MEDICINE | Admitting: INTERNAL MEDICINE
Payer: MEDICARE

## 2020-01-01 ENCOUNTER — HOSPITAL ENCOUNTER (INPATIENT)
Facility: HOSPITAL | Age: 65
LOS: 2 days | Discharge: HOME HEALTH | DRG: 846 | End: 2020-08-28
Attending: INTERNAL MEDICINE | Admitting: INTERNAL MEDICINE
Payer: MEDICARE

## 2020-01-01 ENCOUNTER — OFFICE VISIT (OUTPATIENT)
Dept: PULMONOLOGY | Facility: CLINIC | Age: 65
End: 2020-01-01
Payer: MEDICARE

## 2020-01-01 ENCOUNTER — EXTERNAL HOME HEALTH (OUTPATIENT)
Dept: HOME HEALTH SERVICES | Facility: HOSPITAL | Age: 65
End: 2020-01-01
Payer: MEDICARE

## 2020-01-01 VITALS
DIASTOLIC BLOOD PRESSURE: 74 MMHG | HEART RATE: 87 BPM | RESPIRATION RATE: 18 BRPM | SYSTOLIC BLOOD PRESSURE: 116 MMHG | TEMPERATURE: 98 F | HEIGHT: 64 IN | BODY MASS INDEX: 24.62 KG/M2 | DIASTOLIC BLOOD PRESSURE: 83 MMHG | TEMPERATURE: 98 F | SYSTOLIC BLOOD PRESSURE: 138 MMHG | WEIGHT: 144.19 LBS | OXYGEN SATURATION: 98 % | RESPIRATION RATE: 18 BRPM | HEART RATE: 98 BPM

## 2020-01-01 VITALS
DIASTOLIC BLOOD PRESSURE: 66 MMHG | HEART RATE: 92 BPM | OXYGEN SATURATION: 98 % | TEMPERATURE: 98 F | BODY MASS INDEX: 24.75 KG/M2 | WEIGHT: 145 LBS | SYSTOLIC BLOOD PRESSURE: 105 MMHG | RESPIRATION RATE: 18 BRPM | HEIGHT: 64 IN

## 2020-01-01 VITALS
WEIGHT: 146.69 LBS | WEIGHT: 146 LBS | BODY MASS INDEX: 25.04 KG/M2 | HEART RATE: 104 BPM | TEMPERATURE: 98 F | RESPIRATION RATE: 18 BRPM | SYSTOLIC BLOOD PRESSURE: 146 MMHG | BODY MASS INDEX: 24.92 KG/M2 | RESPIRATION RATE: 18 BRPM | DIASTOLIC BLOOD PRESSURE: 83 MMHG | HEART RATE: 98 BPM | HEIGHT: 64 IN | TEMPERATURE: 98 F | SYSTOLIC BLOOD PRESSURE: 141 MMHG | HEIGHT: 64 IN | DIASTOLIC BLOOD PRESSURE: 77 MMHG

## 2020-01-01 VITALS
HEIGHT: 64 IN | SYSTOLIC BLOOD PRESSURE: 118 MMHG | BODY MASS INDEX: 29.59 KG/M2 | DIASTOLIC BLOOD PRESSURE: 86 MMHG | HEART RATE: 70 BPM | OXYGEN SATURATION: 97 % | WEIGHT: 173.31 LBS | TEMPERATURE: 98 F | RESPIRATION RATE: 16 BRPM

## 2020-01-01 VITALS
RESPIRATION RATE: 16 BRPM | HEIGHT: 64 IN | SYSTOLIC BLOOD PRESSURE: 124 MMHG | BODY MASS INDEX: 28.53 KG/M2 | OXYGEN SATURATION: 94 % | WEIGHT: 167.13 LBS | DIASTOLIC BLOOD PRESSURE: 68 MMHG | TEMPERATURE: 98 F | HEART RATE: 86 BPM

## 2020-01-01 VITALS
BODY MASS INDEX: 25.2 KG/M2 | HEART RATE: 104 BPM | WEIGHT: 144.19 LBS | WEIGHT: 146.81 LBS | DIASTOLIC BLOOD PRESSURE: 78 MMHG | HEART RATE: 106 BPM | BODY MASS INDEX: 24.75 KG/M2 | OXYGEN SATURATION: 98 % | SYSTOLIC BLOOD PRESSURE: 121 MMHG | TEMPERATURE: 97 F | RESPIRATION RATE: 18 BRPM | TEMPERATURE: 97 F | SYSTOLIC BLOOD PRESSURE: 131 MMHG | DIASTOLIC BLOOD PRESSURE: 73 MMHG

## 2020-01-01 VITALS
HEIGHT: 64 IN | RESPIRATION RATE: 18 BRPM | DIASTOLIC BLOOD PRESSURE: 71 MMHG | HEART RATE: 89 BPM | BODY MASS INDEX: 24.84 KG/M2 | TEMPERATURE: 98 F | WEIGHT: 145.5 LBS | SYSTOLIC BLOOD PRESSURE: 109 MMHG

## 2020-01-01 VITALS
DIASTOLIC BLOOD PRESSURE: 72 MMHG | RESPIRATION RATE: 20 BRPM | HEART RATE: 88 BPM | SYSTOLIC BLOOD PRESSURE: 118 MMHG | TEMPERATURE: 98 F | OXYGEN SATURATION: 99 %

## 2020-01-01 VITALS
OXYGEN SATURATION: 97 % | BODY MASS INDEX: 29.24 KG/M2 | HEIGHT: 64 IN | SYSTOLIC BLOOD PRESSURE: 136 MMHG | RESPIRATION RATE: 16 BRPM | HEART RATE: 81 BPM | TEMPERATURE: 98 F | DIASTOLIC BLOOD PRESSURE: 84 MMHG | WEIGHT: 171.31 LBS

## 2020-01-01 VITALS
SYSTOLIC BLOOD PRESSURE: 115 MMHG | OXYGEN SATURATION: 98 % | HEART RATE: 79 BPM | HEART RATE: 99 BPM | BODY MASS INDEX: 24.92 KG/M2 | DIASTOLIC BLOOD PRESSURE: 70 MMHG | WEIGHT: 160 LBS | DIASTOLIC BLOOD PRESSURE: 52 MMHG | BODY MASS INDEX: 27.46 KG/M2 | SYSTOLIC BLOOD PRESSURE: 108 MMHG | TEMPERATURE: 98 F | WEIGHT: 145.94 LBS | OXYGEN SATURATION: 97 % | HEIGHT: 64 IN

## 2020-01-01 VITALS
WEIGHT: 179.38 LBS | TEMPERATURE: 97 F | RESPIRATION RATE: 18 BRPM | BODY MASS INDEX: 30.63 KG/M2 | SYSTOLIC BLOOD PRESSURE: 119 MMHG | OXYGEN SATURATION: 99 % | DIASTOLIC BLOOD PRESSURE: 73 MMHG | HEART RATE: 104 BPM | HEIGHT: 64 IN | WEIGHT: 151.25 LBS | OXYGEN SATURATION: 95 % | DIASTOLIC BLOOD PRESSURE: 85 MMHG | BODY MASS INDEX: 25.82 KG/M2 | RESPIRATION RATE: 20 BRPM | TEMPERATURE: 98 F | HEART RATE: 90 BPM | SYSTOLIC BLOOD PRESSURE: 133 MMHG | HEIGHT: 64 IN

## 2020-01-01 VITALS
HEART RATE: 100 BPM | HEIGHT: 64 IN | TEMPERATURE: 98 F | BODY MASS INDEX: 26.29 KG/M2 | DIASTOLIC BLOOD PRESSURE: 71 MMHG | RESPIRATION RATE: 18 BRPM | BODY MASS INDEX: 24.46 KG/M2 | HEIGHT: 64 IN | WEIGHT: 154 LBS | OXYGEN SATURATION: 99 % | WEIGHT: 143.31 LBS | SYSTOLIC BLOOD PRESSURE: 106 MMHG

## 2020-01-01 VITALS
WEIGHT: 151 LBS | RESPIRATION RATE: 28 BRPM | HEART RATE: 116 BPM | BODY MASS INDEX: 25.78 KG/M2 | HEIGHT: 64 IN | OXYGEN SATURATION: 94 % | SYSTOLIC BLOOD PRESSURE: 85 MMHG | TEMPERATURE: 100 F | DIASTOLIC BLOOD PRESSURE: 53 MMHG

## 2020-01-01 VITALS
SYSTOLIC BLOOD PRESSURE: 133 MMHG | WEIGHT: 143.5 LBS | TEMPERATURE: 97 F | HEART RATE: 109 BPM | DIASTOLIC BLOOD PRESSURE: 79 MMHG | OXYGEN SATURATION: 99 % | BODY MASS INDEX: 24.64 KG/M2 | RESPIRATION RATE: 18 BRPM

## 2020-01-01 VITALS
BODY MASS INDEX: 27.85 KG/M2 | RESPIRATION RATE: 18 BRPM | HEIGHT: 64 IN | SYSTOLIC BLOOD PRESSURE: 127 MMHG | DIASTOLIC BLOOD PRESSURE: 62 MMHG | TEMPERATURE: 98 F | WEIGHT: 163.13 LBS | HEART RATE: 88 BPM | OXYGEN SATURATION: 98 %

## 2020-01-01 DIAGNOSIS — E44.0 MODERATE MALNUTRITION: ICD-10-CM

## 2020-01-01 DIAGNOSIS — C34.91 SMALL CELL LUNG CANCER, RIGHT: Primary | ICD-10-CM

## 2020-01-01 DIAGNOSIS — C34.91 SMALL CELL LUNG CANCER, RIGHT: ICD-10-CM

## 2020-01-01 DIAGNOSIS — C34.90 SMALL CELL LUNG CANCER: ICD-10-CM

## 2020-01-01 DIAGNOSIS — J18.9 OBSTRUCTIVE PNEUMONIA: Primary | ICD-10-CM

## 2020-01-01 DIAGNOSIS — D64.9 SYMPTOMATIC ANEMIA: ICD-10-CM

## 2020-01-01 DIAGNOSIS — E88.09 HYPOALBUMINEMIA: ICD-10-CM

## 2020-01-01 DIAGNOSIS — J18.9 OBSTRUCTIVE PNEUMONIA: ICD-10-CM

## 2020-01-01 DIAGNOSIS — F11.20 NARCOTIC DEPENDENCE: ICD-10-CM

## 2020-01-01 DIAGNOSIS — C34.2 SMALL CELL CARCINOMA OF MIDDLE LOBE OF RIGHT LUNG: ICD-10-CM

## 2020-01-01 DIAGNOSIS — R11.10 VOMITING, INTRACTABILITY OF VOMITING NOT SPECIFIED, PRESENCE OF NAUSEA NOT SPECIFIED, UNSPECIFIED VOMITING TYPE: ICD-10-CM

## 2020-01-01 DIAGNOSIS — R63.5 WEIGHT GAIN: ICD-10-CM

## 2020-01-01 DIAGNOSIS — R11.10 VOMITING, INTRACTABILITY OF VOMITING NOT SPECIFIED, PRESENCE OF NAUSEA NOT SPECIFIED, UNSPECIFIED VOMITING TYPE: Primary | ICD-10-CM

## 2020-01-01 DIAGNOSIS — J44.0 CHRONIC OBSTRUCTIVE PULMONARY DISEASE WITH ACUTE LOWER RESPIRATORY INFECTION: ICD-10-CM

## 2020-01-01 DIAGNOSIS — C34.2 SMALL CELL CARCINOMA OF MIDDLE LOBE OF RIGHT LUNG: Primary | ICD-10-CM

## 2020-01-01 DIAGNOSIS — E87.6 HYPOKALEMIA: ICD-10-CM

## 2020-01-01 DIAGNOSIS — A41.9 SEPSIS, DUE TO UNSPECIFIED ORGANISM, UNSPECIFIED WHETHER ACUTE ORGAN DYSFUNCTION PRESENT: ICD-10-CM

## 2020-01-01 DIAGNOSIS — J96.11 CHRONIC HYPOXEMIC RESPIRATORY FAILURE: ICD-10-CM

## 2020-01-01 DIAGNOSIS — F11.20 NARCOTIC DEPENDENCE: Primary | ICD-10-CM

## 2020-01-01 DIAGNOSIS — R59.0 MEDIASTINAL LYMPHADENOPATHY: ICD-10-CM

## 2020-01-01 DIAGNOSIS — K52.9 COLITIS: ICD-10-CM

## 2020-01-01 DIAGNOSIS — J96.21 ACUTE ON CHRONIC RESPIRATORY FAILURE WITH HYPOXIA AND HYPERCAPNIA: ICD-10-CM

## 2020-01-01 DIAGNOSIS — C34.11 MALIGNANT NEOPLASM OF UPPER LOBE OF RIGHT LUNG: ICD-10-CM

## 2020-01-01 DIAGNOSIS — K29.70 GASTRITIS, PRESENCE OF BLEEDING UNSPECIFIED, UNSPECIFIED CHRONICITY, UNSPECIFIED GASTRITIS TYPE: ICD-10-CM

## 2020-01-01 DIAGNOSIS — R50.9 FEVER: ICD-10-CM

## 2020-01-01 DIAGNOSIS — A41.9 SEPSIS: ICD-10-CM

## 2020-01-01 DIAGNOSIS — R06.02 SOB (SHORTNESS OF BREATH): ICD-10-CM

## 2020-01-01 DIAGNOSIS — D64.9 ANEMIA, UNSPECIFIED TYPE: ICD-10-CM

## 2020-01-01 DIAGNOSIS — K31.84 GASTROPARESIS: ICD-10-CM

## 2020-01-01 DIAGNOSIS — J18.9 PNEUMONIA OF RIGHT LOWER LOBE DUE TO INFECTIOUS ORGANISM: ICD-10-CM

## 2020-01-01 DIAGNOSIS — F41.9 ANXIETY AND DEPRESSION: ICD-10-CM

## 2020-01-01 DIAGNOSIS — G47.00 INSOMNIA, UNSPECIFIED TYPE: ICD-10-CM

## 2020-01-01 DIAGNOSIS — T45.1X5A CHEMOTHERAPY INDUCED DIARRHEA: ICD-10-CM

## 2020-01-01 DIAGNOSIS — J96.22 ACUTE ON CHRONIC RESPIRATORY FAILURE WITH HYPOXIA AND HYPERCAPNIA: ICD-10-CM

## 2020-01-01 DIAGNOSIS — J44.9 CHRONIC OBSTRUCTIVE PULMONARY DISEASE, UNSPECIFIED COPD TYPE: Primary | ICD-10-CM

## 2020-01-01 DIAGNOSIS — D64.81 ANEMIA FOLLOWING USE OF CHEMOTHERAPEUTIC DRUG: ICD-10-CM

## 2020-01-01 DIAGNOSIS — M81.0 AGE-RELATED OSTEOPOROSIS WITHOUT CURRENT PATHOLOGICAL FRACTURE: ICD-10-CM

## 2020-01-01 DIAGNOSIS — E87.6 HYPOKALEMIA: Primary | ICD-10-CM

## 2020-01-01 DIAGNOSIS — K52.1 CHEMOTHERAPY INDUCED DIARRHEA: ICD-10-CM

## 2020-01-01 DIAGNOSIS — D80.1 HYPOGAMMAGLOBULINEMIA: Primary | ICD-10-CM

## 2020-01-01 DIAGNOSIS — J96.01 ACUTE HYPOXEMIC RESPIRATORY FAILURE: Primary | ICD-10-CM

## 2020-01-01 DIAGNOSIS — D72.829 LEUKOCYTOSIS, UNSPECIFIED TYPE: ICD-10-CM

## 2020-01-01 DIAGNOSIS — K08.9 POOR DENTITION: ICD-10-CM

## 2020-01-01 DIAGNOSIS — R06.02 SHORTNESS OF BREATH: ICD-10-CM

## 2020-01-01 DIAGNOSIS — D50.8 IRON DEFICIENCY ANEMIA SECONDARY TO INADEQUATE DIETARY IRON INTAKE: ICD-10-CM

## 2020-01-01 DIAGNOSIS — C34.90 MALIGNANT NEOPLASM OF LUNG, UNSPECIFIED LATERALITY, UNSPECIFIED PART OF LUNG: ICD-10-CM

## 2020-01-01 DIAGNOSIS — R11.0 NAUSEA: ICD-10-CM

## 2020-01-01 DIAGNOSIS — C34.90 LUNG CANCER: ICD-10-CM

## 2020-01-01 DIAGNOSIS — Z09 CHEMOTHERAPY FOLLOW-UP EXAMINATION: ICD-10-CM

## 2020-01-01 DIAGNOSIS — J44.9 CHRONIC OBSTRUCTIVE PULMONARY DISEASE, UNSPECIFIED COPD TYPE: ICD-10-CM

## 2020-01-01 DIAGNOSIS — Z51.11 ENCOUNTER FOR CHEMOTHERAPY MANAGEMENT: ICD-10-CM

## 2020-01-01 DIAGNOSIS — R91.8 MASS OF MIDDLE LOBE OF RIGHT LUNG: ICD-10-CM

## 2020-01-01 DIAGNOSIS — R07.9 CHEST PAIN: ICD-10-CM

## 2020-01-01 DIAGNOSIS — C34.11 MALIGNANT NEOPLASM OF UPPER LOBE OF RIGHT LUNG: Primary | ICD-10-CM

## 2020-01-01 DIAGNOSIS — I70.0 ABDOMINAL AORTIC ATHEROSCLEROSIS: ICD-10-CM

## 2020-01-01 DIAGNOSIS — R91.8 MASS OF MIDDLE LOBE OF RIGHT LUNG: Primary | ICD-10-CM

## 2020-01-01 DIAGNOSIS — D75.839 THROMBOCYTOSIS: ICD-10-CM

## 2020-01-01 DIAGNOSIS — K80.50 CALCULUS OF BILE DUCT WITHOUT CHOLECYSTITIS AND WITHOUT OBSTRUCTION: ICD-10-CM

## 2020-01-01 DIAGNOSIS — F32.A ANXIETY AND DEPRESSION: ICD-10-CM

## 2020-01-01 DIAGNOSIS — R06.00 DYSPNEA: ICD-10-CM

## 2020-01-01 DIAGNOSIS — C79.9 METASTATIC CANCER: ICD-10-CM

## 2020-01-01 DIAGNOSIS — R91.8 LUNG MASS: ICD-10-CM

## 2020-01-01 DIAGNOSIS — R00.0 TACHYCARDIA: ICD-10-CM

## 2020-01-01 DIAGNOSIS — J18.9 PNEUMONIA OF RIGHT MIDDLE LOBE DUE TO INFECTIOUS ORGANISM: ICD-10-CM

## 2020-01-01 DIAGNOSIS — N39.0 URINARY TRACT INFECTION WITHOUT HEMATURIA, SITE UNSPECIFIED: ICD-10-CM

## 2020-01-01 DIAGNOSIS — R19.7 DIARRHEA, UNSPECIFIED TYPE: Primary | ICD-10-CM

## 2020-01-01 DIAGNOSIS — J96.21 ACUTE ON CHRONIC RESPIRATORY FAILURE WITH HYPOXIA: ICD-10-CM

## 2020-01-01 DIAGNOSIS — R73.9 HYPERGLYCEMIA: ICD-10-CM

## 2020-01-01 DIAGNOSIS — Z28.9 DELAYED VACCINATION: ICD-10-CM

## 2020-01-01 DIAGNOSIS — D70.9 SEVERE NEUTROPENIA: Primary | ICD-10-CM

## 2020-01-01 DIAGNOSIS — E87.1 HYPONATREMIA: ICD-10-CM

## 2020-01-01 DIAGNOSIS — Z71.89 COMPLEX CARE COORDINATION: ICD-10-CM

## 2020-01-01 LAB
ABO + RH BLD: NORMAL
ABO + RH BLD: NORMAL
ACID FAST MOD KINY STN SPEC: NORMAL
ALBUMIN SERPL BCP-MCNC: 1.8 G/DL (ref 3.5–5.2)
ALBUMIN SERPL BCP-MCNC: 1.9 G/DL (ref 3.5–5.2)
ALBUMIN SERPL BCP-MCNC: 1.9 G/DL (ref 3.5–5.2)
ALBUMIN SERPL BCP-MCNC: 2 G/DL (ref 3.5–5.2)
ALBUMIN SERPL BCP-MCNC: 2 G/DL (ref 3.5–5.2)
ALBUMIN SERPL BCP-MCNC: 2.1 G/DL (ref 3.5–5.2)
ALBUMIN SERPL BCP-MCNC: 2.1 G/DL (ref 3.5–5.2)
ALBUMIN SERPL BCP-MCNC: 2.2 G/DL (ref 3.5–5.2)
ALBUMIN SERPL BCP-MCNC: 2.2 G/DL (ref 3.5–5.2)
ALBUMIN SERPL BCP-MCNC: 2.3 G/DL (ref 3.5–5.2)
ALBUMIN SERPL BCP-MCNC: 2.3 G/DL (ref 3.5–5.2)
ALBUMIN SERPL BCP-MCNC: 2.4 G/DL (ref 3.5–5.2)
ALBUMIN SERPL BCP-MCNC: 2.5 G/DL (ref 3.5–5.2)
ALBUMIN SERPL BCP-MCNC: 2.5 G/DL (ref 3.5–5.2)
ALBUMIN SERPL BCP-MCNC: 2.6 G/DL (ref 3.5–5.2)
ALBUMIN SERPL BCP-MCNC: 2.6 G/DL (ref 3.5–5.2)
ALBUMIN SERPL BCP-MCNC: 2.7 G/DL (ref 3.5–5.2)
ALBUMIN SERPL BCP-MCNC: 2.8 G/DL (ref 3.5–5.2)
ALBUMIN SERPL BCP-MCNC: 2.9 G/DL (ref 3.5–5.2)
ALBUMIN SERPL BCP-MCNC: 2.9 G/DL (ref 3.5–5.2)
ALLENS TEST: ABNORMAL
ALP SERPL-CCNC: 111 U/L (ref 55–135)
ALP SERPL-CCNC: 118 U/L (ref 55–135)
ALP SERPL-CCNC: 122 U/L (ref 55–135)
ALP SERPL-CCNC: 126 U/L (ref 55–135)
ALP SERPL-CCNC: 135 U/L (ref 55–135)
ALP SERPL-CCNC: 137 U/L (ref 55–135)
ALP SERPL-CCNC: 138 U/L (ref 55–135)
ALP SERPL-CCNC: 143 U/L (ref 55–135)
ALP SERPL-CCNC: 147 U/L (ref 55–135)
ALP SERPL-CCNC: 43 U/L (ref 55–135)
ALP SERPL-CCNC: 56 U/L (ref 55–135)
ALP SERPL-CCNC: 57 U/L (ref 55–135)
ALP SERPL-CCNC: 74 U/L (ref 55–135)
ALP SERPL-CCNC: 74 U/L (ref 55–135)
ALP SERPL-CCNC: 76 U/L (ref 55–135)
ALP SERPL-CCNC: 81 U/L (ref 55–135)
ALP SERPL-CCNC: 81 U/L (ref 55–135)
ALP SERPL-CCNC: 84 U/L (ref 55–135)
ALP SERPL-CCNC: 89 U/L (ref 55–135)
ALP SERPL-CCNC: 90 U/L (ref 55–135)
ALP SERPL-CCNC: 94 U/L (ref 55–135)
ALP SERPL-CCNC: 99 U/L (ref 55–135)
ALT SERPL W/O P-5'-P-CCNC: 10 U/L (ref 10–44)
ALT SERPL W/O P-5'-P-CCNC: 13 U/L (ref 10–44)
ALT SERPL W/O P-5'-P-CCNC: 13 U/L (ref 10–44)
ALT SERPL W/O P-5'-P-CCNC: 17 U/L (ref 10–44)
ALT SERPL W/O P-5'-P-CCNC: 17 U/L (ref 10–44)
ALT SERPL W/O P-5'-P-CCNC: 18 U/L (ref 10–44)
ALT SERPL W/O P-5'-P-CCNC: 18 U/L (ref 10–44)
ALT SERPL W/O P-5'-P-CCNC: 21 U/L (ref 10–44)
ALT SERPL W/O P-5'-P-CCNC: 27 U/L (ref 10–44)
ALT SERPL W/O P-5'-P-CCNC: 29 U/L (ref 10–44)
ALT SERPL W/O P-5'-P-CCNC: 30 U/L (ref 10–44)
ALT SERPL W/O P-5'-P-CCNC: 30 U/L (ref 10–44)
ALT SERPL W/O P-5'-P-CCNC: 31 U/L (ref 10–44)
ALT SERPL W/O P-5'-P-CCNC: 31 U/L (ref 10–44)
ALT SERPL W/O P-5'-P-CCNC: 33 U/L (ref 10–44)
ALT SERPL W/O P-5'-P-CCNC: 35 U/L (ref 10–44)
ALT SERPL W/O P-5'-P-CCNC: 36 U/L (ref 10–44)
ALT SERPL W/O P-5'-P-CCNC: 39 U/L (ref 10–44)
ALT SERPL W/O P-5'-P-CCNC: 40 U/L (ref 10–44)
ALT SERPL W/O P-5'-P-CCNC: 44 U/L (ref 10–44)
AMP D-AMPHETAMINE 1000 NG/ML: NEGATIVE
ANION GAP SERPL CALC-SCNC: 10 MMOL/L (ref 8–16)
ANION GAP SERPL CALC-SCNC: 11 MMOL/L (ref 8–16)
ANION GAP SERPL CALC-SCNC: 12 MMOL/L (ref 8–16)
ANION GAP SERPL CALC-SCNC: 14 MMOL/L (ref 8–16)
ANION GAP SERPL CALC-SCNC: 14 MMOL/L (ref 8–16)
ANION GAP SERPL CALC-SCNC: 15 MMOL/L (ref 8–16)
ANION GAP SERPL CALC-SCNC: 17 MMOL/L (ref 8–16)
ANION GAP SERPL CALC-SCNC: 7 MMOL/L (ref 8–16)
ANION GAP SERPL CALC-SCNC: 9 MMOL/L (ref 8–16)
ANION GAP SERPL CALC-SCNC: 9 MMOL/L (ref 8–16)
ANISOCYTOSIS BLD QL SMEAR: ABNORMAL
ANISOCYTOSIS BLD QL SMEAR: SLIGHT
ANISOCYTOSIS BLD QL SMEAR: SLIGHT
AST SERPL-CCNC: 12 U/L (ref 10–40)
AST SERPL-CCNC: 13 U/L (ref 10–40)
AST SERPL-CCNC: 14 U/L (ref 10–40)
AST SERPL-CCNC: 16 U/L (ref 10–40)
AST SERPL-CCNC: 17 U/L (ref 10–40)
AST SERPL-CCNC: 18 U/L (ref 10–40)
AST SERPL-CCNC: 19 U/L (ref 10–40)
AST SERPL-CCNC: 19 U/L (ref 10–40)
AST SERPL-CCNC: 20 U/L (ref 10–40)
AST SERPL-CCNC: 22 U/L (ref 10–40)
AST SERPL-CCNC: 24 U/L (ref 10–40)
AST SERPL-CCNC: 25 U/L (ref 10–40)
AST SERPL-CCNC: 26 U/L (ref 10–40)
AST SERPL-CCNC: 27 U/L (ref 10–40)
AST SERPL-CCNC: 29 U/L (ref 10–40)
AST SERPL-CCNC: 30 U/L (ref 10–40)
AST SERPL-CCNC: 32 U/L (ref 10–40)
BACTERIA #/AREA URNS HPF: ABNORMAL /HPF
BACTERIA #/AREA URNS HPF: NEGATIVE /HPF
BACTERIA BLD CULT: NORMAL
BACTERIA BLD CULT: NORMAL
BACTERIA SPEC AEROBE CULT: ABNORMAL
BACTERIA UR CULT: ABNORMAL
BAR SECOBARBITAL 300 NG/ML: NEGATIVE
BASO STIPL BLD QL SMEAR: ABNORMAL
BASOPHILS # BLD AUTO: 0.01 K/UL (ref 0–0.2)
BASOPHILS # BLD AUTO: 0.02 K/UL (ref 0–0.2)
BASOPHILS # BLD AUTO: 0.03 K/UL (ref 0–0.2)
BASOPHILS # BLD AUTO: 0.04 K/UL (ref 0–0.2)
BASOPHILS # BLD AUTO: 0.04 K/UL (ref 0–0.2)
BASOPHILS # BLD AUTO: 0.05 K/UL (ref 0–0.2)
BASOPHILS # BLD AUTO: 0.05 K/UL (ref 0–0.2)
BASOPHILS # BLD AUTO: 0.1 K/UL (ref 0–0.2)
BASOPHILS # BLD AUTO: ABNORMAL K/UL (ref 0–0.2)
BASOPHILS NFR BLD: 0 % (ref 0–1.9)
BASOPHILS NFR BLD: 0.1 % (ref 0–1.9)
BASOPHILS NFR BLD: 0.2 % (ref 0–1.9)
BASOPHILS NFR BLD: 0.3 % (ref 0–1.9)
BASOPHILS NFR BLD: 0.5 % (ref 0–1.9)
BASOPHILS NFR BLD: 0.6 % (ref 0–1.9)
BASOPHILS NFR BLD: 0.9 % (ref 0–1.9)
BASOPHILS NFR BLD: 6.7 % (ref 0–1.9)
BILIRUB SERPL-MCNC: 0.1 MG/DL (ref 0.1–1)
BILIRUB SERPL-MCNC: 0.2 MG/DL (ref 0.1–1)
BILIRUB SERPL-MCNC: 0.3 MG/DL (ref 0.1–1)
BILIRUB SERPL-MCNC: 0.3 MG/DL (ref 0.1–1)
BILIRUB SERPL-MCNC: 0.4 MG/DL (ref 0.1–1)
BILIRUB SERPL-MCNC: 0.4 MG/DL (ref 0.1–1)
BILIRUB SERPL-MCNC: 0.5 MG/DL (ref 0.1–1)
BILIRUB SERPL-MCNC: 0.6 MG/DL (ref 0.1–1)
BILIRUB SERPL-MCNC: 0.9 MG/DL (ref 0.1–1)
BILIRUB SERPL-MCNC: 1 MG/DL (ref 0.1–1)
BILIRUB SERPL-MCNC: 1.2 MG/DL (ref 0.1–1)
BILIRUB UR QL STRIP: NEGATIVE
BILIRUB UR QL STRIP: NEGATIVE
BLD GP AB SCN CELLS X3 SERPL QL: NORMAL
BLD GP AB SCN CELLS X3 SERPL QL: NORMAL
BLD PROD TYP BPU: NORMAL
BLOOD UNIT EXPIRATION DATE: NORMAL
BLOOD UNIT TYPE CODE: 5100
BLOOD UNIT TYPE CODE: 5100
BLOOD UNIT TYPE CODE: 6200
BLOOD UNIT TYPE: NORMAL
BNP SERPL-MCNC: 1297 PG/ML (ref 0–99)
BNP SERPL-MCNC: 57 PG/ML (ref 0–99)
BUN SERPL-MCNC: 10 MG/DL (ref 8–23)
BUN SERPL-MCNC: 12 MG/DL (ref 8–23)
BUN SERPL-MCNC: 14 MG/DL (ref 8–23)
BUN SERPL-MCNC: 15 MG/DL (ref 8–23)
BUN SERPL-MCNC: 16 MG/DL (ref 8–23)
BUN SERPL-MCNC: 17 MG/DL (ref 8–23)
BUN SERPL-MCNC: 18 MG/DL (ref 8–23)
BUN SERPL-MCNC: 19 MG/DL (ref 8–23)
BUN SERPL-MCNC: 27 MG/DL (ref 8–23)
BUN SERPL-MCNC: 27 MG/DL (ref 8–23)
BUN SERPL-MCNC: 28 MG/DL (ref 8–23)
BUN SERPL-MCNC: 29 MG/DL (ref 8–23)
BUN SERPL-MCNC: 32 MG/DL (ref 8–23)
BUN SERPL-MCNC: 33 MG/DL (ref 8–23)
BUN SERPL-MCNC: 34 MG/DL (ref 8–23)
BUN SERPL-MCNC: 35 MG/DL (ref 8–23)
BUN SERPL-MCNC: 37 MG/DL (ref 8–23)
BUN SERPL-MCNC: 9 MG/DL (ref 8–23)
BUP BUPRENORPHINE 10 NG/ML: POSITIVE
BZO OXAZEPAM 300 NG/ML: NEGATIVE
CALCIUM SERPL-MCNC: 10 MG/DL (ref 8.7–10.5)
CALCIUM SERPL-MCNC: 7.2 MG/DL (ref 8.7–10.5)
CALCIUM SERPL-MCNC: 7.6 MG/DL (ref 8.7–10.5)
CALCIUM SERPL-MCNC: 7.6 MG/DL (ref 8.7–10.5)
CALCIUM SERPL-MCNC: 7.8 MG/DL (ref 8.7–10.5)
CALCIUM SERPL-MCNC: 8 MG/DL (ref 8.7–10.5)
CALCIUM SERPL-MCNC: 8.1 MG/DL (ref 8.7–10.5)
CALCIUM SERPL-MCNC: 8.2 MG/DL (ref 8.7–10.5)
CALCIUM SERPL-MCNC: 8.3 MG/DL (ref 8.7–10.5)
CALCIUM SERPL-MCNC: 8.5 MG/DL (ref 8.7–10.5)
CALCIUM SERPL-MCNC: 8.5 MG/DL (ref 8.7–10.5)
CALCIUM SERPL-MCNC: 8.6 MG/DL (ref 8.7–10.5)
CALCIUM SERPL-MCNC: 8.8 MG/DL (ref 8.7–10.5)
CALCIUM SERPL-MCNC: 8.9 MG/DL (ref 8.7–10.5)
CALCIUM SERPL-MCNC: 8.9 MG/DL (ref 8.7–10.5)
CALCIUM SERPL-MCNC: 9 MG/DL (ref 8.7–10.5)
CALCIUM SERPL-MCNC: 9.1 MG/DL (ref 8.7–10.5)
CALCIUM SERPL-MCNC: 9.4 MG/DL (ref 8.7–10.5)
CALCIUM SERPL-MCNC: 9.4 MG/DL (ref 8.7–10.5)
CALCIUM SERPL-MCNC: 9.5 MG/DL (ref 8.7–10.5)
CALCIUM SERPL-MCNC: 9.5 MG/DL (ref 8.7–10.5)
CALCIUM SERPL-MCNC: 9.9 MG/DL (ref 8.7–10.5)
CEA SERPL-MCNC: 2.4 NG/ML (ref 0–5)
CGA SERPL-SCNC: 2812 NG/ML (ref 0–101.8)
CHLORIDE SERPL-SCNC: 100 MMOL/L (ref 95–110)
CHLORIDE SERPL-SCNC: 100 MMOL/L (ref 95–110)
CHLORIDE SERPL-SCNC: 101 MMOL/L (ref 95–110)
CHLORIDE SERPL-SCNC: 101 MMOL/L (ref 95–110)
CHLORIDE SERPL-SCNC: 102 MMOL/L (ref 95–110)
CHLORIDE SERPL-SCNC: 105 MMOL/L (ref 95–110)
CHLORIDE SERPL-SCNC: 106 MMOL/L (ref 95–110)
CHLORIDE SERPL-SCNC: 108 MMOL/L (ref 95–110)
CHLORIDE SERPL-SCNC: 91 MMOL/L (ref 95–110)
CHLORIDE SERPL-SCNC: 91 MMOL/L (ref 95–110)
CHLORIDE SERPL-SCNC: 92 MMOL/L (ref 95–110)
CHLORIDE SERPL-SCNC: 93 MMOL/L (ref 95–110)
CHLORIDE SERPL-SCNC: 93 MMOL/L (ref 95–110)
CHLORIDE SERPL-SCNC: 95 MMOL/L (ref 95–110)
CHLORIDE SERPL-SCNC: 96 MMOL/L (ref 95–110)
CHLORIDE SERPL-SCNC: 97 MMOL/L (ref 95–110)
CHLORIDE SERPL-SCNC: 97 MMOL/L (ref 95–110)
CHLORIDE SERPL-SCNC: 98 MMOL/L (ref 95–110)
CHLORIDE SERPL-SCNC: 99 MMOL/L (ref 95–110)
CHLORIDE SERPL-SCNC: 99 MMOL/L (ref 95–110)
CLARITY UR: ABNORMAL
CLARITY UR: CLEAR
CO2 SERPL-SCNC: 20 MMOL/L (ref 23–29)
CO2 SERPL-SCNC: 22 MMOL/L (ref 23–29)
CO2 SERPL-SCNC: 22 MMOL/L (ref 23–29)
CO2 SERPL-SCNC: 25 MMOL/L (ref 23–29)
CO2 SERPL-SCNC: 26 MMOL/L (ref 23–29)
CO2 SERPL-SCNC: 28 MMOL/L (ref 23–29)
CO2 SERPL-SCNC: 28 MMOL/L (ref 23–29)
CO2 SERPL-SCNC: 29 MMOL/L (ref 23–29)
CO2 SERPL-SCNC: 31 MMOL/L (ref 23–29)
CO2 SERPL-SCNC: 31 MMOL/L (ref 23–29)
CO2 SERPL-SCNC: 32 MMOL/L (ref 23–29)
CO2 SERPL-SCNC: 33 MMOL/L (ref 23–29)
COC BENZOYLECGONINE 300 NG/ML: NEGATIVE
CODING SYSTEM: NORMAL
COLOR UR: YELLOW
COLOR UR: YELLOW
CREAT SERPL-MCNC: 0.8 MG/DL (ref 0.5–1.4)
CREAT SERPL-MCNC: 0.9 MG/DL (ref 0.5–1.4)
CREAT SERPL-MCNC: 0.9 MG/DL (ref 0.5–1.4)
CREAT SERPL-MCNC: 1 MG/DL (ref 0.5–1.4)
CREAT SERPL-MCNC: 1 MG/DL (ref 0.5–1.4)
CREAT SERPL-MCNC: 1.3 MG/DL (ref 0.5–1.4)
CREAT SERPL-MCNC: 1.5 MG/DL (ref 0.5–1.4)
CREAT SERPL-MCNC: 1.6 MG/DL (ref 0.5–1.4)
CTP QC/QA: YES
DELSYS: ABNORMAL
DIFFERENTIAL METHOD: ABNORMAL
DISPENSE STATUS: NORMAL
EOSINOPHIL # BLD AUTO: 0 K/UL (ref 0–0.5)
EOSINOPHIL # BLD AUTO: 0.1 K/UL (ref 0–0.5)
EOSINOPHIL # BLD AUTO: ABNORMAL K/UL (ref 0–0.5)
EOSINOPHIL NFR BLD: 0 % (ref 0–8)
EOSINOPHIL NFR BLD: 0.1 % (ref 0–8)
EOSINOPHIL NFR BLD: 0.1 % (ref 0–8)
EOSINOPHIL NFR BLD: 0.2 % (ref 0–8)
EOSINOPHIL NFR BLD: 0.4 % (ref 0–8)
EOSINOPHIL NFR BLD: 0.4 % (ref 0–8)
EOSINOPHIL NFR BLD: 0.5 % (ref 0–8)
EOSINOPHIL NFR BLD: 1 % (ref 0–8)
EOSINOPHIL NFR BLD: 2 % (ref 0–8)
EOSINOPHIL NFR BLD: 26.7 % (ref 0–8)
EP: 5
ERYTHROCYTE [DISTWIDTH] IN BLOOD BY AUTOMATED COUNT: 13.4 % (ref 11.5–14.5)
ERYTHROCYTE [DISTWIDTH] IN BLOOD BY AUTOMATED COUNT: 13.4 % (ref 11.5–14.5)
ERYTHROCYTE [DISTWIDTH] IN BLOOD BY AUTOMATED COUNT: 13.6 % (ref 11.5–14.5)
ERYTHROCYTE [DISTWIDTH] IN BLOOD BY AUTOMATED COUNT: 13.7 % (ref 11.5–14.5)
ERYTHROCYTE [DISTWIDTH] IN BLOOD BY AUTOMATED COUNT: 13.7 % (ref 11.5–14.5)
ERYTHROCYTE [DISTWIDTH] IN BLOOD BY AUTOMATED COUNT: 13.8 % (ref 11.5–14.5)
ERYTHROCYTE [DISTWIDTH] IN BLOOD BY AUTOMATED COUNT: 13.9 % (ref 11.5–14.5)
ERYTHROCYTE [DISTWIDTH] IN BLOOD BY AUTOMATED COUNT: 13.9 % (ref 11.5–14.5)
ERYTHROCYTE [DISTWIDTH] IN BLOOD BY AUTOMATED COUNT: 14 % (ref 11.5–14.5)
ERYTHROCYTE [DISTWIDTH] IN BLOOD BY AUTOMATED COUNT: 14 % (ref 11.5–14.5)
ERYTHROCYTE [DISTWIDTH] IN BLOOD BY AUTOMATED COUNT: 14.1 % (ref 11.5–14.5)
ERYTHROCYTE [DISTWIDTH] IN BLOOD BY AUTOMATED COUNT: 14.1 % (ref 11.5–14.5)
ERYTHROCYTE [DISTWIDTH] IN BLOOD BY AUTOMATED COUNT: 14.2 % (ref 11.5–14.5)
ERYTHROCYTE [DISTWIDTH] IN BLOOD BY AUTOMATED COUNT: 14.4 % (ref 11.5–14.5)
ERYTHROCYTE [DISTWIDTH] IN BLOOD BY AUTOMATED COUNT: 14.5 % (ref 11.5–14.5)
ERYTHROCYTE [DISTWIDTH] IN BLOOD BY AUTOMATED COUNT: 15.2 % (ref 11.5–14.5)
ERYTHROCYTE [DISTWIDTH] IN BLOOD BY AUTOMATED COUNT: 15.3 % (ref 11.5–14.5)
ERYTHROCYTE [DISTWIDTH] IN BLOOD BY AUTOMATED COUNT: 15.3 % (ref 11.5–14.5)
ERYTHROCYTE [SEDIMENTATION RATE] IN BLOOD BY WESTERGREN METHOD: 12 MM/H
EST. GFR  (AFRICAN AMERICAN): 38 ML/MIN/1.73 M^2
EST. GFR  (AFRICAN AMERICAN): 42 ML/MIN/1.73 M^2
EST. GFR  (AFRICAN AMERICAN): 49 ML/MIN/1.73 M^2
EST. GFR  (AFRICAN AMERICAN): 49 ML/MIN/1.73 M^2
EST. GFR  (AFRICAN AMERICAN): 49.4 ML/MIN/1.73 M^2
EST. GFR  (AFRICAN AMERICAN): 49.4 ML/MIN/1.73 M^2
EST. GFR  (AFRICAN AMERICAN): >60 ML/MIN/1.73 M^2
EST. GFR  (NON AFRICAN AMERICAN): 33 ML/MIN/1.73 M^2
EST. GFR  (NON AFRICAN AMERICAN): 36 ML/MIN/1.73 M^2
EST. GFR  (NON AFRICAN AMERICAN): 42.9 ML/MIN/1.73 M^2
EST. GFR  (NON AFRICAN AMERICAN): 42.9 ML/MIN/1.73 M^2
EST. GFR  (NON AFRICAN AMERICAN): 43 ML/MIN/1.73 M^2
EST. GFR  (NON AFRICAN AMERICAN): 43 ML/MIN/1.73 M^2
EST. GFR  (NON AFRICAN AMERICAN): 58.4 ML/MIN/1.73 M^2
EST. GFR  (NON AFRICAN AMERICAN): 58.4 ML/MIN/1.73 M^2
EST. GFR  (NON AFRICAN AMERICAN): >60 ML/MIN/1.73 M^2
ESTIMATED AVG GLUCOSE: 134 MG/DL (ref 68–131)
FERRITIN SERPL-MCNC: 203 NG/ML (ref 20–300)
FINAL PATHOLOGIC DIAGNOSIS: ABNORMAL
FINAL PATHOLOGIC DIAGNOSIS: NORMAL
FIO2: 32
FIO2: 45
FLOW: 2
FLOW: 3
FLOW: 6
FOLATE SERPL-MCNC: 8.9 NG/ML (ref 4–24)
GLUCOSE SERPL-MCNC: 100 MG/DL (ref 70–110)
GLUCOSE SERPL-MCNC: 101 MG/DL (ref 70–110)
GLUCOSE SERPL-MCNC: 103 MG/DL (ref 70–110)
GLUCOSE SERPL-MCNC: 109 MG/DL (ref 70–110)
GLUCOSE SERPL-MCNC: 113 MG/DL (ref 70–110)
GLUCOSE SERPL-MCNC: 116 MG/DL (ref 70–110)
GLUCOSE SERPL-MCNC: 116 MG/DL (ref 70–110)
GLUCOSE SERPL-MCNC: 118 MG/DL (ref 70–110)
GLUCOSE SERPL-MCNC: 121 MG/DL (ref 70–110)
GLUCOSE SERPL-MCNC: 122 MG/DL (ref 70–110)
GLUCOSE SERPL-MCNC: 125 MG/DL (ref 70–110)
GLUCOSE SERPL-MCNC: 125 MG/DL (ref 70–110)
GLUCOSE SERPL-MCNC: 127 MG/DL (ref 70–110)
GLUCOSE SERPL-MCNC: 138 MG/DL (ref 70–110)
GLUCOSE SERPL-MCNC: 140 MG/DL (ref 70–110)
GLUCOSE SERPL-MCNC: 140 MG/DL (ref 70–110)
GLUCOSE SERPL-MCNC: 142 MG/DL (ref 70–110)
GLUCOSE SERPL-MCNC: 157 MG/DL (ref 70–110)
GLUCOSE SERPL-MCNC: 159 MG/DL (ref 70–110)
GLUCOSE SERPL-MCNC: 162 MG/DL (ref 70–110)
GLUCOSE SERPL-MCNC: 167 MG/DL (ref 70–110)
GLUCOSE SERPL-MCNC: 169 MG/DL (ref 70–110)
GLUCOSE SERPL-MCNC: 170 MG/DL (ref 70–110)
GLUCOSE SERPL-MCNC: 181 MG/DL (ref 70–110)
GLUCOSE UR QL STRIP: NEGATIVE
GLUCOSE UR QL STRIP: NEGATIVE
GRAM STN SPEC: ABNORMAL
GROSS: ABNORMAL
HBA1C MFR BLD HPLC: 6.3 % (ref 4.5–6.2)
HCO3 UR-SCNC: 22.7 MMOL/L (ref 24–28)
HCO3 UR-SCNC: 24.1 MMOL/L (ref 24–28)
HCO3 UR-SCNC: 25.5 MMOL/L (ref 24–28)
HCO3 UR-SCNC: 25.9 MMOL/L (ref 24–28)
HCO3 UR-SCNC: 34.1 MMOL/L (ref 24–28)
HCT VFR BLD AUTO: 16.9 % (ref 37–48.5)
HCT VFR BLD AUTO: 24.7 % (ref 37–48.5)
HCT VFR BLD AUTO: 25.5 % (ref 37–48.5)
HCT VFR BLD AUTO: 25.8 % (ref 37–48.5)
HCT VFR BLD AUTO: 28.5 % (ref 37–48.5)
HCT VFR BLD AUTO: 29.3 % (ref 37–48.5)
HCT VFR BLD AUTO: 29.6 % (ref 37–48.5)
HCT VFR BLD AUTO: 31 % (ref 37–48.5)
HCT VFR BLD AUTO: 31.6 % (ref 37–48.5)
HCT VFR BLD AUTO: 32.1 % (ref 37–48.5)
HCT VFR BLD AUTO: 32.9 % (ref 37–48.5)
HCT VFR BLD AUTO: 33.4 % (ref 37–48.5)
HCT VFR BLD AUTO: 34 % (ref 37–48.5)
HCT VFR BLD AUTO: 34.1 % (ref 37–48.5)
HCT VFR BLD AUTO: 34.4 % (ref 37–48.5)
HCT VFR BLD AUTO: 35.2 % (ref 37–48.5)
HCT VFR BLD AUTO: 35.5 % (ref 37–48.5)
HCT VFR BLD AUTO: 35.9 % (ref 37–48.5)
HCT VFR BLD AUTO: 36.3 % (ref 37–48.5)
HCT VFR BLD AUTO: 36.6 % (ref 37–48.5)
HCT VFR BLD AUTO: 37 % (ref 37–48.5)
HCT VFR BLD CALC: 15 %PCV (ref 36–54)
HGB BLD-MCNC: 10.1 G/DL (ref 12–16)
HGB BLD-MCNC: 10.4 G/DL (ref 12–16)
HGB BLD-MCNC: 10.5 G/DL (ref 12–16)
HGB BLD-MCNC: 10.7 G/DL (ref 12–16)
HGB BLD-MCNC: 10.8 G/DL (ref 12–16)
HGB BLD-MCNC: 5.5 G/DL (ref 12–16)
HGB BLD-MCNC: 7.1 G/DL (ref 12–16)
HGB BLD-MCNC: 8.6 G/DL (ref 12–16)
HGB BLD-MCNC: 8.6 G/DL (ref 12–16)
HGB BLD-MCNC: 8.7 G/DL (ref 12–16)
HGB BLD-MCNC: 8.9 G/DL (ref 12–16)
HGB BLD-MCNC: 8.9 G/DL (ref 12–16)
HGB BLD-MCNC: 9.2 G/DL (ref 12–16)
HGB BLD-MCNC: 9.4 G/DL (ref 12–16)
HGB BLD-MCNC: 9.6 G/DL (ref 12–16)
HGB BLD-MCNC: 9.7 G/DL (ref 12–16)
HGB BLD-MCNC: 9.9 G/DL (ref 12–16)
HGB UR QL STRIP: ABNORMAL
HGB UR QL STRIP: NEGATIVE
HYALINE CASTS #/AREA URNS LPF: 8 /LPF
HYPOCHROMIA BLD QL SMEAR: ABNORMAL
IMM GRANULOCYTES # BLD AUTO: 0.05 K/UL (ref 0–0.04)
IMM GRANULOCYTES # BLD AUTO: 0.07 K/UL (ref 0–0.04)
IMM GRANULOCYTES # BLD AUTO: 0.07 K/UL (ref 0–0.04)
IMM GRANULOCYTES # BLD AUTO: 0.09 K/UL (ref 0–0.04)
IMM GRANULOCYTES # BLD AUTO: 0.1 K/UL (ref 0–0.04)
IMM GRANULOCYTES # BLD AUTO: 0.1 K/UL (ref 0–0.04)
IMM GRANULOCYTES # BLD AUTO: 0.11 K/UL (ref 0–0.04)
IMM GRANULOCYTES # BLD AUTO: 0.13 K/UL (ref 0–0.04)
IMM GRANULOCYTES # BLD AUTO: 0.13 K/UL (ref 0–0.04)
IMM GRANULOCYTES # BLD AUTO: 0.17 K/UL (ref 0–0.04)
IMM GRANULOCYTES # BLD AUTO: 0.17 K/UL (ref 0–0.04)
IMM GRANULOCYTES # BLD AUTO: 0.23 K/UL (ref 0–0.04)
IMM GRANULOCYTES # BLD AUTO: 0.31 K/UL (ref 0–0.04)
IMM GRANULOCYTES # BLD AUTO: 0.39 K/UL (ref 0–0.04)
IMM GRANULOCYTES # BLD AUTO: 0.45 K/UL (ref 0–0.04)
IMM GRANULOCYTES # BLD AUTO: 0.55 K/UL (ref 0–0.04)
IMM GRANULOCYTES # BLD AUTO: ABNORMAL K/UL
IMM GRANULOCYTES # BLD AUTO: ABNORMAL K/UL (ref 0–0.04)
IMM GRANULOCYTES NFR BLD AUTO: 0.5 % (ref 0–0.5)
IMM GRANULOCYTES NFR BLD AUTO: 0.6 % (ref 0–0.5)
IMM GRANULOCYTES NFR BLD AUTO: 0.7 % (ref 0–0.5)
IMM GRANULOCYTES NFR BLD AUTO: 0.9 % (ref 0–0.5)
IMM GRANULOCYTES NFR BLD AUTO: 0.9 % (ref 0–0.5)
IMM GRANULOCYTES NFR BLD AUTO: 1 % (ref 0–0.5)
IMM GRANULOCYTES NFR BLD AUTO: 1.3 % (ref 0–0.5)
IMM GRANULOCYTES NFR BLD AUTO: 1.6 % (ref 0–0.5)
IMM GRANULOCYTES NFR BLD AUTO: 1.8 % (ref 0–0.5)
IMM GRANULOCYTES NFR BLD AUTO: 2.1 % (ref 0–0.5)
IMM GRANULOCYTES NFR BLD AUTO: 2.7 % (ref 0–0.5)
IMM GRANULOCYTES NFR BLD AUTO: 3.8 % (ref 0–0.5)
IMM GRANULOCYTES NFR BLD AUTO: ABNORMAL %
IMM GRANULOCYTES NFR BLD AUTO: ABNORMAL % (ref 0–0.5)
INR PPP: 1.2
IP: 15
IRON SERPL-MCNC: 92 UG/DL (ref 30–160)
IRON SERPL-MCNC: <10 UG/DL (ref 30–160)
KETONES UR QL STRIP: NEGATIVE
KETONES UR QL STRIP: NEGATIVE
LACTATE SERPL-SCNC: 1.2 MMOL/L (ref 0.5–2.2)
LACTATE SERPL-SCNC: 1.3 MMOL/L (ref 0.5–1.9)
LACTATE SERPL-SCNC: 1.6 MMOL/L (ref 0.5–2.2)
LACTATE SERPL-SCNC: 1.9 MMOL/L (ref 0.5–1.9)
LACTATE SERPL-SCNC: 3.4 MMOL/L (ref 0.5–1.9)
LACTATE SERPL-SCNC: 3.6 MMOL/L (ref 0.5–1.9)
LACTATE SERPL-SCNC: 4.1 MMOL/L (ref 0.5–1.9)
LDH SERPL L TO P-CCNC: 177 U/L (ref 110–260)
LEUKOCYTE ESTERASE UR QL STRIP: ABNORMAL
LEUKOCYTE ESTERASE UR QL STRIP: NEGATIVE
LIPASE SERPL-CCNC: 21 U/L (ref 4–60)
LYMPHOCYTES # BLD AUTO: 0.3 K/UL (ref 1–4.8)
LYMPHOCYTES # BLD AUTO: 0.4 K/UL (ref 1–4.8)
LYMPHOCYTES # BLD AUTO: 0.5 K/UL (ref 1–4.8)
LYMPHOCYTES # BLD AUTO: 0.6 K/UL (ref 1–4.8)
LYMPHOCYTES # BLD AUTO: 0.6 K/UL (ref 1–4.8)
LYMPHOCYTES # BLD AUTO: 0.7 K/UL (ref 1–4.8)
LYMPHOCYTES # BLD AUTO: 0.7 K/UL (ref 1–4.8)
LYMPHOCYTES # BLD AUTO: 0.8 K/UL (ref 1–4.8)
LYMPHOCYTES # BLD AUTO: 0.9 K/UL (ref 1–4.8)
LYMPHOCYTES # BLD AUTO: 0.9 K/UL (ref 1–4.8)
LYMPHOCYTES # BLD AUTO: 1.1 K/UL (ref 1–4.8)
LYMPHOCYTES # BLD AUTO: 1.2 K/UL (ref 1–4.8)
LYMPHOCYTES # BLD AUTO: 1.4 K/UL (ref 1–4.8)
LYMPHOCYTES # BLD AUTO: 1.6 K/UL (ref 1–4.8)
LYMPHOCYTES # BLD AUTO: ABNORMAL K/UL (ref 1–4.8)
LYMPHOCYTES NFR BLD: 14.2 % (ref 18–48)
LYMPHOCYTES NFR BLD: 2.6 % (ref 18–48)
LYMPHOCYTES NFR BLD: 2.9 % (ref 18–48)
LYMPHOCYTES NFR BLD: 2.9 % (ref 18–48)
LYMPHOCYTES NFR BLD: 21 % (ref 18–48)
LYMPHOCYTES NFR BLD: 3 % (ref 18–48)
LYMPHOCYTES NFR BLD: 3.4 % (ref 18–48)
LYMPHOCYTES NFR BLD: 3.9 % (ref 18–48)
LYMPHOCYTES NFR BLD: 3.9 % (ref 18–48)
LYMPHOCYTES NFR BLD: 4.6 % (ref 18–48)
LYMPHOCYTES NFR BLD: 4.6 % (ref 18–48)
LYMPHOCYTES NFR BLD: 40 % (ref 18–48)
LYMPHOCYTES NFR BLD: 5.1 % (ref 18–48)
LYMPHOCYTES NFR BLD: 5.1 % (ref 18–48)
LYMPHOCYTES NFR BLD: 6 % (ref 18–48)
LYMPHOCYTES NFR BLD: 6.5 % (ref 18–48)
LYMPHOCYTES NFR BLD: 6.9 % (ref 18–48)
LYMPHOCYTES NFR BLD: 6.9 % (ref 18–48)
LYMPHOCYTES NFR BLD: 7.5 % (ref 18–48)
LYMPHOCYTES NFR BLD: 80 % (ref 18–48)
LYMPHOCYTES NFR BLD: 83 % (ref 18–48)
MAGNESIUM SERPL-MCNC: 1.1 MG/DL (ref 1.6–2.6)
MAGNESIUM SERPL-MCNC: 1.5 MG/DL (ref 1.6–2.6)
MAGNESIUM SERPL-MCNC: 1.5 MG/DL (ref 1.6–2.6)
MAGNESIUM SERPL-MCNC: 1.6 MG/DL (ref 1.6–2.6)
MAGNESIUM SERPL-MCNC: 1.7 MG/DL (ref 1.6–2.6)
MAGNESIUM SERPL-MCNC: 1.7 MG/DL (ref 1.6–2.6)
MAGNESIUM SERPL-MCNC: 1.8 MG/DL (ref 1.6–2.6)
MAGNESIUM SERPL-MCNC: 1.9 MG/DL (ref 1.6–2.6)
MAGNESIUM SERPL-MCNC: 2 MG/DL (ref 1.6–2.6)
MAGNESIUM SERPL-MCNC: 2.3 MG/DL (ref 1.6–2.6)
MCH RBC QN AUTO: 26 PG (ref 27–31)
MCH RBC QN AUTO: 26.3 PG (ref 27–31)
MCH RBC QN AUTO: 26.4 PG (ref 27–31)
MCH RBC QN AUTO: 26.6 PG (ref 27–31)
MCH RBC QN AUTO: 26.9 PG (ref 27–31)
MCH RBC QN AUTO: 27 PG (ref 27–31)
MCH RBC QN AUTO: 27.1 PG (ref 27–31)
MCH RBC QN AUTO: 27.2 PG (ref 27–31)
MCH RBC QN AUTO: 27.3 PG (ref 27–31)
MCH RBC QN AUTO: 27.4 PG (ref 27–31)
MCH RBC QN AUTO: 27.6 PG (ref 27–31)
MCH RBC QN AUTO: 27.9 PG (ref 27–31)
MCH RBC QN AUTO: 28 PG (ref 27–31)
MCH RBC QN AUTO: 28 PG (ref 27–31)
MCH RBC QN AUTO: 28.4 PG (ref 27–31)
MCHC RBC AUTO-ENTMCNC: 28.1 G/DL (ref 32–36)
MCHC RBC AUTO-ENTMCNC: 28.5 G/DL (ref 32–36)
MCHC RBC AUTO-ENTMCNC: 28.7 G/DL (ref 32–36)
MCHC RBC AUTO-ENTMCNC: 28.8 G/DL (ref 32–36)
MCHC RBC AUTO-ENTMCNC: 28.9 G/DL (ref 32–36)
MCHC RBC AUTO-ENTMCNC: 29 G/DL (ref 32–36)
MCHC RBC AUTO-ENTMCNC: 29.2 G/DL (ref 32–36)
MCHC RBC AUTO-ENTMCNC: 29.3 G/DL (ref 32–36)
MCHC RBC AUTO-ENTMCNC: 29.4 G/DL (ref 32–36)
MCHC RBC AUTO-ENTMCNC: 29.8 G/DL (ref 32–36)
MCHC RBC AUTO-ENTMCNC: 30.1 G/DL (ref 32–36)
MCHC RBC AUTO-ENTMCNC: 30.1 G/DL (ref 32–36)
MCHC RBC AUTO-ENTMCNC: 30.2 G/DL (ref 32–36)
MCHC RBC AUTO-ENTMCNC: 30.4 G/DL (ref 32–36)
MCHC RBC AUTO-ENTMCNC: 31.3 G/DL (ref 32–36)
MCHC RBC AUTO-ENTMCNC: 32.3 G/DL (ref 32–36)
MCHC RBC AUTO-ENTMCNC: 32.5 G/DL (ref 32–36)
MCHC RBC AUTO-ENTMCNC: 33.3 G/DL (ref 32–36)
MCHC RBC AUTO-ENTMCNC: 33.7 G/DL (ref 32–36)
MCV RBC AUTO: 84 FL (ref 82–98)
MCV RBC AUTO: 86 FL (ref 82–98)
MCV RBC AUTO: 88 FL (ref 82–98)
MCV RBC AUTO: 88 FL (ref 82–98)
MCV RBC AUTO: 89 FL (ref 82–98)
MCV RBC AUTO: 90 FL (ref 82–98)
MCV RBC AUTO: 91 FL (ref 82–98)
MCV RBC AUTO: 92 FL (ref 82–98)
MCV RBC AUTO: 92 FL (ref 82–98)
MCV RBC AUTO: 93 FL (ref 82–98)
MCV RBC AUTO: 94 FL (ref 82–98)
MCV RBC AUTO: 95 FL (ref 82–98)
MCV RBC AUTO: 96 FL (ref 82–98)
MET D-METHAMPHETAMINE 500 NG/ML: NEGATIVE
METHLYMALONIC ACID: 334 NMOL/L (ref 0–378)
MICROSCOPIC COMMENT: ABNORMAL
MICROSCOPIC COMMENT: ABNORMAL
MICROSCOPIC EXAM: NORMAL
MIN VOL: 10
MMA DISCLAIMER: NORMAL
MODE: ABNORMAL
MONOCYTES # BLD AUTO: 0 K/UL (ref 0.3–1)
MONOCYTES # BLD AUTO: 0.5 K/UL (ref 0.3–1)
MONOCYTES # BLD AUTO: 0.6 K/UL (ref 0.3–1)
MONOCYTES # BLD AUTO: 0.8 K/UL (ref 0.3–1)
MONOCYTES # BLD AUTO: 1.1 K/UL (ref 0.3–1)
MONOCYTES # BLD AUTO: 1.4 K/UL (ref 0.3–1)
MONOCYTES # BLD AUTO: 1.5 K/UL (ref 0.3–1)
MONOCYTES # BLD AUTO: 1.7 K/UL (ref 0.3–1)
MONOCYTES # BLD AUTO: 1.8 K/UL (ref 0.3–1)
MONOCYTES # BLD AUTO: 1.9 K/UL (ref 0.3–1)
MONOCYTES # BLD AUTO: 1.9 K/UL (ref 0.3–1)
MONOCYTES # BLD AUTO: 2 K/UL (ref 0.3–1)
MONOCYTES # BLD AUTO: 2 K/UL (ref 0.3–1)
MONOCYTES # BLD AUTO: 2.4 K/UL (ref 0.3–1)
MONOCYTES # BLD AUTO: ABNORMAL K/UL (ref 0.3–1)
MONOCYTES NFR BLD: 0 % (ref 4–15)
MONOCYTES NFR BLD: 0.3 % (ref 4–15)
MONOCYTES NFR BLD: 10 % (ref 4–15)
MONOCYTES NFR BLD: 10.4 % (ref 4–15)
MONOCYTES NFR BLD: 10.4 % (ref 4–15)
MONOCYTES NFR BLD: 10.6 % (ref 4–15)
MONOCYTES NFR BLD: 11 % (ref 4–15)
MONOCYTES NFR BLD: 11.1 % (ref 4–15)
MONOCYTES NFR BLD: 11.6 % (ref 4–15)
MONOCYTES NFR BLD: 13.3 % (ref 4–15)
MONOCYTES NFR BLD: 15 % (ref 4–15)
MONOCYTES NFR BLD: 2.3 % (ref 4–15)
MONOCYTES NFR BLD: 5.8 % (ref 4–15)
MONOCYTES NFR BLD: 6 % (ref 4–15)
MONOCYTES NFR BLD: 8.5 % (ref 4–15)
MONOCYTES NFR BLD: 8.8 % (ref 4–15)
MONOCYTES NFR BLD: 9 % (ref 4–15)
MONOCYTES NFR BLD: 9.6 % (ref 4–15)
MONOCYTES NFR BLD: 9.8 % (ref 4–15)
MOP MORPHINE 300 NG/ML: NEGATIVE
MTD METHADONE 300 NG/ML: NEGATIVE
MYCOBACTERIUM SPEC QL CULT: NORMAL
NEUTROPHILS # BLD AUTO: 10 K/UL (ref 1.8–7.7)
NEUTROPHILS # BLD AUTO: 10.8 K/UL (ref 1.8–7.7)
NEUTROPHILS # BLD AUTO: 13.1 K/UL (ref 1.8–7.7)
NEUTROPHILS # BLD AUTO: 13.8 K/UL (ref 1.8–7.7)
NEUTROPHILS # BLD AUTO: 14.7 K/UL (ref 1.8–7.7)
NEUTROPHILS # BLD AUTO: 14.8 K/UL (ref 1.8–7.7)
NEUTROPHILS # BLD AUTO: 15 K/UL (ref 1.8–7.7)
NEUTROPHILS # BLD AUTO: 15.5 K/UL (ref 1.8–7.7)
NEUTROPHILS # BLD AUTO: 15.8 K/UL (ref 1.8–7.7)
NEUTROPHILS # BLD AUTO: 16.8 K/UL (ref 1.8–7.7)
NEUTROPHILS # BLD AUTO: 16.8 K/UL (ref 1.8–7.7)
NEUTROPHILS # BLD AUTO: 16.9 K/UL (ref 1.8–7.7)
NEUTROPHILS # BLD AUTO: 19.7 K/UL (ref 1.8–7.7)
NEUTROPHILS # BLD AUTO: 2.7 K/UL (ref 1.8–7.7)
NEUTROPHILS # BLD AUTO: 7.3 K/UL (ref 1.8–7.7)
NEUTROPHILS # BLD AUTO: 8.7 K/UL (ref 1.8–7.7)
NEUTROPHILS # BLD AUTO: ABNORMAL K/UL (ref 1.8–7.7)
NEUTROPHILS NFR BLD: 10 % (ref 38–73)
NEUTROPHILS NFR BLD: 10 % (ref 38–73)
NEUTROPHILS NFR BLD: 13.3 % (ref 38–73)
NEUTROPHILS NFR BLD: 61 % (ref 38–73)
NEUTROPHILS NFR BLD: 78.8 % (ref 38–73)
NEUTROPHILS NFR BLD: 78.8 % (ref 38–73)
NEUTROPHILS NFR BLD: 81.1 % (ref 38–73)
NEUTROPHILS NFR BLD: 81.2 % (ref 38–73)
NEUTROPHILS NFR BLD: 81.5 % (ref 38–73)
NEUTROPHILS NFR BLD: 81.8 % (ref 38–73)
NEUTROPHILS NFR BLD: 82.1 % (ref 38–73)
NEUTROPHILS NFR BLD: 82.1 % (ref 38–73)
NEUTROPHILS NFR BLD: 83.9 % (ref 38–73)
NEUTROPHILS NFR BLD: 84.6 % (ref 38–73)
NEUTROPHILS NFR BLD: 85.2 % (ref 38–73)
NEUTROPHILS NFR BLD: 86.1 % (ref 38–73)
NEUTROPHILS NFR BLD: 86.2 % (ref 38–73)
NEUTROPHILS NFR BLD: 87.1 % (ref 38–73)
NEUTROPHILS NFR BLD: 90.6 % (ref 38–73)
NEUTROPHILS NFR BLD: 93.2 % (ref 38–73)
NEUTROPHILS NFR BLD: 97 % (ref 38–73)
NEUTS BAND NFR BLD MANUAL: 3 %
NITRITE UR QL STRIP: POSITIVE
NITRITE UR QL STRIP: POSITIVE
NRBC BLD-RTO: 0 /100 WBC
NUM UNITS TRANS PACKED RBC: NORMAL
NUM UNITS TRANS WBC-POOR PLATPHERESIS: NORMAL
PCO2 BLDA: 32.4 MMHG (ref 35–45)
PCO2 BLDA: 43.5 MMHG (ref 35–45)
PCO2 BLDA: 52.8 MMHG (ref 35–45)
PCO2 BLDA: 55 MMHG (ref 35–45)
PCO2 BLDA: 59.1 MMHG (ref 35–45)
PH SMN: 7.24 [PH] (ref 7.35–7.45)
PH SMN: 7.27 [PH] (ref 7.35–7.45)
PH SMN: 7.28 [PH] (ref 7.35–7.45)
PH SMN: 7.45 [PH] (ref 7.35–7.45)
PH SMN: 7.5 [PH] (ref 7.35–7.45)
PH UR STRIP: 6 [PH] (ref 5–8)
PH UR STRIP: 6 [PH] (ref 5–8)
PHOSPHATE SERPL-MCNC: 2 MG/DL (ref 2.7–4.5)
PHOSPHATE SERPL-MCNC: 2.4 MG/DL (ref 2.7–4.5)
PHOSPHATE SERPL-MCNC: 2.6 MG/DL (ref 2.7–4.5)
PHOSPHATE SERPL-MCNC: 2.7 MG/DL (ref 2.7–4.5)
PHOSPHATE SERPL-MCNC: 3 MG/DL (ref 2.7–4.5)
PLATELET # BLD AUTO: 15 K/UL (ref 150–350)
PLATELET # BLD AUTO: 17 K/UL (ref 150–350)
PLATELET # BLD AUTO: 240 K/UL (ref 150–350)
PLATELET # BLD AUTO: 285 K/UL (ref 150–350)
PLATELET # BLD AUTO: 307 K/UL (ref 150–350)
PLATELET # BLD AUTO: 341 K/UL (ref 150–350)
PLATELET # BLD AUTO: 349 K/UL (ref 150–350)
PLATELET # BLD AUTO: 356 K/UL (ref 150–350)
PLATELET # BLD AUTO: 36 K/UL (ref 150–350)
PLATELET # BLD AUTO: 427 K/UL (ref 150–350)
PLATELET # BLD AUTO: 427 K/UL (ref 150–350)
PLATELET # BLD AUTO: 442 K/UL (ref 150–350)
PLATELET # BLD AUTO: 445 K/UL (ref 150–350)
PLATELET # BLD AUTO: 470 K/UL (ref 150–350)
PLATELET # BLD AUTO: 493 K/UL (ref 150–350)
PLATELET # BLD AUTO: 508 K/UL (ref 150–350)
PLATELET # BLD AUTO: 567 K/UL (ref 150–350)
PLATELET # BLD AUTO: 586 K/UL (ref 150–350)
PLATELET # BLD AUTO: 594 K/UL (ref 150–350)
PLATELET # BLD AUTO: 622 K/UL (ref 150–350)
PLATELET # BLD AUTO: 708 K/UL (ref 150–350)
PLATELET BLD QL SMEAR: ABNORMAL
PMV BLD AUTO: 10 FL (ref 9.2–12.9)
PMV BLD AUTO: 10 FL (ref 9.2–12.9)
PMV BLD AUTO: 10.1 FL (ref 9.2–12.9)
PMV BLD AUTO: 10.1 FL (ref 9.2–12.9)
PMV BLD AUTO: 10.5 FL (ref 9.2–12.9)
PMV BLD AUTO: 8.4 FL (ref 9.2–12.9)
PMV BLD AUTO: 9 FL (ref 9.2–12.9)
PMV BLD AUTO: 9.1 FL (ref 9.2–12.9)
PMV BLD AUTO: 9.3 FL (ref 9.2–12.9)
PMV BLD AUTO: 9.3 FL (ref 9.2–12.9)
PMV BLD AUTO: 9.4 FL (ref 9.2–12.9)
PMV BLD AUTO: 9.5 FL (ref 9.2–12.9)
PMV BLD AUTO: 9.5 FL (ref 9.2–12.9)
PMV BLD AUTO: 9.6 FL (ref 9.2–12.9)
PMV BLD AUTO: 9.7 FL (ref 9.2–12.9)
PMV BLD AUTO: 9.7 FL (ref 9.2–12.9)
PMV BLD AUTO: 9.8 FL (ref 9.2–12.9)
PO2 BLDA: 100 MMHG (ref 80–100)
PO2 BLDA: 78 MMHG (ref 80–100)
PO2 BLDA: 80 MMHG (ref 80–100)
PO2 BLDA: 84 MMHG (ref 80–100)
PO2 BLDA: 97 MMHG (ref 80–100)
POC BE: -1 MMOL/L
POC BE: -1 MMOL/L
POC BE: -2 MMOL/L
POC BE: -3 MMOL/L
POC BE: 11 MMOL/L
POC IONIZED CALCIUM: 1.13 MMOL/L (ref 1.06–1.42)
POC SATURATED O2: 94 % (ref 95–100)
POC SATURATED O2: 96 % (ref 95–100)
POC SATURATED O2: 96 % (ref 95–100)
POC SATURATED O2: 97 % (ref 95–100)
POC SATURATED O2: 97 % (ref 95–100)
POC TCO2: 24 MMOL/L (ref 23–27)
POC TCO2: 26 MMOL/L (ref 23–27)
POC TCO2: 27 MMOL/L (ref 23–27)
POC TCO2: 28 MMOL/L (ref 23–27)
POC TCO2: 35 MMOL/L (ref 23–27)
POCT GLUCOSE: 153 MG/DL (ref 70–110)
POTASSIUM BLD-SCNC: 2.9 MMOL/L (ref 3.5–5.1)
POTASSIUM SERPL-SCNC: 2.6 MMOL/L (ref 3.5–5.1)
POTASSIUM SERPL-SCNC: 2.7 MMOL/L (ref 3.5–5.1)
POTASSIUM SERPL-SCNC: 2.8 MMOL/L (ref 3.5–5.1)
POTASSIUM SERPL-SCNC: 2.9 MMOL/L (ref 3.5–5.1)
POTASSIUM SERPL-SCNC: 2.9 MMOL/L (ref 3.5–5.1)
POTASSIUM SERPL-SCNC: 3.2 MMOL/L (ref 3.5–5.1)
POTASSIUM SERPL-SCNC: 3.2 MMOL/L (ref 3.5–5.1)
POTASSIUM SERPL-SCNC: 3.4 MMOL/L (ref 3.5–5.1)
POTASSIUM SERPL-SCNC: 3.4 MMOL/L (ref 3.5–5.1)
POTASSIUM SERPL-SCNC: 3.5 MMOL/L (ref 3.5–5.1)
POTASSIUM SERPL-SCNC: 3.5 MMOL/L (ref 3.5–5.1)
POTASSIUM SERPL-SCNC: 3.6 MMOL/L (ref 3.5–5.1)
POTASSIUM SERPL-SCNC: 3.7 MMOL/L (ref 3.5–5.1)
POTASSIUM SERPL-SCNC: 3.7 MMOL/L (ref 3.5–5.1)
POTASSIUM SERPL-SCNC: 3.9 MMOL/L (ref 3.5–5.1)
POTASSIUM SERPL-SCNC: 3.9 MMOL/L (ref 3.5–5.1)
POTASSIUM SERPL-SCNC: 4 MMOL/L (ref 3.5–5.1)
POTASSIUM SERPL-SCNC: 4 MMOL/L (ref 3.5–5.1)
POTASSIUM SERPL-SCNC: 4.2 MMOL/L (ref 3.5–5.1)
PROCALCITONIN SERPL IA-MCNC: 0.57 NG/ML
PROCALCITONIN SERPL IA-MCNC: 1.02 NG/ML
PROCALCITONIN SERPL IA-MCNC: 3.11 NG/ML (ref 0–0.5)
PROT SERPL-MCNC: 5.2 G/DL (ref 6–8.4)
PROT SERPL-MCNC: 5.4 G/DL (ref 6–8.4)
PROT SERPL-MCNC: 5.9 G/DL (ref 6–8.4)
PROT SERPL-MCNC: 6 G/DL (ref 6–8.4)
PROT SERPL-MCNC: 6.1 G/DL (ref 6–8.4)
PROT SERPL-MCNC: 6.1 G/DL (ref 6–8.4)
PROT SERPL-MCNC: 6.3 G/DL (ref 6–8.4)
PROT SERPL-MCNC: 6.4 G/DL (ref 6–8.4)
PROT SERPL-MCNC: 6.5 G/DL (ref 6–8.4)
PROT SERPL-MCNC: 6.5 G/DL (ref 6–8.4)
PROT SERPL-MCNC: 6.6 G/DL (ref 6–8.4)
PROT SERPL-MCNC: 6.7 G/DL (ref 6–8.4)
PROT SERPL-MCNC: 6.8 G/DL (ref 6–8.4)
PROT SERPL-MCNC: 6.9 G/DL (ref 6–8.4)
PROT SERPL-MCNC: 7.1 G/DL (ref 6–8.4)
PROT UR QL STRIP: ABNORMAL
PROT UR QL STRIP: NEGATIVE
PROTHROMBIN TIME: 14.5 SEC (ref 10.6–14.8)
QXY OXYCODONE 100 NG/ML: NEGATIVE
RBC # BLD AUTO: 1.97 M/UL (ref 4–5.4)
RBC # BLD AUTO: 2.7 M/UL (ref 4–5.4)
RBC # BLD AUTO: 3.03 M/UL (ref 4–5.4)
RBC # BLD AUTO: 3.07 M/UL (ref 4–5.4)
RBC # BLD AUTO: 3.28 M/UL (ref 4–5.4)
RBC # BLD AUTO: 3.29 M/UL (ref 4–5.4)
RBC # BLD AUTO: 3.35 M/UL (ref 4–5.4)
RBC # BLD AUTO: 3.38 M/UL (ref 4–5.4)
RBC # BLD AUTO: 3.4 M/UL (ref 4–5.4)
RBC # BLD AUTO: 3.55 M/UL (ref 4–5.4)
RBC # BLD AUTO: 3.59 M/UL (ref 4–5.4)
RBC # BLD AUTO: 3.63 M/UL (ref 4–5.4)
RBC # BLD AUTO: 3.67 M/UL (ref 4–5.4)
RBC # BLD AUTO: 3.68 M/UL (ref 4–5.4)
RBC # BLD AUTO: 3.73 M/UL (ref 4–5.4)
RBC # BLD AUTO: 3.81 M/UL (ref 4–5.4)
RBC # BLD AUTO: 3.84 M/UL (ref 4–5.4)
RBC # BLD AUTO: 3.86 M/UL (ref 4–5.4)
RBC # BLD AUTO: 3.87 M/UL (ref 4–5.4)
RBC # BLD AUTO: 3.91 M/UL (ref 4–5.4)
RBC # BLD AUTO: 3.95 M/UL (ref 4–5.4)
RBC #/AREA URNS HPF: 2 /HPF (ref 0–4)
RBC #/AREA URNS HPF: 4 /HPF (ref 0–4)
ROULEAUX BLD QL SMEAR: PRESENT
SAMPLE: ABNORMAL
SARS-COV-2 RDRP RESP QL NAA+PROBE: NEGATIVE
SATURATED IRON: 49 % (ref 20–50)
SATURATED IRON: ABNORMAL % (ref 20–50)
SITE: ABNORMAL
SODIUM BLD-SCNC: 129 MMOL/L (ref 136–145)
SODIUM SERPL-SCNC: 130 MMOL/L (ref 136–145)
SODIUM SERPL-SCNC: 132 MMOL/L (ref 136–145)
SODIUM SERPL-SCNC: 132 MMOL/L (ref 136–145)
SODIUM SERPL-SCNC: 135 MMOL/L (ref 136–145)
SODIUM SERPL-SCNC: 135 MMOL/L (ref 136–145)
SODIUM SERPL-SCNC: 136 MMOL/L (ref 136–145)
SODIUM SERPL-SCNC: 136 MMOL/L (ref 136–145)
SODIUM SERPL-SCNC: 138 MMOL/L (ref 136–145)
SODIUM SERPL-SCNC: 139 MMOL/L (ref 136–145)
SODIUM SERPL-SCNC: 139 MMOL/L (ref 136–145)
SODIUM SERPL-SCNC: 140 MMOL/L (ref 136–145)
SODIUM SERPL-SCNC: 140 MMOL/L (ref 136–145)
SODIUM SERPL-SCNC: 141 MMOL/L (ref 136–145)
SODIUM SERPL-SCNC: 143 MMOL/L (ref 136–145)
SODIUM SERPL-SCNC: 143 MMOL/L (ref 136–145)
SODIUM SERPL-SCNC: 144 MMOL/L (ref 136–145)
SP GR UR STRIP: 1.01 (ref 1–1.03)
SP GR UR STRIP: 1.02 (ref 1–1.03)
SP02: 100
SP02: 97
SPONT RATE: 2
SQUAMOUS #/AREA URNS HPF: 2 /HPF
SQUAMOUS #/AREA URNS HPF: 3 /HPF
T3 SERPL-MCNC: 159 NG/DL (ref 71–180)
T4 SERPL-MCNC: 8.4 UG/DL (ref 4.5–12)
TB INDURATION 48 - 72 HR READ: 0 MM
THC 11-NOR-9-TETRAHYDROCANNABINOL-9-CARBOXYLIC ACID: NEGATIVE
TOTAL IRON BINDING CAPACITY: 189 UG/DL (ref 250–450)
TOTAL IRON BINDING CAPACITY: 229 UG/DL (ref 250–450)
TRANSFERRIN SERPL-MCNC: 135 MG/DL (ref 200–375)
TRANSFERRIN SERPL-MCNC: 155 MG/DL (ref 200–375)
TROPONIN I SERPL DL<=0.01 NG/ML-MCNC: 0.08 NG/ML (ref 0–0.03)
TSH SERPL DL<=0.005 MIU/L-ACNC: 0.52 UIU/ML (ref 0.4–4)
TSH SERPL DL<=0.005 MIU/L-ACNC: 1.58 UIU/ML (ref 0.34–5.6)
UNIT NUMBER: NORMAL
URN SPEC COLLECT METH UR: ABNORMAL
URN SPEC COLLECT METH UR: ABNORMAL
UROBILINOGEN UR STRIP-ACNC: 1 EU/DL
UROBILINOGEN UR STRIP-ACNC: NEGATIVE EU/DL
VANCOMYCIN TROUGH SERPL-MCNC: 37.4 UG/ML (ref 10–22)
VANCOMYCIN TROUGH SERPL-MCNC: 8.8 UG/ML (ref 10–22)
VIT B12 SERPL-MCNC: 1890 PG/ML (ref 210–950)
WBC # BLD AUTO: 0.08 K/UL (ref 3.9–12.7)
WBC # BLD AUTO: 0.09 K/UL (ref 3.9–12.7)
WBC # BLD AUTO: 0.09 K/UL (ref 3.9–12.7)
WBC # BLD AUTO: 10.67 K/UL (ref 3.9–12.7)
WBC # BLD AUTO: 10.69 K/UL (ref 3.9–12.7)
WBC # BLD AUTO: 11.08 K/UL (ref 3.9–12.7)
WBC # BLD AUTO: 12.86 K/UL (ref 3.9–12.7)
WBC # BLD AUTO: 16.03 K/UL (ref 3.9–12.7)
WBC # BLD AUTO: 16.82 K/UL (ref 3.9–12.7)
WBC # BLD AUTO: 16.93 K/UL (ref 3.9–12.7)
WBC # BLD AUTO: 17.59 K/UL (ref 3.9–12.7)
WBC # BLD AUTO: 17.97 K/UL (ref 3.9–12.7)
WBC # BLD AUTO: 18.02 K/UL (ref 3.9–12.7)
WBC # BLD AUTO: 19.36 K/UL (ref 3.9–12.7)
WBC # BLD AUTO: 19.52 K/UL (ref 3.9–12.7)
WBC # BLD AUTO: 20.71 K/UL (ref 3.9–12.7)
WBC # BLD AUTO: 21.11 K/UL (ref 3.9–12.7)
WBC # BLD AUTO: 21.47 K/UL (ref 3.9–12.7)
WBC # BLD AUTO: 25.08 K/UL (ref 3.9–12.7)
WBC # BLD AUTO: 3.44 K/UL (ref 3.9–12.7)
WBC # BLD AUTO: 8.66 K/UL (ref 3.9–12.7)
WBC #/AREA URNS HPF: 35 /HPF (ref 0–5)
WBC #/AREA URNS HPF: 5 /HPF (ref 0–5)
ZINC SERPL-MCNC: 70 UG/DL (ref 56–134)

## 2020-01-01 PROCEDURE — 63600175 PHARM REV CODE 636 W HCPCS: Performed by: INTERNAL MEDICINE

## 2020-01-01 PROCEDURE — 63600175 PHARM REV CODE 636 W HCPCS: Performed by: NURSE PRACTITIONER

## 2020-01-01 PROCEDURE — 80053 COMPREHEN METABOLIC PANEL: CPT

## 2020-01-01 PROCEDURE — 36600 WITHDRAWAL OF ARTERIAL BLOOD: CPT

## 2020-01-01 PROCEDURE — 83615 LACTATE (LD) (LDH) ENZYME: CPT

## 2020-01-01 PROCEDURE — 99223 PR INITIAL HOSPITAL CARE,LEVL III: ICD-10-PCS | Mod: ,,, | Performed by: INTERNAL MEDICINE

## 2020-01-01 PROCEDURE — A4216 STERILE WATER/SALINE, 10 ML: HCPCS | Performed by: INTERNAL MEDICINE

## 2020-01-01 PROCEDURE — 25000003 PHARM REV CODE 250: Performed by: INTERNAL MEDICINE

## 2020-01-01 PROCEDURE — 99213 OFFICE O/P EST LOW 20 MIN: CPT | Mod: 95,,, | Performed by: INTERNAL MEDICINE

## 2020-01-01 PROCEDURE — 94640 AIRWAY INHALATION TREATMENT: CPT

## 2020-01-01 PROCEDURE — 25000003 PHARM REV CODE 250: Performed by: NURSE PRACTITIONER

## 2020-01-01 PROCEDURE — 20000000 HC ICU ROOM

## 2020-01-01 PROCEDURE — 96366 THER/PROPH/DIAG IV INF ADDON: CPT

## 2020-01-01 PROCEDURE — 84443 ASSAY THYROID STIM HORMONE: CPT

## 2020-01-01 PROCEDURE — 83735 ASSAY OF MAGNESIUM: CPT

## 2020-01-01 PROCEDURE — 77338 DESIGN MLC DEVICE FOR IMRT: CPT | Mod: S$GLB,,, | Performed by: RADIOLOGY

## 2020-01-01 PROCEDURE — 25000003 PHARM REV CODE 250: Performed by: PHYSICIAN ASSISTANT

## 2020-01-01 PROCEDURE — 85025 COMPLETE CBC W/AUTO DIFF WBC: CPT

## 2020-01-01 PROCEDURE — 36415 COLL VENOUS BLD VENIPUNCTURE: CPT

## 2020-01-01 PROCEDURE — S4991 NICOTINE PATCH NONLEGEND: HCPCS | Performed by: INTERNAL MEDICINE

## 2020-01-01 PROCEDURE — 99999 PR PBB SHADOW E&M-EST. PATIENT-LVL III: CPT | Mod: PBBFAC,,, | Performed by: INTERNAL MEDICINE

## 2020-01-01 PROCEDURE — 99999 PR PBB SHADOW E&M-EST. PATIENT-LVL V: ICD-10-PCS | Mod: PBBFAC,,, | Performed by: INTERNAL MEDICINE

## 2020-01-01 PROCEDURE — 96367 TX/PROPH/DG ADDL SEQ IV INF: CPT

## 2020-01-01 PROCEDURE — 99213 PR OFFICE/OUTPT VISIT, EST, LEVL III, 20-29 MIN: ICD-10-PCS | Mod: 95,,, | Performed by: INTERNAL MEDICINE

## 2020-01-01 PROCEDURE — 83605 ASSAY OF LACTIC ACID: CPT | Mod: 91

## 2020-01-01 PROCEDURE — 81001 URINALYSIS AUTO W/SCOPE: CPT

## 2020-01-01 PROCEDURE — 87206 SMEAR FLUORESCENT/ACID STAI: CPT

## 2020-01-01 PROCEDURE — 99900035 HC TECH TIME PER 15 MIN (STAT)

## 2020-01-01 PROCEDURE — 99214 OFFICE O/P EST MOD 30 MIN: CPT | Mod: PBBFAC,PO | Performed by: PHYSICIAN ASSISTANT

## 2020-01-01 PROCEDURE — 97116 GAIT TRAINING THERAPY: CPT

## 2020-01-01 PROCEDURE — 87077 CULTURE AEROBIC IDENTIFY: CPT

## 2020-01-01 PROCEDURE — 27000221 HC OXYGEN, UP TO 24 HOURS

## 2020-01-01 PROCEDURE — 63600175 PHARM REV CODE 636 W HCPCS: Mod: JG | Performed by: INTERNAL MEDICINE

## 2020-01-01 PROCEDURE — 88342 IMHCHEM/IMCYTCHM 1ST ANTB: CPT | Mod: 59 | Performed by: PATHOLOGY

## 2020-01-01 PROCEDURE — 84100 ASSAY OF PHOSPHORUS: CPT

## 2020-01-01 PROCEDURE — 88341 IMHCHEM/IMCYTCHM EA ADD ANTB: CPT | Performed by: PATHOLOGY

## 2020-01-01 PROCEDURE — D9220A PRA ANESTHESIA: ICD-10-PCS | Mod: CRNA,,, | Performed by: NURSE ANESTHETIST, CERTIFIED REGISTERED

## 2020-01-01 PROCEDURE — 99291 CRITICAL CARE FIRST HOUR: CPT

## 2020-01-01 PROCEDURE — 25000242 PHARM REV CODE 250 ALT 637 W/ HCPCS: Performed by: NURSE PRACTITIONER

## 2020-01-01 PROCEDURE — 99232 SBSQ HOSP IP/OBS MODERATE 35: CPT | Mod: ,,, | Performed by: PHYSICIAN ASSISTANT

## 2020-01-01 PROCEDURE — 99232 PR SUBSEQUENT HOSPITAL CARE,LEVL II: ICD-10-PCS | Mod: S$GLB,,, | Performed by: INTERNAL MEDICINE

## 2020-01-01 PROCEDURE — 99233 SBSQ HOSP IP/OBS HIGH 50: CPT | Mod: ,,, | Performed by: PHYSICIAN ASSISTANT

## 2020-01-01 PROCEDURE — 31625 BRONCHOSCOPY W/BIOPSY(S): CPT | Performed by: INTERNAL MEDICINE

## 2020-01-01 PROCEDURE — 97802 MEDICAL NUTRITION INDIV IN: CPT

## 2020-01-01 PROCEDURE — 83880 ASSAY OF NATRIURETIC PEPTIDE: CPT

## 2020-01-01 PROCEDURE — 63600175 PHARM REV CODE 636 W HCPCS: Performed by: STUDENT IN AN ORGANIZED HEALTH CARE EDUCATION/TRAINING PROGRAM

## 2020-01-01 PROCEDURE — 99233 PR SUBSEQUENT HOSPITAL CARE,LEVL III: ICD-10-PCS | Mod: ,,, | Performed by: INTERNAL MEDICINE

## 2020-01-01 PROCEDURE — 84145 PROCALCITONIN (PCT): CPT

## 2020-01-01 PROCEDURE — 97116 GAIT TRAINING THERAPY: CPT | Mod: CQ

## 2020-01-01 PROCEDURE — P9073 PLATELETS PHERESIS PATH REDU: HCPCS

## 2020-01-01 PROCEDURE — 77470 SPECIAL RADIATION TREATMENT: CPT | Mod: S$GLB,,, | Performed by: RADIOLOGY

## 2020-01-01 PROCEDURE — 92526 ORAL FUNCTION THERAPY: CPT

## 2020-01-01 PROCEDURE — 99215 PR OFFICE/OUTPT VISIT, EST, LEVL V, 40-54 MIN: ICD-10-PCS | Mod: S$PBB,,, | Performed by: INTERNAL MEDICINE

## 2020-01-01 PROCEDURE — 86901 BLOOD TYPING SEROLOGIC RH(D): CPT

## 2020-01-01 PROCEDURE — 77334 RADIATION TREATMENT AID(S): CPT | Mod: S$GLB,,, | Performed by: RADIOLOGY

## 2020-01-01 PROCEDURE — 86920 COMPATIBILITY TEST SPIN: CPT

## 2020-01-01 PROCEDURE — 87086 URINE CULTURE/COLONY COUNT: CPT

## 2020-01-01 PROCEDURE — 87070 CULTURE OTHR SPECIMN AEROBIC: CPT

## 2020-01-01 PROCEDURE — 25000242 PHARM REV CODE 250 ALT 637 W/ HCPCS: Performed by: INTERNAL MEDICINE

## 2020-01-01 PROCEDURE — 94761 N-INVAS EAR/PLS OXIMETRY MLT: CPT

## 2020-01-01 PROCEDURE — 25500020 PHARM REV CODE 255: Performed by: INTERNAL MEDICINE

## 2020-01-01 PROCEDURE — G0378 HOSPITAL OBSERVATION PER HR: HCPCS

## 2020-01-01 PROCEDURE — G0379 DIRECT REFER HOSPITAL OBSERV: HCPCS

## 2020-01-01 PROCEDURE — 96413 CHEMO IV INFUSION 1 HR: CPT

## 2020-01-01 PROCEDURE — U0002 COVID-19 LAB TEST NON-CDC: HCPCS

## 2020-01-01 PROCEDURE — 25000003 PHARM REV CODE 250: Performed by: EMERGENCY MEDICINE

## 2020-01-01 PROCEDURE — 80053 COMPREHEN METABOLIC PANEL: CPT | Mod: 91

## 2020-01-01 PROCEDURE — 87040 BLOOD CULTURE FOR BACTERIA: CPT

## 2020-01-01 PROCEDURE — 94660 CPAP INITIATION&MGMT: CPT

## 2020-01-01 PROCEDURE — 12000002 HC ACUTE/MED SURGE SEMI-PRIVATE ROOM

## 2020-01-01 PROCEDURE — 99215 OFFICE O/P EST HI 40 MIN: CPT | Mod: PBBFAC,PO,25 | Performed by: INTERNAL MEDICINE

## 2020-01-01 PROCEDURE — 27000190 HC CPAP FULL FACE MASK W/VALVE

## 2020-01-01 PROCEDURE — 78815 PET IMAGE W/CT SKULL-THIGH: CPT | Mod: TC,PO,PI

## 2020-01-01 PROCEDURE — 84132 ASSAY OF SERUM POTASSIUM: CPT

## 2020-01-01 PROCEDURE — 83540 ASSAY OF IRON: CPT

## 2020-01-01 PROCEDURE — 99215 OFFICE O/P EST HI 40 MIN: CPT | Mod: S$PBB,,, | Performed by: INTERNAL MEDICINE

## 2020-01-01 PROCEDURE — 77301 RADIOTHERAPY DOSE PLAN IMRT: CPT | Mod: S$GLB,,, | Performed by: RADIOLOGY

## 2020-01-01 PROCEDURE — 94760 N-INVAS EAR/PLS OXIMETRY 1: CPT

## 2020-01-01 PROCEDURE — 85007 BL SMEAR W/DIFF WBC COUNT: CPT

## 2020-01-01 PROCEDURE — 85027 COMPLETE CBC AUTOMATED: CPT

## 2020-01-01 PROCEDURE — 82962 GLUCOSE BLOOD TEST: CPT | Mod: PO

## 2020-01-01 PROCEDURE — 99232 PR SUBSEQUENT HOSPITAL CARE,LEVL II: ICD-10-PCS | Mod: ,,, | Performed by: INTERNAL MEDICINE

## 2020-01-01 PROCEDURE — 96417 CHEMO IV INFUS EACH ADDL SEQ: CPT

## 2020-01-01 PROCEDURE — 88305 TISSUE EXAM BY PATHOLOGIST: CPT | Mod: 59 | Performed by: PATHOLOGY

## 2020-01-01 PROCEDURE — 77300 RADIATION THERAPY DOSE PLAN: CPT | Mod: S$GLB,,, | Performed by: RADIOLOGY

## 2020-01-01 PROCEDURE — 99223 1ST HOSP IP/OBS HIGH 75: CPT | Mod: ,,, | Performed by: PHYSICIAN ASSISTANT

## 2020-01-01 PROCEDURE — 99291 CRITICAL CARE FIRST HOUR: CPT | Mod: 25

## 2020-01-01 PROCEDURE — 84484 ASSAY OF TROPONIN QUANT: CPT

## 2020-01-01 PROCEDURE — D9220A PRA ANESTHESIA: Mod: CRNA,,, | Performed by: NURSE ANESTHETIST, CERTIFIED REGISTERED

## 2020-01-01 PROCEDURE — 1126F PR PAIN SEVERITY QUANTIFIED, NO PAIN PRESENT: ICD-10-PCS | Mod: S$GLB,,, | Performed by: INTERNAL MEDICINE

## 2020-01-01 PROCEDURE — 25000003 PHARM REV CODE 250

## 2020-01-01 PROCEDURE — 99291 CRITICAL CARE FIRST HOUR: CPT | Mod: ,,, | Performed by: INTERNAL MEDICINE

## 2020-01-01 PROCEDURE — 31625 BRONCHOSCOPY W/BIOPSY(S): CPT | Mod: RT,,, | Performed by: INTERNAL MEDICINE

## 2020-01-01 PROCEDURE — 31645 PR BRONCHOSCOPY,RX ASPIR PULM TREE: ICD-10-PCS | Mod: 59,,, | Performed by: INTERNAL MEDICINE

## 2020-01-01 PROCEDURE — G6015 RADIATION TX DELIVERY IMRT: HCPCS | Mod: S$GLB,,, | Performed by: RADIOLOGY

## 2020-01-01 PROCEDURE — 93010 EKG 12-LEAD: ICD-10-PCS | Mod: ,,, | Performed by: INTERNAL MEDICINE

## 2020-01-01 PROCEDURE — 99233 SBSQ HOSP IP/OBS HIGH 50: CPT | Mod: ,,, | Performed by: INTERNAL MEDICINE

## 2020-01-01 PROCEDURE — 99999 PR PBB SHADOW E&M-EST. PATIENT-LVL III: ICD-10-PCS | Mod: PBBFAC,,, | Performed by: INTERNAL MEDICINE

## 2020-01-01 PROCEDURE — 99214 OFFICE O/P EST MOD 30 MIN: CPT | Mod: S$PBB,,, | Performed by: PHYSICIAN ASSISTANT

## 2020-01-01 PROCEDURE — S4991 NICOTINE PATCH NONLEGEND: HCPCS | Performed by: HOSPITALIST

## 2020-01-01 PROCEDURE — 80305 DRUG TEST PRSMV DIR OPT OBS: CPT | Mod: QW,S$GLB,, | Performed by: INTERNAL MEDICINE

## 2020-01-01 PROCEDURE — 84630 ASSAY OF ZINC: CPT

## 2020-01-01 PROCEDURE — 99232 SBSQ HOSP IP/OBS MODERATE 35: CPT | Mod: S$GLB,,, | Performed by: INTERNAL MEDICINE

## 2020-01-01 PROCEDURE — 99291 CRITICAL CARE FIRST HOUR: CPT | Mod: 25,,, | Performed by: INTERNAL MEDICINE

## 2020-01-01 PROCEDURE — 97110 THERAPEUTIC EXERCISES: CPT

## 2020-01-01 PROCEDURE — 86850 RBC ANTIBODY SCREEN: CPT

## 2020-01-01 PROCEDURE — 99215 OFFICE O/P EST HI 40 MIN: CPT | Mod: ,,, | Performed by: INTERNAL MEDICINE

## 2020-01-01 PROCEDURE — 99999 PR PBB SHADOW E&M-EST. PATIENT-LVL IV: ICD-10-PCS | Mod: PBBFAC,,, | Performed by: INTERNAL MEDICINE

## 2020-01-01 PROCEDURE — 25000003 PHARM REV CODE 250: Performed by: HOSPITALIST

## 2020-01-01 PROCEDURE — 63600175 PHARM REV CODE 636 W HCPCS: Performed by: EMERGENCY MEDICINE

## 2020-01-01 PROCEDURE — 77014 PR  CT GUIDANCE PLACEMENT RAD THERAPY FIELDS: CPT | Mod: S$GLB,,, | Performed by: RADIOLOGY

## 2020-01-01 PROCEDURE — 88305 TISSUE EXAM BY PATHOLOGIST: CPT | Performed by: PATHOLOGY

## 2020-01-01 PROCEDURE — 96365 THER/PROPH/DIAG IV INF INIT: CPT

## 2020-01-01 PROCEDURE — 87088 URINE BACTERIA CULTURE: CPT

## 2020-01-01 PROCEDURE — 82378 CARCINOEMBRYONIC ANTIGEN: CPT

## 2020-01-01 PROCEDURE — 37000008 HC ANESTHESIA 1ST 15 MINUTES: Performed by: INTERNAL MEDICINE

## 2020-01-01 PROCEDURE — 99215 PR OFFICE/OUTPT VISIT, EST, LEVL V, 40-54 MIN: ICD-10-PCS | Mod: ,,, | Performed by: INTERNAL MEDICINE

## 2020-01-01 PROCEDURE — 99213 OFFICE O/P EST LOW 20 MIN: CPT | Mod: S$GLB,,, | Performed by: INTERNAL MEDICINE

## 2020-01-01 PROCEDURE — 63600175 PHARM REV CODE 636 W HCPCS: Performed by: HOSPITALIST

## 2020-01-01 PROCEDURE — 92610 EVALUATE SWALLOWING FUNCTION: CPT

## 2020-01-01 PROCEDURE — 51702 INSERT TEMP BLADDER CATH: CPT

## 2020-01-01 PROCEDURE — 97530 THERAPEUTIC ACTIVITIES: CPT

## 2020-01-01 PROCEDURE — 99900031 HC PATIENT EDUCATION (STAT)

## 2020-01-01 PROCEDURE — 99232 PR SUBSEQUENT HOSPITAL CARE,LEVL II: ICD-10-PCS | Mod: ,,, | Performed by: PHYSICIAN ASSISTANT

## 2020-01-01 PROCEDURE — 99213 PR OFFICE/OUTPT VISIT, EST, LEVL III, 20-29 MIN: ICD-10-PCS | Mod: S$GLB,,, | Performed by: INTERNAL MEDICINE

## 2020-01-01 PROCEDURE — 88112 CYTOPATH CELL ENHANCE TECH: CPT | Performed by: PATHOLOGY

## 2020-01-01 PROCEDURE — 87186 SC STD MICRODIL/AGAR DIL: CPT

## 2020-01-01 PROCEDURE — 94760 N-INVAS EAR/PLS OXIMETRY 1: CPT | Mod: PBBFAC,PO | Performed by: INTERNAL MEDICINE

## 2020-01-01 PROCEDURE — 82746 ASSAY OF FOLIC ACID SERUM: CPT

## 2020-01-01 PROCEDURE — 1159F MED LIST DOCD IN RCRD: CPT | Mod: S$GLB,,, | Performed by: INTERNAL MEDICINE

## 2020-01-01 PROCEDURE — 82728 ASSAY OF FERRITIN: CPT

## 2020-01-01 PROCEDURE — 97803 MED NUTRITION INDIV SUBSEQ: CPT

## 2020-01-01 PROCEDURE — 88341 IMHCHEM/IMCYTCHM EA ADD ANTB: CPT | Mod: 59 | Performed by: PATHOLOGY

## 2020-01-01 PROCEDURE — 31645 BRNCHSC W/THER ASPIR 1ST: CPT | Mod: 59,,, | Performed by: INTERNAL MEDICINE

## 2020-01-01 PROCEDURE — 63600175 PHARM REV CODE 636 W HCPCS: Performed by: PHYSICIAN ASSISTANT

## 2020-01-01 PROCEDURE — 97164 PT RE-EVAL EST PLAN CARE: CPT

## 2020-01-01 PROCEDURE — 93005 ELECTROCARDIOGRAM TRACING: CPT | Performed by: INTERNAL MEDICINE

## 2020-01-01 PROCEDURE — 82607 VITAMIN B-12: CPT

## 2020-01-01 PROCEDURE — 77301 PR  INTEN MOD RADIOTHER PLAN W/DOSE VOL HIST: ICD-10-PCS | Mod: S$GLB,,, | Performed by: RADIOLOGY

## 2020-01-01 PROCEDURE — D9220A PRA ANESTHESIA: ICD-10-PCS | Mod: ANES,,, | Performed by: ANESTHESIOLOGY

## 2020-01-01 PROCEDURE — 82803 BLOOD GASES ANY COMBINATION: CPT

## 2020-01-01 PROCEDURE — 31625 PR BRONCHOSCOPY,BIOPSY: ICD-10-PCS | Mod: RT,,, | Performed by: INTERNAL MEDICINE

## 2020-01-01 PROCEDURE — 97530 THERAPEUTIC ACTIVITIES: CPT | Mod: CQ

## 2020-01-01 PROCEDURE — 63600175 PHARM REV CODE 636 W HCPCS

## 2020-01-01 PROCEDURE — G0180 PR HOME HEALTH MD CERTIFICATION: ICD-10-PCS | Mod: ,,, | Performed by: INTERNAL MEDICINE

## 2020-01-01 PROCEDURE — 77263 THER RADIOLOGY TX PLNG CPLX: CPT | Mod: S$GLB,,, | Performed by: RADIOLOGY

## 2020-01-01 PROCEDURE — 96374 THER/PROPH/DIAG INJ IV PUSH: CPT

## 2020-01-01 PROCEDURE — 88305 TISSUE EXAM BY PATHOLOGIST: CPT | Mod: 26,,, | Performed by: PATHOLOGY

## 2020-01-01 PROCEDURE — 88112 PR  CYTOPATH, CELL ENHANCE TECH: ICD-10-PCS | Mod: 26,,, | Performed by: PATHOLOGY

## 2020-01-01 PROCEDURE — 88305 TISSUE EXAM BY PATHOLOGIST: ICD-10-PCS | Mod: 26,,, | Performed by: PATHOLOGY

## 2020-01-01 PROCEDURE — 1126F AMNT PAIN NOTED NONE PRSNT: CPT | Mod: S$GLB,,, | Performed by: INTERNAL MEDICINE

## 2020-01-01 PROCEDURE — 99205 OFFICE O/P NEW HI 60 MIN: CPT | Mod: ,,, | Performed by: INTERNAL MEDICINE

## 2020-01-01 PROCEDURE — 85610 PROTHROMBIN TIME: CPT

## 2020-01-01 PROCEDURE — 25000003 PHARM REV CODE 250: Performed by: NURSE ANESTHETIST, CERTIFIED REGISTERED

## 2020-01-01 PROCEDURE — 82330 ASSAY OF CALCIUM: CPT

## 2020-01-01 PROCEDURE — P9016 RBC LEUKOCYTES REDUCED: HCPCS

## 2020-01-01 PROCEDURE — 99291 PR CRITICAL CARE, E/M 30-74 MINUTES: ICD-10-PCS | Mod: ,,, | Performed by: INTERNAL MEDICINE

## 2020-01-01 PROCEDURE — 86316 IMMUNOASSAY TUMOR OTHER: CPT

## 2020-01-01 PROCEDURE — 93010 ELECTROCARDIOGRAM REPORT: CPT | Mod: ,,, | Performed by: INTERNAL MEDICINE

## 2020-01-01 PROCEDURE — 77300 PR RADIATION THERAPY,DOSIMETRY PLAN: ICD-10-PCS | Mod: S$GLB,,, | Performed by: RADIOLOGY

## 2020-01-01 PROCEDURE — 83036 HEMOGLOBIN GLYCOSYLATED A1C: CPT

## 2020-01-01 PROCEDURE — 84295 ASSAY OF SERUM SODIUM: CPT

## 2020-01-01 PROCEDURE — 80202 ASSAY OF VANCOMYCIN: CPT

## 2020-01-01 PROCEDURE — 84480 ASSAY TRIIODOTHYRONINE (T3): CPT

## 2020-01-01 PROCEDURE — 88342 IMHCHEM/IMCYTCHM 1ST ANTB: CPT | Mod: 26,,, | Performed by: PATHOLOGY

## 2020-01-01 PROCEDURE — 99223 1ST HOSP IP/OBS HIGH 75: CPT | Mod: ,,, | Performed by: INTERNAL MEDICINE

## 2020-01-01 PROCEDURE — 87015 SPECIMEN INFECT AGNT CONCNTJ: CPT

## 2020-01-01 PROCEDURE — 96375 TX/PRO/DX INJ NEW DRUG ADDON: CPT

## 2020-01-01 PROCEDURE — 84436 ASSAY OF TOTAL THYROXINE: CPT

## 2020-01-01 PROCEDURE — 83921 ORGANIC ACID SINGLE QUANT: CPT

## 2020-01-01 PROCEDURE — 96368 THER/DIAG CONCURRENT INF: CPT

## 2020-01-01 PROCEDURE — 99205 PR OFFICE/OUTPT VISIT, NEW, LEVL V, 60-74 MIN: ICD-10-PCS | Mod: ,,, | Performed by: INTERNAL MEDICINE

## 2020-01-01 PROCEDURE — 99214 PR OFFICE/OUTPT VISIT, EST, LEVL IV, 30-39 MIN: ICD-10-PCS | Mod: S$GLB,,, | Performed by: INTERNAL MEDICINE

## 2020-01-01 PROCEDURE — 99999 PR PBB SHADOW E&M-EST. PATIENT-LVL IV: CPT | Mod: PBBFAC,,, | Performed by: PHYSICIAN ASSISTANT

## 2020-01-01 PROCEDURE — 93005 ELECTROCARDIOGRAM TRACING: CPT

## 2020-01-01 PROCEDURE — 77338 PR  MLC IMRT DESIGN & CONSTRUCTION PER IMRT PLAN: ICD-10-PCS | Mod: S$GLB,,, | Performed by: RADIOLOGY

## 2020-01-01 PROCEDURE — 88342 IMHCHEM/IMCYTCHM 1ST ANTB: CPT | Performed by: PATHOLOGY

## 2020-01-01 PROCEDURE — 97535 SELF CARE MNGMENT TRAINING: CPT

## 2020-01-01 PROCEDURE — 99213 OFFICE O/P EST LOW 20 MIN: CPT | Mod: PBBFAC,PO | Performed by: INTERNAL MEDICINE

## 2020-01-01 PROCEDURE — 30200315 PPD INTRADERMAL TEST REV CODE 302: Performed by: INTERNAL MEDICINE

## 2020-01-01 PROCEDURE — 99999 PR PBB SHADOW E&M-EST. PATIENT-LVL V: CPT | Mod: PBBFAC,,, | Performed by: INTERNAL MEDICINE

## 2020-01-01 PROCEDURE — 37000009 HC ANESTHESIA EA ADD 15 MINS: Performed by: INTERNAL MEDICINE

## 2020-01-01 PROCEDURE — 99223 PR INITIAL HOSPITAL CARE,LEVL III: ICD-10-PCS | Mod: ,,, | Performed by: PHYSICIAN ASSISTANT

## 2020-01-01 PROCEDURE — 77014 PR  CT GUIDANCE PLACEMENT RAD THERAPY FIELDS: ICD-10-PCS | Mod: S$GLB,,, | Performed by: RADIOLOGY

## 2020-01-01 PROCEDURE — 80305 POCT BUP URINE DRUG TEST: ICD-10-PCS | Mod: QW,S$GLB,, | Performed by: INTERNAL MEDICINE

## 2020-01-01 PROCEDURE — 77334 PR  RADN TREATMENT AID(S) COMPLX: ICD-10-PCS | Mod: S$GLB,,, | Performed by: RADIOLOGY

## 2020-01-01 PROCEDURE — 99233 PR SUBSEQUENT HOSPITAL CARE,LEVL III: ICD-10-PCS | Mod: ,,, | Performed by: PHYSICIAN ASSISTANT

## 2020-01-01 PROCEDURE — G0444 DEPRESSION SCREEN ANNUAL: HCPCS | Mod: PBBFAC,PO | Performed by: INTERNAL MEDICINE

## 2020-01-01 PROCEDURE — 81000 URINALYSIS NONAUTO W/SCOPE: CPT

## 2020-01-01 PROCEDURE — 99214 OFFICE O/P EST MOD 30 MIN: CPT | Mod: S$GLB,,, | Performed by: INTERNAL MEDICINE

## 2020-01-01 PROCEDURE — 1159F PR MEDICATION LIST DOCUMENTED IN MEDICAL RECORD: ICD-10-PCS | Mod: S$GLB,,, | Performed by: INTERNAL MEDICINE

## 2020-01-01 PROCEDURE — 83735 ASSAY OF MAGNESIUM: CPT | Mod: 91

## 2020-01-01 PROCEDURE — 87116 MYCOBACTERIA CULTURE: CPT

## 2020-01-01 PROCEDURE — 27200944 HC BRONCH FORCEPS DISPOSABLE: Performed by: INTERNAL MEDICINE

## 2020-01-01 PROCEDURE — 99291 PR CRITICAL CARE, E/M 30-74 MINUTES: ICD-10-PCS | Mod: 25,,, | Performed by: INTERNAL MEDICINE

## 2020-01-01 PROCEDURE — 99214 PR OFFICE/OUTPT VISIT, EST, LEVL IV, 30-39 MIN: ICD-10-PCS | Mod: S$PBB,,, | Performed by: PHYSICIAN ASSISTANT

## 2020-01-01 PROCEDURE — 87205 SMEAR GRAM STAIN: CPT

## 2020-01-01 PROCEDURE — D9220A PRA ANESTHESIA: Mod: ANES,,, | Performed by: ANESTHESIOLOGY

## 2020-01-01 PROCEDURE — 86580 TB INTRADERMAL TEST: CPT | Performed by: INTERNAL MEDICINE

## 2020-01-01 PROCEDURE — 96361 HYDRATE IV INFUSION ADD-ON: CPT

## 2020-01-01 PROCEDURE — G0180 MD CERTIFICATION HHA PATIENT: HCPCS | Mod: ,,, | Performed by: INTERNAL MEDICINE

## 2020-01-01 PROCEDURE — 99999 PR PBB SHADOW E&M-EST. PATIENT-LVL IV: CPT | Mod: PBBFAC,,, | Performed by: INTERNAL MEDICINE

## 2020-01-01 PROCEDURE — 97161 PT EVAL LOW COMPLEX 20 MIN: CPT

## 2020-01-01 PROCEDURE — 31622 DX BRONCHOSCOPE/WASH: CPT | Performed by: INTERNAL MEDICINE

## 2020-01-01 PROCEDURE — 97165 OT EVAL LOW COMPLEX 30 MIN: CPT

## 2020-01-01 PROCEDURE — 99232 SBSQ HOSP IP/OBS MODERATE 35: CPT | Mod: ,,, | Performed by: INTERNAL MEDICINE

## 2020-01-01 PROCEDURE — 77263 PR  RADIATION THERAPY PLAN COMPLEX: ICD-10-PCS | Mod: S$GLB,,, | Performed by: RADIOLOGY

## 2020-01-01 PROCEDURE — 63600175 PHARM REV CODE 636 W HCPCS: Performed by: NURSE ANESTHETIST, CERTIFIED REGISTERED

## 2020-01-01 PROCEDURE — 36430 TRANSFUSION BLD/BLD COMPNT: CPT

## 2020-01-01 PROCEDURE — 99999 PR PBB SHADOW E&M-EST. PATIENT-LVL IV: ICD-10-PCS | Mod: PBBFAC,,, | Performed by: PHYSICIAN ASSISTANT

## 2020-01-01 PROCEDURE — 77470 PR  SPECIAL RADIATION TREATMENT: ICD-10-PCS | Mod: S$GLB,,, | Performed by: RADIOLOGY

## 2020-01-01 PROCEDURE — 99214 OFFICE O/P EST MOD 30 MIN: CPT | Mod: PBBFAC,PO | Performed by: INTERNAL MEDICINE

## 2020-01-01 PROCEDURE — G6015 PR RADN TX DELIVERY,  INTENS MOD, 1+ FIELDS PER TX: ICD-10-PCS | Mod: S$GLB,,, | Performed by: RADIOLOGY

## 2020-01-01 PROCEDURE — 88342 CHG IMMUNOCYTOCHEMISTRY: ICD-10-PCS | Mod: 26,,, | Performed by: PATHOLOGY

## 2020-01-01 PROCEDURE — 88341 PR IHC OR ICC EACH ADD'L SINGLE ANTIBODY  STAINPR: ICD-10-PCS | Mod: 26,,, | Performed by: PATHOLOGY

## 2020-01-01 PROCEDURE — 36569 INSJ PICC 5 YR+ W/O IMAGING: CPT

## 2020-01-01 PROCEDURE — 83605 ASSAY OF LACTIC ACID: CPT

## 2020-01-01 PROCEDURE — 83690 ASSAY OF LIPASE: CPT

## 2020-01-01 PROCEDURE — 88341 IMHCHEM/IMCYTCHM EA ADD ANTB: CPT | Mod: 26,,, | Performed by: PATHOLOGY

## 2020-01-01 PROCEDURE — 88112 CYTOPATH CELL ENHANCE TECH: CPT | Mod: 26,,, | Performed by: PATHOLOGY

## 2020-01-01 PROCEDURE — 85014 HEMATOCRIT: CPT

## 2020-01-01 RX ORDER — SUCRALFATE 1 G/10ML
1 SUSPENSION ORAL 4 TIMES DAILY
Qty: 420 ML | Refills: 1 | Status: SHIPPED | OUTPATIENT
Start: 2020-01-01 | End: 2021-10-13

## 2020-01-01 RX ORDER — MAGNESIUM SULFATE HEPTAHYDRATE 40 MG/ML
2 INJECTION, SOLUTION INTRAVENOUS
Status: DISCONTINUED | OUTPATIENT
Start: 2020-01-01 | End: 2020-01-01 | Stop reason: HOSPADM

## 2020-01-01 RX ORDER — IPRATROPIUM BROMIDE AND ALBUTEROL SULFATE 2.5; .5 MG/3ML; MG/3ML
3 SOLUTION RESPIRATORY (INHALATION) EVERY 4 HOURS
Qty: 1 BOX | Refills: 0 | Status: SHIPPED | OUTPATIENT
Start: 2020-01-01 | End: 2021-08-16

## 2020-01-01 RX ORDER — ONDANSETRON 8 MG/1
8 TABLET, ORALLY DISINTEGRATING ORAL EVERY 6 HOURS PRN
Qty: 60 TABLET | Refills: 0 | Status: SHIPPED | OUTPATIENT
Start: 2020-01-01 | End: 2020-01-01

## 2020-01-01 RX ORDER — ACETAMINOPHEN 325 MG/1
325 TABLET ORAL EVERY 6 HOURS PRN
COMMUNITY

## 2020-01-01 RX ORDER — POTASSIUM CHLORIDE 7.45 MG/ML
40 INJECTION INTRAVENOUS
Status: DISCONTINUED | OUTPATIENT
Start: 2020-01-01 | End: 2020-01-01 | Stop reason: HOSPADM

## 2020-01-01 RX ORDER — MAGNESIUM SULFATE 1 G/100ML
1 INJECTION INTRAVENOUS
Status: DISCONTINUED | OUTPATIENT
Start: 2020-01-01 | End: 2020-01-01 | Stop reason: HOSPADM

## 2020-01-01 RX ORDER — LOSARTAN POTASSIUM 100 MG/1
1 TABLET ORAL DAILY
COMMUNITY
Start: 2020-01-01 | End: 2020-01-01 | Stop reason: SDUPTHER

## 2020-01-01 RX ORDER — ALPRAZOLAM 0.25 MG/1
0.25 TABLET ORAL 3 TIMES DAILY PRN
Status: DISCONTINUED | OUTPATIENT
Start: 2020-01-01 | End: 2020-01-01

## 2020-01-01 RX ORDER — FLUTICASONE FUROATE AND VILANTEROL 200; 25 UG/1; UG/1
1 POWDER RESPIRATORY (INHALATION) DAILY
Status: DISCONTINUED | OUTPATIENT
Start: 2020-01-01 | End: 2020-01-01 | Stop reason: HOSPADM

## 2020-01-01 RX ORDER — MAGNESIUM SULFATE HEPTAHYDRATE 40 MG/ML
4 INJECTION, SOLUTION INTRAVENOUS
Status: DISCONTINUED | OUTPATIENT
Start: 2020-01-01 | End: 2020-01-01 | Stop reason: HOSPADM

## 2020-01-01 RX ORDER — LORAZEPAM 2 MG/ML
1 INJECTION INTRAMUSCULAR
Status: ACTIVE | OUTPATIENT
Start: 2020-01-01 | End: 2020-01-01

## 2020-01-01 RX ORDER — POTASSIUM CHLORIDE 20 MEQ/1
20 TABLET, EXTENDED RELEASE ORAL
Status: DISCONTINUED | OUTPATIENT
Start: 2020-01-01 | End: 2020-01-01 | Stop reason: HOSPADM

## 2020-01-01 RX ORDER — CALCIUM CHLORIDE IN 0.9 % NACL 1 G/100 ML
1 INTRAVENOUS SOLUTION, PIGGYBACK (ML) INTRAVENOUS
Status: DISCONTINUED | OUTPATIENT
Start: 2020-01-01 | End: 2020-01-01 | Stop reason: HOSPADM

## 2020-01-01 RX ORDER — LOSARTAN POTASSIUM 100 MG/1
100 TABLET ORAL DAILY
Qty: 90 TABLET | Refills: 1 | Status: SHIPPED | OUTPATIENT
Start: 2020-01-01 | End: 2020-01-01

## 2020-01-01 RX ORDER — FAMOTIDINE 20 MG/1
20 TABLET, FILM COATED ORAL 2 TIMES DAILY
Status: DISCONTINUED | OUTPATIENT
Start: 2020-01-01 | End: 2020-01-01 | Stop reason: HOSPADM

## 2020-01-01 RX ORDER — POTASSIUM CHLORIDE 1.5 G/1.58G
40 POWDER, FOR SOLUTION ORAL
Status: DISCONTINUED | OUTPATIENT
Start: 2020-01-01 | End: 2020-01-01 | Stop reason: HOSPADM

## 2020-01-01 RX ORDER — SODIUM CHLORIDE 0.9 % (FLUSH) 0.9 %
10 SYRINGE (ML) INJECTION
Status: CANCELLED | OUTPATIENT
Start: 2020-01-01

## 2020-01-01 RX ORDER — HYDROCODONE BITARTRATE AND ACETAMINOPHEN 500; 5 MG/1; MG/1
TABLET ORAL
Status: DISCONTINUED | OUTPATIENT
Start: 2020-01-01 | End: 2020-01-01

## 2020-01-01 RX ORDER — POTASSIUM CHLORIDE 20 MEQ/1
40 TABLET, EXTENDED RELEASE ORAL
Status: DISCONTINUED | OUTPATIENT
Start: 2020-01-01 | End: 2020-01-01 | Stop reason: HOSPADM

## 2020-01-01 RX ORDER — HEPARIN 100 UNIT/ML
500 SYRINGE INTRAVENOUS
Status: CANCELLED | OUTPATIENT
Start: 2020-01-01

## 2020-01-01 RX ORDER — VANCOMYCIN HCL IN 5 % DEXTROSE 1G/250ML
1000 PLASTIC BAG, INJECTION (ML) INTRAVENOUS ONCE
Status: COMPLETED | OUTPATIENT
Start: 2020-01-01 | End: 2020-01-01

## 2020-01-01 RX ORDER — MUPIROCIN 20 MG/G
OINTMENT TOPICAL 2 TIMES DAILY
Status: DISCONTINUED | OUTPATIENT
Start: 2020-01-01 | End: 2020-01-01

## 2020-01-01 RX ORDER — LANOLIN ALCOHOL/MO/W.PET/CERES
800 CREAM (GRAM) TOPICAL
Status: DISCONTINUED | OUTPATIENT
Start: 2020-01-01 | End: 2020-01-01 | Stop reason: HOSPADM

## 2020-01-01 RX ORDER — LIDOCAINE HYDROCHLORIDE AND EPINEPHRINE 10; 10 MG/ML; UG/ML
INJECTION, SOLUTION INFILTRATION; PERINEURAL
Status: COMPLETED | OUTPATIENT
Start: 2020-01-01 | End: 2020-01-01

## 2020-01-01 RX ORDER — ACETAMINOPHEN 325 MG/1
650 TABLET ORAL EVERY 4 HOURS PRN
Status: DISCONTINUED | OUTPATIENT
Start: 2020-01-01 | End: 2020-01-01 | Stop reason: HOSPADM

## 2020-01-01 RX ORDER — BUPRENORPHINE AND NALOXONE 8; 2 MG/1; MG/1
1 FILM, SOLUBLE BUCCAL; SUBLINGUAL 2 TIMES DAILY
Qty: 60 PACKET | Refills: 0 | Status: SHIPPED | OUTPATIENT
Start: 2020-01-01 | End: 2020-01-01 | Stop reason: SDUPTHER

## 2020-01-01 RX ORDER — ONDANSETRON 2 MG/ML
4 INJECTION INTRAMUSCULAR; INTRAVENOUS EVERY 8 HOURS PRN
Status: DISCONTINUED | OUTPATIENT
Start: 2020-01-01 | End: 2020-01-01 | Stop reason: HOSPADM

## 2020-01-01 RX ORDER — HEPARIN 100 UNIT/ML
500 SYRINGE INTRAVENOUS
Status: DISCONTINUED | OUTPATIENT
Start: 2020-01-01 | End: 2020-01-01 | Stop reason: HOSPADM

## 2020-01-01 RX ORDER — IBUPROFEN 200 MG
16 TABLET ORAL
Status: DISCONTINUED | OUTPATIENT
Start: 2020-01-01 | End: 2020-01-01 | Stop reason: HOSPADM

## 2020-01-01 RX ORDER — DEXMEDETOMIDINE HYDROCHLORIDE 4 UG/ML
0.2 INJECTION, SOLUTION INTRAVENOUS CONTINUOUS
Status: DISCONTINUED | OUTPATIENT
Start: 2020-01-01 | End: 2020-01-01

## 2020-01-01 RX ORDER — KETAMINE HYDROCHLORIDE 10 MG/ML
INJECTION, SOLUTION INTRAMUSCULAR; INTRAVENOUS
Status: DISCONTINUED | OUTPATIENT
Start: 2020-01-01 | End: 2020-01-01

## 2020-01-01 RX ORDER — DULOXETIN HYDROCHLORIDE 30 MG/1
60 CAPSULE, DELAYED RELEASE ORAL DAILY
Status: DISCONTINUED | OUTPATIENT
Start: 2020-01-01 | End: 2020-01-01 | Stop reason: HOSPADM

## 2020-01-01 RX ORDER — AMOXICILLIN AND CLAVULANATE POTASSIUM 875; 125 MG/1; MG/1
1 TABLET, FILM COATED ORAL EVERY 12 HOURS
Qty: 14 TABLET | Refills: 0 | Status: SHIPPED | OUTPATIENT
Start: 2020-01-01 | End: 2020-01-01 | Stop reason: HOSPADM

## 2020-01-01 RX ORDER — SODIUM CHLORIDE 0.9 % (FLUSH) 0.9 %
10 SYRINGE (ML) INJECTION
Status: DISCONTINUED | OUTPATIENT
Start: 2020-01-01 | End: 2020-01-01 | Stop reason: HOSPADM

## 2020-01-01 RX ORDER — PROMETHAZINE HYDROCHLORIDE 25 MG/1
25 SUPPOSITORY RECTAL EVERY 6 HOURS PRN
Qty: 12 SUPPOSITORY | Refills: 1 | Status: SHIPPED | OUTPATIENT
Start: 2020-01-01 | End: 2020-01-01

## 2020-01-01 RX ORDER — ACETAMINOPHEN 325 MG/1
650 TABLET ORAL EVERY 4 HOURS PRN
Status: DISCONTINUED | OUTPATIENT
Start: 2020-01-01 | End: 2020-01-01

## 2020-01-01 RX ORDER — LORAZEPAM 2 MG/ML
0.5 INJECTION INTRAMUSCULAR EVERY 6 HOURS PRN
Status: DISCONTINUED | OUTPATIENT
Start: 2020-01-01 | End: 2020-01-01

## 2020-01-01 RX ORDER — BUPRENORPHINE AND NALOXONE 8; 2 MG/1; MG/1
1 FILM, SOLUBLE BUCCAL; SUBLINGUAL 2 TIMES DAILY
Status: DISCONTINUED | OUTPATIENT
Start: 2020-01-01 | End: 2020-01-01

## 2020-01-01 RX ORDER — PANTOPRAZOLE SODIUM 40 MG/1
40 TABLET, DELAYED RELEASE ORAL DAILY
Status: DISCONTINUED | OUTPATIENT
Start: 2020-01-01 | End: 2020-01-01 | Stop reason: HOSPADM

## 2020-01-01 RX ORDER — HYDROCODONE BITARTRATE AND ACETAMINOPHEN 500; 5 MG/1; MG/1
TABLET ORAL ONCE
Status: CANCELLED | OUTPATIENT
Start: 2020-01-01 | End: 2020-01-01

## 2020-01-01 RX ORDER — IPRATROPIUM BROMIDE AND ALBUTEROL SULFATE 2.5; .5 MG/3ML; MG/3ML
3 SOLUTION RESPIRATORY (INHALATION)
Status: DISCONTINUED | OUTPATIENT
Start: 2020-01-01 | End: 2020-01-01 | Stop reason: HOSPADM

## 2020-01-01 RX ORDER — POTASSIUM CHLORIDE 1.5 G/1.58G
60 POWDER, FOR SOLUTION ORAL
Status: DISCONTINUED | OUTPATIENT
Start: 2020-01-01 | End: 2020-01-01 | Stop reason: HOSPADM

## 2020-01-01 RX ORDER — ONDANSETRON 4 MG/1
4 TABLET, ORALLY DISINTEGRATING ORAL EVERY 6 HOURS PRN
Status: DISCONTINUED | OUTPATIENT
Start: 2020-01-01 | End: 2020-01-01 | Stop reason: HOSPADM

## 2020-01-01 RX ORDER — SODIUM CHLORIDE 9 MG/ML
INJECTION, SOLUTION INTRAVENOUS CONTINUOUS
Status: DISCONTINUED | OUTPATIENT
Start: 2020-01-01 | End: 2020-01-01

## 2020-01-01 RX ORDER — POTASSIUM CHLORIDE 7.45 MG/ML
20 INJECTION INTRAVENOUS
Status: DISCONTINUED | OUTPATIENT
Start: 2020-01-01 | End: 2020-01-01 | Stop reason: HOSPADM

## 2020-01-01 RX ORDER — ONDANSETRON 2 MG/ML
8 INJECTION INTRAMUSCULAR; INTRAVENOUS
Status: COMPLETED | OUTPATIENT
Start: 2020-01-01 | End: 2020-01-01

## 2020-01-01 RX ORDER — ONDANSETRON 2 MG/ML
8 INJECTION INTRAMUSCULAR; INTRAVENOUS EVERY 8 HOURS PRN
Status: DISCONTINUED | OUTPATIENT
Start: 2020-01-01 | End: 2020-01-01 | Stop reason: HOSPADM

## 2020-01-01 RX ORDER — OXYMETAZOLINE HCL 0.05 %
SPRAY, NON-AEROSOL (ML) NASAL
Status: DISCONTINUED
Start: 2020-01-01 | End: 2020-01-01 | Stop reason: HOSPADM

## 2020-01-01 RX ORDER — NOREPINEPHRINE BITARTRATE 1 MG/ML
INJECTION, SOLUTION INTRAVENOUS
Status: COMPLETED
Start: 2020-01-01 | End: 2020-01-01

## 2020-01-01 RX ORDER — LORAZEPAM 2 MG/ML
1 INJECTION INTRAMUSCULAR ONCE AS NEEDED
Status: COMPLETED | OUTPATIENT
Start: 2020-01-01 | End: 2020-01-01

## 2020-01-01 RX ORDER — MORPHINE SULFATE 4 MG/ML
INJECTION, SOLUTION INTRAMUSCULAR; INTRAVENOUS
Status: DISCONTINUED
Start: 2020-01-01 | End: 2020-01-01 | Stop reason: HOSPADM

## 2020-01-01 RX ORDER — LORAZEPAM 2 MG/ML
1 INJECTION INTRAMUSCULAR EVERY 4 HOURS PRN
Status: DISCONTINUED | OUTPATIENT
Start: 2020-01-01 | End: 2020-01-01

## 2020-01-01 RX ORDER — LIDOCAINE HYDROCHLORIDE 20 MG/ML
JELLY TOPICAL
Status: COMPLETED | OUTPATIENT
Start: 2020-01-01 | End: 2020-01-01

## 2020-01-01 RX ORDER — PROPOFOL 10 MG/ML
INJECTION, EMULSION INTRAVENOUS
Status: COMPLETED
Start: 2020-01-01 | End: 2020-01-01

## 2020-01-01 RX ORDER — SODIUM CHLORIDE 9 MG/ML
INJECTION, SOLUTION INTRAVENOUS ONCE
Status: DISCONTINUED | OUTPATIENT
Start: 2020-01-01 | End: 2020-01-01

## 2020-01-01 RX ORDER — IBUPROFEN 200 MG
24 TABLET ORAL
Status: DISCONTINUED | OUTPATIENT
Start: 2020-01-01 | End: 2020-01-01 | Stop reason: HOSPADM

## 2020-01-01 RX ORDER — HYDROCHLOROTHIAZIDE 25 MG/1
25 TABLET ORAL DAILY
Qty: 90 TABLET | Refills: 1 | Status: SHIPPED | OUTPATIENT
Start: 2020-01-01 | End: 2020-01-01

## 2020-01-01 RX ORDER — BUPRENORPHINE AND NALOXONE 8; 2 MG/1; MG/1
1 FILM, SOLUBLE BUCCAL; SUBLINGUAL 2 TIMES DAILY
Status: DISCONTINUED | OUTPATIENT
Start: 2020-01-01 | End: 2020-01-01 | Stop reason: HOSPADM

## 2020-01-01 RX ORDER — DULOXETIN HYDROCHLORIDE 60 MG/1
CAPSULE, DELAYED RELEASE ORAL
Qty: 30 CAPSULE | Refills: 10 | Status: SHIPPED | OUTPATIENT
Start: 2020-01-01

## 2020-01-01 RX ORDER — ACETAMINOPHEN 325 MG/1
650 TABLET ORAL ONCE
Status: CANCELLED | OUTPATIENT
Start: 2020-01-01

## 2020-01-01 RX ORDER — ENOXAPARIN SODIUM 100 MG/ML
40 INJECTION SUBCUTANEOUS EVERY 24 HOURS
Status: DISCONTINUED | OUTPATIENT
Start: 2020-01-01 | End: 2020-01-01 | Stop reason: HOSPADM

## 2020-01-01 RX ORDER — LORAZEPAM 1 MG/1
1 TABLET ORAL EVERY 4 HOURS PRN
Status: DISCONTINUED | OUTPATIENT
Start: 2020-01-01 | End: 2020-01-01 | Stop reason: HOSPADM

## 2020-01-01 RX ORDER — AMITRIPTYLINE HYDROCHLORIDE 10 MG/1
10 TABLET, FILM COATED ORAL NIGHTLY PRN
Status: DISCONTINUED | OUTPATIENT
Start: 2020-01-01 | End: 2020-01-01 | Stop reason: HOSPADM

## 2020-01-01 RX ORDER — ENOXAPARIN SODIUM 100 MG/ML
40 INJECTION SUBCUTANEOUS
Status: DISCONTINUED | OUTPATIENT
Start: 2020-01-01 | End: 2020-01-01 | Stop reason: HOSPADM

## 2020-01-01 RX ORDER — ALPRAZOLAM 0.25 MG/1
0.5 TABLET ORAL 3 TIMES DAILY PRN
Status: DISCONTINUED | OUTPATIENT
Start: 2020-01-01 | End: 2020-01-01

## 2020-01-01 RX ORDER — BUPRENORPHINE AND NALOXONE 8; 2 MG/1; MG/1
1 FILM, SOLUBLE BUCCAL; SUBLINGUAL DAILY
Status: DISCONTINUED | OUTPATIENT
Start: 2020-01-01 | End: 2020-01-01

## 2020-01-01 RX ORDER — METRONIDAZOLE 500 MG/1
500 TABLET ORAL EVERY 12 HOURS
Qty: 20 TABLET | Refills: 0 | Status: SHIPPED | OUTPATIENT
Start: 2020-01-01 | End: 2020-01-01

## 2020-01-01 RX ORDER — GLUCAGON 1 MG
1 KIT INJECTION
Status: DISCONTINUED | OUTPATIENT
Start: 2020-01-01 | End: 2020-01-01 | Stop reason: HOSPADM

## 2020-01-01 RX ORDER — IPRATROPIUM BROMIDE AND ALBUTEROL SULFATE 2.5; .5 MG/3ML; MG/3ML
3 SOLUTION RESPIRATORY (INHALATION) EVERY 4 HOURS
Status: DISCONTINUED | OUTPATIENT
Start: 2020-01-01 | End: 2020-01-01

## 2020-01-01 RX ORDER — MAGNESIUM SULFATE 1 G/100ML
1 INJECTION INTRAVENOUS ONCE
Status: COMPLETED | OUTPATIENT
Start: 2020-01-01 | End: 2020-01-01

## 2020-01-01 RX ORDER — SUCRALFATE 1 G/10ML
1 SUSPENSION ORAL 4 TIMES DAILY
Status: DISCONTINUED | OUTPATIENT
Start: 2020-01-01 | End: 2020-01-01

## 2020-01-01 RX ORDER — TALC
6 POWDER (GRAM) TOPICAL NIGHTLY PRN
Status: DISCONTINUED | OUTPATIENT
Start: 2020-01-01 | End: 2020-01-01 | Stop reason: HOSPADM

## 2020-01-01 RX ORDER — HYDROCODONE BITARTRATE AND ACETAMINOPHEN 500; 5 MG/1; MG/1
TABLET ORAL ONCE
Status: ACTIVE | OUTPATIENT
Start: 2020-01-01

## 2020-01-01 RX ORDER — FUROSEMIDE 10 MG/ML
20 INJECTION INTRAMUSCULAR; INTRAVENOUS ONCE
Status: CANCELLED | OUTPATIENT
Start: 2020-01-01

## 2020-01-01 RX ORDER — LOSARTAN POTASSIUM 50 MG/1
100 TABLET ORAL DAILY
Status: DISCONTINUED | OUTPATIENT
Start: 2020-01-01 | End: 2020-01-01 | Stop reason: HOSPADM

## 2020-01-01 RX ORDER — ACETAMINOPHEN 325 MG/1
650 TABLET ORAL EVERY 6 HOURS PRN
Status: DISCONTINUED | OUTPATIENT
Start: 2020-01-01 | End: 2020-01-01

## 2020-01-01 RX ORDER — LIDOCAINE HYDROCHLORIDE 20 MG/ML
JELLY TOPICAL
Status: DISCONTINUED
Start: 2020-01-01 | End: 2020-01-01 | Stop reason: HOSPADM

## 2020-01-01 RX ORDER — DIPHENHYDRAMINE HCL 25 MG
25 CAPSULE ORAL ONCE
Status: CANCELLED | OUTPATIENT
Start: 2020-01-01

## 2020-01-01 RX ORDER — ACETAMINOPHEN 650 MG/20.3ML
650 LIQUID ORAL EVERY 6 HOURS PRN
Status: DISCONTINUED | OUTPATIENT
Start: 2020-01-01 | End: 2020-01-01

## 2020-01-01 RX ORDER — ALPRAZOLAM 0.5 MG/1
0.5 TABLET ORAL 3 TIMES DAILY PRN
Status: DISCONTINUED | OUTPATIENT
Start: 2020-01-01 | End: 2020-01-01 | Stop reason: HOSPADM

## 2020-01-01 RX ORDER — LEVOFLOXACIN 5 MG/ML
500 INJECTION, SOLUTION INTRAVENOUS ONCE
Status: COMPLETED | OUTPATIENT
Start: 2020-01-01 | End: 2020-01-01

## 2020-01-01 RX ORDER — POTASSIUM CHLORIDE 14.9 MG/ML
40 INJECTION INTRAVENOUS
Status: CANCELLED
Start: 2020-01-01

## 2020-01-01 RX ORDER — DIPHENOXYLATE HYDROCHLORIDE AND ATROPINE SULFATE 2.5; .025 MG/1; MG/1
1 TABLET ORAL 4 TIMES DAILY PRN
Status: DISCONTINUED | OUTPATIENT
Start: 2020-01-01 | End: 2020-01-01 | Stop reason: HOSPADM

## 2020-01-01 RX ORDER — ALPRAZOLAM 0.25 MG/1
0.25 TABLET ORAL 3 TIMES DAILY PRN
Status: DISCONTINUED | OUTPATIENT
Start: 2020-01-01 | End: 2020-01-01 | Stop reason: HOSPADM

## 2020-01-01 RX ORDER — LOPERAMIDE HYDROCHLORIDE 2 MG/1
2 CAPSULE ORAL 4 TIMES DAILY PRN
Status: DISCONTINUED | OUTPATIENT
Start: 2020-01-01 | End: 2020-01-01 | Stop reason: HOSPADM

## 2020-01-01 RX ORDER — IBUPROFEN 200 MG
1 TABLET ORAL DAILY
Status: DISCONTINUED | OUTPATIENT
Start: 2020-01-01 | End: 2020-01-01 | Stop reason: HOSPADM

## 2020-01-01 RX ORDER — AMOXICILLIN 250 MG
1 CAPSULE ORAL 2 TIMES DAILY
Status: DISCONTINUED | OUTPATIENT
Start: 2020-01-01 | End: 2020-01-01 | Stop reason: HOSPADM

## 2020-01-01 RX ORDER — HYDRALAZINE HYDROCHLORIDE 20 MG/ML
10 INJECTION INTRAMUSCULAR; INTRAVENOUS EVERY 6 HOURS PRN
Status: DISCONTINUED | OUTPATIENT
Start: 2020-01-01 | End: 2020-01-01 | Stop reason: HOSPADM

## 2020-01-01 RX ORDER — IPRATROPIUM BROMIDE AND ALBUTEROL SULFATE 2.5; .5 MG/3ML; MG/3ML
3 SOLUTION RESPIRATORY (INHALATION) EVERY 4 HOURS PRN
Status: DISCONTINUED | OUTPATIENT
Start: 2020-01-01 | End: 2020-01-01 | Stop reason: HOSPADM

## 2020-01-01 RX ORDER — ONDANSETRON 2 MG/ML
4 INJECTION INTRAMUSCULAR; INTRAVENOUS EVERY 6 HOURS PRN
Status: DISCONTINUED | OUTPATIENT
Start: 2020-01-01 | End: 2020-01-01 | Stop reason: HOSPADM

## 2020-01-01 RX ORDER — OMEPRAZOLE 40 MG/1
40 CAPSULE, DELAYED RELEASE ORAL DAILY
COMMUNITY

## 2020-01-01 RX ORDER — FLUTICASONE FUROATE AND VILANTEROL 200; 25 UG/1; UG/1
1 POWDER RESPIRATORY (INHALATION) DAILY
Qty: 1 EACH | Refills: 0 | Status: SHIPPED | OUTPATIENT
Start: 2020-01-01

## 2020-01-01 RX ORDER — ALPRAZOLAM 0.5 MG/1
0.5 TABLET ORAL 2 TIMES DAILY PRN
Qty: 15 TABLET | Refills: 0 | Status: SHIPPED | OUTPATIENT
Start: 2020-01-01 | End: 2020-01-01

## 2020-01-01 RX ORDER — HYDROCHLOROTHIAZIDE 25 MG/1
25 TABLET ORAL DAILY
Status: DISCONTINUED | OUTPATIENT
Start: 2020-01-01 | End: 2020-01-01 | Stop reason: HOSPADM

## 2020-01-01 RX ORDER — BUSPIRONE HYDROCHLORIDE 10 MG/1
10 TABLET ORAL 2 TIMES DAILY
Qty: 60 TABLET | Refills: 11 | Status: SHIPPED | OUTPATIENT
Start: 2020-01-01 | End: 2021-08-31

## 2020-01-01 RX ORDER — LIDOCAINE HYDROCHLORIDE 40 MG/ML
4 INJECTION, SOLUTION RETROBULBAR ONCE
Status: COMPLETED | OUTPATIENT
Start: 2020-01-01 | End: 2020-01-01

## 2020-01-01 RX ORDER — LOPERAMIDE HYDROCHLORIDE 2 MG/1
2 CAPSULE ORAL 4 TIMES DAILY PRN
Qty: 25 CAPSULE | Refills: 0 | Status: SHIPPED | OUTPATIENT
Start: 2020-01-01 | End: 2020-01-01

## 2020-01-01 RX ORDER — LEVOFLOXACIN 5 MG/ML
750 INJECTION, SOLUTION INTRAVENOUS
Status: DISCONTINUED | OUTPATIENT
Start: 2020-01-01 | End: 2020-01-01

## 2020-01-01 RX ORDER — PROMETHAZINE HYDROCHLORIDE 25 MG/1
25 TABLET ORAL EVERY 6 HOURS PRN
Qty: 60 TABLET | Refills: 1 | Status: SHIPPED | OUTPATIENT
Start: 2020-01-01 | End: 2021-10-20

## 2020-01-01 RX ORDER — FUROSEMIDE 10 MG/ML
40 INJECTION INTRAMUSCULAR; INTRAVENOUS ONCE
Status: COMPLETED | OUTPATIENT
Start: 2020-01-01 | End: 2020-01-01

## 2020-01-01 RX ORDER — VANCOMYCIN HCL IN 5 % DEXTROSE 1G/250ML
1000 PLASTIC BAG, INJECTION (ML) INTRAVENOUS
Status: DISCONTINUED | OUTPATIENT
Start: 2020-01-01 | End: 2020-01-01

## 2020-01-01 RX ORDER — ACETAMINOPHEN 500 MG
1000 TABLET ORAL
Status: COMPLETED | OUTPATIENT
Start: 2020-01-01 | End: 2020-01-01

## 2020-01-01 RX ORDER — IPRATROPIUM BROMIDE AND ALBUTEROL SULFATE 2.5; .5 MG/3ML; MG/3ML
3 SOLUTION RESPIRATORY (INHALATION) EVERY 6 HOURS PRN
Status: DISCONTINUED | OUTPATIENT
Start: 2020-01-01 | End: 2020-01-01

## 2020-01-01 RX ORDER — CEFEPIME HYDROCHLORIDE 1 G/50ML
2 INJECTION, SOLUTION INTRAVENOUS
Status: DISCONTINUED | OUTPATIENT
Start: 2020-01-01 | End: 2020-01-01

## 2020-01-01 RX ORDER — SODIUM CHLORIDE, SODIUM LACTATE, POTASSIUM CHLORIDE, CALCIUM CHLORIDE 600; 310; 30; 20 MG/100ML; MG/100ML; MG/100ML; MG/100ML
INJECTION, SOLUTION INTRAVENOUS CONTINUOUS
Status: DISCONTINUED | OUTPATIENT
Start: 2020-01-01 | End: 2020-01-01

## 2020-01-01 RX ORDER — FERROUS SULFATE 325(65) MG
325 TABLET ORAL DAILY
Status: DISCONTINUED | OUTPATIENT
Start: 2020-01-01 | End: 2020-01-01 | Stop reason: HOSPADM

## 2020-01-01 RX ORDER — LEVOFLOXACIN 5 MG/ML
250 INJECTION, SOLUTION INTRAVENOUS
Status: DISCONTINUED | OUTPATIENT
Start: 2020-01-01 | End: 2020-01-01

## 2020-01-01 RX ORDER — PREDNISONE 20 MG/1
40 TABLET ORAL DAILY
Status: DISCONTINUED | OUTPATIENT
Start: 2020-01-01 | End: 2020-01-01

## 2020-01-01 RX ORDER — ACETAMINOPHEN 325 MG/1
650 TABLET ORAL ONCE
Status: COMPLETED | OUTPATIENT
Start: 2020-01-01 | End: 2020-01-01

## 2020-01-01 RX ORDER — FERROUS SULFATE 325(65) MG
325 TABLET, DELAYED RELEASE (ENTERIC COATED) ORAL DAILY
Qty: 30 TABLET | Refills: 0
Start: 2020-01-01 | End: 2020-01-01

## 2020-01-01 RX ORDER — PREDNISONE 10 MG/1
TABLET ORAL
Qty: 20 TABLET | Refills: 0 | Status: SHIPPED | OUTPATIENT
Start: 2020-01-01 | End: 2020-01-01

## 2020-01-01 RX ORDER — ACETAMINOPHEN 325 MG/1
650 TABLET ORAL EVERY 8 HOURS PRN
Status: DISCONTINUED | OUTPATIENT
Start: 2020-01-01 | End: 2020-01-01

## 2020-01-01 RX ORDER — POTASSIUM CHLORIDE 14.9 MG/ML
40 INJECTION INTRAVENOUS
Status: DISCONTINUED | OUTPATIENT
Start: 2020-01-01 | End: 2020-01-01

## 2020-01-01 RX ORDER — ALPRAZOLAM 0.5 MG/1
0.5 TABLET ORAL 3 TIMES DAILY
COMMUNITY

## 2020-01-01 RX ORDER — POTASSIUM CHLORIDE 20 MEQ/1
40 TABLET, EXTENDED RELEASE ORAL EVERY 4 HOURS
Status: DISPENSED | OUTPATIENT
Start: 2020-01-01 | End: 2020-01-01

## 2020-01-01 RX ORDER — MORPHINE SULFATE 2 MG/ML
INJECTION, SOLUTION INTRAMUSCULAR; INTRAVENOUS
Status: COMPLETED
Start: 2020-01-01 | End: 2020-01-01

## 2020-01-01 RX ORDER — HYDROCHLOROTHIAZIDE 25 MG/1
1 TABLET ORAL DAILY
COMMUNITY
Start: 2020-01-01 | End: 2020-01-01 | Stop reason: SDUPTHER

## 2020-01-01 RX ORDER — POTASSIUM CHLORIDE 20 MEQ/1
40 TABLET, EXTENDED RELEASE ORAL EVERY 4 HOURS
Status: DISCONTINUED | OUTPATIENT
Start: 2020-01-01 | End: 2020-01-01

## 2020-01-01 RX ORDER — SODIUM CHLORIDE AND POTASSIUM CHLORIDE 150; 900 MG/100ML; MG/100ML
INJECTION, SOLUTION INTRAVENOUS CONTINUOUS
Status: CANCELLED
Start: 2020-01-01

## 2020-01-01 RX ORDER — LORAZEPAM 2 MG/ML
1 INJECTION INTRAMUSCULAR
Status: DISCONTINUED | OUTPATIENT
Start: 2020-01-01 | End: 2020-11-02 | Stop reason: HOSPADM

## 2020-01-01 RX ORDER — LORAZEPAM 2 MG/ML
INJECTION INTRAMUSCULAR
Status: DISCONTINUED
Start: 2020-01-01 | End: 2020-01-01 | Stop reason: HOSPADM

## 2020-01-01 RX ORDER — HEPARIN 100 UNIT/ML
300 SYRINGE INTRAVENOUS
Status: ACTIVE | OUTPATIENT
Start: 2020-01-01

## 2020-01-01 RX ORDER — FUROSEMIDE 10 MG/ML
20 INJECTION INTRAMUSCULAR; INTRAVENOUS ONCE
Status: COMPLETED | OUTPATIENT
Start: 2020-01-01 | End: 2020-01-01

## 2020-01-01 RX ORDER — PROPOFOL 10 MG/ML
VIAL (ML) INTRAVENOUS
Status: DISCONTINUED | OUTPATIENT
Start: 2020-01-01 | End: 2020-01-01

## 2020-01-01 RX ORDER — ONDANSETRON 4 MG/1
TABLET, FILM COATED ORAL
COMMUNITY
Start: 2020-01-01

## 2020-01-01 RX ORDER — BUSPIRONE HYDROCHLORIDE 5 MG/1
10 TABLET ORAL 2 TIMES DAILY
Status: DISCONTINUED | OUTPATIENT
Start: 2020-01-01 | End: 2020-01-01

## 2020-01-01 RX ORDER — KETAMINE HYDROCHLORIDE 100 MG/ML
INJECTION, SOLUTION INTRAMUSCULAR; INTRAVENOUS
Status: DISCONTINUED
Start: 2020-01-01 | End: 2020-01-01 | Stop reason: HOSPADM

## 2020-01-01 RX ORDER — LORAZEPAM 2 MG/ML
1 INJECTION INTRAMUSCULAR
Status: DISCONTINUED | OUTPATIENT
Start: 2020-01-01 | End: 2020-01-01

## 2020-01-01 RX ORDER — LEVOFLOXACIN 5 MG/ML
500 INJECTION, SOLUTION INTRAVENOUS
Status: DISCONTINUED | OUTPATIENT
Start: 2020-01-01 | End: 2020-01-01 | Stop reason: DRUGHIGH

## 2020-01-01 RX ORDER — NOREPINEPHRINE BITARTRATE 1 MG/ML
INJECTION, SOLUTION INTRAVENOUS
Status: DISCONTINUED
Start: 2020-01-01 | End: 2020-01-01 | Stop reason: HOSPADM

## 2020-01-01 RX ORDER — CHLORHEXIDINE GLUCONATE ORAL RINSE 1.2 MG/ML
15 SOLUTION DENTAL 2 TIMES DAILY
Status: DISCONTINUED | OUTPATIENT
Start: 2020-01-01 | End: 2020-01-01

## 2020-01-01 RX ORDER — OXYMETAZOLINE HCL 0.05 %
SPRAY, NON-AEROSOL (ML) NASAL
Status: COMPLETED | OUTPATIENT
Start: 2020-01-01 | End: 2020-01-01

## 2020-01-01 RX ORDER — CEFTRIAXONE 1 G/1
1 INJECTION, POWDER, FOR SOLUTION INTRAMUSCULAR; INTRAVENOUS
Status: DISCONTINUED | OUTPATIENT
Start: 2020-01-01 | End: 2020-01-01 | Stop reason: SDUPTHER

## 2020-01-01 RX ORDER — LORAZEPAM 1 MG/1
1 TABLET ORAL EVERY 4 HOURS PRN
Status: CANCELLED | OUTPATIENT
Start: 2020-01-01

## 2020-01-01 RX ORDER — LIDOCAINE HYDROCHLORIDE 40 MG/ML
INJECTION, SOLUTION RETROBULBAR
Status: DISCONTINUED
Start: 2020-01-01 | End: 2020-01-01 | Stop reason: HOSPADM

## 2020-01-01 RX ORDER — LOSARTAN POTASSIUM 100 MG/1
100 TABLET ORAL DAILY
COMMUNITY

## 2020-01-01 RX ORDER — LIDOCAINE HYDROCHLORIDE 10 MG/ML
INJECTION, SOLUTION EPIDURAL; INFILTRATION; INTRACAUDAL; PERINEURAL
Status: DISCONTINUED
Start: 2020-01-01 | End: 2020-01-01 | Stop reason: HOSPADM

## 2020-01-01 RX ORDER — PREDNISONE 5 MG/1
10 TABLET ORAL 2 TIMES DAILY
Status: DISCONTINUED | OUTPATIENT
Start: 2020-01-01 | End: 2020-01-01

## 2020-01-01 RX ORDER — SIMETHICONE 80 MG
2 TABLET,CHEWABLE ORAL 4 TIMES DAILY PRN
Status: DISCONTINUED | OUTPATIENT
Start: 2020-01-01 | End: 2020-01-01 | Stop reason: HOSPADM

## 2020-01-01 RX ORDER — LOSARTAN POTASSIUM 100 MG/1
100 TABLET ORAL DAILY
Status: DISCONTINUED | OUTPATIENT
Start: 2020-01-01 | End: 2020-01-01 | Stop reason: HOSPADM

## 2020-01-01 RX ORDER — FERROUS SULFATE 325(65) MG
325 TABLET, DELAYED RELEASE (ENTERIC COATED) ORAL DAILY
Qty: 30 TABLET | Refills: 0 | Status: SHIPPED | OUTPATIENT
Start: 2020-01-01 | End: 2020-01-01 | Stop reason: HOSPADM

## 2020-01-01 RX ORDER — POTASSIUM CHLORIDE 20 MEQ/1
40 TABLET, EXTENDED RELEASE ORAL ONCE
Qty: 2 TABLET | Refills: 0 | Status: SHIPPED | OUTPATIENT
Start: 2020-01-01 | End: 2020-01-01

## 2020-01-01 RX ORDER — VANCOMYCIN HCL IN 5 % DEXTROSE 1G/250ML
15 PLASTIC BAG, INJECTION (ML) INTRAVENOUS
Status: COMPLETED | OUTPATIENT
Start: 2020-01-01 | End: 2020-01-01

## 2020-01-01 RX ORDER — LIDOCAINE HYDROCHLORIDE 10 MG/ML
INJECTION INFILTRATION; PERINEURAL
Status: COMPLETED | OUTPATIENT
Start: 2020-01-01 | End: 2020-01-01

## 2020-01-01 RX ORDER — LIDOCAINE HYDROCHLORIDE 20 MG/ML
INJECTION, SOLUTION EPIDURAL; INFILTRATION; INTRACAUDAL; PERINEURAL
Status: DISCONTINUED
Start: 2020-01-01 | End: 2020-01-01 | Stop reason: HOSPADM

## 2020-01-01 RX ORDER — LOSARTAN POTASSIUM 100 MG/1
TABLET ORAL
Qty: 90 TABLET | Refills: 0 | Status: SHIPPED | OUTPATIENT
Start: 2020-01-01 | End: 2020-01-01

## 2020-01-01 RX ORDER — MAGNESIUM SULFATE HEPTAHYDRATE 40 MG/ML
2 INJECTION, SOLUTION INTRAVENOUS ONCE
Status: COMPLETED | OUTPATIENT
Start: 2020-01-01 | End: 2020-01-01

## 2020-01-01 RX ORDER — MORPHINE SULFATE 2 MG/ML
2 INJECTION, SOLUTION INTRAMUSCULAR; INTRAVENOUS ONCE
Status: DISCONTINUED | OUTPATIENT
Start: 2020-01-01 | End: 2020-01-01 | Stop reason: HOSPADM

## 2020-01-01 RX ORDER — DIPHENHYDRAMINE HCL 25 MG
25 CAPSULE ORAL ONCE
Status: COMPLETED | OUTPATIENT
Start: 2020-01-01 | End: 2020-01-01

## 2020-01-01 RX ORDER — POTASSIUM CHLORIDE 1.5 G/1.58G
40 POWDER, FOR SOLUTION ORAL
Status: COMPLETED | OUTPATIENT
Start: 2020-01-01 | End: 2020-01-01

## 2020-01-01 RX ORDER — BUPRENORPHINE AND NALOXONE 8; 2 MG/1; MG/1
1 FILM, SOLUBLE BUCCAL; SUBLINGUAL 2 TIMES DAILY
Qty: 60 PACKET | Refills: 0 | Status: SHIPPED | OUTPATIENT
Start: 2020-01-01

## 2020-01-01 RX ORDER — FERROUS SULFATE 325(65) MG
325 TABLET, DELAYED RELEASE (ENTERIC COATED) ORAL DAILY
Status: DISCONTINUED | OUTPATIENT
Start: 2020-01-01 | End: 2020-01-01 | Stop reason: HOSPADM

## 2020-01-01 RX ADMIN — IPRATROPIUM BROMIDE AND ALBUTEROL SULFATE 3 ML: .5; 2.5 SOLUTION RESPIRATORY (INHALATION) at 04:08

## 2020-01-01 RX ADMIN — IPRATROPIUM BROMIDE AND ALBUTEROL SULFATE 3 ML: .5; 2.5 SOLUTION RESPIRATORY (INHALATION) at 03:08

## 2020-01-01 RX ADMIN — DEXTROSE 0.1 MCG/KG/MIN: 5 SOLUTION INTRAVENOUS at 06:10

## 2020-01-01 RX ADMIN — BUPRENORPHINE AND NALOXONE 1 EACH: 8; 2 FILM BUCCAL; SUBLINGUAL at 08:08

## 2020-01-01 RX ADMIN — SODIUM CHLORIDE, PRESERVATIVE FREE 10 ML: 5 INJECTION INTRAVENOUS at 02:09

## 2020-01-01 RX ADMIN — ETOPOSIDE 192 MG: 20 INJECTION INTRAVENOUS at 09:10

## 2020-01-01 RX ADMIN — DEXTROSE 0.2 MCG/KG/MIN: 5 SOLUTION INTRAVENOUS at 12:11

## 2020-01-01 RX ADMIN — ALPRAZOLAM 0.5 MG: 0.25 TABLET ORAL at 02:08

## 2020-01-01 RX ADMIN — ACETAMINOPHEN 650 MG: 325 TABLET ORAL at 07:10

## 2020-01-01 RX ADMIN — PANTOPRAZOLE SODIUM 40 MG: 40 TABLET, DELAYED RELEASE ORAL at 09:08

## 2020-01-01 RX ADMIN — PIPERACILLIN AND TAZOBACTAM 4.5 G: 4; .5 INJECTION, POWDER, FOR SOLUTION INTRAVENOUS at 10:08

## 2020-01-01 RX ADMIN — APREPITANT 130 MG: 130 INJECTION, EMULSION INTRAVENOUS at 09:08

## 2020-01-01 RX ADMIN — IPRATROPIUM BROMIDE AND ALBUTEROL SULFATE 3 ML: .5; 3 SOLUTION RESPIRATORY (INHALATION) at 03:11

## 2020-01-01 RX ADMIN — SODIUM CHLORIDE, PRESERVATIVE FREE 10 ML: 5 INJECTION INTRAVENOUS at 10:10

## 2020-01-01 RX ADMIN — NOREPINEPHRINE BITARTRATE 4000 MCG: 1 INJECTION INTRAVENOUS at 04:10

## 2020-01-01 RX ADMIN — IPRATROPIUM BROMIDE AND ALBUTEROL SULFATE 3 ML: .5; 2.5 SOLUTION RESPIRATORY (INHALATION) at 11:08

## 2020-01-01 RX ADMIN — DOCUSATE SODIUM 50 MG AND SENNOSIDES 8.6 MG 1 TABLET: 8.6; 5 TABLET, FILM COATED ORAL at 09:08

## 2020-01-01 RX ADMIN — IPRATROPIUM BROMIDE AND ALBUTEROL SULFATE 3 ML: .5; 3 SOLUTION RESPIRATORY (INHALATION) at 10:10

## 2020-01-01 RX ADMIN — PIPERACILLIN SODIUM AND TAZOBACTAM SODIUM 4.5 G: 4; .5 INJECTION, POWDER, LYOPHILIZED, FOR SOLUTION INTRAVENOUS at 12:08

## 2020-01-01 RX ADMIN — NICOTINE 1 PATCH: 14 PATCH, EXTENDED RELEASE TRANSDERMAL at 09:08

## 2020-01-01 RX ADMIN — HYDROCHLOROTHIAZIDE 25 MG: 25 TABLET ORAL at 08:08

## 2020-01-01 RX ADMIN — IPRATROPIUM BROMIDE AND ALBUTEROL SULFATE 3 ML: .5; 3 SOLUTION RESPIRATORY (INHALATION) at 04:08

## 2020-01-01 RX ADMIN — LORAZEPAM 1 MG: 2 INJECTION, SOLUTION INTRAMUSCULAR; INTRAVENOUS at 10:08

## 2020-01-01 RX ADMIN — LOPERAMIDE HYDROCHLORIDE 2 MG: 2 CAPSULE ORAL at 09:08

## 2020-01-01 RX ADMIN — IPRATROPIUM BROMIDE AND ALBUTEROL SULFATE 3 ML: .5; 3 SOLUTION RESPIRATORY (INHALATION) at 07:08

## 2020-01-01 RX ADMIN — PIPERACILLIN AND TAZOBACTAM 4.5 G: 4; .5 INJECTION, POWDER, FOR SOLUTION INTRAVENOUS at 11:08

## 2020-01-01 RX ADMIN — FERROUS SULFATE TAB 325 MG (65 MG ELEMENTAL FE) 325 MG: 325 (65 FE) TAB at 08:08

## 2020-01-01 RX ADMIN — ONDANSETRON 8 MG: 2 INJECTION INTRAMUSCULAR; INTRAVENOUS at 07:08

## 2020-01-01 RX ADMIN — CARBOPLATIN 480 MG: 10 INJECTION, SOLUTION INTRAVENOUS at 12:09

## 2020-01-01 RX ADMIN — FAMOTIDINE 20 MG: 20 TABLET, FILM COATED ORAL at 09:08

## 2020-01-01 RX ADMIN — SODIUM CHLORIDE, PRESERVATIVE FREE 10 ML: 5 INJECTION INTRAVENOUS at 03:09

## 2020-01-01 RX ADMIN — IPRATROPIUM BROMIDE AND ALBUTEROL SULFATE 3 ML: .5; 3 SOLUTION RESPIRATORY (INHALATION) at 03:08

## 2020-01-01 RX ADMIN — THERA TABS 1 TABLET: TAB at 08:08

## 2020-01-01 RX ADMIN — LIDOCAINE HYDROCHLORIDE AND EPINEPHRINE 6 ML: 10; 10 INJECTION, SOLUTION INFILTRATION; PERINEURAL at 08:08

## 2020-01-01 RX ADMIN — IPRATROPIUM BROMIDE AND ALBUTEROL SULFATE 3 ML: .5; 2.5 SOLUTION RESPIRATORY (INHALATION) at 07:08

## 2020-01-01 RX ADMIN — SODIUM CHLORIDE, SODIUM LACTATE, POTASSIUM CHLORIDE, AND CALCIUM CHLORIDE: .6; .31; .03; .02 INJECTION, SOLUTION INTRAVENOUS at 01:10

## 2020-01-01 RX ADMIN — CARBOPLATIN 480 MG: 10 INJECTION, SOLUTION INTRAVENOUS at 09:08

## 2020-01-01 RX ADMIN — IPRATROPIUM BROMIDE AND ALBUTEROL SULFATE 3 ML: .5; 2.5 SOLUTION RESPIRATORY (INHALATION) at 12:08

## 2020-01-01 RX ADMIN — LOSARTAN POTASSIUM 100 MG: 50 TABLET, FILM COATED ORAL at 10:08

## 2020-01-01 RX ADMIN — SODIUM CHLORIDE: 0.9 INJECTION, SOLUTION INTRAVENOUS at 05:08

## 2020-01-01 RX ADMIN — DULOXETINE 60 MG: 30 CAPSULE, DELAYED RELEASE ORAL at 10:08

## 2020-01-01 RX ADMIN — PANTOPRAZOLE SODIUM 40 MG: 40 TABLET, DELAYED RELEASE ORAL at 10:08

## 2020-01-01 RX ADMIN — SUCRALFATE 1 G: 1 SUSPENSION ORAL at 06:11

## 2020-01-01 RX ADMIN — THERA TABS 1 TABLET: TAB at 10:08

## 2020-01-01 RX ADMIN — BUPRENORPHINE AND NALOXONE 1 EACH: 8; 2 FILM BUCCAL; SUBLINGUAL at 09:08

## 2020-01-01 RX ADMIN — ACETAMINOPHEN 650 MG: 325 TABLET ORAL at 04:10

## 2020-01-01 RX ADMIN — ALPRAZOLAM 0.5 MG: 0.25 TABLET ORAL at 12:08

## 2020-01-01 RX ADMIN — BUPRENORPHINE AND NALOXONE 1 EACH: 8; 2 FILM BUCCAL; SUBLINGUAL at 10:08

## 2020-01-01 RX ADMIN — SODIUM CHLORIDE: 0.9 INJECTION, SOLUTION INTRAVENOUS at 12:10

## 2020-01-01 RX ADMIN — ETOPOSIDE 192 MG: 20 INJECTION INTRAVENOUS at 01:09

## 2020-01-01 RX ADMIN — ENOXAPARIN SODIUM 40 MG: 40 INJECTION SUBCUTANEOUS at 05:08

## 2020-01-01 RX ADMIN — FAMOTIDINE 20 MG: 20 TABLET, FILM COATED ORAL at 08:08

## 2020-01-01 RX ADMIN — POTASSIUM CHLORIDE: 2 INJECTION, SOLUTION, CONCENTRATE INTRAVENOUS at 10:10

## 2020-01-01 RX ADMIN — LOSARTAN POTASSIUM 100 MG: 50 TABLET, FILM COATED ORAL at 08:08

## 2020-01-01 RX ADMIN — DULOXETINE 60 MG: 30 CAPSULE, DELAYED RELEASE ORAL at 09:08

## 2020-01-01 RX ADMIN — ONDANSETRON 8 MG: 2 INJECTION INTRAMUSCULAR; INTRAVENOUS at 08:08

## 2020-01-01 RX ADMIN — PROMETHAZINE HYDROCHLORIDE 25 MG: 25 INJECTION INTRAMUSCULAR; INTRAVENOUS at 09:08

## 2020-01-01 RX ADMIN — POTASSIUM CHLORIDE 40 MEQ: 7.46 INJECTION, SOLUTION INTRAVENOUS at 06:11

## 2020-01-01 RX ADMIN — PIPERACILLIN AND TAZOBACTAM 4.5 G: 4; .5 INJECTION, POWDER, FOR SOLUTION INTRAVENOUS at 02:08

## 2020-01-01 RX ADMIN — VANCOMYCIN HYDROCHLORIDE 1000 MG: 1 INJECTION, POWDER, LYOPHILIZED, FOR SOLUTION INTRAVENOUS at 09:08

## 2020-01-01 RX ADMIN — DULOXETINE 60 MG: 30 CAPSULE, DELAYED RELEASE ORAL at 08:08

## 2020-01-01 RX ADMIN — Medication 300 UNITS: at 03:10

## 2020-01-01 RX ADMIN — DOCUSATE SODIUM 50 MG AND SENNOSIDES 8.6 MG 1 TABLET: 8.6; 5 TABLET, FILM COATED ORAL at 08:08

## 2020-01-01 RX ADMIN — CHLORHEXIDINE GLUCONATE 15 ML: 1.2 RINSE ORAL at 09:10

## 2020-01-01 RX ADMIN — DEXTROSE 0.2 MCG/KG/MIN: 5 SOLUTION INTRAVENOUS at 06:10

## 2020-01-01 RX ADMIN — CEFEPIME HYDROCHLORIDE 2 G: 2 INJECTION, SOLUTION INTRAVENOUS at 02:11

## 2020-01-01 RX ADMIN — METHYLPREDNISOLONE SODIUM SUCCINATE 40 MG: 40 INJECTION, POWDER, FOR SOLUTION INTRAMUSCULAR; INTRAVENOUS at 09:08

## 2020-01-01 RX ADMIN — SODIUM CHLORIDE: 0.9 INJECTION, SOLUTION INTRAVENOUS at 11:08

## 2020-01-01 RX ADMIN — BUPRENORPHINE AND NALOXONE 1 EACH: 8; 2 FILM BUCCAL; SUBLINGUAL at 11:08

## 2020-01-01 RX ADMIN — CEFTRIAXONE 1 G: 1 INJECTION, SOLUTION INTRAVENOUS at 08:08

## 2020-01-01 RX ADMIN — BUPRENORPHINE AND NALOXONE 1 EACH: 8; 2 FILM, SOLUBLE BUCCAL; SUBLINGUAL at 10:11

## 2020-01-01 RX ADMIN — ENOXAPARIN SODIUM 40 MG: 100 INJECTION SUBCUTANEOUS at 05:08

## 2020-01-01 RX ADMIN — PIPERACILLIN AND TAZOBACTAM 4.5 G: 4; .5 INJECTION, POWDER, FOR SOLUTION INTRAVENOUS at 01:08

## 2020-01-01 RX ADMIN — IPRATROPIUM BROMIDE AND ALBUTEROL SULFATE 3 ML: .5; 3 SOLUTION RESPIRATORY (INHALATION) at 08:08

## 2020-01-01 RX ADMIN — LORAZEPAM 1 MG: 2 INJECTION INTRAMUSCULAR; INTRAVENOUS at 06:11

## 2020-01-01 RX ADMIN — CARBOPLATIN 380 MG: 10 INJECTION INTRAVENOUS at 12:10

## 2020-01-01 RX ADMIN — IBUPROFEN 600 MG: 200 TABLET, FILM COATED ORAL at 04:10

## 2020-01-01 RX ADMIN — ACETAMINOPHEN 650 MG: 325 TABLET, FILM COATED ORAL at 07:11

## 2020-01-01 RX ADMIN — IPRATROPIUM BROMIDE AND ALBUTEROL SULFATE 3 ML: .5; 2.5 SOLUTION RESPIRATORY (INHALATION) at 06:08

## 2020-01-01 RX ADMIN — MORPHINE SULFATE 2 MG: 2 INJECTION, SOLUTION INTRAMUSCULAR; INTRAVENOUS at 11:11

## 2020-01-01 RX ADMIN — ALTEPLASE 2 MG: 2.2 INJECTION, POWDER, LYOPHILIZED, FOR SOLUTION INTRAVENOUS at 05:10

## 2020-01-01 RX ADMIN — DEXAMETHASONE SODIUM PHOSPHATE 12 MG: 4 INJECTION, SOLUTION INTRA-ARTICULAR; INTRALESIONAL; INTRAMUSCULAR; INTRAVENOUS; SOFT TISSUE at 02:09

## 2020-01-01 RX ADMIN — LORAZEPAM 1 MG: 2 INJECTION INTRAMUSCULAR; INTRAVENOUS at 10:10

## 2020-01-01 RX ADMIN — FLUTICASONE FUROATE AND VILANTEROL TRIFENATATE 1 PUFF: 200; 25 POWDER RESPIRATORY (INHALATION) at 08:08

## 2020-01-01 RX ADMIN — PROPOFOL 60 MG: 10 INJECTION, EMULSION INTRAVENOUS at 07:08

## 2020-01-01 RX ADMIN — LIDOCAINE HYDROCHLORIDE 4 ML: 40 INJECTION, SOLUTION RETROBULBAR; TOPICAL at 07:08

## 2020-01-01 RX ADMIN — LACTOBACILLUS TAB 4 TABLET: TAB at 12:08

## 2020-01-01 RX ADMIN — ONDANSETRON 8 MG: 2 INJECTION INTRAMUSCULAR; INTRAVENOUS at 12:08

## 2020-01-01 RX ADMIN — IPRATROPIUM BROMIDE AND ALBUTEROL SULFATE 3 ML: .5; 2.5 SOLUTION RESPIRATORY (INHALATION) at 08:08

## 2020-01-01 RX ADMIN — SODIUM CHLORIDE: 0.9 INJECTION, SOLUTION INTRAVENOUS at 12:09

## 2020-01-01 RX ADMIN — ENOXAPARIN SODIUM 40 MG: 40 INJECTION SUBCUTANEOUS at 04:08

## 2020-01-01 RX ADMIN — IOHEXOL 100 ML: 350 INJECTION, SOLUTION INTRAVENOUS at 09:08

## 2020-01-01 RX ADMIN — SODIUM CHLORIDE, PRESERVATIVE FREE 10 ML: 5 INJECTION INTRAVENOUS at 11:09

## 2020-01-01 RX ADMIN — NICOTINE 1 PATCH: 14 PATCH, EXTENDED RELEASE TRANSDERMAL at 10:08

## 2020-01-01 RX ADMIN — PIPERACILLIN AND TAZOBACTAM 4.5 G: 4; .5 INJECTION, POWDER, FOR SOLUTION INTRAVENOUS at 06:08

## 2020-01-01 RX ADMIN — PIPERACILLIN AND TAZOBACTAM 4.5 G: 4; .5 INJECTION, POWDER, FOR SOLUTION INTRAVENOUS at 09:08

## 2020-01-01 RX ADMIN — DEXAMETHASONE SODIUM PHOSPHATE 12 MG: 4 INJECTION, SOLUTION INTRA-ARTICULAR; INTRALESIONAL; INTRAMUSCULAR; INTRAVENOUS; SOFT TISSUE at 09:10

## 2020-01-01 RX ADMIN — ACETAMINOPHEN 650 MG: 325 TABLET ORAL at 04:08

## 2020-01-01 RX ADMIN — APREPITANT 130 MG: 130 INJECTION, EMULSION INTRAVENOUS at 12:09

## 2020-01-01 RX ADMIN — BUPRENORPHINE AND NALOXONE 1 EACH: 8; 2 FILM, SOLUBLE BUCCAL; SUBLINGUAL at 08:08

## 2020-01-01 RX ADMIN — CHLORHEXIDINE GLUCONATE 15 ML: 1.2 RINSE ORAL at 01:10

## 2020-01-01 RX ADMIN — MAGNESIUM OXIDE 800 MG: 400 TABLET ORAL at 08:08

## 2020-01-01 RX ADMIN — LOPERAMIDE HYDROCHLORIDE 2 MG: 2 CAPSULE ORAL at 12:08

## 2020-01-01 RX ADMIN — DEXAMETHASONE SODIUM PHOSPHATE 12 MG: 4 INJECTION, SOLUTION INTRAMUSCULAR; INTRAVENOUS at 10:08

## 2020-01-01 RX ADMIN — FERROUS SULFATE TAB EC 325 MG (65 MG FE EQUIVALENT) 325 MG: 325 (65 FE) TABLET DELAYED RESPONSE at 08:08

## 2020-01-01 RX ADMIN — LACTOBACILLUS TAB 4 TABLET: TAB at 08:08

## 2020-01-01 RX ADMIN — THERA TABS 1 TABLET: TAB at 09:08

## 2020-01-01 RX ADMIN — METHYLPREDNISOLONE SODIUM SUCCINATE 40 MG: 40 INJECTION, POWDER, FOR SOLUTION INTRAMUSCULAR; INTRAVENOUS at 12:08

## 2020-01-01 RX ADMIN — MAGNESIUM SULFATE IN WATER 2 G: 40 INJECTION, SOLUTION INTRAVENOUS at 05:10

## 2020-01-01 RX ADMIN — ALPRAZOLAM 0.25 MG: 0.25 TABLET ORAL at 08:08

## 2020-01-01 RX ADMIN — FERROUS SULFATE TAB EC 325 MG (65 MG FE EQUIVALENT) 325 MG: 325 (65 FE) TABLET DELAYED RESPONSE at 10:08

## 2020-01-01 RX ADMIN — PREDNISONE 40 MG: 20 TABLET ORAL at 09:08

## 2020-01-01 RX ADMIN — BUPRENORPHINE AND NALOXONE 1 EACH: 8; 2 FILM, SOLUBLE BUCCAL; SUBLINGUAL at 10:10

## 2020-01-01 RX ADMIN — IPRATROPIUM BROMIDE AND ALBUTEROL SULFATE 3 ML: .5; 3 SOLUTION RESPIRATORY (INHALATION) at 11:10

## 2020-01-01 RX ADMIN — IPRATROPIUM BROMIDE AND ALBUTEROL SULFATE 3 ML: .5; 3 SOLUTION RESPIRATORY (INHALATION) at 06:10

## 2020-01-01 RX ADMIN — PIPERACILLIN AND TAZOBACTAM 4.5 G: 4; .5 INJECTION, POWDER, FOR SOLUTION INTRAVENOUS at 03:08

## 2020-01-01 RX ADMIN — PROPOFOL 40 MG: 10 INJECTION, EMULSION INTRAVENOUS at 08:08

## 2020-01-01 RX ADMIN — ENOXAPARIN SODIUM 40 MG: 40 INJECTION SUBCUTANEOUS at 06:08

## 2020-01-01 RX ADMIN — LEVOFLOXACIN 500 MG: 500 INJECTION, SOLUTION INTRAVENOUS at 12:08

## 2020-01-01 RX ADMIN — ALPRAZOLAM 0.5 MG: 0.5 TABLET ORAL at 06:08

## 2020-01-01 RX ADMIN — PIPERACILLIN SODIUM AND TAZOBACTAM SODIUM 4.5 G: 4; .5 INJECTION, POWDER, LYOPHILIZED, FOR SOLUTION INTRAVENOUS at 01:08

## 2020-01-01 RX ADMIN — POTASSIUM CHLORIDE 40 MEQ: 1.5 POWDER, FOR SOLUTION ORAL at 12:08

## 2020-01-01 RX ADMIN — DOCUSATE SODIUM 50 MG AND SENNOSIDES 8.6 MG 1 TABLET: 8.6; 5 TABLET, FILM COATED ORAL at 11:08

## 2020-01-01 RX ADMIN — FUROSEMIDE 20 MG: 10 INJECTION, SOLUTION INTRAMUSCULAR; INTRAVENOUS at 12:10

## 2020-01-01 RX ADMIN — ACETAMINOPHEN 650 MG: 325 TABLET, FILM COATED ORAL at 09:10

## 2020-01-01 RX ADMIN — LOSARTAN POTASSIUM 100 MG: 50 TABLET, FILM COATED ORAL at 09:08

## 2020-01-01 RX ADMIN — TBO-FILGRASTIM 480 MCG: 480 INJECTION, SOLUTION SUBCUTANEOUS at 04:10

## 2020-01-01 RX ADMIN — DEXAMETHASONE SODIUM PHOSPHATE 12 MG: 4 INJECTION, SOLUTION INTRA-ARTICULAR; INTRALESIONAL; INTRAMUSCULAR; INTRAVENOUS; SOFT TISSUE at 10:09

## 2020-01-01 RX ADMIN — FERROUS SULFATE TAB EC 325 MG (65 MG FE EQUIVALENT) 325 MG: 325 (65 FE) TABLET DELAYED RESPONSE at 09:08

## 2020-01-01 RX ADMIN — PIPERACILLIN AND TAZOBACTAM 4.5 G: 4; .5 INJECTION, POWDER, FOR SOLUTION INTRAVENOUS at 05:08

## 2020-01-01 RX ADMIN — ALPRAZOLAM 0.25 MG: 0.25 TABLET ORAL at 12:08

## 2020-01-01 RX ADMIN — KETAMINE HYDROCHLORIDE 10 MG: 10 INJECTION, SOLUTION INTRAMUSCULAR; INTRAVENOUS at 07:08

## 2020-01-01 RX ADMIN — FLUTICASONE FUROATE AND VILANTEROL TRIFENATATE 1 PUFF: 200; 25 POWDER RESPIRATORY (INHALATION) at 06:08

## 2020-01-01 RX ADMIN — DEXTROSE 0.3 MCG/KG/MIN: 5 SOLUTION INTRAVENOUS at 10:10

## 2020-01-01 RX ADMIN — AMITRIPTYLINE HYDROCHLORIDE 10 MG: 10 TABLET, FILM COATED ORAL at 11:08

## 2020-01-01 RX ADMIN — PIPERACILLIN AND TAZOBACTAM 3.38 G: 3; .375 INJECTION, POWDER, LYOPHILIZED, FOR SOLUTION INTRAVENOUS; PARENTERAL at 12:08

## 2020-01-01 RX ADMIN — DEXAMETHASONE SODIUM PHOSPHATE 12 MG: 4 INJECTION, SOLUTION INTRAMUSCULAR; INTRAVENOUS at 09:08

## 2020-01-01 RX ADMIN — SUCRALFATE 1 G: 1 SUSPENSION ORAL at 04:10

## 2020-01-01 RX ADMIN — HYDROCHLOROTHIAZIDE 25 MG: 25 TABLET ORAL at 10:08

## 2020-01-01 RX ADMIN — ETOPOSIDE 184 MG: 20 INJECTION INTRAVENOUS at 11:08

## 2020-01-01 RX ADMIN — HYDROCHLOROTHIAZIDE 25 MG: 25 TABLET ORAL at 09:08

## 2020-01-01 RX ADMIN — ALPRAZOLAM 0.25 MG: 0.25 TABLET ORAL at 09:08

## 2020-01-01 RX ADMIN — IPRATROPIUM BROMIDE AND ALBUTEROL SULFATE 3 ML: .5; 3 SOLUTION RESPIRATORY (INHALATION) at 11:08

## 2020-01-01 RX ADMIN — PIPERACILLIN AND TAZOBACTAM 3.38 G: 3; .375 INJECTION, POWDER, LYOPHILIZED, FOR SOLUTION INTRAVENOUS; PARENTERAL at 11:08

## 2020-01-01 RX ADMIN — ETOPOSIDE 184 MG: 20 INJECTION INTRAVENOUS at 10:08

## 2020-01-01 RX ADMIN — LIDOCAINE HYDROCHLORIDE 6 ML: 10 INJECTION, SOLUTION INFILTRATION; PERINEURAL at 07:08

## 2020-01-01 RX ADMIN — TUBERCULIN PURIFIED PROTEIN DERIVATIVE 5 UNITS: 5 INJECTION, SOLUTION INTRADERMAL at 04:08

## 2020-01-01 RX ADMIN — VANCOMYCIN HYDROCHLORIDE 1750 MG: 1 INJECTION, POWDER, LYOPHILIZED, FOR SOLUTION INTRAVENOUS at 08:08

## 2020-01-01 RX ADMIN — METHYLPREDNISOLONE SODIUM SUCCINATE 40 MG: 40 INJECTION, POWDER, FOR SOLUTION INTRAMUSCULAR; INTRAVENOUS at 05:08

## 2020-01-01 RX ADMIN — PROPOFOL 20 MG: 10 INJECTION, EMULSION INTRAVENOUS at 08:08

## 2020-01-01 RX ADMIN — LIDOCAINE HYDROCHLORIDE 1 ML: 20 JELLY TOPICAL at 07:08

## 2020-01-01 RX ADMIN — SODIUM CHLORIDE: 0.9 INJECTION, SOLUTION INTRAVENOUS at 09:08

## 2020-01-01 RX ADMIN — METHYLPREDNISOLONE SODIUM SUCCINATE 40 MG: 40 INJECTION, POWDER, FOR SOLUTION INTRAMUSCULAR; INTRAVENOUS at 02:08

## 2020-01-01 RX ADMIN — SODIUM CHLORIDE 1974 ML: 0.9 INJECTION, SOLUTION INTRAVENOUS at 07:08

## 2020-01-01 RX ADMIN — PIPERACILLIN AND TAZOBACTAM 4.5 G: 4; .5 INJECTION, POWDER, FOR SOLUTION INTRAVENOUS at 12:08

## 2020-01-01 RX ADMIN — ONDANSETRON 8 MG: 2 INJECTION INTRAMUSCULAR; INTRAVENOUS at 03:08

## 2020-01-01 RX ADMIN — LORAZEPAM 1 MG: 2 INJECTION INTRAMUSCULAR; INTRAVENOUS at 10:11

## 2020-01-01 RX ADMIN — POTASSIUM CHLORIDE 40 MEQ: 1.5 POWDER, FOR SOLUTION ORAL at 02:08

## 2020-01-01 RX ADMIN — PROPOFOL 20 MG: 10 INJECTION, EMULSION INTRAVENOUS at 07:08

## 2020-01-01 RX ADMIN — BUPRENORPHINE AND NALOXONE 1 EACH: 8; 2 FILM, SOLUBLE BUCCAL; SUBLINGUAL at 09:08

## 2020-01-01 RX ADMIN — LOSARTAN POTASSIUM 100 MG: 100 TABLET, FILM COATED ORAL at 09:08

## 2020-01-01 RX ADMIN — PREDNISONE 10 MG: 5 TABLET ORAL at 09:08

## 2020-01-01 RX ADMIN — DEXTROSE 0.3 MCG/KG/MIN: 5 SOLUTION INTRAVENOUS at 02:11

## 2020-01-01 RX ADMIN — HUMAN IMMUNOGLOBULIN G 10 G: 10 LIQUID INTRAVENOUS at 11:08

## 2020-01-01 RX ADMIN — FERROUS SULFATE TAB 325 MG (65 MG ELEMENTAL FE) 325 MG: 325 (65 FE) TAB at 09:08

## 2020-01-01 RX ADMIN — SUCRALFATE 1 G: 1 SUSPENSION ORAL at 10:10

## 2020-01-01 RX ADMIN — AZITHROMYCIN MONOHYDRATE 500 MG: 500 INJECTION, POWDER, LYOPHILIZED, FOR SOLUTION INTRAVENOUS at 07:10

## 2020-01-01 RX ADMIN — BUSPIRONE HYDROCHLORIDE 10 MG: 5 TABLET ORAL at 11:10

## 2020-01-01 RX ADMIN — LORAZEPAM 0.5 MG: 2 INJECTION INTRAMUSCULAR; INTRAVENOUS at 04:10

## 2020-01-01 RX ADMIN — HYDRALAZINE HYDROCHLORIDE 10 MG: 20 INJECTION INTRAMUSCULAR; INTRAVENOUS at 12:08

## 2020-01-01 RX ADMIN — IPRATROPIUM BROMIDE AND ALBUTEROL SULFATE 3 ML: .5; 3 SOLUTION RESPIRATORY (INHALATION) at 06:08

## 2020-01-01 RX ADMIN — VANCOMYCIN HYDROCHLORIDE 1000 MG: 1 INJECTION, POWDER, LYOPHILIZED, FOR SOLUTION INTRAVENOUS at 07:08

## 2020-01-01 RX ADMIN — ETOPOSIDE 192 MG: 20 INJECTION INTRAVENOUS at 10:09

## 2020-01-01 RX ADMIN — VANCOMYCIN HYDROCHLORIDE 1500 MG: 1.5 INJECTION, POWDER, LYOPHILIZED, FOR SOLUTION INTRAVENOUS at 09:08

## 2020-01-01 RX ADMIN — SODIUM CHLORIDE 125 MG: 9 INJECTION, SOLUTION INTRAVENOUS at 09:08

## 2020-01-01 RX ADMIN — IPRATROPIUM BROMIDE AND ALBUTEROL SULFATE 3 ML: .5; 3 SOLUTION RESPIRATORY (INHALATION) at 07:11

## 2020-01-01 RX ADMIN — LOSARTAN POTASSIUM 100 MG: 100 TABLET, FILM COATED ORAL at 08:08

## 2020-01-01 RX ADMIN — ENOXAPARIN SODIUM 40 MG: 100 INJECTION SUBCUTANEOUS at 04:08

## 2020-01-01 RX ADMIN — LACTOBACILLUS TAB 4 TABLET: TAB at 04:08

## 2020-01-01 RX ADMIN — ONDANSETRON HYDROCHLORIDE 4 MG: 2 SOLUTION INTRAMUSCULAR; INTRAVENOUS at 03:11

## 2020-01-01 RX ADMIN — POTASSIUM CHLORIDE: 2 INJECTION, SOLUTION, CONCENTRATE INTRAVENOUS at 12:10

## 2020-01-01 RX ADMIN — SUCRALFATE 1 G: 1 SUSPENSION ORAL at 11:11

## 2020-01-01 RX ADMIN — HUMAN IMMUNOGLOBULIN G 20 G: 10 LIQUID INTRAVENOUS at 11:08

## 2020-01-01 RX ADMIN — IPRATROPIUM BROMIDE AND ALBUTEROL SULFATE 3 ML: .5; 3 SOLUTION RESPIRATORY (INHALATION) at 09:08

## 2020-01-01 RX ADMIN — FUROSEMIDE 40 MG: 10 INJECTION, SOLUTION INTRAMUSCULAR; INTRAVENOUS at 03:10

## 2020-01-01 RX ADMIN — ACETAMINOPHEN 1000 MG: 500 TABLET, FILM COATED ORAL at 04:10

## 2020-01-01 RX ADMIN — IPRATROPIUM BROMIDE AND ALBUTEROL SULFATE 3 ML: .5; 3 SOLUTION RESPIRATORY (INHALATION) at 03:10

## 2020-01-01 RX ADMIN — BUPRENORPHINE AND NALOXONE 1 EACH: 8; 2 FILM, SOLUBLE BUCCAL; SUBLINGUAL at 10:08

## 2020-01-01 RX ADMIN — MAGNESIUM SULFATE 1 G: 1 INJECTION INTRAVENOUS at 07:11

## 2020-01-01 RX ADMIN — PIPERACILLIN AND TAZOBACTAM 3.38 G: 3; .375 INJECTION, POWDER, LYOPHILIZED, FOR SOLUTION INTRAVENOUS; PARENTERAL at 05:08

## 2020-01-01 RX ADMIN — SODIUM CHLORIDE: 0.9 INJECTION, SOLUTION INTRAVENOUS at 06:08

## 2020-01-01 RX ADMIN — SODIUM CHLORIDE, SODIUM LACTATE, POTASSIUM CHLORIDE, AND CALCIUM CHLORIDE 1947 ML: .6; .31; .03; .02 INJECTION, SOLUTION INTRAVENOUS at 03:10

## 2020-01-01 RX ADMIN — CEFEPIME HYDROCHLORIDE 2 G: 2 INJECTION, SOLUTION INTRAVENOUS at 03:10

## 2020-01-01 RX ADMIN — ACETAMINOPHEN 650 MG: 325 TABLET ORAL at 12:08

## 2020-01-01 RX ADMIN — ONDANSETRON 8 MG: 2 INJECTION, SOLUTION INTRAMUSCULAR; INTRAVENOUS at 03:10

## 2020-01-01 RX ADMIN — POTASSIUM CHLORIDE 40 MEQ: 20 TABLET, EXTENDED RELEASE ORAL at 10:10

## 2020-01-01 RX ADMIN — SUCRALFATE 1 G: 1 SUSPENSION ORAL at 01:10

## 2020-01-01 RX ADMIN — NICOTINE 1 PATCH: 14 PATCH, EXTENDED RELEASE TRANSDERMAL at 08:08

## 2020-01-01 RX ADMIN — LACTOBACILLUS TAB 4 TABLET: TAB at 05:08

## 2020-01-01 RX ADMIN — ALPRAZOLAM 0.5 MG: 0.5 TABLET ORAL at 08:08

## 2020-01-01 RX ADMIN — SODIUM CHLORIDE: 0.9 INJECTION, SOLUTION INTRAVENOUS at 07:08

## 2020-01-01 RX ADMIN — DIPHENOXYLATE HYDROCHLORIDE AND ATROPINE SULFATE 1 TABLET: 2.5; .025 TABLET ORAL at 12:08

## 2020-01-01 RX ADMIN — VANCOMYCIN HYDROCHLORIDE 1000 MG: 1 INJECTION, POWDER, LYOPHILIZED, FOR SOLUTION INTRAVENOUS at 05:10

## 2020-01-01 RX ADMIN — POTASSIUM CHLORIDE 40 MEQ: 1.5 POWDER, FOR SOLUTION ORAL at 04:10

## 2020-01-01 RX ADMIN — PANTOPRAZOLE SODIUM 40 MG: 40 TABLET, DELAYED RELEASE ORAL at 08:08

## 2020-01-01 RX ADMIN — LOPERAMIDE HYDROCHLORIDE 2 MG: 2 CAPSULE ORAL at 11:08

## 2020-01-01 RX ADMIN — FAMOTIDINE 20 MG: 20 TABLET, FILM COATED ORAL at 10:08

## 2020-01-01 RX ADMIN — ACETAMINOPHEN 650 MG: 325 TABLET ORAL at 07:08

## 2020-01-01 RX ADMIN — SODIUM CHLORIDE, PRESERVATIVE FREE 10 ML: 5 INJECTION INTRAVENOUS at 02:10

## 2020-01-01 RX ADMIN — PALONOSETRON: 0.05 INJECTION, SOLUTION INTRAVENOUS at 09:08

## 2020-01-01 RX ADMIN — LACTOBACILLUS TAB 4 TABLET: TAB at 10:08

## 2020-01-01 RX ADMIN — METHYLPREDNISOLONE SODIUM SUCCINATE 40 MG: 40 INJECTION, POWDER, FOR SOLUTION INTRAMUSCULAR; INTRAVENOUS at 10:08

## 2020-01-01 RX ADMIN — MUPIROCIN: 20 OINTMENT TOPICAL at 11:10

## 2020-01-01 RX ADMIN — PIPERACILLIN AND TAZOBACTAM 4.5 G: 4; .5 INJECTION, POWDER, FOR SOLUTION INTRAVENOUS at 07:08

## 2020-01-01 RX ADMIN — AZITHROMYCIN MONOHYDRATE 500 MG: 500 INJECTION, POWDER, LYOPHILIZED, FOR SOLUTION INTRAVENOUS at 04:10

## 2020-01-01 RX ADMIN — PREDNISONE 40 MG: 20 TABLET ORAL at 10:08

## 2020-01-01 RX ADMIN — CEFEPIME HYDROCHLORIDE 2 G: 2 INJECTION, SOLUTION INTRAVENOUS at 04:10

## 2020-01-01 RX ADMIN — VANCOMYCIN HYDROCHLORIDE 1000 MG: 1 INJECTION, POWDER, LYOPHILIZED, FOR SOLUTION INTRAVENOUS at 07:10

## 2020-01-01 RX ADMIN — LORAZEPAM 0.5 MG: 2 INJECTION INTRAMUSCULAR; INTRAVENOUS at 10:10

## 2020-01-01 RX ADMIN — ENOXAPARIN SODIUM 40 MG: 40 INJECTION SUBCUTANEOUS at 11:08

## 2020-01-01 RX ADMIN — ETOPOSIDE 192 MG: 20 INJECTION INTRAVENOUS at 02:09

## 2020-01-01 RX ADMIN — ALPRAZOLAM 0.5 MG: 0.5 TABLET ORAL at 09:08

## 2020-01-01 RX ADMIN — MUPIROCIN: 20 OINTMENT TOPICAL at 09:10

## 2020-01-01 RX ADMIN — BUPRENORPHINE AND NALOXONE 1 EACH: 8; 2 FILM, SOLUBLE BUCCAL; SUBLINGUAL at 09:10

## 2020-01-01 RX ADMIN — OXYMETAZOLINE HCL 2 SPRAY: 0.05 SPRAY NASAL at 07:08

## 2020-01-01 RX ADMIN — METHYLPREDNISOLONE SODIUM SUCCINATE 40 MG: 40 INJECTION, POWDER, FOR SOLUTION INTRAMUSCULAR; INTRAVENOUS at 08:08

## 2020-01-01 RX ADMIN — LIDOCAINE HYDROCHLORIDE AND EPINEPHRINE 6 ML: 10; 10 INJECTION, SOLUTION INFILTRATION; PERINEURAL at 07:08

## 2020-01-01 RX ADMIN — DEXAMETHASONE SODIUM PHOSPHATE: 4 INJECTION, SOLUTION INTRA-ARTICULAR; INTRALESIONAL; INTRAMUSCULAR; INTRAVENOUS; SOFT TISSUE at 12:10

## 2020-01-01 RX ADMIN — METHYLPREDNISOLONE SODIUM SUCCINATE 40 MG: 40 INJECTION, POWDER, FOR SOLUTION INTRAMUSCULAR; INTRAVENOUS at 11:08

## 2020-01-01 RX ADMIN — ONDANSETRON 16 MG: 2 INJECTION INTRAMUSCULAR; INTRAVENOUS at 10:10

## 2020-01-01 RX ADMIN — PIPERACILLIN AND TAZOBACTAM 4.5 G: 4; .5 INJECTION, POWDER, FOR SOLUTION INTRAVENOUS at 04:08

## 2020-01-01 RX ADMIN — POTASSIUM CHLORIDE 40 MEQ: 1.5 POWDER, FOR SOLUTION ORAL at 08:08

## 2020-01-01 RX ADMIN — MORPHINE SULFATE 2 MG/HR: 10 INJECTION INTRAVENOUS at 02:11

## 2020-01-01 RX ADMIN — POTASSIUM CHLORIDE 40 MEQ: 7.46 INJECTION, SOLUTION INTRAVENOUS at 07:11

## 2020-01-01 RX ADMIN — APREPITANT 130 MG: 130 INJECTION, EMULSION INTRAVENOUS at 12:10

## 2020-01-01 RX ADMIN — ALPRAZOLAM 0.5 MG: 0.25 TABLET ORAL at 03:08

## 2020-01-01 RX ADMIN — LOPERAMIDE HYDROCHLORIDE 2 MG: 2 CAPSULE ORAL at 08:08

## 2020-01-01 RX ADMIN — DEXAMETHASONE SODIUM PHOSPHATE: 4 INJECTION, SOLUTION INTRA-ARTICULAR; INTRALESIONAL; INTRAMUSCULAR; INTRAVENOUS; SOFT TISSUE at 12:09

## 2020-01-01 RX ADMIN — LORAZEPAM 1 MG: 2 INJECTION INTRAMUSCULAR; INTRAVENOUS at 05:10

## 2020-01-01 RX ADMIN — FLUTICASONE FUROATE AND VILANTEROL TRIFENATATE 1 PUFF: 200; 25 POWDER RESPIRATORY (INHALATION) at 09:08

## 2020-01-01 RX ADMIN — DIPHENHYDRAMINE HYDROCHLORIDE 25 MG: 25 CAPSULE ORAL at 07:10

## 2020-01-01 RX ADMIN — PIPERACILLIN SODIUM AND TAZOBACTAM SODIUM 4.5 G: 4; .5 INJECTION, POWDER, LYOPHILIZED, FOR SOLUTION INTRAVENOUS at 03:08

## 2020-01-01 RX ADMIN — ETOPOSIDE 192 MG: 20 INJECTION INTRAVENOUS at 01:10

## 2020-01-01 RX ADMIN — ONDANSETRON 4 MG: 2 INJECTION INTRAMUSCULAR; INTRAVENOUS at 05:08

## 2020-01-01 RX ADMIN — FERROUS SULFATE TAB 325 MG (65 MG ELEMENTAL FE) 325 MG: 325 (65 FE) TAB at 10:08

## 2020-01-01 RX ADMIN — BUSPIRONE HYDROCHLORIDE 10 MG: 5 TABLET ORAL at 09:10

## 2020-01-02 NOTE — TELEPHONE ENCOUNTER
----- Message from Nery Brewer sent at 1/2/2020  3:50 PM CST -----  Contact: Stefany([employer])  Would like to speak to someone in office regarding pt    Please advise, can be reached at [phone]

## 2020-01-10 NOTE — PATIENT INSTRUCTIONS
An order for a urine drug screen with buprenorphine was given.  The patient is to use the order when called, on a random day.       Mint Tea for nausea        Thank you for choosing [facility].     Please fill out the patient experience survey.

## 2020-01-10 NOTE — PROGRESS NOTES
"Subjective:      4:45 PM     Patient ID: Kendra Lawrence is a 64 y.o. female.    Chief Complaint: narcotic dependence    Subjective:       Patient ID: Kendra Lawrence is a 64 y.o. female.    Chief Complaint: narcotic dependence    HPI     The patient presents for medical management of opioid dependency. she is receiving maintenance therapy with buprenorphine.      CHIEF COMPLAINT: opoid dependence  HPI:     ONSET/TIMING:     DURATION: . Continuous(+).    QUALITY/COURSE:  unchanged.     INTENSITY/SEVERITY:  controlled.    CONTEXT/WHEN:     MODIFIERS/TREATMENTS:  Taking medications(+) Suboxone  8/2 # 60,   last rx given 12.13.19    SYMPTOMS/RELATED: no withdrawal symptoms. no cravings . No substance abuse.  No alcohol use     pnp checked: last month:  Yes    Patient says she is always tired and mid to late afternoon and is sleepy now.    Review of Systems   Constitutional: Negative for diaphoresis, fatigue and unexpected weight change.   Gastrointestinal: Positive for nausea (chronic). Negative for constipation, diarrhea and vomiting.   Neurological: Positive for headaches (chronic due to neck pain). Negative for dizziness and light-headedness.   Psychiatric/Behavioral: Negative for dysphoric mood and sleep disturbance. The patient is not nervous/anxious.          Objective:      Vitals:    01/10/20 1630   BP: 118/86   Pulse: 70   Resp: 16   Temp: 98 °F (36.7 °C)   TempSrc: Oral   SpO2: 97%   Weight: 78.6 kg (173 lb 4.5 oz)   Height: 5' 4" (1.626 m)   PainSc: 0-No pain     Physical Exam   Constitutional: She appears well-developed and well-nourished.   Cardiovascular: Normal rate, regular rhythm and normal heart sounds.   Pulmonary/Chest: Effort normal and breath sounds normal.   Abdominal: Soft. There is no tenderness.   Neurological: She is alert.   Psychiatric: She has a normal mood and affect. Her behavior is normal. Thought content normal.   Nursing note and vitals reviewed.     Urine drug screen negative except " buprenorphine.           Assessment:       1. Narcotic dependence          Plan:       (+) pt taking medication as prescribed.    (+) dose is approproate.    (+) urine drug screen done.   (+) discussed risks of buprenorphine, including to others.      (+) assessed if benefits outweigh risks of buprenorphine. .     (+) reviewed safe storage of medication.     (+) discussed proper use of buprenorphine, including missed doses.      Narcotic dependence  -     buprenorphine-naloxone (SUBOXONE) 8-2 mg Film; Place 1 packet (1 each total) under the tongue 2 (two) times daily.  Dispense: 60 packet; Refill: 0      Follow up in about 1 month (around 2/10/2020).

## 2020-02-07 NOTE — PROGRESS NOTES
"Subjective:       Patient ID: Kendra Lawrence is a 64 y.o. female.    Chief Complaint: narcotic dependence    HPI     The patient presents for medical management of opioid dependency. she is receiving maintenance therapy with buprenorphine.      CHIEF COMPLAINT: opoid dependence  HPI:     ONSET/TIMING:     DURATION: . Continuous(+).    QUALITY/COURSE:  unchanged.     INTENSITY/SEVERITY:  controlled.    CONTEXT/WHEN:     MODIFIERS/TREATMENTS:  Taking medications(+) Suboxone  8/2 # 60,   last rx given 1.10.20    SYMPTOMS/RELATED: no withdrawal symptoms. no cravings . No substance abuse.  No alcohol use     pnp checked: last month:  Yes    Patient says she is always tired and mid to late afternoon and is sleepy now.    Review of Systems   Constitutional: Negative for diaphoresis, fatigue and unexpected weight change.   Gastrointestinal: Positive for nausea (chronic). Negative for constipation, diarrhea and vomiting.   Neurological: Positive for headaches (chronic due to neck pain). Negative for dizziness and light-headedness.   Psychiatric/Behavioral: Negative for dysphoric mood and sleep disturbance. The patient is not nervous/anxious.          Objective:      Vitals:    02/07/20 1548   BP: 136/84   Pulse: 81   Resp: 16   Temp: 97.9 °F (36.6 °C)   TempSrc: Oral   SpO2: 97%   Weight: 77.7 kg (171 lb 4.8 oz)   Height: 5' 4" (1.626 m)   PainSc: 0-No pain     Physical Exam   Constitutional: She appears well-developed and well-nourished.   Cardiovascular: Normal rate, regular rhythm and normal heart sounds.   Pulmonary/Chest: Effort normal and breath sounds normal.   Abdominal: Soft. There is no tenderness.   Neurological: She is alert.   Psychiatric: She has a normal mood and affect. Her behavior is normal. Thought content normal.   Nursing note and vitals reviewed.     Urine drug screen negative except buprenorphine.           Assessment:       1. Narcotic dependence          Plan:       (+) pt taking medication as " prescribed.    (+) dose is approproate.    (+) urine drug screen done.   (+) discussed risks of buprenorphine, including to others.      (+) assessed if benefits outweigh risks of buprenorphine. .     (+) reviewed safe storage of medication.     (+) discussed proper use of buprenorphine, including missed doses.      Narcotic dependence  -     buprenorphine-naloxone (SUBOXONE) 8-2 mg Film; Place 1 packet (1 each total) under the tongue 2 (two) times daily.  Dispense: 60 packet; Refill: 0      Follow up in about 1 month (around 3/7/2020).

## 2020-03-06 NOTE — PROGRESS NOTES
"Subjective:       Patient ID: Kendra Lawrence is a 64 y.o. female.    Chief Complaint: narcotic dependence    HPI     The patient presents for medical management of opioid dependency. she is receiving maintenance therapy with buprenorphine.      CHIEF COMPLAINT: opoid dependence  HPI:     ONSET/TIMING:     DURATION: . Continuous(+).    QUALITY/COURSE:  unchanged.     INTENSITY/SEVERITY:  controlled.    CONTEXT/WHEN:     MODIFIERS/TREATMENTS:  Taking medications(+) Suboxone  8/2 # 60,   last rx given 2.7.20    SYMPTOMS/RELATED: no withdrawal symptoms. no cravings . No substance abuse.  No alcohol use     pnp checked: last month:  Yes    Patient says she is always tired and mid to late afternoon and is sleepy now.    Review of Systems   Constitutional: Negative for diaphoresis, fatigue and unexpected weight change.   Gastrointestinal: Negative for constipation, diarrhea, nausea (chronic) and vomiting.   Neurological: Positive for headaches (chronic due to neck pain). Negative for dizziness and light-headedness.   Psychiatric/Behavioral: Negative for dysphoric mood and sleep disturbance. The patient is not nervous/anxious.          Objective:      Vitals:    03/06/20 1108   BP: 124/68   Pulse: 86   Resp: 16   Temp: 98.1 °F (36.7 °C)   TempSrc: Oral   SpO2: (!) 94%   Weight: 75.8 kg (167 lb 1.7 oz)   Height: 5' 4" (1.626 m)   PainSc: 0-No pain     Physical Exam   Constitutional: She appears well-developed and well-nourished.   Cardiovascular: Normal rate, regular rhythm and normal heart sounds.   Pulmonary/Chest: Effort normal and breath sounds normal.   Abdominal: Soft. There is no tenderness.   Neurological: She is alert.   Psychiatric: She has a normal mood and affect. Her behavior is normal. Thought content normal.   Nursing note and vitals reviewed.     Urine drug screen negative except buprenorphine.           Assessment:       1. Narcotic dependence          Plan:       (+) pt taking medication as " prescribed.    (+) dose is approproate.    (+) urine drug screen done.   (+) discussed risks of buprenorphine, including to others.      (+) assessed if benefits outweigh risks of buprenorphine. .     (+) reviewed safe storage of medication.     (+) discussed proper use of buprenorphine, including missed doses.      Narcotic dependence  -     buprenorphine-naloxone (SUBOXONE) 8-2 mg Film; Place 1 packet (1 each total) under the tongue 2 (two) times daily.  Dispense: 60 packet; Refill: 0      Follow up in about 1 month (around 4/6/2020).

## 2020-03-11 NOTE — TELEPHONE ENCOUNTER
----- Message from Chantale Moon sent at 3/11/2020  2:04 PM CDT -----  Contact: self  Type:  Patient Returning Call    Who Called:  self  Who Left Message for Patient:  Ruth Ann  Does the patient know what this is regarding?:  no  Best Call Back Number:  [phone] (home)   Additional Information:  Patient states she received a call this morning. Please call patient. Thanks!

## 2020-03-11 NOTE — TELEPHONE ENCOUNTER
Called patient and advised of this month's drug screen by 4 tomorrow. Patient verbalized understanding.

## 2020-04-01 NOTE — TELEPHONE ENCOUNTER
----- Message from Roland Storey sent at 4/1/2020  3:10 PM CDT -----  Contact: pt  Type:  Patient Returning Call    Who Called:  pt  Who Left Message for Patient:  Ruth Ann  Does the patient know what this is regarding?:    Best Call Back Number:  [phone]  Additional Information:

## 2020-04-03 NOTE — PROGRESS NOTES
Subjective:       Patient ID: Kendra Lawrence is a 64 y.o. female.    Chief Complaint: No chief complaint on file.    HPI     The patient presents for medical management of opioid dependency. she is receiving maintenance therapy with buprenorphine.      CHIEF COMPLAINT: opoid dependence  HPI:     ONSET/TIMING:     DURATION: . Continuous(+).    QUALITY/COURSE:  unchanged.     INTENSITY/SEVERITY:  controlled.    CONTEXT/WHEN:     MODIFIERS/TREATMENTS:  Taking medications(+) Suboxone  8/2 # 60,   last rx given 3.6.20 the    SYMPTOMS/RELATED: no withdrawal symptoms. no cravings . No substance abuse.  No alcohol use     pnp checked: last month:  Yes    Patient says she is always tired and mid to late afternoon and is sleepy now.    Review of Systems   Constitutional: Negative for activity change, diaphoresis, fatigue and unexpected weight change.   HENT: Positive for hearing loss and rhinorrhea. Negative for trouble swallowing.    Eyes: Negative for discharge and visual disturbance.   Respiratory: Negative for chest tightness and wheezing.    Cardiovascular: Negative for chest pain and palpitations.   Gastrointestinal: Positive for constipation. Negative for blood in stool, diarrhea, nausea (chronic) and vomiting.   Endocrine: Negative for polydipsia and polyuria.   Genitourinary: Negative for difficulty urinating, dysuria, hematuria and menstrual problem.   Musculoskeletal: Positive for neck pain. Negative for arthralgias and joint swelling.   Neurological: Positive for headaches (chronic due to neck pain). Negative for dizziness, weakness and light-headedness.   Psychiatric/Behavioral: Negative for confusion, dysphoric mood and sleep disturbance. The patient is not nervous/anxious.          Objective:      There were no vitals filed for this visit.   Urine drug screen negative except buprenorphine.           Assessment:       1. Narcotic dependence          Plan:       (+) pt taking medication as prescribed.    (+) dose  is approproate.    (+) urine drug screen done.   (+) discussed risks of buprenorphine, including to others.      (+) assessed if benefits outweigh risks of buprenorphine. .     (+) reviewed safe storage of medication.     (+) discussed proper use of buprenorphine, including missed doses.      Narcotic dependence  -     buprenorphine-naloxone (SUBOXONE) 8-2 mg Film; Place 1 packet (1 each total) under the tongue 2 (two) times daily.  Dispense: 60 packet; Refill: 0    Other orders  -     losartan (COZAAR) 100 MG tablet; Take 1 tablet (100 mg total) by mouth once daily.  Dispense: 90 tablet; Refill: 1  -     hydroCHLOROthiazide (HYDRODIURIL) 25 MG tablet; Take 1 tablet (25 mg total) by mouth once daily.  Dispense: 90 tablet; Refill: 1      Follow up in about 1 month (around 5/3/2020).

## 2020-04-30 NOTE — PROGRESS NOTES
Subjective:       Patient ID: Kendra Lawrence is a 64 y.o. female.    Chief Complaint: No chief complaint on file.  Video visit:    The patient location is: [place]  The chief complaint leading to consultation is: narcotic dependence   Visit type: Virtual visit with synchronous audio and video  Total time spent with patient: 10 mins  Each patient to whom he or she provides medical services by telemedicine is:  (1) informed of the relationship between the physician and patient and the respective role of any other health care provider with respect to management of the patient; and (2) notified that he or she may decline to receive medical services by telemedicine and may withdraw from such care at any time.    Notes:       HPI     The patient presents for medical management of opioid dependency. she is receiving maintenance therapy with buprenorphine.      CHIEF COMPLAINT: opoid dependence  HPI:     ONSET/TIMING:     DURATION: . Continuous(+).    QUALITY/COURSE:  unchanged.     INTENSITY/SEVERITY:  controlled.    CONTEXT/WHEN:     MODIFIERS/TREATMENTS:  Taking medications(+) Suboxone  8/2 # 60,   last rx given 4.3.20    SYMPTOMS/RELATED: no withdrawal symptoms. no cravings . No substance abuse.  No alcohol use     pnp checked: last month:  Yes    Patient says she is always tired and mid to late afternoon and is sleepy now.    Review of Systems   Constitutional: Negative for diaphoresis, fatigue and unexpected weight change.   Gastrointestinal: Positive for nausea (last week). Negative for constipation, diarrhea and vomiting.   Neurological: Positive for headaches. Negative for dizziness and light-headedness.   Psychiatric/Behavioral: Negative for dysphoric mood and sleep disturbance. The patient is not nervous/anxious.          Objective:      There were no vitals filed for this visit.   Urine drug screen negative except buprenorphine.           Assessment:       1. Narcotic dependence          Plan:       (+) pt taking  medication as prescribed.    (+) dose is approproate.    (+) urine drug screen done.   (+) discussed risks of buprenorphine, including to others.      (+) assessed if benefits outweigh risks of buprenorphine. .     (+) reviewed safe storage of medication.     (+) discussed proper use of buprenorphine, including missed doses.      Narcotic dependence  -     buprenorphine-naloxone (SUBOXONE) 8-2 mg Film; Place 1 packet (1 each total) under the tongue 2 (two) times daily.  Dispense: 60 packet; Refill: 0      Follow up in about 1 month (around 5/30/2020).      Physical Exam

## 2020-05-28 NOTE — PROGRESS NOTES
Subjective:       Patient ID: Kendra Lawrence is a 64 y.o. female.    Chief Complaint: No chief complaint on file.  Video visit:    The patient location is: [place]  The chief complaint leading to consultation is: narcotic dependence   Visit type: Virtual visit with synchronous audio and video  Total time spent with patient: 10 mins  Each patient to whom he or she provides medical services by telemedicine is:  (1) informed of the relationship between the physician and patient and the respective role of any other health care provider with respect to management of the patient; and (2) notified that he or she may decline to receive medical services by telemedicine and may withdraw from such care at any time.    Notes:       HPI     The patient presents for medical management of opioid dependency. she is receiving maintenance therapy with buprenorphine.      CHIEF COMPLAINT: opoid dependence  HPI:     ONSET/TIMING:     DURATION: . Continuous(+).    QUALITY/COURSE:  unchanged.     INTENSITY/SEVERITY:  controlled.    CONTEXT/WHEN:     MODIFIERS/TREATMENTS:  Taking medications(+) Suboxone  8/2 # 60,   last rx given 4.30.20    SYMPTOMS/RELATED: no withdrawal symptoms. no cravings . No substance abuse.  No alcohol use     pnp checked: last month:  Yes    Patient has a history of calcifications on x-ray of her abdominal aorta    Review of Systems   Constitutional: Negative for diaphoresis, fatigue and unexpected weight change.   Gastrointestinal: Positive for nausea (last week). Negative for constipation, diarrhea and vomiting.   Neurological: Positive for headaches. Negative for dizziness and light-headedness.   Psychiatric/Behavioral: Negative for dysphoric mood and sleep disturbance. The patient is not nervous/anxious.          Objective:      There were no vitals filed for this visit.   Urine drug screen negative except buprenorphine.           Assessment:       1. Narcotic dependence    2. Abdominal aortic atherosclerosis           Plan:       (+) pt taking medication as prescribed.    (+) dose is approproate.    (+) urine drug screen done.   (+) discussed risks of buprenorphine, including to others.      (+) assessed if benefits outweigh risks of buprenorphine. .     (+) reviewed safe storage of medication.     (+) discussed proper use of buprenorphine, including missed doses.      Narcotic dependence  -     buprenorphine-naloxone (SUBOXONE) 8-2 mg Film; Place 1 packet (1 each total) under the tongue 2 (two) times daily.  Dispense: 60 packet; Refill: 0    Abdominal aortic atherosclerosis      Follow up in about 1 month (around 6/28/2020).      Physical Exam

## 2020-06-25 NOTE — PROGRESS NOTES
Subjective:       Patient ID: Kendra Lawrence is a 64 y.o. female.    Chief Complaint: No chief complaint on file.  Video visit:    The patient location is: [place]  The chief complaint leading to consultation is: narcotic dependence   Visit type: Virtual visit with synchronous audio and video  Total time spent with patient: 10 mins  Each patient to whom he or she provides medical services by telemedicine is:  (1) informed of the relationship between the physician and patient and the respective role of any other health care provider with respect to management of the patient; and (2) notified that he or she may decline to receive medical services by telemedicine and may withdraw from such care at any time.    Notes:       HPI     The patient presents for medical management of opioid dependency. she is receiving maintenance therapy with buprenorphine.      CHIEF COMPLAINT: opoid dependence  HPI:     ONSET/TIMING:     DURATION: . Continuous(+).    QUALITY/COURSE:  unchanged.     INTENSITY/SEVERITY:  controlled.    CONTEXT/WHEN:     MODIFIERS/TREATMENTS:  Taking medications(+) Suboxone  8/2 # 60,   last rx given 5.28.20    SYMPTOMS/RELATED: no withdrawal symptoms. no cravings . No substance abuse.  No alcohol use     pnp checked: last month:  Yes    S the the hr you to [facility] good that the    Review of Systems   Constitutional: Negative for diaphoresis, fatigue and unexpected weight change.   Gastrointestinal: Positive for nausea (last week). Negative for constipation, diarrhea and vomiting.   Neurological: Positive for headaches. Negative for dizziness and light-headedness.   Psychiatric/Behavioral: Negative for dysphoric mood and sleep disturbance. The patient is not nervous/anxious.          Objective:      There were no vitals filed for this visit.   Urine drug screen negative except buprenorphine.           Assessment:       1. Narcotic dependence          Plan:       (+) pt taking medication as prescribed.    (+) dose is  approproate.    (+) urine drug screen done.   (+) discussed risks of buprenorphine, including to others.      (+) assessed if benefits outweigh risks of buprenorphine. .     (+) reviewed safe storage of medication.     (+) discussed proper use of buprenorphine, including missed doses.      Narcotic dependence  -     buprenorphine-naloxone (SUBOXONE) 8-2 mg Film; Place 1 packet (1 each total) under the tongue 2 (two) times daily.  Dispense: 60 packet; Refill: 0      Follow up in about 1 month (around 7/25/2020).      Physical Exam

## 2020-08-13 PROBLEM — E87.6 HYPOKALEMIA: Status: ACTIVE | Noted: 2020-01-01

## 2020-08-13 PROBLEM — J18.9 PNEUMONIA DUE TO INFECTIOUS ORGANISM: Status: ACTIVE | Noted: 2020-01-01

## 2020-08-13 PROBLEM — J44.0 CHRONIC OBSTRUCTIVE PULMONARY DISEASE WITH ACUTE LOWER RESPIRATORY INFECTION: Status: ACTIVE | Noted: 2020-01-01

## 2020-08-13 PROBLEM — N30.00 ACUTE CYSTITIS WITHOUT HEMATURIA: Status: ACTIVE | Noted: 2020-01-01

## 2020-08-13 PROBLEM — J96.01 ACUTE HYPOXEMIC RESPIRATORY FAILURE: Status: ACTIVE | Noted: 2020-01-01

## 2020-08-13 PROBLEM — E83.51 HYPOCALCEMIA: Status: ACTIVE | Noted: 2020-01-01

## 2020-08-13 PROBLEM — A41.9 SEPSIS: Status: ACTIVE | Noted: 2020-01-01

## 2020-08-13 NOTE — LETTER
Pt needing transfer to 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

## 2020-08-13 NOTE — ED NOTES
Pt to ER with c/o increased SOB. Pt reports the past few days shes been having to stop to catch her breath while walking to the mail box. Pt denies being around anyone whos been sick. No Cough, weakness, dizziness, CP.

## 2020-08-14 PROBLEM — F11.20 OPIOID DEPENDENCE: Status: ACTIVE | Noted: 2020-01-01

## 2020-08-14 PROBLEM — D50.8 IRON DEFICIENCY ANEMIA SECONDARY TO INADEQUATE DIETARY IRON INTAKE: Status: ACTIVE | Noted: 2020-01-01

## 2020-08-14 NOTE — RESPIRATORY THERAPY
08/14/20 0009   Patient Assessment/Suction   Level of Consciousness (AVPU) alert   Respiratory Effort Normal;Unlabored   Expansion/Accessory Muscles/Retractions no use of accessory muscles;no retractions;expansion symmetric   All Lung Fields Breath Sounds clear   SUDHEER Breath Sounds clear;diminished   LLL Breath Sounds diminished;clear   RUL Breath Sounds clear   RML Breath Sounds clear   RLL Breath Sounds diminished   Rhythm/Pattern, Respiratory shortness of breath   Cough Frequency no cough   PRE-TX-O2   O2 Device (Oxygen Therapy) nasal cannula   $ Is the patient on Low Flow Oxygen? Yes   Flow (L/min) 2   Oxygen Concentration (%) 28   SpO2 96 %   Pulse Oximetry Type Intermittent   $ Pulse Oximetry - Single Charge Pulse Oximetry - Single   Pulse 72   Resp 16   Positioning HOB elevated 45 degrees   Aerosol Therapy   $ Aerosol Therapy Charges Aerosol Treatment   Daily Review of Necessity (SVN) completed   Respiratory Treatment Status (SVN) given   Treatment Route (SVN) mask   Patient Position (SVN) semi-Hall's   Post Treatment Assessment (SVN) breath sounds improved;increased aeration   Signs of Intolerance (SVN) none   Breath Sounds Post-Respiratory Treatment   Throughout All Fields Post-Treatment All Fields   Throughout All Fields Post-Treatment clear   Post-treatment Heart Rate (beats/min) 75   Post-treatment Resp Rate (breaths/min) 16   Ready to Wean/Extubation Screen   FIO2<=50 (chart decimal) 0.28

## 2020-08-14 NOTE — CONSULTS
Pharmacokinetic Initial Assessment: IV Vancomycin    Assessment/Plan:    Patient received vancomycin 1000mg once in ED, will initiate maintenance dose of vancomycin 1750mg IV every 24 hours  Desired empiric serum trough concentration is 15 to 20 mcg/mL  Draw vancomycin trough level 30 min prior to third dose on 08/15 at approximately 1830  Pharmacy will continue to follow and monitor vancomycin.      Please contact pharmacy at extension [phone] with any questions regarding this assessment.     Thank you for the consult,   Anna Ballard       Patient brief summary:  Kendra Lawrence is a 64 y.o. female initiated on antimicrobial therapy with IV Vancomycin for treatment of suspected lower respiratory infection    Drug Allergies:   Review of patient's allergies indicates:  No Known Allergies    Actual Body Weight:   65.8 kg    Renal Function:   Estimated Creatinine Clearance: 64.5 mL/min (based on SCr of 0.8 mg/dL).,     Dialysis Method (if applicable):  N/A    CBC (last 72 hours):  Recent Labs   Lab Result Units 08/13/20  1834   WBC K/uL 16.03*   Hemoglobin g/dL 10.8*   Hematocrit % 35.9*   Platelets K/uL 349   Gran% % 81.5*   Lymph% % 6.9*   Mono% % 10.4   Eosinophil% % 0.4   Basophil% % 0.2   Differential Method  Automated       Metabolic Panel (last 72 hours):  Recent Labs   Lab Result Units 08/13/20  1834 08/13/20  1931   Sodium mmol/L 136  --    Potassium mmol/L 3.2*  --    Chloride mmol/L 97  --    CO2 mmol/L 29  --    Glucose mg/dL 169*  --    Glucose, UA   --  Negative   BUN, Bld mg/dL 16  --    Creatinine mg/dL 0.8  --    Albumin g/dL 2.9*  --    Total Bilirubin mg/dL 0.3  --    Alkaline Phosphatase U/L 137*  --    AST U/L 32  --    ALT U/L 35  --        Drug levels (last 3 results):  No results for input(s): VANCOMYCINRA, VANCOMYCINPE, VANCOMYCINTR in the last 72 hours.    Microbiologic Results:  Microbiology Results (last 7 days)       Procedure Component Value Units Date/Time    Urine culture [[id number]]  Collected: 08/13/20 1931    Order Status: No result Specimen: Urine Updated: 08/13/20 1954    Blood culture x two cultures. Draw prior to antibiotics. [[id number]] Collected: 08/13/20 1906    Order Status: Sent Specimen: Blood from Antecubital, Right Updated: 08/13/20 1906    Blood culture x two cultures. Draw prior to antibiotics. [[id number]] Collected: 08/13/20 1906    Order Status: Sent Specimen: Blood from Antecubital, Left Updated: 08/13/20 1906

## 2020-08-14 NOTE — PLAN OF CARE
Pt AAO, VSS. Plan of care reviewed with pt at beginning of shift.  Pt/family verbalized understanding. Pt denies pain. IV fluids infusing. IV antibiotics given as ordered. O2 on at 2L. Cardiac monitor intact showing NSR. Hourly/ Q2 hourly rounds completed on this pt throughout shift.  Pain monitored, restroom offered, repositions independently, safety maintained.  Patient has remained free from fall/injury, no skin breakdown noted.  Side rails up x2, bed in locked and lowest position, call light kept within reach.  Needs attended to, will continue to monitor/ update as indicated.

## 2020-08-14 NOTE — PLAN OF CARE
Assessment completed with Pt at bedside.  Pt lives with her  Mike, and daughter Lorelei and her family.  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  She denies having addressed POA / AD.  She reports no HH or DME prior to admission.  She is currently on O2 and will need Home O2 Assessment prior to discharge if applicable.  Her daughter provides transportation and will transport home at discharge.  Her PCP is Dr. Urena and she uses [email or web address] in [employer] in [place].  She has Medicare A, B and D.  Her plan is to return home at discharge, and she denies any needs at this time.  Case Management to continue to follow.       08/14/20 1201   Discharge Assessment   Assessment Type Discharge Planning Assessment   Confirmed/corrected address and phone number on facesheet? Yes   Assessment information obtained from? Patient;Medical Record   Communicated expected length of stay with patient/caregiver no   Prior to hospitilization cognitive status: Alert/Oriented   Prior to hospitalization functional status: Independent   Current cognitive status: Alert/Oriented   Current Functional Status: Independent   Facility Arrived From: Home   Lives With spouse;child(ramesh), adult   Able to Return to Prior Arrangements yes   Is patient able to care for self after discharge? Yes   Who are your caregiver(s) and their phone number(s)? Pt is  and has 3 adult children.  She lives with her  Mike, and daughter, Lorelei Mattson ([phone]) and her family.  Larissa Agustin, daughter ([phone]) also listed as emergency contact.   Patient's perception of discharge disposition home or selfcare   Readmission Within the Last 30 Days no previous admission in last 30 days   Patient currently being followed by outpatient case management? No   Patient currently receives any other outside agency services? No   Equipment Currently  Used at Home none   Part D Coverage Silver [employer]   Do you have any problems affording any of your prescribed medications? No   Is the patient taking medications as prescribed? yes   Does the patient have transportation home? Yes   Transportation Anticipated family or friend will provide   Dialysis Name and Scheduled days N/A   Does the patient receive services at the [facility]? No   Discharge Plan A Home with family   Discharge Plan B Home with family   DME Needed Upon Discharge  none   Patient/Family in Agreement with Plan yes

## 2020-08-14 NOTE — SUBJECTIVE & OBJECTIVE
Past Medical History:   Diagnosis Date    Asthma     Cardiac angina     Chronic abdominal pain     Claustrophobia     COPD (chronic obstructive pulmonary disease)     Coronary artery disease     GERD (gastroesophageal reflux disease)     History of drug dependence     Hypertension        Past Surgical History:   Procedure Laterality Date    BREAST BIOPSY Bilateral 20 yrs ago    benign     SECTION      CHOLECYSTECTOMY      ENDOSCOPIC ULTRASOUND OF UPPER GASTROINTESTINAL TRACT Left 2018    Procedure: ULTRASOUND, UPPER GI TRACT, ENDOSCOPIC;  Surgeon: Paras Negrete MD;  Location: [facility] END[facility];  Service: Endoscopy;  Laterality: Left;    ESOPHAGOGASTRODUODENOSCOPY N/A 2018    Procedure: EGD (ESOPHAGOGASTRODUODENOSCOPY);  Surgeon: Paras Negrete MD;  Location: [facility] ENDO;  Service: Endoscopy;  Laterality: N/A;    ESOPHAGOGASTRODUODENOSCOPY N/A 2018    Procedure: EGD (ESOPHAGOGASTRODUODENOSCOPY);  Surgeon: Paras Negrete MD;  Location: [facility] ENDO;  Service: Endoscopy;  Laterality: N/A;    HYSTERECTOMY      MAGNETIC RESONANCE IMAGING N/A 2019    Procedure: MRI (Magnetic Resonance Imagine) needs anesthesia;  Surgeon: Raffy Charles MD;  Location: [facility];  Service: Anesthesiology;  Laterality: N/A;    OOPHORECTOMY      TEMPOROMANDIBULAR JOINT SURGERY      TONSILLECTOMY         Review of patient's allergies indicates:  No Known Allergies    No current facility-administered medications on file prior to encounter.      Current Outpatient Medications on File Prior to Encounter   Medication Sig    alendronate (FOSAMAX) 70 MG tablet TAKE ONE TABLET BY MOUTH ONCE EVERY 7 DAYS (Patient taking differently: Take 70 mg by mouth every 7 days. )    amitriptyline (ELAVIL) 10 MG tablet TAKE ONE TABLET BY MOUTH AT BEDTIME AS NEEDED FOR INSOMNIA (Patient taking differently: Take 10 mg by mouth nightly as needed for Insomnia. )    buprenorphine-naloxone  (SUBOXONE) 8-2 mg Film Place 1 packet (1 each total) under the tongue 2 (two) times daily.    DULoxetine (CYMBALTA) 60 MG capsule TAKE ONE CAPSULE BY MOUTH ONCE A DAY (Patient taking differently: Take 60 mg by mouth once daily. )    hydroCHLOROthiazide (HYDRODIURIL) 25 MG tablet TAKE ONE TABLET BY MOUTH EVERY DAY (Patient taking differently: Take 25 mg by mouth once daily. )    losartan (COZAAR) 100 MG tablet TAKE ONE TABLET BY MOUTH ONCE A DAY (Patient taking differently: Take 100 mg by mouth once daily. )    nitroGLYCERIN (NITROSTAT) 0.4 MG SL tablet Place 0.4 mg under the tongue every 5 (five) minutes as needed for Chest pain.    omeprazole (PRILOSEC) 40 MG capsule Take 1 capsule by mouth 2 (two) times daily.    ondansetron (ZOFRAN) 4 MG tablet TAKE ONE TABLET BY MOUTH EVERY 8 HOURS AS NEEDED FOR NAUSEA (Patient taking differently: Take 4 mg by mouth every 8 (eight) hours as needed for Nausea. )    senna-docusate 8.6-50 mg (SENNA WITH DOCUSATE SODIUM) 8.6-50 mg per tablet Take 2 tablets by mouth 2 (two) times daily. (Patient taking differently: Take 1 tablet by mouth 2 (two) times daily. )    TRELEGY ELLIPTA 100-62.5-25 mcg DsDv INHALE 1 PUFF INTO THE LUNGS NIGHTLY (Patient taking differently: Inhale 1 puff into the lungs every evening. )     Family History     Problem Relation (Age of Onset)    Breast cancer Sister (55), Sister (50)    Cancer Mother, Father    Heart disease Mother    Hypertension Mother        Tobacco Use    Smoking status: Current Every Day Smoker     Years: 52.00     Types: Vaping w/o nicotine    Smokeless tobacco: Never Used   Substance and Sexual Activity    Alcohol use: No     Frequency: Never    Drug use: Yes     Types: Hydrocodone    Sexual activity: Not on file     Review of Systems   Constitutional: Positive for chills and fatigue. Negative for activity change, appetite change, diaphoresis and fever.   HENT: Negative for congestion, ear pain, postnasal drip, rhinorrhea and  sore throat.    Eyes: Negative for photophobia and visual disturbance.   Respiratory: Positive for chest tightness (right sided) and shortness of breath. Negative for cough and wheezing.    Cardiovascular: Negative for chest pain, palpitations and leg swelling.   Gastrointestinal: Positive for nausea. Negative for abdominal distention, abdominal pain, constipation, diarrhea and vomiting.   Genitourinary: Negative for difficulty urinating, dysuria, flank pain, frequency, hematuria and urgency.   Musculoskeletal: Negative for arthralgias, gait problem, myalgias and neck pain.   Skin: Negative for color change, pallor, rash and wound.   Neurological: Negative for dizziness, syncope, weakness, light-headedness, numbness and headaches.   Psychiatric/Behavioral: Negative for agitation, confusion and hallucinations. The patient is not nervous/anxious.      Objective:     Vital Signs (Most Recent):  Temp: 96.3 °F (35.7 °C) (08/13/20 2237)  Pulse: 72 (08/14/20 0009)  Resp: 16 (08/14/20 0009)  BP: (!) 98/56 (08/13/20 2237)  SpO2: 96 % (08/14/20 0009) Vital Signs (24h Range):  Temp:  [96.3 °F (35.7 °C)-101 °F (38.3 °C)] 96.3 °F (35.7 °C)  Pulse:  [] 72  Resp:  [16-26] 16  SpO2:  [88 %-100 %] 96 %  BP: ()/(52-73) 98/56     Weight: 65.8 kg (145 lb)  Body mass index is 24.89 kg/m².    Physical Exam  Vitals signs and nursing note reviewed.   Constitutional:       General: She is not in acute distress.     Appearance: She is well-developed. She is ill-appearing. She is not diaphoretic.      Interventions: Nasal cannula in place.   HENT:      Head: Normocephalic and atraumatic.      Right Ear: External ear normal.      Left Ear: External ear normal.      Mouth/Throat:      Mouth: Mucous membranes are dry.   Eyes:      Pupils: Pupils are equal, round, and reactive to light.   Neck:      Musculoskeletal: Normal range of motion.      Vascular: No JVD.   Cardiovascular:      Rate and Rhythm: Regular rhythm. Tachycardia  present.      Heart sounds: No murmur.   Pulmonary:      Effort: Pulmonary effort is normal. Tachypnea present. No respiratory distress.      Breath sounds: Examination of the right-middle field reveals rhonchi. Examination of the right-lower field reveals rhonchi. Rhonchi present. No wheezing.      Comments: Patient is currently on 2L nasal cannula  Abdominal:      General: Abdomen is flat. Bowel sounds are normal. There is no distension.      Palpations: Abdomen is soft.      Tenderness: There is no abdominal tenderness.   Genitourinary:     Vagina: Normal.      Comments: Not examined  Musculoskeletal: Normal range of motion.         General: No deformity.   Skin:     General: Skin is warm and dry.      Capillary Refill: Capillary refill takes 2 to 3 seconds.   Neurological:      General: No focal deficit present.      Mental Status: She is alert and oriented to person, place, and time.   Psychiatric:         Mood and Affect: Mood normal.         Behavior: Behavior normal.         Thought Content: Thought content normal.         Judgment: Judgment normal.           CRANIAL NERVES     CN III, IV, VI   Pupils are equal, round, and reactive to light.       Significant Labs:   CBC:   Recent Labs   Lab 08/13/20  1834   WBC 16.03*   HGB 10.8*   HCT 35.9*        CMP:   Recent Labs   Lab 08/13/20  1834      K 3.2*   CL 97   CO2 29   *   BUN 16   CREATININE 0.8   CALCIUM 8.6*   PROT 6.9   ALBUMIN 2.9*   BILITOT 0.3   ALKPHOS 137*   AST 32   ALT 35   ANIONGAP 10   EGFRNONAA >60     Lactic Acid:   Recent Labs   Lab 08/13/20  1906 08/13/20  2307   LACTATE 1.6 1.2     Urine Studies:   Recent Labs   Lab 08/13/20  1931   COLORU Yellow   APPEARANCEUA Hazy*   PHUR 6.0   SPECGRAV 1.020   PROTEINUA Negative   GLUCUA Negative   KETONESU Negative   BILIRUBINUA Negative   OCCULTUA Trace*   NITRITE Positive*   UROBILINOGEN 1.0   LEUKOCYTESUR 2+*   RBCUA 2   WBCUA 35*   BACTERIA Many*   SQUAMEPITHEL 2     All  pertinent labs within the past 24 hours have been reviewed.    Significant Imaging: I have reviewed and interpreted all pertinent imaging results/findings within the past 24 hours.

## 2020-08-14 NOTE — PROGRESS NOTES
[facility] Hospital Medicine  Progress Note    Patient Name: Kendra Lawrence  MRN: [id number]  Patient Class: IP- Inpatient   Admission Date: 8/13/2020  Length of Stay: 1 days  Attending Physician: Glenroy Latham MD  Primary Care Provider: Louie Urena MD        Subjective:     Principal Problem:Sepsis        HPI:  Kendra Lawrence is a 64 y.o. female with a PMHx of COPD, opioid dependence, HTN, CAD, and GERD who presents to the ED with complaints of SOB x10 days. The patient reports SOB that has been constant and became progressively worse over the last 2 days. She reports exerting herself makes it worse and nothing makes it better. She endorses associated fatigue, chills, and right chest wall tightness. The patient denies any fever, CP, cough, congestion, abdominal pain, vomiting, diarrhea, dizziness, weakness, or dysuria. The patient states that she currently smokes e-cigarettes. She denies any known sick contacts. Her ED work up is significant for tachycardia, fever, tachypnea, hypoxia, leukocytosis, nitrite positive UTI, and CXR revealing dense consolidation within the right mid to lower lung consistent with pneumonia. Sepsis protocol was initiated in the ED and the patient received IV fluids, vancomycin, and zosyn. The patient will be admitted under hospital medicine for further work up and evaluation.     Overview/Hospital Course:  No notes on file    Interval History: complains of generalized weakness and fatigue. + dysuria. Reports back pain.   Currently on 2 L NC with 98% oxygen    Review of Systems   Constitutional: Positive for activity change, appetite change, fatigue and fever. Negative for diaphoresis.   Respiratory: Positive for cough and shortness of breath.    Cardiovascular: Negative for chest pain and leg swelling.   Gastrointestinal: Negative for abdominal distention and abdominal pain.   Genitourinary: Positive for dysuria and frequency.   Musculoskeletal: Positive for  arthralgias and back pain.   Skin: Negative for rash.   Neurological: Negative for speech difficulty and numbness.     Objective:     Vital Signs (Most Recent):  Temp: 99.7 °F (37.6 °C) (08/14/20 1058)  Pulse: 92 (08/14/20 1115)  Resp: 20 (08/14/20 1115)  BP: 130/72 (08/14/20 1058)  SpO2: 98 % (08/14/20 1115) Vital Signs (24h Range):  Temp:  [96.3 °F (35.7 °C)-101 °F (38.3 °C)] 99.7 °F (37.6 °C)  Pulse:  [] 92  Resp:  [16-26] 20  SpO2:  [88 %-100 %] 98 %  BP: ()/(52-73) 130/72     Weight: 65.8 kg (145 lb)  Body mass index is 24.89 kg/m².    Intake/Output Summary (Last 24 hours) at 8/14/2020 1437  Last data filed at 8/14/2020 0700  Gross per 24 hour   Intake 3374 ml   Output --   Net 3374 ml      Physical Exam  Vitals signs and nursing note reviewed.   Constitutional:       Appearance: She is well-developed. She is ill-appearing.   HENT:      Head: Normocephalic.      Right Ear: External ear normal.      Left Ear: External ear normal.      Mouth/Throat:      Comments: Poor dentition  Eyes:      Conjunctiva/sclera: Conjunctivae normal.      Pupils: Pupils are equal, round, and reactive to light.   Neck:      Musculoskeletal: Normal range of motion and neck supple.   Cardiovascular:      Rate and Rhythm: Normal rate and regular rhythm.      Heart sounds: Normal heart sounds.   Pulmonary:      Effort: Pulmonary effort is normal.      Breath sounds: Rhonchi present.   Abdominal:      General: Bowel sounds are normal.      Palpations: Abdomen is soft.      Tenderness: There is no abdominal tenderness.   Musculoskeletal: Normal range of motion.   Skin:     General: Skin is warm and dry.   Neurological:      General: No focal deficit present.      Mental Status: She is alert and oriented to person, place, and time. Mental status is at baseline.      Motor: No weakness.      Comments: Generalized weakness   Psychiatric:         Behavior: Behavior normal.         Judgment: Judgment normal.         Significant  Labs:   BMP:   Recent Labs   Lab 08/14/20  0421         K 3.4*   CL 99   CO2 31*   BUN 17   CREATININE 0.8   CALCIUM 8.0*   MG 1.8     CBC:   Recent Labs   Lab 08/13/20  1834 08/14/20  0704   WBC 16.03* 10.69   HGB 10.8* 9.4*   HCT 35.9* 32.1*    240       Significant Imaging: I have reviewed and interpreted all pertinent imaging results/findings within the past 24 hours.      Assessment/Plan:      * Sepsis  This patient does have evidence of infective focus  My overall impression is sepsis.  Antibiotics given-   Antibiotics (From admission, onward)    Start     Stop Route Frequency Ordered    08/14/20 1930  vancomycin (VANCOCIN) 1,750 mg in dextrose 5 % 500 mL IVPB      -- IV Every 24 hours (non-standard times) 08/13/20 2222    08/14/20 0400  piperacillin-tazobactam 4.5 g in dextrose 5 % 100 mL IVPB (ready to mix system)      -- IV Every 8 hours (non-standard times) 08/13/20 2217    08/13/20 2217  vancomycin - pharmacy to dose  (vancomycin IVPB)      -- IV pharmacy to manage frequency 08/13/20 2217          Latest lactate reviewed, they are-  Recent Labs   Lab 08/13/20  1906 08/13/20  2307   LACTATE 1.6 1.2       Organ dysfunction indicated by Acute respiratory failure  Source- PNA and UTI  Source control Achieved by- Vancomycin and Zosyn  UA reviewed.  CXR reviewed.  Procalcitonin elevated.  Blood cx pending.  Continue IVF.      Iron deficiency anemia secondary to inadequate dietary iron intake  Check iron studies. Add po fergon      Opioid dependence  Continue home suboxone.  reviewed.    Hypocalcemia  Corrected calcium 9.5, continue to trend.    Hypokalemia  Patient has hypokalemia which is currently uncontrolled. Last electrolytes reviewed-   Recent Labs   Lab 08/13/20  1834   K 3.2*   . Will replace potassium and monitor electrolytes closely. Continuous telemetry.    Acute hypoxemic respiratory failure  Likely 2/2 PNA.  Continue abx and supplemental oxygen.    Chronic obstructive  pulmonary disease with acute lower respiratory infection  Patient's COPD is uncontrolled due to worsening of baseline hypoxia currently.  Patient is currently off COPD Pathway. Continue scheduled inhalers Antibiotics and Supplemental oxygen and monitor respiratory status closely.   Procalcitonin elevated.  CXR reviewed.  Continue abx.  Duo nebs.  Consulted pulmonology.     Acute cystitis without hematuria  UA reviewed, follow culture.  Patient started on zosyn in the ED, continue for now.    Pneumonia due to infectious organism  CXR reviewed.  Patient started on vancomycin and zosyn in the ED, will continue for now.  Duo nebs q4 hours.  Supplemental oxygen as needed to keep oxygen satruation >90%.  Pulmonary consulted      VTE Risk Mitigation (From admission, onward)         Ordered     enoxaparin injection 40 mg  Every 24 hours      08/13/20 2217     IP VTE HIGH RISK PATIENT  Once      08/13/20 2217     Place sequential compression device  Until discontinued      08/13/20 2217                Discharge Planning   BRANDON:      Code Status: Full Code   Is the patient medically ready for discharge?:     Reason for patient still in hospital (select all that apply): Patient trending condition, Laboratory test, Treatment and Consult recommendations  Discharge Plan A: Home with family                  Natalia Guajardo NP  Department of Hospital Medicine   [facility]

## 2020-08-14 NOTE — ED NOTES
Report given to Karissa on 4th floor. Pt will remain in ED until room 407A is cleaned by EVS. EVS currently not on floor cleaning.

## 2020-08-14 NOTE — ED PROVIDER NOTES
Encounter Date: 2020       History     Chief Complaint   Patient presents with    Shortness of Breath     hx of asthma     HPI  Review of patient's allergies indicates:  No Known Allergies  Past Medical History:   Diagnosis Date    Asthma     Cardiac angina     Chronic abdominal pain     Claustrophobia     COPD (chronic obstructive pulmonary disease)     Coronary artery disease     GERD (gastroesophageal reflux disease)     History of drug dependence     Hypertension      Past Surgical History:   Procedure Laterality Date    BREAST BIOPSY Bilateral 20 yrs ago    benign     SECTION      CHOLECYSTECTOMY      ENDOSCOPIC ULTRASOUND OF UPPER GASTROINTESTINAL TRACT Left 2018    Procedure: ULTRASOUND, UPPER GI TRACT, ENDOSCOPIC;  Surgeon: Paras Negrete MD;  [facility];  Service: Endoscopy;  Laterality: Left;    ESOPHAGOGASTRODUODENOSCOPY N/A 2018    Procedure: EGD (ESOPHAGOGASTRODUODENOSCOPY);  Surgeon: Paras Negrete MD;  Location: [facility];  Service: Endoscopy;  Laterality: N/A;    ESOPHAGOGASTRODUODENOSCOPY N/A 2018    Procedure: EGD (ESOPHAGOGASTRODUODENOSCOPY);  Surgeon: Paras Negrete MD;  Location: [facility];  Service: Endoscopy;  Laterality: N/A;    HYSTERECTOMY      MAGNETIC RESONANCE IMAGING N/A 2019    Procedure: MRI (Magnetic Resonance Imagine) needs anesthesia;  Surgeon: Raffy Charles MD;  Location: [facility];  Service: Anesthesiology;  Laterality: N/A;    OOPHORECTOMY      TEMPOROMANDIBULAR JOINT SURGERY      TONSILLECTOMY       Family History   Problem Relation Age of Onset    Breast cancer Sister 55    Breast cancer Sister 50     Social History     Tobacco Use    Smoking status: Current Every Day Smoker     Types: Vaping w/o nicotine    Smokeless tobacco: Never Used   Substance Use Topics    Alcohol use: No     Frequency: Never    Drug use: Yes     Types: Hydrocodone     Review of Systems    Physical Exam      Initial Vitals [08/13/20 1803]   BP Pulse Resp Temp SpO2   137/73 (!) 128 (!) 26 (!) 101 °F (38.3 °C) (!) 88 %      MAP       --         Physical Exam    ED Course   Procedures  Labs Reviewed   CBC W/ AUTO DIFFERENTIAL - Abnormal; Notable for the following components:       Result Value    WBC 16.03 (*)     RBC 3.86 (*)     Hemoglobin 10.8 (*)     Hematocrit 35.9 (*)     Mean Corpuscular Hemoglobin Conc 30.1 (*)     Immature Granulocytes 0.6 (*)     Gran # (ANC) 13.1 (*)     Immature Grans (Abs) 0.09 (*)     Mono # 1.7 (*)     Gran% 81.5 (*)     Lymph% 6.9 (*)     All other components within normal limits   COMPREHENSIVE METABOLIC PANEL - Abnormal; Notable for the following components:    Potassium 3.2 (*)     Glucose 169 (*)     Calcium 8.6 (*)     Albumin 2.9 (*)     Alkaline Phosphatase 137 (*)     All other components within normal limits   URINALYSIS, REFLEX TO URINE CULTURE - Abnormal; Notable for the following components:    Appearance, UA Hazy (*)     Occult Blood UA Trace (*)     Nitrite, UA Positive (*)     Leukocytes, UA 2+ (*)     All other components within normal limits    Narrative:     Specimen Source->Urine   URINALYSIS MICROSCOPIC - Abnormal; Notable for the following components:    WBC, UA 35 (*)     Bacteria Many (*)     All other components within normal limits    Narrative:     Specimen Source->Urine   CULTURE, BLOOD   CULTURE, BLOOD   CULTURE, URINE   LACTIC ACID, PLASMA   SARS-COV-2 RNA AMPLIFICATION, QUAL          Imaging Results          X-Ray Chest AP Portable (Final result)  Result time 08/13/20 19:49:48    Final result by Leonel Beasley DO (08/13/20 19:49:48)                 Impression:      Dense consolidation within the right mid to lower lung consistent with pneumonia.      Electronically signed by: Leonel Beasley  Date:    08/13/2020  Time:    19:49             Narrative:    EXAMINATION:  XR CHEST AP PORTABLE    CLINICAL HISTORY:  Sepsis.    TECHNIQUE:  AP chest  radiograph.    COMPARISON:  Prior chest radiograph from 03/17/2017.    FINDINGS:  The lungs are well expanded.  There is a dense focal consolidation within the right mid to lower lung with silhouetting of the right heart border, consistent with lobar consolidation.  The left lung is clear.  The pleural spaces are clear.  The cardiac silhouette is unremarkable.  There are calcifications of the aortic arch.  The visualized osseous structures are unremarkable.                                                   ED Course as of Aug 13 2017   Thu Aug 13, 2020   2000 Impression:     Dense consolidation within the right mid to lower lung consistent with pneumonia.        Electronically signed by: Leonel Beasley  Date:                                            08/13/2020  Time:                                           19:49    [BD]   2005 64-year-old female past medical history of opioid dependence, COPD, CAD, hypertension, hyperlipidemia and obesity presents today with sepsis and respiratory failure.  Patient tachycardic, tachypneic and hypoxic to 88% with a fever of 38.3 on arrival.  Nasal cannula improved oxygenation.  Sepsis protocol initiated on arrival chest x-ray shows large dense consolidation in right lower lobe consistent with pneumonia urine consistent with nitrite positive UTI.  Leukocytosis to 16 with mild hypokalemia otherwise reassuring labs.  COVID negative.  Given vanc and Zosyn with 30 makes per kg of IV fluids.  Patient's vitals improved will admit to Medicine for further workup and management.    [BD]      ED Course User Index  [BD] Neo Welsh MD                Clinical Impression:       ICD-10-CM ICD-9-CM   1. Sepsis, due to unspecified organism, unspecified whether acute organ dysfunction present  A41.9 038.9     995.91   2. SOB (shortness of breath)  R06.02 786.05   3. Pneumonia of right lower lobe due to infectious organism  J18.1 486   4. Urinary tract infection without hematuria, site  unspecified  N39.0 599.0   5. Acute on chronic respiratory failure with hypoxia  J96.21 518.84     799.02             ED Disposition Condition    Admit                           Neo Welsh MD  08/13/20 2017

## 2020-08-14 NOTE — ASSESSMENT & PLAN NOTE
Patient has hypokalemia which is currently uncontrolled. Last electrolytes reviewed-   Recent Labs   Lab 08/13/20  1834   K 3.2*   . Will replace potassium and monitor electrolytes closely. Continuous telemetry.

## 2020-08-14 NOTE — CONSULTS
2020      Admit Date: 2020  Kendra Lawrence  New Patient Consult    Chief Complaint   Patient presents with    Shortness of Breath     hx of asthma       History of Present Illness:   Pt follows with Dr Urena, on suboxone, trelegy at home- pt worked in home, stopped smoking but uses e smokes.  Pt has poor teeth chr- right lower tooth abscess.  Pt relates has had asthma, new onset last 20 yr with cough/wheezes intermittently - flares once a yr lately on trelegly.  Gets sinus infections yearly. No prior pneumonia.    Pt acutely worsened 10 days ago np cough, no wheezes, more sob, and weakness.  Min appetite change.  No n/v, diarrhea, nor chest pain.  No hemoptysis.  No recent wt loss.      Pt had cxr 2020 with opacified rml- procal 0.57, wbc 16k, tm 101 admit.  sars neg.  Now on 2lpm     PFSH:  Past Medical History:   Diagnosis Date    Asthma     Cardiac angina     Chronic abdominal pain     Claustrophobia     COPD (chronic obstructive pulmonary disease)     Coronary artery disease     GERD (gastroesophageal reflux disease)     History of drug dependence     Hypertension      Past Surgical History:   Procedure Laterality Date    BREAST BIOPSY Bilateral 20 yrs ago    benign     SECTION      CHOLECYSTECTOMY      ENDOSCOPIC ULTRASOUND OF UPPER GASTROINTESTINAL TRACT Left 2018    Procedure: ULTRASOUND, UPPER GI TRACT, ENDOSCOPIC;  Surgeon: Paras Negrete MD;  Location: [facility];  Service: Endoscopy;  Laterality: Left;    ESOPHAGOGASTRODUODENOSCOPY N/A 2018    Procedure: EGD (ESOPHAGOGASTRODUODENOSCOPY);  Surgeon: Paras Negrete MD;  Location: [facility];  Service: Endoscopy;  Laterality: N/A;    ESOPHAGOGASTRODUODENOSCOPY N/A 2018    Procedure: EGD (ESOPHAGOGASTRODUODENOSCOPY);  Surgeon: Paras Negrete MD;  Location: [facility];  Service: Endoscopy;  Laterality: N/A;    HYSTERECTOMY      MAGNETIC RESONANCE IMAGING N/A 2019    Procedure: MRI  "(Magnetic Resonance Imagine) needs anesthesia;  Surgeon: Raffy Charles MD;  [facility];  Service: Anesthesiology;  Laterality: N/A;    OOPHORECTOMY      TEMPOROMANDIBULAR JOINT SURGERY      TONSILLECTOMY       Social History     Tobacco Use    Smoking status: Current Every Day Smoker     Years: 52.00     Types: Vaping w/o nicotine    Smokeless tobacco: Never Used   Substance Use Topics    Alcohol use: No     Frequency: Never    Drug use: Yes     Types: Hydrocodone     Family History   Problem Relation Age of Onset    Breast cancer Sister 55    Breast cancer Sister 50    Cancer Mother     Heart disease Mother     Hypertension Mother     Cancer Father      Review of patient's allergies indicates:  No Known Allergies    Performance Status:Performance Status:The patient's activity level is no limits with regular activity.    Review of Systems:  a review of eleven systems covering constitutional, Psych, Eye, HEENT, Respiratory, Cardiac, GI, , Musculoskeletal, Endocrine, Dermatologicwas negative except the above mentioned abnormalities and for any pertinent findings as listed below:  pertinent positive as above, rest is good         Exam:Comprehensive exam done. BP (!) 107/56 (Patient Position: Lying)   Pulse 87   Temp 97.2 °F (36.2 °C) (Oral)   Resp 18   Ht 5' 4" (1.626 m)   Wt 65.8 kg (145 lb)   LMP  (LMP Unknown)   SpO2 (!) 94%   Breastfeeding No   BMI 24.89 kg/m²   Exam included Vitals as listed, and patient's appearance and affect and alertness and mood, oral exam for yeast and hygiene and pharynx lesions and Mallapatti (M) score, neck with inspection for jvd and masses and thyroid abnormalities and lymph nodes (supraclavicular and infraclavicular nodes also examined and noted if abn), chest exam included symmetry and effort and fremitus and percussion and auscultation, cardiac exam included rhythm and gallops and murmur and rubs and jvd and edema, abdominal exam for mass and " hepatosplenomegaly and tenderness and hernias and bowel sounds, Musculoskeletal exam with muscle tone and posture and mobility/gait and  strenght, and skin for rashes and cyanosis and pallor and turgor, extremity for clubbing.  Findings were normal except as listed below:  M2,very poor dentition, chest is symmetric, no distress, normal percussion, normal fremitus and good decreased breath sounds       Radiographs reviewed: view by direct vision dense opacified rml -8/13/2020  No results found for this or any previous visit.]    Labs     Recent Labs   Lab 08/14/20  0704   WBC 10.69   HGB 9.4*   HCT 32.1*        Recent Labs   Lab 08/13/20  2306 08/13/20  2307 08/14/20  0421   NA  --   --  139   K  --   --  3.4*   CL  --   --  99   CO2  --   --  31*   BUN  --   --  17   CREATININE  --   --  0.8   GLU  --   --  109   CALCIUM  --   --  8.0*   MG  --   --  1.8   PHOS  --   --  2.7   AST  --   --  27   ALT  --   --  31   ALKPHOS  --   --  122   BILITOT  --   --  0.5   PROT  --   --  6.3   ALBUMIN  --   --  2.6*   PROCAL 0.57*  --   --    LACTATE  --  1.2  --    No results for input(s): PH, PCO2, PO2, HCO3 in the last 24 hours.  Microbiology Results (last 7 days)     Procedure Component Value Units Date/Time    Urine culture [[id number]] Collected: 08/13/20 1931    Order Status: No result Specimen: Urine Updated: 08/13/20 1954    Blood culture x two cultures. Draw prior to antibiotics. [[id number]] Collected: 08/13/20 1906    Order Status: Sent Specimen: Blood from Antecubital, Right Updated: 08/13/20 1906    Blood culture x two cultures. Draw prior to antibiotics. [[id number]] Collected: 08/13/20 1906    Order Status: Sent Specimen: Blood from Antecubital, Left Updated: 08/13/20 1906          Impression:  Active Hospital Problems    Diagnosis  POA    *Sepsis [A41.9]  Yes    Opioid dependence [F11.20]  Yes    Pneumonia due to infectious organism [J18.9]  Yes    Acute cystitis without hematuria [N30.00]  Yes     "Chronic obstructive pulmonary disease with acute lower respiratory infection [J44.0]  Yes    Acute hypoxemic respiratory failure [J96.01]  Yes    Hypokalemia [E87.6]  Yes    Hypocalcemia [E83.51]  Yes      Resolved Hospital Problems   No resolved problems to display.               Plan: pt on zosyn/vanc.  Suspect anaerobic with poor dentition.  Delayed presentation as attributed her problems to "asthma" or copd exacerbation.      Will need f/u cxr outpt as impressively abn.  rx  Copd. Needs ox now, home when rm air and ambulates better.    Would recommend f/u office in a month with cxr.  Dc likely Sunday/Monday.  Steroids not needed.      Consider augmentin at dc.      "

## 2020-08-14 NOTE — ASSESSMENT & PLAN NOTE
This patient does have evidence of infective focus  My overall impression is sepsis.  Antibiotics given-   Antibiotics (From admission, onward)    Start     Stop Route Frequency Ordered    08/14/20 1930  vancomycin (VANCOCIN) 1,750 mg in dextrose 5 % 500 mL IVPB      -- IV Every 24 hours (non-standard times) 08/13/20 2222    08/14/20 0400  piperacillin-tazobactam 4.5 g in dextrose 5 % 100 mL IVPB (ready to mix system)      -- IV Every 8 hours (non-standard times) 08/13/20 2217 08/13/20 2217  vancomycin - pharmacy to dose  (vancomycin IVPB)      -- IV pharmacy to manage frequency 08/13/20 2217          Latest lactate reviewed, they are-  Recent Labs   Lab 08/13/20  1906 08/13/20  2307   LACTATE 1.6 1.2       Organ dysfunction indicated by Acute respiratory failure  Source- PNA and UTI  Source control Achieved by- Vancomycin and Zosyn  UA reviewed.  CXR reviewed.  Procalcitonin elevated.  Blood cx pending.  Continue IVF.

## 2020-08-14 NOTE — ASSESSMENT & PLAN NOTE
Patient's COPD is uncontrolled due to worsening of baseline hypoxia currently.  Patient is currently off COPD Pathway. Continue scheduled inhalers Antibiotics and Supplemental oxygen and monitor respiratory status closely.   Procalcitonin elevated.  CXR reviewed.  Continue abx.  Duo nebs.  Consulted pulmonology.

## 2020-08-14 NOTE — ED PROVIDER NOTES
Encounter Date: 2020    SCRIBE #1 NOTE: Mahsa QUINTANA, am scribing for, and in the presence of, Neo Welsh MD.       History     Chief Complaint   Patient presents with    Shortness of Breath     hx of asthma     Time seen by provider: 7:43 PM on 2020    Kendra Lawrence is a 64 y.o. female with PMHx of asthma, HTN, COPD, history of drug dependence, claustrophobia, CAD, cardiac angia, GERD, and chronic abdominal pain who presents to the ED with an onset of SOB, nausea, and a headache. The patient states that her symptoms started 10 day ago. She notes that today has been the worse. The patient states that she smokes e-cigarettes with no nicotine. The patient denies cough, vomiting, abdominal pain, or any other symptoms at this time. PSHx of hysterectomy, cholecystectomy, tonsillectomy,  section, oophorectomy, breast biopsy, and temporomandibular joint surgery.    The history is provided by the patient.     Review of patient's allergies indicates:  No Known Allergies  Past Medical History:   Diagnosis Date    Asthma     Cardiac angina     Chronic abdominal pain     Claustrophobia     COPD (chronic obstructive pulmonary disease)     Coronary artery disease     GERD (gastroesophageal reflux disease)     History of drug dependence     Hypertension      Past Surgical History:   Procedure Laterality Date    BREAST BIOPSY Bilateral 20 yrs ago    benign     SECTION      CHOLECYSTECTOMY      ENDOSCOPIC ULTRASOUND OF UPPER GASTROINTESTINAL TRACT Left 2018    Procedure: ULTRASOUND, UPPER GI TRACT, ENDOSCOPIC;  Surgeon: Paras Negrete MD;  Location: [facility];  Service: Endoscopy;  Laterality: Left;    ESOPHAGOGASTRODUODENOSCOPY N/A 2018    Procedure: EGD (ESOPHAGOGASTRODUODENOSCOPY);  Surgeon: Paras Negrete MD;  Location: [facility];  Service: Endoscopy;  Laterality: N/A;    ESOPHAGOGASTRODUODENOSCOPY N/A 2018    Procedure: EGD  (ESOPHAGOGASTRODUODENOSCOPY);  Surgeon: Paras Negrete MD;  Location: [facility] ENDO;  Service: Endoscopy;  Laterality: N/A;    HYSTERECTOMY      MAGNETIC RESONANCE IMAGING N/A 2/19/2019    Procedure: MRI (Magnetic Resonance Imagine) needs anesthesia;  Surgeon: Raffy Charles MD;  Location: [facility];  Service: Anesthesiology;  Laterality: N/A;    OOPHORECTOMY      TEMPOROMANDIBULAR JOINT SURGERY      TONSILLECTOMY       Family History   Problem Relation Age of Onset    Breast cancer Sister 55    Breast cancer Sister 50    Cancer Mother     Heart disease Mother     Hypertension Mother     Cancer Father      Social History     Tobacco Use    Smoking status: Current Every Day Smoker     Years: 52.00     Types: Vaping w/o nicotine    Smokeless tobacco: Never Used   Substance Use Topics    Alcohol use: No     Frequency: Never    Drug use: Yes     Types: Hydrocodone     Review of Systems   Constitutional: Negative for activity change, diaphoresis and fever.   HENT: Negative for ear pain, rhinorrhea, sore throat and trouble swallowing.    Eyes: Negative for pain and visual disturbance.   Respiratory: Positive for shortness of breath. Negative for cough and stridor.    Cardiovascular: Negative for chest pain.   Gastrointestinal: Positive for nausea. Negative for abdominal pain, blood in stool, diarrhea and vomiting.   Genitourinary: Negative for dysuria, hematuria, vaginal bleeding and vaginal discharge.   Musculoskeletal: Negative for gait problem.   Skin: Negative for rash and wound.   Neurological: Positive for headaches. Negative for seizures.   Psychiatric/Behavioral: Negative for hallucinations and suicidal ideas.       Physical Exam     Initial Vitals [08/13/20 1803]   BP Pulse Resp Temp SpO2   137/73 (!) 128 (!) 26 (!) 101 °F (38.3 °C) (!) 88 %      MAP       --         Physical Exam    Nursing note and vitals reviewed.  Constitutional: She appears well-developed. She is not diaphoretic. No  distress.   HENT:   Head: Normocephalic and atraumatic.   Nose: Nose normal.   Eyes: EOM are normal. No scleral icterus.   Neck: Normal range of motion. Neck supple.   Cardiovascular: Regular rhythm, normal heart sounds and intact distal pulses. Tachycardia present.  Exam reveals no gallop and no friction rub.    No murmur heard.  Pulmonary/Chest: No stridor. Tachypnea noted. No respiratory distress. She has decreased breath sounds. She has no wheezes. She has no rhonchi. She has no rales.   Breathe slightly diminished on the right compared to the left.   Abdominal: Soft. Bowel sounds are normal. She exhibits no distension. There is no abdominal tenderness.   Surgical scar.   Musculoskeletal: Normal range of motion. No edema.   Neurological: She is alert and oriented to person, place, and time. No cranial nerve deficit.   Skin: Skin is warm and dry. Capillary refill takes less than 2 seconds. No rash noted.   Psychiatric: She has a normal mood and affect.         ED Course   Critical Care    Date/Time: 8/13/2020 8:30 PM  Performed by: Mahsa Clark  Authorized by: Neo Welsh MD   Direct patient critical care time: 10 minutes  Additional history critical care time: 5 minutes  Ordering / reviewing critical care time: 5 minutes  Documentation critical care time: 5 minutes  Consulting other physicians critical care time: 5 minutes  Consult with family critical care time: 5 minutes  Other critical care time: 0 minutes  Total critical care time (exclusive of procedural time) : 35 minutes        Labs Reviewed   CBC W/ AUTO DIFFERENTIAL - Abnormal; Notable for the following components:       Result Value    WBC 16.03 (*)     RBC 3.86 (*)     Hemoglobin 10.8 (*)     Hematocrit 35.9 (*)     Mean Corpuscular Hemoglobin Conc 30.1 (*)     Immature Granulocytes 0.6 (*)     Gran # (ANC) 13.1 (*)     Immature Grans (Abs) 0.09 (*)     Mono # 1.7 (*)     Gran% 81.5 (*)     Lymph% 6.9 (*)     All other components within normal  limits   COMPREHENSIVE METABOLIC PANEL - Abnormal; Notable for the following components:    Potassium 3.2 (*)     Glucose 169 (*)     Calcium 8.6 (*)     Albumin 2.9 (*)     Alkaline Phosphatase 137 (*)     All other components within normal limits   URINALYSIS, REFLEX TO URINE CULTURE - Abnormal; Notable for the following components:    Appearance, UA Hazy (*)     Occult Blood UA Trace (*)     Nitrite, UA Positive (*)     Leukocytes, UA 2+ (*)     All other components within normal limits    Narrative:     Specimen Source->Urine   URINALYSIS MICROSCOPIC - Abnormal; Notable for the following components:    WBC, UA 35 (*)     Bacteria Many (*)     All other components within normal limits    Narrative:     Specimen Source->Urine   CULTURE, BLOOD   CULTURE, BLOOD   CULTURE, URINE   LACTIC ACID, PLASMA   SARS-COV-2 RNA AMPLIFICATION, QUAL     EKG Readings: (Independently Interpreted)   Normal sinus rhythm at rate of 98 bpm with normal axis and normal intervals.        Imaging Results          X-Ray Chest AP Portable (Final result)  Result time 08/13/20 19:49:48    Final result by Leonel Beasley DO (08/13/20 19:49:48)                 Impression:      Dense consolidation within the right mid to lower lung consistent with pneumonia.      Electronically signed by: Leonel Beasley  Date:    08/13/2020  Time:    19:49             Narrative:    EXAMINATION:  XR CHEST AP PORTABLE    CLINICAL HISTORY:  Sepsis.    TECHNIQUE:  AP chest radiograph.    COMPARISON:  Prior chest radiograph from 03/17/2017.    FINDINGS:  The lungs are well expanded.  There is a dense focal consolidation within the right mid to lower lung with silhouetting of the right heart border, consistent with lobar consolidation.  The left lung is clear.  The pleural spaces are clear.  The cardiac silhouette is unremarkable.  There are calcifications of the aortic arch.  The visualized osseous structures are unremarkable.                                 Medical  Decision Making:   History:   Old Medical Records: I decided to obtain old medical records.  Independently Interpreted Test(s):   I have ordered and independently interpreted EKG Reading(s) - see prior notes  Clinical Tests:   Lab Tests: Ordered and Reviewed  Radiological Study: Ordered and Reviewed  Medical Tests: Ordered and Reviewed  ED Management:  64-year-old female past medical history of opioid dependence, COPD, CAD, hypertension, hyperlipidemia and obesity presents today with sepsis and respiratory failure.  Patient tachycardic, tachypneic and hypoxic to 88% with a fever of 38.3 on arrival.  Nasal cannula improved oxygenation.  Sepsis protocol initiated on arrival chest x-ray shows large dense consolidation in right lower lobe consistent with pneumonia urine consistent with nitrite positive UTI.  Leukocytosis to 16 with mild hypokalemia otherwise reassuring labs.  COVID negative.  Given vanc and Zosyn with 30 makes per kg of IV fluids.  Patient's vitals improved will admit to Medicine for further workup and management.            Scribe Attestation:   Scribe #1: I performed the above scribed service and the documentation accurately describes the services I performed. I attest to the accuracy of the note.            ED Course as of Aug 14 0019   Thu Aug 13, 2020   2000 Impression:     Dense consolidation within the right mid to lower lung consistent with pneumonia.        Electronically signed by: Leonel Beasley  Date:                                            08/13/2020  Time:                                           19:49    [BD]   2003 64-year-old female past medical history of opioid dependence, COPD, CAD, hypertension, hyperlipidemia and obesity presents today with sepsis and respiratory failure.  Patient tachycardic, tachypneic and hypoxic to 88% with a fever of 38.3 on arrival.  Nasal cannula improved oxygenation.  Sepsis protocol initiated on arrival chest x-ray shows large dense consolidation in  right lower lobe consistent with pneumonia urine consistent with nitrite positive UTI.  Leukocytosis to 16 with mild hypokalemia otherwise reassuring labs.  COVID negative.  Given vanc and Zosyn with 30 makes per kg of IV fluids.  Patient's vitals improved will admit to Medicine for further workup and management.    [BD]      ED Course User Index  [BD] Neo Welsh MD                Clinical Impression:       ICD-10-CM ICD-9-CM   1. Sepsis, due to unspecified organism, unspecified whether acute organ dysfunction present  A41.9 038.9     995.91   2. SOB (shortness of breath)  R06.02 786.05   3. Pneumonia of right lower lobe due to infectious organism  J18.1 486   4. Urinary tract infection without hematuria, site unspecified  N39.0 599.0   5. Acute on chronic respiratory failure with hypoxia  J96.21 518.84     799.02   6. Chest pain  R07.9 786.50         Disposition:   Disposition: Admitted     ED Disposition Condition    Admit                           Neo Welsh MD  08/27/20 1052

## 2020-08-14 NOTE — H&P
[facility] Medicine  History & Physical    Patient Name: Kendra Lawrence  MRN: [id number]  Admission Date: 2020  Attending Physician: Glenroy Latham MD   Primary Care Provider: Louie Urena MD         Patient information was obtained from patient, past medical records and ER records.     Subjective:     Principal Problem:Sepsis    Chief Complaint:   Chief Complaint   Patient presents with    Shortness of Breath     hx of asthma        HPI: Kendra Lawrence is a 64 y.o. female with a PMHx of COPD, opioid dependence, HTN, CAD, and GERD who presents to the ED with complaints of SOB x10 days. The patient reports SOB that has been constant and became progressively worse over the last 2 days. She reports exerting herself makes it worse and nothing makes it better. She endorses associated fatigue, chills, and right chest wall tightness. The patient denies any fever, CP, cough, congestion, abdominal pain, vomiting, diarrhea, dizziness, weakness, or dysuria. The patient states that she currently smokes e-cigarettes. She denies any known sick contacts. Her ED work up is significant for tachycardia, fever, tachypnea, hypoxia, leukocytosis, nitrite positive UTI, and CXR revealing dense consolidation within the right mid to lower lung consistent with pneumonia. Sepsis protocol was initiated in the ED and the patient received IV fluids, vancomycin, and zosyn. The patient will be admitted under hospital medicine for further work up and evaluation.     Past Medical History:   Diagnosis Date    Asthma     Cardiac angina     Chronic abdominal pain     Claustrophobia     COPD (chronic obstructive pulmonary disease)     Coronary artery disease     GERD (gastroesophageal reflux disease)     History of drug dependence     Hypertension        Past Surgical History:   Procedure Laterality Date    BREAST BIOPSY Bilateral 20 yrs ago    benign     SECTION      CHOLECYSTECTOMY       ENDOSCOPIC ULTRASOUND OF UPPER GASTROINTESTINAL TRACT Left 12/21/2018    Procedure: ULTRASOUND, UPPER GI TRACT, ENDOSCOPIC;  Surgeon: Paras Negrete MD;  Location: [facility] ENDO;  Service: Endoscopy;  Laterality: Left;    ESOPHAGOGASTRODUODENOSCOPY N/A 12/18/2018    Procedure: EGD (ESOPHAGOGASTRODUODENOSCOPY);  Surgeon: Paras Negrete MD;  Location: [facility] ENDO;  Service: Endoscopy;  Laterality: N/A;    ESOPHAGOGASTRODUODENOSCOPY N/A 12/21/2018    Procedure: EGD (ESOPHAGOGASTRODUODENOSCOPY);  Surgeon: Paras Negrete MD;  Location: [facility] ENDO;  Service: Endoscopy;  Laterality: N/A;    HYSTERECTOMY      MAGNETIC RESONANCE IMAGING N/A 2/19/2019    Procedure: MRI (Magnetic Resonance Imagine) needs anesthesia;  Surgeon: Raffy Charles MD;  Location: [facility];  Service: Anesthesiology;  Laterality: N/A;    OOPHORECTOMY      TEMPOROMANDIBULAR JOINT SURGERY      TONSILLECTOMY         Review of patient's allergies indicates:  No Known Allergies    No current facility-administered medications on file prior to encounter.      Current Outpatient Medications on File Prior to Encounter   Medication Sig    alendronate (FOSAMAX) 70 MG tablet TAKE ONE TABLET BY MOUTH ONCE EVERY 7 DAYS (Patient taking differently: Take 70 mg by mouth every 7 days. Sunday)    amitriptyline (ELAVIL) 10 MG tablet TAKE ONE TABLET BY MOUTH AT BEDTIME AS NEEDED FOR INSOMNIA (Patient taking differently: Take 10 mg by mouth nightly as needed for Insomnia. )    buprenorphine-naloxone (SUBOXONE) 8-2 mg Film Place 1 packet (1 each total) under the tongue 2 (two) times daily.    DULoxetine (CYMBALTA) 60 MG capsule TAKE ONE CAPSULE BY MOUTH ONCE A DAY (Patient taking differently: Take 60 mg by mouth once daily. )    hydroCHLOROthiazide (HYDRODIURIL) 25 MG tablet TAKE ONE TABLET BY MOUTH EVERY DAY (Patient taking differently: Take 25 mg by mouth once daily. )    losartan (COZAAR) 100 MG tablet TAKE ONE TABLET BY MOUTH ONCE A DAY  (Patient taking differently: Take 100 mg by mouth once daily. )    nitroGLYCERIN (NITROSTAT) 0.4 MG SL tablet Place 0.4 mg under the tongue every 5 (five) minutes as needed for Chest pain.    omeprazole (PRILOSEC) 40 MG capsule Take 1 capsule by mouth 2 (two) times daily.    ondansetron (ZOFRAN) 4 MG tablet TAKE ONE TABLET BY MOUTH EVERY 8 HOURS AS NEEDED FOR NAUSEA (Patient taking differently: Take 4 mg by mouth every 8 (eight) hours as needed for Nausea. )    senna-docusate 8.6-50 mg (SENNA WITH DOCUSATE SODIUM) 8.6-50 mg per tablet Take 2 tablets by mouth 2 (two) times daily. (Patient taking differently: Take 1 tablet by mouth 2 (two) times daily. )    TRELEGY ELLIPTA 100-62.5-25 mcg DsDv INHALE 1 PUFF INTO THE LUNGS NIGHTLY (Patient taking differently: Inhale 1 puff into the lungs every evening. )     Family History     Problem Relation (Age of Onset)    Breast cancer Sister (55), Sister (50)    Cancer Mother, Father    Heart disease Mother    Hypertension Mother        Tobacco Use    Smoking status: Current Every Day Smoker     Years: 52.00     Types: Vaping w/o nicotine    Smokeless tobacco: Never Used   Substance and Sexual Activity    Alcohol use: No     Frequency: Never    Drug use: Yes     Types: Hydrocodone    Sexual activity: Not on file     Review of Systems   Constitutional: Positive for chills and fatigue. Negative for activity change, appetite change, diaphoresis and fever.   HENT: Negative for congestion, ear pain, postnasal drip, rhinorrhea and sore throat.    Eyes: Negative for photophobia and visual disturbance.   Respiratory: Positive for chest tightness (right sided) and shortness of breath. Negative for cough and wheezing.    Cardiovascular: Negative for chest pain, palpitations and leg swelling.   Gastrointestinal: Positive for nausea. Negative for abdominal distention, abdominal pain, constipation, diarrhea and vomiting.   Genitourinary: Negative for difficulty urinating, dysuria,  flank pain, frequency, hematuria and urgency.   Musculoskeletal: Negative for arthralgias, gait problem, myalgias and neck pain.   Skin: Negative for color change, pallor, rash and wound.   Neurological: Negative for dizziness, syncope, weakness, light-headedness, numbness and headaches.   Psychiatric/Behavioral: Negative for agitation, confusion and hallucinations. The patient is not nervous/anxious.      Objective:     Vital Signs (Most Recent):  Temp: 96.3 °F (35.7 °C) (08/13/20 2237)  Pulse: 72 (08/14/20 0009)  Resp: 16 (08/14/20 0009)  BP: (!) 98/56 (08/13/20 2237)  SpO2: 96 % (08/14/20 0009) Vital Signs (24h Range):  Temp:  [96.3 °F (35.7 °C)-101 °F (38.3 °C)] 96.3 °F (35.7 °C)  Pulse:  [] 72  Resp:  [16-26] 16  SpO2:  [88 %-100 %] 96 %  BP: ()/(52-73) 98/56     Weight: 65.8 kg (145 lb)  Body mass index is 24.89 kg/m².    Physical Exam  Vitals signs and nursing note reviewed.   Constitutional:       General: She is not in acute distress.     Appearance: She is well-developed. She is ill-appearing. She is not diaphoretic.      Interventions: Nasal cannula in place.   HENT:      Head: Normocephalic and atraumatic.      Right Ear: External ear normal.      Left Ear: External ear normal.      Mouth/Throat:      Mouth: Mucous membranes are dry.   Eyes:      Pupils: Pupils are equal, round, and reactive to light.   Neck:      Musculoskeletal: Normal range of motion.      Vascular: No JVD.   Cardiovascular:      Rate and Rhythm: Regular rhythm. Tachycardia present.      Heart sounds: No murmur.   Pulmonary:      Effort: Pulmonary effort is normal. Tachypnea present. No respiratory distress.      Breath sounds: Examination of the right-middle field reveals rhonchi. Examination of the right-lower field reveals rhonchi. Rhonchi present. No wheezing.      Comments: Patient is currently on 2L nasal cannula  Abdominal:      General: Abdomen is flat. Bowel sounds are normal. There is no distension.       Palpations: Abdomen is soft.      Tenderness: There is no abdominal tenderness.   Genitourinary:     Vagina: Normal.      Comments: Not examined  Musculoskeletal: Normal range of motion.         General: No deformity.   Skin:     General: Skin is warm and dry.      Capillary Refill: Capillary refill takes 2 to 3 seconds.   Neurological:      General: No focal deficit present.      Mental Status: She is alert and oriented to person, place, and time.   Psychiatric:         Mood and Affect: Mood normal.         Behavior: Behavior normal.         Thought Content: Thought content normal.         Judgment: Judgment normal.           CRANIAL NERVES     CN III, IV, VI   Pupils are equal, round, and reactive to light.       Significant Labs:   CBC:   Recent Labs   Lab 08/13/20  1834   WBC 16.03*   HGB 10.8*   HCT 35.9*        CMP:   Recent Labs   Lab 08/13/20  1834      K 3.2*   CL 97   CO2 29   *   BUN 16   CREATININE 0.8   CALCIUM 8.6*   PROT 6.9   ALBUMIN 2.9*   BILITOT 0.3   ALKPHOS 137*   AST 32   ALT 35   ANIONGAP 10   EGFRNONAA >60     Lactic Acid:   Recent Labs   Lab 08/13/20  1906 08/13/20  2307   LACTATE 1.6 1.2     Urine Studies:   Recent Labs   Lab 08/13/20  1931   COLORU Yellow   APPEARANCEUA Hazy*   PHUR 6.0   SPECGRAV 1.020   PROTEINUA Negative   GLUCUA Negative   KETONESU Negative   BILIRUBINUA Negative   OCCULTUA Trace*   NITRITE Positive*   UROBILINOGEN 1.0   LEUKOCYTESUR 2+*   RBCUA 2   WBCUA 35*   BACTERIA Many*   SQUAMEPITHEL 2     All pertinent labs within the past 24 hours have been reviewed.    Significant Imaging: I have reviewed and interpreted all pertinent imaging results/findings within the past 24 hours.    Assessment/Plan:     * Sepsis  This patient does have evidence of infective focus  My overall impression is sepsis.  Antibiotics given-   Antibiotics (From admission, onward)    Start     Stop Route Frequency Ordered    08/14/20 1930  vancomycin (VANCOCIN) 1,750 mg in  dextrose 5 % 500 mL IVPB      -- IV Every 24 hours (non-standard times) 08/13/20 2222    08/14/20 0400  piperacillin-tazobactam 4.5 g in dextrose 5 % 100 mL IVPB (ready to mix system)      -- IV Every 8 hours (non-standard times) 08/13/20 2217    08/13/20 2217  vancomycin - pharmacy to dose  (vancomycin IVPB)      -- IV pharmacy to manage frequency 08/13/20 2217          Latest lactate reviewed, they are-  Recent Labs   Lab 08/13/20  1906 08/13/20  2307   LACTATE 1.6 1.2       Organ dysfunction indicated by Acute respiratory failure  Source- PNA and UTI  Source control Achieved by- Vancomycin and Zosyn  UA reviewed.  CXR reviewed.  Procalcitonin elevated.  Blood cx pending.  Continue IVF.    Opioid dependence  Continue home suboxone.  reviewed.    Hypocalcemia  Corrected calcium 9.5, continue to trend.    Hypokalemia  Patient has hypokalemia which is currently uncontrolled. Last electrolytes reviewed-   Recent Labs   Lab 08/13/20  1834   K 3.2*   . Will replace potassium and monitor electrolytes closely. Continuous telemetry.    Acute hypoxemic respiratory failure  Likely 2/2 PNA.  Continue abx and supplemental oxygen.    Chronic obstructive pulmonary disease with acute lower respiratory infection  Patient's COPD is uncontrolled due to worsening of baseline hypoxia currently.  Patient is currently off COPD Pathway. Continue scheduled inhalers Antibiotics and Supplemental oxygen and monitor respiratory status closely.   Procalcitonin elevated.  CXR reviewed.  Continue abx.  Duo nebs.  Consulted pulmonology.     Acute cystitis without hematuria  UA reviewed, follow culture.  Patient started on zosyn in the ED, continue for now.    Pneumonia due to infectious organism  CXR reviewed.  Patient started on vancomycin and zosyn in the ED, will continue for now.  Duo nebs q4 hours.  Supplemental oxygen as needed to keep oxygen satruation >90%.    VTE Risk Mitigation (From admission, onward)         Ordered     enoxaparin  injection 40 mg  Every 24 hours      08/13/20 2217     IP VTE HIGH RISK PATIENT  Once      08/13/20 2217     Place sequential compression device  Until discontinued      08/13/20 2217                   Nora Newsome NP  Department of Hospital Medicine   [facility]

## 2020-08-14 NOTE — PLAN OF CARE
Pt is  AAOX4. POC reviewed with pt. Pt verbalized understanding. VSS. Remains afebrile. IVF infusing per order. IV abx infused per order. Pain controlled with PRN medications. Remains free of injury. Bed low, breaks locked, call light in reach. Will monitor.

## 2020-08-14 NOTE — CARE UPDATE
Receiving aerosol tx Q4.       08/14/20 0755   Patient Assessment/Suction   Level of Consciousness (AVPU) alert   Respiratory Effort Unlabored   Expansion/Accessory Muscles/Retractions suprasternal retractions   All Lung Fields Breath Sounds diminished   SUDHEER Breath Sounds coarse   LLL Breath Sounds coarse   RUL Breath Sounds crackles   RML Breath Sounds crackles   RLL Breath Sounds crackles   Cough Frequency no cough   PRE-TX-O2   O2 Device (Oxygen Therapy) nasal cannula   $ Is the patient on Low Flow Oxygen? Yes   Flow (L/min) 2   Oxygen Concentration (%) 28   SpO2 96 %   Pulse Oximetry Type Intermittent   $ Pulse Oximetry - Multiple Charge Pulse Oximetry - Multiple   Pulse 87   Resp 18   Aerosol Therapy   $ Aerosol Therapy Charges Aerosol Treatment   Daily Review of Necessity (SVN) completed   Respiratory Treatment Status (SVN) given   Treatment Route (SVN) mask;oxygen   Patient Position (SVN) HOB elevated   Signs of Intolerance (SVN) none   Breath Sounds Post-Respiratory Treatment   Throughout All Fields Post-Treatment All Fields   Throughout All Fields Post-Treatment crackles;aeration increased   Post-treatment Heart Rate (beats/min) 86   Post-treatment Resp Rate (breaths/min) 20   Ready to Wean/Extubation Screen   FIO2<=50 (chart decimal) 0.28

## 2020-08-14 NOTE — HPI
Kendra Lawrence is a 64 y.o. female with a PMHx of COPD, opioid dependence, HTN, CAD, and GERD who presents to the ED with complaints of SOB x10 days. The patient reports SOB that has been constant and became progressively worse over the last 2 days. She reports exerting herself makes it worse and nothing makes it better. She endorses associated fatigue, chills, and right chest wall tightness. The patient denies any fever, CP, cough, congestion, abdominal pain, vomiting, diarrhea, dizziness, weakness, or dysuria. The patient states that she currently smokes e-cigarettes. She denies any known sick contacts. Her ED work up is significant for tachycardia, fever, tachypnea, hypoxia, leukocytosis, nitrite positive UTI, and CXR revealing dense consolidation within the right mid to lower lung consistent with pneumonia. Sepsis protocol was initiated in the ED and the patient received IV fluids, vancomycin, and zosyn. The patient will be admitted under hospital medicine for further work up and evaluation.

## 2020-08-14 NOTE — ASSESSMENT & PLAN NOTE
CXR reviewed.  Patient started on vancomycin and zosyn in the ED, will continue for now.  Duo nebs q4 hours.  Supplemental oxygen as needed to keep oxygen satruation >90%.

## 2020-08-14 NOTE — SUBJECTIVE & OBJECTIVE
Interval History: complains of generalized weakness and fatigue. + dysuria. Reports back pain.   Currently on 2 L NC with 98% oxygen    Review of Systems   Constitutional: Positive for activity change, appetite change, fatigue and fever. Negative for diaphoresis.   Respiratory: Positive for cough and shortness of breath.    Cardiovascular: Negative for chest pain and leg swelling.   Gastrointestinal: Negative for abdominal distention and abdominal pain.   Genitourinary: Positive for dysuria and frequency.   Musculoskeletal: Positive for arthralgias and back pain.   Skin: Negative for rash.   Neurological: Negative for speech difficulty and numbness.     Objective:     Vital Signs (Most Recent):  Temp: 99.7 °F (37.6 °C) (08/14/20 1058)  Pulse: 92 (08/14/20 1115)  Resp: 20 (08/14/20 1115)  BP: 130/72 (08/14/20 1058)  SpO2: 98 % (08/14/20 1115) Vital Signs (24h Range):  Temp:  [96.3 °F (35.7 °C)-101 °F (38.3 °C)] 99.7 °F (37.6 °C)  Pulse:  [] 92  Resp:  [16-26] 20  SpO2:  [88 %-100 %] 98 %  BP: ()/(52-73) 130/72     Weight: 65.8 kg (145 lb)  Body mass index is 24.89 kg/m².    Intake/Output Summary (Last 24 hours) at 8/14/2020 1437  Last data filed at 8/14/2020 0700  Gross per 24 hour   Intake 3374 ml   Output --   Net 3374 ml      Physical Exam  Vitals signs and nursing note reviewed.   Constitutional:       Appearance: She is well-developed. She is ill-appearing.   HENT:      Head: Normocephalic.      Right Ear: External ear normal.      Left Ear: External ear normal.      Mouth/Throat:      Comments: Poor dentition  Eyes:      Conjunctiva/sclera: Conjunctivae normal.      Pupils: Pupils are equal, round, and reactive to light.   Neck:      Musculoskeletal: Normal range of motion and neck supple.   Cardiovascular:      Rate and Rhythm: Normal rate and regular rhythm.      Heart sounds: Normal heart sounds.   Pulmonary:      Effort: Pulmonary effort is normal.      Breath sounds: Rhonchi present.    Abdominal:      General: Bowel sounds are normal.      Palpations: Abdomen is soft.      Tenderness: There is no abdominal tenderness.   Musculoskeletal: Normal range of motion.   Skin:     General: Skin is warm and dry.   Neurological:      General: No focal deficit present.      Mental Status: She is alert and oriented to person, place, and time. Mental status is at baseline.      Motor: No weakness.      Comments: Generalized weakness   Psychiatric:         Behavior: Behavior normal.         Judgment: Judgment normal.         Significant Labs:   BMP:   Recent Labs   Lab 08/14/20  0421         K 3.4*   CL 99   CO2 31*   BUN 17   CREATININE 0.8   CALCIUM 8.0*   MG 1.8     CBC:   Recent Labs   Lab 08/13/20  1834 08/14/20  0704   WBC 16.03* 10.69   HGB 10.8* 9.4*   HCT 35.9* 32.1*    240       Significant Imaging: I have reviewed and interpreted all pertinent imaging results/findings within the past 24 hours.

## 2020-08-14 NOTE — PT/OT/SLP EVAL
"Physical Therapy Evaluation    Patient Name:  Kendra Lawrence   MRN:  [id number]    Recommendations:     Discharge Recommendations:  home health PT   Discharge Equipment Recommendations: shower chair   Barriers to discharge: None    Assessment:     Kendra Lawrence is a 64 y.o. female admitted with a medical diagnosis of Sepsis.  She presents with the following impairments/functional limitations:  weakness, impaired endurance, impaired balance, impaired cardiopulmonary response to activity. Patient is agreeable to participation with PT evaluation. She has a medical history of COPD, opioid dependence, HTN, CAD, and GERD. She denies any pain at rest. She lives with her  and daughter. She is the caregiver for her . Her daughter works from home. The patient does not use an AD at baseline and is not on home O2. Her  has a rollator that he does not use. She reports a fall 4 months ago in which she tripped over her 's oxygen cord. She does not drive. SpO2 of 95% at rest on 2LO2. She requires SBA for supine to sit and step transfer to the [facility]. She ambulated 30' in room with no AD, CGA, and 2LO2. SpO2 of 86% after gait recovering to 94%. Patient was educated on PLB but notes "I don't think I can breath through my nose right now". She was able to perform with verbal cues and a demonstration. She was educated to use the rollator at home if needed. She will benefit from HHPT, a shower chair, and a home O2 evaluation.     Rehab Prognosis: Good; patient would benefit from acute skilled PT services to address these deficits and reach maximum level of function.    Recent Surgery: * No surgery found *      Plan:     During this hospitalization, patient to be seen 6 x/week to address the identified rehab impairments via gait training, therapeutic activities, therapeutic exercises and progress toward the following goals:    · Plan of Care Expires:  09/14/20    Subjective     Chief Complaint: SOB with movement "   Patient/Family Comments/goals: use [facility]  Pain/Comfort:  · Pain Rating 1: 0/10    Patients cultural, spiritual, L[employer]-day conflicts given the current situation:      Living Environment:  Patient lives with  and daughter   Prior to admission, patients level of function was independent. She is the caregiver for her . She had a fall 4 months ago. She does not drive. Her daughter cooks and she cleans.  Equipment used at home: none.  DME owned (not currently used): rollator.  Upon discharge, patient will have assistance from family.    Objective:     Communicated with JORDI Dias prior to session.  Patient found HOB elevated with bed alarm, oxygen, peripheral IV  upon PT entry to room.    General Precautions: Standard, fall, respiratory   Orthopedic Precautions:N/A   Braces: N/A     Exams:  · RUE ROM: WFL  · LUE ROM: WFL  · RLE Strength: 4/5  · LLE Strength: 4/5    Functional Mobility:  · Bed Mobility:     · Supine to Sit: stand by assistance  · Transfers:     · Sit to Stand:  stand by assistance with no AD  · Gait: 30' no AD, CGA, 2LO2  · Balance: Sitting: good, standing: fair    Therapeutic Activities and Exercises:   Patient was educated on the importance of OOB activity and functional mobility to negate negative effects of prolonged bed rest during hospitalization, safe transfers and ambulation, and D/C planning, PLB     AM-PAC 6 CLICK MOBILITY  Total Score:22     Patient left HOB elevated with all lines intact, call button in reach, bed alarm on and RN notified.    GOALS:   Multidisciplinary Problems     Physical Therapy Goals        Problem: Physical Therapy Goal    Goal Priority Disciplines Outcome Goal Variances Interventions   Physical Therapy Goal     PT, PT/OT      Description: Goals to be met by: 2020     Patient will increase functional independence with mobility by performin. Supine to sit with Supervision  2. Sit to stand transfer with Supervision  3. Bed to chair transfer with  Supervision using LRAD  4. Gait  x 250 feet with Supervision using LRAD  5. Lower extremity exercise program x20 reps per handout, with independence                     History:     Past Medical History:   Diagnosis Date    Asthma     Cardiac angina     Chronic abdominal pain     Claustrophobia     COPD (chronic obstructive pulmonary disease)     Coronary artery disease     GERD (gastroesophageal reflux disease)     History of drug dependence     Hypertension        Past Surgical History:   Procedure Laterality Date    BREAST BIOPSY Bilateral 20 yrs ago    benign     SECTION      CHOLECYSTECTOMY      ENDOSCOPIC ULTRASOUND OF UPPER GASTROINTESTINAL TRACT Left 2018    Procedure: ULTRASOUND, UPPER GI TRACT, ENDOSCOPIC;  Surgeon: Paras Negrete MD;  Location: [facility];  Service: Endoscopy;  Laterality: Left;    ESOPHAGOGASTRODUODENOSCOPY N/A 2018    Procedure: EGD (ESOPHAGOGASTRODUODENOSCOPY);  Surgeon: Paras Negrete MD;  Location: [facility];  Service: Endoscopy;  Laterality: N/A;    ESOPHAGOGASTRODUODENOSCOPY N/A 2018    Procedure: EGD (ESOPHAGOGASTRODUODENOSCOPY);  Surgeon: Paras Negrete MD;  Location: [facility];  Service: Endoscopy;  Laterality: N/A;    HYSTERECTOMY      MAGNETIC RESONANCE IMAGING N/A 2019    Procedure: MRI (Magnetic Resonance Imagine) needs anesthesia;  Surgeon: Raffy Charles MD;  Location: [facility];  Service: Anesthesiology;  Laterality: N/A;    OOPHORECTOMY      TEMPOROMANDIBULAR JOINT SURGERY      TONSILLECTOMY         Time Tracking:     PT Received On: 20  PT Start Time: 1258     PT Stop Time: 1325  PT Total Time (min): 27 min     Billable Minutes: Evaluation 10 and Gait Training 17      Rachel Sue, PT  2020

## 2020-08-14 NOTE — ASSESSMENT & PLAN NOTE
CXR reviewed.  Patient started on vancomycin and zosyn in the ED, will continue for now.  Duo nebs q4 hours.  Supplemental oxygen as needed to keep oxygen satruation >90%.  Pulmonary consulted

## 2020-08-15 PROBLEM — E44.1 PROTEIN-CALORIE MALNUTRITION, MILD: Status: ACTIVE | Noted: 2020-01-01

## 2020-08-15 NOTE — PT/OT/SLP PROGRESS
Physical Therapy Treatment    Patient Name:  Kendra Lawrence   MRN:  [id number]    Recommendations:     Discharge Recommendations:  home health PT   Discharge Equipment Recommendations: shower chair   Barriers to discharge: None    Assessment:     Kendra Lawrence is a 64 y.o. female admitted with a medical diagnosis of Sepsis.  She presents with the following impairments/functional limitations:  weakness, impaired endurance, impaired self care skills, impaired functional mobilty, gait instability, decreased lower extremity function, decreased safety awareness, impaired cardiopulmonary response to activity.  Patient agreeable to gait training. Patient experienced episode of anxiety after walking and began to have trouble breathing.  Patient accidentally removed IV from her rt wrist while attempting to reach for something to fan herself - nursing assistance needed to stop blood leaking from IV site.    Rehab Prognosis: Fair; patient would benefit from acute skilled PT services to address these deficits and reach maximum level of function.    Recent Surgery: * No surgery found *      Plan:     During this hospitalization, patient to be seen 6 x/week to address the identified rehab impairments via gait training, therapeutic activities, therapeutic exercises and progress toward the following goals:    · Plan of Care Expires:  09/14/20    Subjective     Chief Complaint: generalized weakness  Patient/Family Comments/goals: improve overall strength/mobility  Pain/Comfort:  · Pain Rating 1: 0/10      Objective:     Communicated with nurse prior to session.  Patient found supine with oxygen, peripheral IV upon PT entry to room.     General Precautions: Standard, fall   Orthopedic Precautions:N/A   Braces: N/A     Functional Mobility:  · Bed Mobility:     · Supine to Sit: minimum assistance  · Sit to Supine: minimum assistance  · Transfers:     · Sit to Stand:  minimum assistance with rolling walker  · Gait: 30 ft w/ RW and min  assist x 2/2 liters of oxy      AM-PAC 6 CLICK MOBILITY  Turning over in bed (including adjusting bedclothes, sheets and blankets)?: 3  Sitting down on and standing up from a chair with arms (e.g., wheelchair, bedside commode, etc.): 3  Moving from lying on back to sitting on the side of the bed?: 3  Moving to and from a bed to a chair (including a wheelchair)?: 3  Need to walk in hospital room?: 3  Climbing 3-5 steps with a railing?: 1  Basic Mobility Total Score: 16       Therapeutic Activities and Exercises:   patient sat EOB 15 min while experiencing her anxiety attack    Patient left sittig EOB with nurse present..    GOALS:   Multidisciplinary Problems     Physical Therapy Goals        Problem: Physical Therapy Goal    Goal Priority Disciplines Outcome Goal Variances Interventions   Physical Therapy Goal     PT, PT/OT Ongoing, Progressing     Description: Goals to be met by: 2020     Patient will increase functional independence with mobility by performin. Supine to sit with Supervision  2. Sit to stand transfer with Supervision  3. Bed to chair transfer with Supervision using LRAD  4. Gait  x 250 feet with Supervision using LRAD  5. Lower extremity exercise program x20 reps per handout, with independence                     Time Tracking:     PT Received On: 08/15/20  PT Start Time: 1030     PT Stop Time: 1100  PT Total Time (min): 30 min     Billable Minutes: Gait Training 15 and Therapeutic Activity 15    Treatment Type: Treatment  PT/PTA: PT     PTA Visit Number: 0     Cory Ruano, PT  08/15/2020

## 2020-08-15 NOTE — PROGRESS NOTES
[facility] Hospital Medicine  Progress Note    Patient Name: Kendra Lawrence  MRN: [id number]  Patient Class: IP- Inpatient   Admission Date: 8/13/2020  Length of Stay: 2 days  Attending Physician: Dorie Starks MD  Primary Care Provider: Louie Urena MD        Subjective:     Principal Problem:Sepsis        HPI:  Kendra Lawrence is a 64 y.o. female with a PMHx of COPD, opioid dependence, HTN, CAD, and GERD who presents to the ED with complaints of SOB x10 days. The patient reports SOB that has been constant and became progressively worse over the last 2 days. She reports exerting herself makes it worse and nothing makes it better. She endorses associated fatigue, chills, and right chest wall tightness. The patient denies any fever, CP, cough, congestion, abdominal pain, vomiting, diarrhea, dizziness, weakness, or dysuria. The patient states that she currently smokes e-cigarettes. She denies any known sick contacts. Her ED work up is significant for tachycardia, fever, tachypnea, hypoxia, leukocytosis, nitrite positive UTI, and CXR revealing dense consolidation within the right mid to lower lung consistent with pneumonia. Sepsis protocol was initiated in the ED and the patient received IV fluids, vancomycin, and zosyn. The patient will be admitted under hospital medicine for further work up and evaluation.     Overview/Hospital Course:  No notes on file    Interval History: complains of generalized weakness and fatigue.  She reports worsening of shortness of breath this morning although she is 98% on 2 L nasal cannula O2 desats to 88% on room air with exertion.  Reports increased anxiety.     Review of Systems   Constitutional: Positive for activity change, appetite change, fatigue and fever. Negative for diaphoresis.   Respiratory: Positive for cough and shortness of breath.    Cardiovascular: Negative for chest pain and leg swelling.   Gastrointestinal: Negative for abdominal distention and  abdominal pain.   Genitourinary: Positive for dysuria and frequency.   Musculoskeletal: Positive for arthralgias and back pain.   Skin: Negative for rash.   Neurological: Negative for speech difficulty and numbness.     Objective:     Vital Signs (Most Recent):  Temp: 97.5 °F (36.4 °C) (08/15/20 0809)  Pulse: 92 (08/15/20 0809)  Resp: 16 (08/15/20 0809)  BP: 129/61 (08/15/20 0809)  SpO2: 97 % (08/15/20 0809) Vital Signs (24h Range):  Temp:  [97 °F (36.1 °C)-100.6 °F (38.1 °C)] 97.5 °F (36.4 °C)  Pulse:  [] 92  Resp:  [16-20] 16  SpO2:  [90 %-100 %] 97 %  BP: (101-129)/(51-61) 129/61     Weight: 66.1 kg (145 lb 12.8 oz)  Body mass index is 25.03 kg/m².    Intake/Output Summary (Last 24 hours) at 8/15/2020 1140  Last data filed at 8/15/2020 0600  Gross per 24 hour   Intake 160 ml   Output --   Net 160 ml      Physical Exam  Vitals signs and nursing note reviewed.   Constitutional:       Appearance: She is well-developed. She is ill-appearing.   HENT:      Head: Normocephalic.      Right Ear: External ear normal.      Left Ear: External ear normal.      Mouth/Throat:      Comments: Poor dentition  Eyes:      Conjunctiva/sclera: Conjunctivae normal.      Pupils: Pupils are equal, round, and reactive to light.   Neck:      Musculoskeletal: Normal range of motion and neck supple.   Cardiovascular:      Rate and Rhythm: Normal rate and regular rhythm.      Heart sounds: Normal heart sounds.   Pulmonary:      Effort: Pulmonary effort is normal.      Breath sounds: Wheezing and rhonchi present.      Comments: Diminished breath sounds on 2 L nasal cannula  Abdominal:      General: Bowel sounds are normal.      Palpations: Abdomen is soft.      Tenderness: There is no abdominal tenderness.   Musculoskeletal: Normal range of motion.   Skin:     General: Skin is warm and dry.   Neurological:      General: No focal deficit present.      Mental Status: She is alert and oriented to person, place, and time. Mental status is at  baseline.      Motor: No weakness.      Comments: Generalized weakness   Psychiatric:         Behavior: Behavior normal.         Judgment: Judgment normal.      Comments: Anxious         Significant Labs:   BMP:   Recent Labs   Lab 08/15/20  0435   *      K 4.0      CO2 28   BUN 10   CREATININE 0.8   CALCIUM 7.2*   MG 1.8     CBC:   Recent Labs   Lab 08/13/20  1834 08/14/20  0704 08/15/20  0436   WBC 16.03* 10.69 12.86*   HGB 10.8* 9.4* 8.9*   HCT 35.9* 32.1* 31.0*    240 285       Significant Imaging: I have reviewed and interpreted all pertinent imaging results/findings within the past 24 hours.      Assessment/Plan:      * Sepsis  This patient does have evidence of infective focus  My overall impression is sepsis.  Antibiotics given-   Antibiotics (From admission, onward)    Start     Stop Route Frequency Ordered    08/14/20 1930  vancomycin (VANCOCIN) 1,750 mg in dextrose 5 % 500 mL IVPB      -- IV Every 24 hours (non-standard times) 08/13/20 2222    08/14/20 0400  piperacillin-tazobactam 4.5 g in dextrose 5 % 100 mL IVPB (ready to mix system)      -- IV Every 8 hours (non-standard times) 08/13/20 2217    08/13/20 2217  vancomycin - pharmacy to dose  (vancomycin IVPB)      -- IV pharmacy to manage frequency 08/13/20 2217          Latest lactate reviewed, they are-  Recent Labs   Lab 08/13/20  1906 08/13/20  2307   LACTATE 1.6 1.2       Organ dysfunction indicated by Acute respiratory failure  Source- PNA and UTI  Source control Achieved by- Vancomycin and Zosyn  UA reviewed.  CXR reviewed.  Procalcitonin elevated.  Blood cx pending.  Continue IVF.      Protein-calorie malnutrition, mild  Nutrition consulted. Body mass index is 25.03 kg/m².. Encourage maximal PO intake. Diet supplementation ordered per nutrition approval. Will encourage PO and monitor closely for weight changes.        Iron deficiency anemia secondary to inadequate dietary iron intake  Check iron studies. Add po  fergon      Opioid dependence  Continue home suboxone.  reviewed.    Hypocalcemia  Corrected calcium 9.5, continue to trend.    Hypokalemia  Patient has hypokalemia which is currently uncontrolled. Last electrolytes reviewed-   Recent Labs   Lab 08/14/20  0421 08/14/20  1800 08/15/20  0435   K 3.4* 4.2 4.0   . Will replace potassium and monitor electrolytes closely. Continuous telemetry.  improved    Acute hypoxemic respiratory failure  Likely 2/2 PNA.  Continue abx and supplemental oxygen.  No significant improvement. Check CT chest- no previous imaging of chest.     Chronic obstructive pulmonary disease with acute lower respiratory infection  Patient's COPD is uncontrolled due to worsening of baseline hypoxia currently.  Patient is currently off COPD Pathway. Continue scheduled inhalers Antibiotics and Supplemental oxygen and monitor respiratory status closely.   Procalcitonin elevated.  CXR reviewed.  Continue abx.  Duo nebs.  Consulted pulmonology.     Acute cystitis without hematuria  UA reviewed, follow culture.  Patient started on zosyn in the ED, continue for now.    Pneumonia due to infectious organism  CXR reviewed.  Patient started on vancomycin and zosyn in the ED, will continue for now.  Duo nebs q4 hours.  Supplemental oxygen as needed to keep oxygen satruation >90%.  Pulmonary consulted      VTE Risk Mitigation (From admission, onward)         Ordered     enoxaparin injection 40 mg  Every 24 hours      08/13/20 2217     IP VTE HIGH RISK PATIENT  Once      08/13/20 2217     Place sequential compression device  Until discontinued      08/13/20 2217                Discharge Planning   BRANDON: 8/16/2020     Code Status: Full Code   Is the patient medically ready for discharge?:     Reason for patient still in hospital (select all that apply): Patient unstable, Laboratory test, Treatment and Consult recommendations  Discharge Plan A: Home with family                  Natalia Guajardo NP  Department of  Hospital Medicine [facility]

## 2020-08-15 NOTE — ASSESSMENT & PLAN NOTE
Nutrition consulted. Body mass index is 25.03 kg/m².. Encourage maximal PO intake. Diet supplementation ordered per nutrition approval. Will encourage PO and monitor closely for weight changes.

## 2020-08-15 NOTE — CARE UPDATE
08/15/20 0739   Patient Assessment/Suction   Level of Consciousness (AVPU) alert   Respiratory Effort Unlabored   Expansion/Accessory Muscles/Retractions no use of accessory muscles   All Lung Fields Breath Sounds crackles, coarse   PRE-TX-O2   O2 Device (Oxygen Therapy) nasal cannula   $ Is the patient on Low Flow Oxygen? Yes   Flow (L/min) 2   Oxygen Concentration (%) 28   SpO2 100 %   Pulse Oximetry Type Intermittent   $ Pulse Oximetry - Multiple Charge Pulse Oximetry - Multiple   Pulse 85   Resp 20   Aerosol Therapy   $ Aerosol Therapy Charges Aerosol Treatment   Respiratory Treatment Status (SVN) given   Treatment Route (SVN) mask;oxygen   Patient Position (SVN) HOB elevated   Signs of Intolerance (SVN) none   Breath Sounds Post-Respiratory Treatment   Throughout All Fields Post-Treatment All Fields   Throughout All Fields Post-Treatment no change   Post-treatment Heart Rate (beats/min) 87   Post-treatment Resp Rate (breaths/min) 20   Ready to Wean/Extubation Screen   FIO2<=50 (chart decimal) 0.28

## 2020-08-15 NOTE — CARE UPDATE
08/15/20 0957   Home Oxygen Qualification   Room Air SpO2 At Rest (!) 86 %   SpO2 on Recovery 96 %   Recovery Heart Rate 96 bpm   Recovery O2 LPM 2 LPM   Home O2 Eval Comments Qualifies for home 02

## 2020-08-15 NOTE — RESPIRATORY THERAPY
08/14/20 1940   Patient Assessment/Suction   Level of Consciousness (AVPU) alert   Respiratory Effort Normal;Unlabored   Expansion/Accessory Muscles/Retractions expansion symmetric;no retractions;no use of accessory muscles   All Lung Fields Breath Sounds crackles, coarse   Cough Frequency infrequent   Cough Type nonproductive   PRE-TX-O2   O2 Device (Oxygen Therapy) nasal cannula   Flow (L/min) 2   Oxygen Concentration (%) 28   SpO2 98 %   Pulse Oximetry Type Intermittent   Pulse 87   Resp 16   Aerosol Therapy   $ Aerosol Therapy Charges Aerosol Treatment   Respiratory Treatment Status (SVN) given   Treatment Route (SVN) mask;oxygen   Patient Position (SVN) HOB elevated   Signs of Intolerance (SVN) none   Breath Sounds Post-Respiratory Treatment   Throughout All Fields Post-Treatment All Fields   Throughout All Fields Post-Treatment no change   Post-treatment Heart Rate (beats/min) 87   Post-treatment Resp Rate (breaths/min) 18   Ready to Wean/Extubation Screen   FIO2<=50 (chart decimal) 0.28

## 2020-08-15 NOTE — ASSESSMENT & PLAN NOTE
Patient has hypokalemia which is currently uncontrolled. Last electrolytes reviewed-   Recent Labs   Lab 08/14/20  0421 08/14/20  1800 08/15/20  0435   K 3.4* 4.2 4.0   . Will replace potassium and monitor electrolytes closely. Continuous telemetry.  improved

## 2020-08-15 NOTE — PLAN OF CARE
Problem: Physical Therapy Goal  Goal: Physical Therapy Goal  Description: Goals to be met by: 2020     Patient will increase functional independence with mobility by performin. Supine to sit with Supervision  2. Sit to stand transfer with Supervision  3. Bed to chair transfer with Supervision using LRAD  4. Gait  x 250 feet with Supervision using LRAD  5. Lower extremity exercise program x20 reps per handout, with independence    Outcome: Ongoing, Progressing

## 2020-08-15 NOTE — SUBJECTIVE & OBJECTIVE
Interval History: complains of generalized weakness and fatigue.  She reports worsening of shortness of breath this morning although she is 98% on 2 L nasal cannula O2 desats to 88% on room air with exertion.  Reports increased anxiety.     Review of Systems   Constitutional: Positive for activity change, appetite change, fatigue and fever. Negative for diaphoresis.   Respiratory: Positive for cough and shortness of breath.    Cardiovascular: Negative for chest pain and leg swelling.   Gastrointestinal: Negative for abdominal distention and abdominal pain.   Genitourinary: Positive for dysuria and frequency.   Musculoskeletal: Positive for arthralgias and back pain.   Skin: Negative for rash.   Neurological: Negative for speech difficulty and numbness.     Objective:     Vital Signs (Most Recent):  Temp: 97.5 °F (36.4 °C) (08/15/20 0809)  Pulse: 92 (08/15/20 0809)  Resp: 16 (08/15/20 0809)  BP: 129/61 (08/15/20 0809)  SpO2: 97 % (08/15/20 0809) Vital Signs (24h Range):  Temp:  [97 °F (36.1 °C)-100.6 °F (38.1 °C)] 97.5 °F (36.4 °C)  Pulse:  [] 92  Resp:  [16-20] 16  SpO2:  [90 %-100 %] 97 %  BP: (101-129)/(51-61) 129/61     Weight: 66.1 kg (145 lb 12.8 oz)  Body mass index is 25.03 kg/m².    Intake/Output Summary (Last 24 hours) at 8/15/2020 1140  Last data filed at 8/15/2020 0600  Gross per 24 hour   Intake 160 ml   Output --   Net 160 ml      Physical Exam  Vitals signs and nursing note reviewed.   Constitutional:       Appearance: She is well-developed. She is ill-appearing.   HENT:      Head: Normocephalic.      Right Ear: External ear normal.      Left Ear: External ear normal.      Mouth/Throat:      Comments: Poor dentition  Eyes:      Conjunctiva/sclera: Conjunctivae normal.      Pupils: Pupils are equal, round, and reactive to light.   Neck:      Musculoskeletal: Normal range of motion and neck supple.   Cardiovascular:      Rate and Rhythm: Normal rate and regular rhythm.      Heart sounds: Normal  heart sounds.   Pulmonary:      Effort: Pulmonary effort is normal.      Breath sounds: Wheezing and rhonchi present.      Comments: Diminished breath sounds on 2 L nasal cannula  Abdominal:      General: Bowel sounds are normal.      Palpations: Abdomen is soft.      Tenderness: There is no abdominal tenderness.   Musculoskeletal: Normal range of motion.   Skin:     General: Skin is warm and dry.   Neurological:      General: No focal deficit present.      Mental Status: She is alert and oriented to person, place, and time. Mental status is at baseline.      Motor: No weakness.      Comments: Generalized weakness   Psychiatric:         Behavior: Behavior normal.         Judgment: Judgment normal.      Comments: Anxious         Significant Labs:   BMP:   Recent Labs   Lab 08/15/20  0435   *      K 4.0      CO2 28   BUN 10   CREATININE 0.8   CALCIUM 7.2*   MG 1.8     CBC:   Recent Labs   Lab 08/13/20  1834 08/14/20  0704 08/15/20  0436   WBC 16.03* 10.69 12.86*   HGB 10.8* 9.4* 8.9*   HCT 35.9* 32.1* 31.0*    240 285       Significant Imaging: I have reviewed and interpreted all pertinent imaging results/findings within the past 24 hours.

## 2020-08-15 NOTE — ASSESSMENT & PLAN NOTE
Likely 2/2 PNA.  Continue abx and supplemental oxygen.  No significant improvement. Check CT chest- no previous imaging of chest.

## 2020-08-15 NOTE — PLAN OF CARE
"Plan of care review with pt, pt states "understanding," IVF infusing without difficulty, no redness/swelling noted to IV site, pt is very anxious with care AEB pt does not want to get out of bed states "I can't breathe when I move and yall are trying to kill me," extensive teaching and redirecting provided, will continue to monitor, observe and note any changes, safety maintain    "

## 2020-08-15 NOTE — PLAN OF CARE
Plan of care reviewed with pt,verbalized understanding. Pt had low grade fever 100.6. she had two episode of panic attack ,first one after using bedside commode and second one after using bed pan, both times 02 sat dropped blow 90% but eventually ,increased. Call light kept within reach, bed in locked and lowest position, side rails up x2.

## 2020-08-16 PROBLEM — R59.0 MEDIASTINAL LYMPHADENOPATHY: Status: ACTIVE | Noted: 2020-01-01

## 2020-08-16 NOTE — ASSESSMENT & PLAN NOTE
UA reviewed, follow culture.  Patient started on zosyn in the ED, continue for now.    + Klebsiella pneumonia

## 2020-08-16 NOTE — NURSING
Per conversation with Dr. Mello, she states that Dr. Brothers is aware of her lung mass/cancer and she needs to follow up with him in the office. She stated that as long as she has home O2 set up she is ok to discharge per pulmonology standpoint and she will not be seeing her today.

## 2020-08-16 NOTE — RESPIRATORY THERAPY
08/1955   Patient Assessment/Suction   Level of Consciousness (AVPU) alert   Respiratory Effort Normal;Unlabored   Expansion/Accessory Muscles/Retractions expansion symmetric;no retractions;no use of accessory muscles   All Lung Fields Breath Sounds crackles, coarse   Cough Frequency no cough   PRE-TX-O2   O2 Device (Oxygen Therapy) nasal cannula   Flow (L/min) 2   Oxygen Concentration (%) 28   SpO2 96 %   Pulse Oximetry Type Intermittent   Pulse 106   Resp (!) 21   Aerosol Therapy   $ Aerosol Therapy Charges Aerosol Treatment   Respiratory Treatment Status (SVN) given   Treatment Route (SVN) mask;oxygen   Patient Position (SVN) HOB elevated   Signs of Intolerance (SVN) none   Breath Sounds Post-Respiratory Treatment   Throughout All Fields Post-Treatment All Fields   Throughout All Fields Post-Treatment no change   Post-treatment Heart Rate (beats/min) 103   Post-treatment Resp Rate (breaths/min) 20   Ready to Wean/Extubation Screen   FIO2<=50 (chart decimal) 0.28

## 2020-08-16 NOTE — PLAN OF CARE
A&Ox4. Pt educated to call for assistance. Plan of care discussed with patient, all questions answered. C/o pain addressed with prn medication. Comfort level established. Pt remains free from fall/injury throughout shift. IV fluids infusing per orders, pt tolerating well. Pt continues IV ABT per orders, no s/s of adverse reactions noted at this time. Telemetry on and being monitored. Pt ranges from NSR to Sinus Tachycardia. Pt afebrile throughout shift. 02 in place per NC, pt tolerating well. No c/o anxiety at this time. Non-skid socks in place when pt out of bed. Bed in lowest position, wheels locked, side rails up x2, call light within reach, bed alarm on and sounding. No distress noted at this time. Will Continue to observe.

## 2020-08-16 NOTE — PROGRESS NOTES
[facility] Hospital Medicine  Progress Note    Patient Name: Kendra Lawrence  MRN: [id number]  Patient Class: IP- Inpatient   Admission Date: 8/13/2020  Length of Stay: 3 days  Attending Physician: Dorie Starks MD  Primary Care Provider: Louie Urena MD        Subjective:     Principal Problem:Sepsis        HPI:  Kendra Lawrence is a 64 y.o. female with a PMHx of COPD, opioid dependence, HTN, CAD, and GERD who presents to the ED with complaints of SOB x10 days. The patient reports SOB that has been constant and became progressively worse over the last 2 days. She reports exerting herself makes it worse and nothing makes it better. She endorses associated fatigue, chills, and right chest wall tightness. The patient denies any fever, CP, cough, congestion, abdominal pain, vomiting, diarrhea, dizziness, weakness, or dysuria. The patient states that she currently smokes e-cigarettes. She denies any known sick contacts. Her ED work up is significant for tachycardia, fever, tachypnea, hypoxia, leukocytosis, nitrite positive UTI, and CXR revealing dense consolidation within the right mid to lower lung consistent with pneumonia. Sepsis protocol was initiated in the ED and the patient received IV fluids, vancomycin, and zosyn. The patient will be admitted under hospital medicine for further work up and evaluation.     Overview/Hospital Course:  No notes on file    Interval History: complains of generalized weakness and fatigue. Still with BLUE on 2 Liters NC.   On IV vanc and zosyn    CT chest  8/16 demonstrates Findings in the right middle lobe concerning for neoplasm, invading the mediastinum.  Masslike pneumonia is included in the differential but less likely.  Probable postobstructive changes in the right upper and lower lobes.   Enlarged subcarinal lymph node, concerning for a metastatic node.   Moderate, loculated-appearing right pleural effusion.      Review of Systems   Constitutional: Positive  for activity change, appetite change, fatigue and fever. Negative for diaphoresis.   Respiratory: Positive for cough, shortness of breath and wheezing.    Cardiovascular: Negative for chest pain and leg swelling.   Gastrointestinal: Negative for abdominal distention and abdominal pain.   Genitourinary: Positive for dysuria and frequency.   Musculoskeletal: Positive for arthralgias and back pain.   Skin: Negative for rash.   Neurological: Negative for speech difficulty and numbness.     Objective:     Vital Signs (Most Recent):  Temp: 99.1 °F (37.3 °C) (08/16/20 0732)  Pulse: 97 (08/16/20 0741)  Resp: 16 (08/16/20 0741)  BP: 126/88 (08/16/20 0732)  SpO2: 96 % (08/16/20 0741) Vital Signs (24h Range):  Temp:  [96.4 °F (35.8 °C)-99.1 °F (37.3 °C)] 99.1 °F (37.3 °C)  Pulse:  [] 97  Resp:  [16-21] 16  SpO2:  [94 %-99 %] 96 %  BP: (121-139)/(60-88) 126/88     Weight: 66.1 kg (145 lb 12.8 oz)  Body mass index is 25.03 kg/m².    Intake/Output Summary (Last 24 hours) at 8/16/2020 1029  Last data filed at 8/15/2020 1800  Gross per 24 hour   Intake 120 ml   Output 650 ml   Net -530 ml      Physical Exam  Vitals signs and nursing note reviewed.   Constitutional:       Appearance: She is well-developed. She is ill-appearing.   HENT:      Head: Normocephalic.      Right Ear: External ear normal.      Left Ear: External ear normal.      Mouth/Throat:      Comments: Poor dentition  Eyes:      Conjunctiva/sclera: Conjunctivae normal.      Pupils: Pupils are equal, round, and reactive to light.   Neck:      Musculoskeletal: Normal range of motion and neck supple.   Cardiovascular:      Rate and Rhythm: Normal rate and regular rhythm.      Heart sounds: Normal heart sounds.   Pulmonary:      Effort: Pulmonary effort is normal.      Breath sounds: Wheezing and rhonchi present.      Comments: Diminished breath sounds on 2 L nasal cannula  Abdominal:      General: Bowel sounds are normal.      Palpations: Abdomen is soft.       Tenderness: There is no abdominal tenderness.   Musculoskeletal: Normal range of motion.   Skin:     General: Skin is warm and dry.   Neurological:      General: No focal deficit present.      Mental Status: She is alert and oriented to person, place, and time. Mental status is at baseline.      Motor: No weakness.      Comments: Generalized weakness   Psychiatric:         Behavior: Behavior normal.         Judgment: Judgment normal.      Comments: Anxious         Significant Labs:   BMP:   Recent Labs   Lab 08/16/20  0436   *      K 4.0      CO2 25   BUN 9   CREATININE 0.8   CALCIUM 7.8*   MG 1.9     CBC:   Recent Labs   Lab 08/15/20  0436 08/16/20  0437   WBC 12.86* 16.82*   HGB 8.9* 10.4*   HCT 31.0* 37.0    356*       Significant Imaging: I have reviewed and interpreted all pertinent imaging results/findings within the past 24 hours.      Assessment/Plan:      * Sepsis  This patient does have evidence of infective focus  My overall impression is sepsis.  Antibiotics given-   Antibiotics (From admission, onward)    Start     Stop Route Frequency Ordered    08/14/20 1930  vancomycin (VANCOCIN) 1,750 mg in dextrose 5 % 500 mL IVPB      -- IV Every 24 hours (non-standard times) 08/13/20 2222    08/14/20 0400  piperacillin-tazobactam 4.5 g in dextrose 5 % 100 mL IVPB (ready to mix system)      -- IV Every 8 hours (non-standard times) 08/13/20 2217    08/13/20 2217  vancomycin - pharmacy to dose  (vancomycin IVPB)      -- IV pharmacy to manage frequency 08/13/20 2217          Latest lactate reviewed, they are-  Recent Labs   Lab 08/13/20  1906 08/13/20  2307   LACTATE 1.6 1.2       Organ dysfunction indicated by Acute respiratory failure  Source- PNA and UTI  Source control Achieved by- Vancomycin and Zosyn  UA reviewed.  CXR reviewed.  Procalcitonin elevated.  Blood cx pending.  Continue IVF.      Mediastinal lymphadenopathy  Noted on CT chest. Possible due to neoplasm.         Protein-calorie malnutrition, mild  Nutrition consulted. Body mass index is 25.03 kg/m².. Encourage maximal PO intake. Diet supplementation ordered per nutrition approval. Will encourage PO and monitor closely for weight changes.        Iron deficiency anemia secondary to inadequate dietary iron intake  Check iron studies. Add po fergon      Opioid dependence  Continue home suboxone.  reviewed.    Hypocalcemia  Corrected calcium 9.5, continue to trend.    Hypokalemia  Patient has hypokalemia which is currently uncontrolled. Last electrolytes reviewed-   Recent Labs   Lab 08/14/20  0421 08/14/20  1800 08/15/20  0435   K 3.4* 4.2 4.0   . Will replace potassium and monitor electrolytes closely. Continuous telemetry.  improved    Acute hypoxemic respiratory failure  Likely 2/2 PNA.  Continue abx and supplemental oxygen.  No significant improvement. Check CT chest- no previous imaging of chest.   CT chest with possible mass, post obs pneumonia. Continue IV abx. Pulmonary consulted     Chronic obstructive pulmonary disease with acute lower respiratory infection  Patient's COPD is uncontrolled due to worsening of baseline hypoxia currently.  Patient is currently off COPD Pathway. Continue scheduled inhalers Antibiotics and Supplemental oxygen and monitor respiratory status closely.   Procalcitonin elevated.  CXR reviewed.  Continue abx.  Duo nebs.  Consulted pulmonology.     Acute cystitis without hematuria  UA reviewed, follow culture.  Patient started on zosyn in the ED, continue for now.    + Klebsiella pneumonia    Pneumonia due to infectious organism  CXR reviewed.  Patient started on vancomycin and zosyn in the ED, will continue for now.  Duo nebs q4 hours.  Supplemental oxygen as needed to keep oxygen satruation >90%.  Pulmonary consulted        VTE Risk Mitigation (From admission, onward)         Ordered     enoxaparin injection 40 mg  Every 24 hours      08/13/20 2126     IP VTE HIGH RISK PATIENT  Once       08/13/20 2217     Place sequential compression device  Until discontinued      08/13/20 2217                Discharge Planning   BRANDON: 8/16/2020     Code Status: Full Code   Is the patient medically ready for discharge?:     Reason for patient still in hospital (select all that apply): Patient new problem, Patient trending condition, Imaging and Consult recommendations  Discharge Plan A: Home with family                  Natalia Guajardo NP  Department of Hospital Medicine   [facility]

## 2020-08-16 NOTE — CARE UPDATE
08/16/20 1234   Home Oxygen Qualification   Room Air SpO2 At Rest (!) 72 %   SpO2 on Recovery 96 %   Recovery Heart Rate 105 bpm   Recovery O2 LPM 3 LPM   Home O2 Eval Comments Qualifies for home 02

## 2020-08-16 NOTE — PROGRESS NOTES
Pharmacokinetic Assessment Follow Up: IV Vancomycin - Edited    Vancomycin serum concentration assessment(s):    The trough level was drawn correctly and can be used to guide therapy at this time. The measurement is below the desired definitive target range of 15 to 20 mcg/mL.    Vancomycin Regimen Plan:    Change regimen to Vancomycin 1000 mg IV every 12 hours with next serum trough concentration measured at 0830 prior to 4th dose on 08/17    Drug levels (last 3 results):  Recent Labs   Lab Result Units 08/15/20  1840   Vancomycin-Trough ug/mL 8.8*       Pharmacy will continue to follow and monitor vancomycin.    Please contact pharmacy at extension [phone] for questions regarding this assessment.    Thank you for the consult,   Sanjuanita Delgado       Patient brief summary:  Kendra Lawrence is a 64 y.o. female initiated on antimicrobial therapy with IV Vancomycin for treatment of lower respiratory infection    The patient's current regimen is Vancomycin 1000 mg IV Q12H.    Drug Allergies:   Review of patient's allergies indicates:  No Known Allergies    Actual Body Weight:   66.1 kg    Renal Function:   Estimated Creatinine Clearance: 64.8 mL/min (based on SCr of 0.8 mg/dL).,     Dialysis Method (if applicable):  N/A    CBC (last 72 hours):  Recent Labs   Lab Result Units 08/13/20  1834 08/14/20  0704 08/15/20  0436 08/16/20  0437   WBC K/uL 16.03* 10.69 12.86* 16.82*   Hemoglobin g/dL 10.8* 9.4* 8.9* 10.4*   Hematocrit % 35.9* 32.1* 31.0* 37.0   Platelets K/uL 349 240 285 356*   Gran% % 81.5* 81.1* 83.9* 82.1*   Lymph% % 6.9* 6.9* 4.6* 5.1*   Mono% % 10.4 10.4 10.6 11.6   Eosinophil% % 0.4 0.5 0.2 0.4   Basophil% % 0.2 0.2 0.2 0.2   Differential Method  Automated Automated Automated Automated       Metabolic Panel (last 72 hours):  Recent Labs   Lab Result Units 08/13/20  1834 08/13/20  1931 08/14/20  0421 08/14/20  1800 08/15/20  0435 08/16/20  0436   Sodium mmol/L 136  --  139  --  136 138   Potassium mmol/L 3.2*   --  3.4* 4.2 4.0 4.0   Chloride mmol/L 97  --  99  --  101 102   CO2 mmol/L 29  --  31*  --  28 25   Glucose mg/dL 169*  --  109  --  138* 116*   Glucose, UA   --  Negative  --   --   --   --    BUN, Bld mg/dL 16  --  17  --  10 9   Creatinine mg/dL 0.8  --  0.8  --  0.8 0.8   Albumin g/dL 2.9*  --  2.6*  --  2.0* 2.1*   Total Bilirubin mg/dL 0.3  --  0.5  --  0.4 0.4   Alkaline Phosphatase U/L 137*  --  122  --  147* 143*   AST U/L 32  --  27  --  29 14   ALT U/L 35  --  31  --  35 27   Magnesium mg/dL  --   --  1.8  --  1.8 1.9   Phosphorus mg/dL  --   --  2.7  --  2.0* 2.4*       Vancomycin Administrations:  vancomycin given in the last 96 hours                     vancomycin 1.5 g in dextrose 5 % 250 mL IVPB (ready to mix) (mg) 1,500 mg New Bag 08/15/20 2131    vancomycin (VANCOCIN) 1,750 mg in dextrose 5 % 500 mL IVPB (mg) 1,750 mg New Bag 08/14/20 2032    vancomycin in dextrose 5 % 1 gram/250 mL IVPB 1,000 mg (mg) 1,000 mg New Bag 08/13/20 1950                    Microbiologic Results:  Microbiology Results (last 7 days)       Procedure Component Value Units Date/Time    Blood culture x two cultures. Draw prior to antibiotics. [[id number]] Collected: 08/13/20 1906    Order Status: Completed Specimen: Blood from Antecubital, Left Updated: 08/15/20 1412     Blood Culture, Routine No Growth to date      No Growth to date    Narrative:      Aerobic and anaerobic    Blood culture x two cultures. Draw prior to antibiotics. [[id number]] Collected: 08/13/20 1906    Order Status: Completed Specimen: Blood from Antecubital, Right Updated: 08/15/20 1412     Blood Culture, Routine No Growth to date      No Growth to date    Narrative:      Aerobic and anaerobic    Urine culture [[id number]]  (Abnormal) Collected: 08/13/20 1931    Order Status: Completed Specimen: Urine Updated: 08/15/20 1200     Urine Culture, Routine KLEBSIELLA PNEUMONIAE  >100,000 cfu/ml  Susceptibility pending      Narrative:      Specimen Source->Urine

## 2020-08-16 NOTE — PT/OT/SLP PROGRESS
Physical Therapy      Patient Name:  Kendra Lawrence   MRN:  [id number]    Patient not seen today secondary to Patient unwilling to participate(2x attempts made. nurse Larissa notified). Will follow-up 8/17/2020.    Minerva Greenfield PTA

## 2020-08-16 NOTE — PLAN OF CARE
08/16/20 1351   Discharge Reassessment   Assessment Type Discharge Planning Reassessment       Pt was scheduled to discharge today with home health and oxygen, however due to pt's significant oxygen desaturation, it was decided that pt would stay overnight. Pt was approved by Mr. Hollis Zaman to have oxygen through [facility]. Pt will continue to be followed for any continuity of care issues, discharge planning, and emotional support. SW remains available.

## 2020-08-16 NOTE — CONSULTS
Pharmacokinetic Assessment Follow Up: IV Vancomycin    Vancomycin serum concentration assessment(s):    The trough level was drawn correctly and can be used to guide therapy at this time. The measurement is below the desired definitive target range of 15 to 20 mcg/mL.    Vancomycin Regimen Plan:    Change regimen to Vancomycin 1500 mg IV every 12 hours with next serum trough concentration measured at 0830 prior to 4th dose on 08/17    Drug levels (last 3 results):  Recent Labs   Lab Result Units 08/15/20  1840   Vancomycin-Trough ug/mL 8.8*       Pharmacy will continue to follow and monitor vancomycin.    Please contact pharmacy at extension [phone] for questions regarding this assessment.    Thank you for the consult,   Anna Ballard       Patient brief summary:  Kendra Lawrence is a 64 y.o. female initiated on antimicrobial therapy with IV Vancomycin for treatment of lower respiratory infection      Drug Allergies:   Review of patient's allergies indicates:  No Known Allergies    Actual Body Weight:   66.1 kg    Renal Function:   Estimated Creatinine Clearance: 64.8 mL/min (based on SCr of 0.8 mg/dL).,     Dialysis Method (if applicable):  N/A    CBC (last 72 hours):  Recent Labs   Lab Result Units 08/13/20  1834 08/14/20  0704 08/15/20  0436   WBC K/uL 16.03* 10.69 12.86*   Hemoglobin g/dL 10.8* 9.4* 8.9*   Hematocrit % 35.9* 32.1* 31.0*   Platelets K/uL 349 240 285   Gran% % 81.5* 81.1* 83.9*   Lymph% % 6.9* 6.9* 4.6*   Mono% % 10.4 10.4 10.6   Eosinophil% % 0.4 0.5 0.2   Basophil% % 0.2 0.2 0.2   Differential Method  Automated Automated Automated       Metabolic Panel (last 72 hours):  Recent Labs   Lab Result Units 08/13/20  1834 08/13/20  1931 08/14/20  0421 08/14/20  1800 08/15/20  0435   Sodium mmol/L 136  --  139  --  136   Potassium mmol/L 3.2*  --  3.4* 4.2 4.0   Chloride mmol/L 97  --  99  --  101   CO2 mmol/L 29  --  31*  --  28   Glucose mg/dL 169*  --  109  --  138*   Glucose, UA   --  Negative  --   --    --    BUN, Bld mg/dL 16  --  17  --  10   Creatinine mg/dL 0.8  --  0.8  --  0.8   Albumin g/dL 2.9*  --  2.6*  --  2.0*   Total Bilirubin mg/dL 0.3  --  0.5  --  0.4   Alkaline Phosphatase U/L 137*  --  122  --  147*   AST U/L 32  --  27  --  29   ALT U/L 35  --  31  --  35   Magnesium mg/dL  --   --  1.8  --  1.8   Phosphorus mg/dL  --   --  2.7  --  2.0*       Vancomycin Administrations:  vancomycin given in the last 96 hours                     vancomycin 1.5 g in dextrose 5 % 250 mL IVPB (ready to mix) (mg) 1,500 mg New Bag 08/15/20 2131    vancomycin (VANCOCIN) 1,750 mg in dextrose 5 % 500 mL IVPB (mg) 1,750 mg New Bag 08/14/20 2032    vancomycin in dextrose 5 % 1 gram/250 mL IVPB 1,000 mg (mg) 1,000 mg New Bag 08/13/20 1950                    Microbiologic Results:  Microbiology Results (last 7 days)       Procedure Component Value Units Date/Time    Blood culture x two cultures. Draw prior to antibiotics. [[id number]] Collected: 08/13/20 1906    Order Status: Completed Specimen: Blood from Antecubital, Left Updated: 08/15/20 1412     Blood Culture, Routine No Growth to date      No Growth to date    Narrative:      Aerobic and anaerobic    Blood culture x two cultures. Draw prior to antibiotics. [[id number]] Collected: 08/13/20 1906    Order Status: Completed Specimen: Blood from Antecubital, Right Updated: 08/15/20 1412     Blood Culture, Routine No Growth to date      No Growth to date    Narrative:      Aerobic and anaerobic    Urine culture [[id number]]  (Abnormal) Collected: 08/13/20 1931    Order Status: Completed Specimen: Urine Updated: 08/15/20 1200     Urine Culture, Routine KLEBSIELLA PNEUMONIAE  >100,000 cfu/ml  Susceptibility pending      Narrative:      Specimen Source->Urine

## 2020-08-16 NOTE — SUBJECTIVE & OBJECTIVE
Interval History: complains of generalized weakness and fatigue. Still with BLUE on 2 Liters NC.   On IV vanc and zosyn    CT chest  8/16 demonstrates Findings in the right middle lobe concerning for neoplasm, invading the mediastinum.  Masslike pneumonia is included in the differential but less likely.  Probable postobstructive changes in the right upper and lower lobes.   Enlarged subcarinal lymph node, concerning for a metastatic node.   Moderate, loculated-appearing right pleural effusion.      Review of Systems   Constitutional: Positive for activity change, appetite change, fatigue and fever. Negative for diaphoresis.   Respiratory: Positive for cough, shortness of breath and wheezing.    Cardiovascular: Negative for chest pain and leg swelling.   Gastrointestinal: Negative for abdominal distention and abdominal pain.   Genitourinary: Positive for dysuria and frequency.   Musculoskeletal: Positive for arthralgias and back pain.   Skin: Negative for rash.   Neurological: Negative for speech difficulty and numbness.     Objective:     Vital Signs (Most Recent):  Temp: 99.1 °F (37.3 °C) (08/16/20 0732)  Pulse: 97 (08/16/20 0741)  Resp: 16 (08/16/20 0741)  BP: 126/88 (08/16/20 0732)  SpO2: 96 % (08/16/20 0741) Vital Signs (24h Range):  Temp:  [96.4 °F (35.8 °C)-99.1 °F (37.3 °C)] 99.1 °F (37.3 °C)  Pulse:  [] 97  Resp:  [16-21] 16  SpO2:  [94 %-99 %] 96 %  BP: (121-139)/(60-88) 126/88     Weight: 66.1 kg (145 lb 12.8 oz)  Body mass index is 25.03 kg/m².    Intake/Output Summary (Last 24 hours) at 8/16/2020 1029  Last data filed at 8/15/2020 1800  Gross per 24 hour   Intake 120 ml   Output 650 ml   Net -530 ml      Physical Exam  Vitals signs and nursing note reviewed.   Constitutional:       Appearance: She is well-developed. She is ill-appearing.   HENT:      Head: Normocephalic.      Right Ear: External ear normal.      Left Ear: External ear normal.      Mouth/Throat:      Comments: Poor dentition  Eyes:       Conjunctiva/sclera: Conjunctivae normal.      Pupils: Pupils are equal, round, and reactive to light.   Neck:      Musculoskeletal: Normal range of motion and neck supple.   Cardiovascular:      Rate and Rhythm: Normal rate and regular rhythm.      Heart sounds: Normal heart sounds.   Pulmonary:      Effort: Pulmonary effort is normal.      Breath sounds: Wheezing and rhonchi present.      Comments: Diminished breath sounds on 2 L nasal cannula  Abdominal:      General: Bowel sounds are normal.      Palpations: Abdomen is soft.      Tenderness: There is no abdominal tenderness.   Musculoskeletal: Normal range of motion.   Skin:     General: Skin is warm and dry.   Neurological:      General: No focal deficit present.      Mental Status: She is alert and oriented to person, place, and time. Mental status is at baseline.      Motor: No weakness.      Comments: Generalized weakness   Psychiatric:         Behavior: Behavior normal.         Judgment: Judgment normal.      Comments: Anxious         Significant Labs:   BMP:   Recent Labs   Lab 08/16/20 0436   *      K 4.0      CO2 25   BUN 9   CREATININE 0.8   CALCIUM 7.8*   MG 1.9     CBC:   Recent Labs   Lab 08/15/20  0436 08/16/20 0437   WBC 12.86* 16.82*   HGB 8.9* 10.4*   HCT 31.0* 37.0    356*       Significant Imaging: I have reviewed and interpreted all pertinent imaging results/findings within the past 24 hours.

## 2020-08-16 NOTE — PLAN OF CARE
Pt is  AAOX4. POC reviewed with pt. Pt verbalized understanding. VSS. Remains afebrile. IVF infusing per order. IV abx infused per order. Pain controlled with PRN medications. Pt has increase SOB and decreasing O2 sats when on room air. Pulmonology consulted.  Remains free of injury. Bed low, breaks locked, call light in reach. Will monitor.

## 2020-08-16 NOTE — ASSESSMENT & PLAN NOTE
Likely 2/2 PNA.  Continue abx and supplemental oxygen.  No significant improvement. Check CT chest- no previous imaging of chest.   CT chest with possible mass, post obs pneumonia. Continue IV abx. Pulmonary consulted

## 2020-08-16 NOTE — CARE UPDATE
Receiving aerosol tx Q4, attempting to wean 02 today. Currently on 3lpm reduced to 2lpm       08/16/20 0741   Patient Assessment/Suction   Level of Consciousness (AVPU) alert   Respiratory Effort Unlabored   Expansion/Accessory Muscles/Retractions no retractions;no use of accessory muscles   All Lung Fields Breath Sounds crackles, coarse   Rhythm/Pattern, Respiratory shallow;unlabored;sighing   Cough Frequency no cough   PRE-TX-O2   O2 Device (Oxygen Therapy) nasal cannula   $ Is the patient on Low Flow Oxygen? Yes   SpO2 96 %   Pulse Oximetry Type Intermittent   $ Pulse Oximetry - Multiple Charge Pulse Oximetry - Multiple   Pulse 97   Resp 16   Aerosol Therapy   $ Aerosol Therapy Charges Aerosol Treatment   Daily Review of Necessity (SVN) completed   Respiratory Treatment Status (SVN) given   Treatment Route (SVN) mask;oxygen   Patient Position (SVN) HOB elevated   Post Treatment Assessment (SVN) breath sounds unchanged   Signs of Intolerance (SVN) none   Breath Sounds Post-Respiratory Treatment   Throughout All Fields Post-Treatment All Fields   Throughout All Fields Post-Treatment no change   Post-treatment Heart Rate (beats/min) 97   Post-treatment Resp Rate (breaths/min) 18

## 2020-08-17 PROBLEM — C34.10 MALIGNANT NEOPLASM OF UPPER LOBE OF LUNG: Status: ACTIVE | Noted: 2020-01-01

## 2020-08-17 PROBLEM — N17.9 AKI (ACUTE KIDNEY INJURY): Status: ACTIVE | Noted: 2020-01-01

## 2020-08-17 NOTE — ASSESSMENT & PLAN NOTE
Suspected. New finding on CT chest. Plan for bronch in am.  Concerning for mets. Check CT head per pulmonary recs.

## 2020-08-17 NOTE — PLAN OF CARE
Problem: Physical Therapy Goal  Goal: Physical Therapy Goal  Description: Goals to be met by: 2020     Patient will increase functional independence with mobility by performin. Supine to sit with Supervision  2. Sit to stand transfer with Supervision  3. Bed to chair transfer with Supervision using LRAD  4. Gait  x 250 feet with Supervision using LRAD  5. Lower extremity exercise program x20 reps per handout, with independence    Outcome: Ongoing, Progressing   Therapeutic activity ; bed mobility, transfers, gait with rw and assistance for safety.

## 2020-08-17 NOTE — PROGRESS NOTES
Progress Note  PULMONARY    Admit Date: 8/13/2020 08/17/2020      SUBJECTIVE:     Aug 17 ct with mass,    Feels weak, min ambulation, min appetite.    Seems much worse today at bedside        PFSH and Allergies reviewed.    OBJECTIVE:     Vitals (Most recent):  Vitals:    08/17/20 0657   BP:    Pulse: 85   Resp: 18   Temp:        Vitals (24 hour range):  Temp:  [97.1 °F (36.2 °C)-98.8 °F (37.1 °C)]   Pulse:  []   Resp:  [8-28]   BP: (122-132)/(57-68)   SpO2:  [92 %-98 %]     No intake or output data in the 24 hours ending 08/17/20 0822       Physical Exam:  The patient's neuro status (alertness,orientation,cognitive function,motor skills,), pharyngeal exam (oral lesions, hygiene, abn dentition,), Neck (jvd,mass,thyroid,nodes in neck and above/below clavicle),RESPIRATORY(symmetry,effort,fremitus,percussion,auscultation),  Cor(rhythm,heart tones including gallops,perfusion,edema)ABD(distention,hepatic&splenomegaly,tenderness,masses), Skin(rash,cyanosis),Psyc(affect,judgement,).  Exam negative except for these pertinent findings:    Decreased bs right, good left, looks weak,debilitated, sedate this am    Radiographs reviewed: view by direct vision - ct with mass rul ant/post and rml,   No results found for this or any previous visit.]    Labs     Recent Labs   Lab 08/17/20  0433   WBC 18.02*   HGB 9.9*   HCT 34.4*   *     Recent Labs   Lab 08/17/20  0433      K 3.9      CO2 22*   BUN 15   CREATININE 1.5*   *   CALCIUM 7.6*   MG 2.0   PHOS 3.0   AST 13   ALT 21   ALKPHOS 135   BILITOT 0.2   PROT 6.0   ALBUMIN 1.8*   No results for input(s): PH, PCO2, PO2, HCO3 in the last 24 hours.  Microbiology Results (last 7 days)     Procedure Component Value Units Date/Time    Blood culture x two cultures. Draw prior to antibiotics. [[id number]] Collected: 08/13/20 1906    Order Status: Completed Specimen: Blood from Antecubital, Left Updated: 08/16/20 1412     Blood Culture, Routine No Growth to  date      No Growth to date      No Growth to date    Narrative:      Aerobic and anaerobic    Blood culture x two cultures. Draw prior to antibiotics. [[id number]] Collected: 08/13/20 1906    Order Status: Completed Specimen: Blood from Antecubital, Right Updated: 08/16/20 1412     Blood Culture, Routine No Growth to date      No Growth to date      No Growth to date    Narrative:      Aerobic and anaerobic    Urine culture [[id number]]  (Abnormal)  (Susceptibility) Collected: 08/13/20 1931    Order Status: Completed Specimen: Urine Updated: 08/16/20 1256     Urine Culture, Routine KLEBSIELLA PNEUMONIAE  >100,000 cfu/ml      Narrative:      Specimen Source->Urine    Culture, Respiratory with Gram Stain [[id number]]     Order Status: No result Specimen: Respiratory           Impression:  Active Hospital Problems    Diagnosis  POA    *Sepsis [A41.9]  Yes    Mediastinal lymphadenopathy [R59.0]  Yes    Protein-calorie malnutrition, mild [E44.1]  Yes    Opioid dependence [F11.20]  Yes    Iron deficiency anemia secondary to inadequate dietary iron intake [D50.8]  Yes    Pneumonia due to infectious organism [J18.9]  Yes    Acute cystitis without hematuria [N30.00]  Yes    Chronic obstructive pulmonary disease with acute lower respiratory infection [J44.0]  Yes    Acute hypoxemic respiratory failure [J96.01]  Yes    Hypokalemia [E87.6]  Yes    Hypocalcemia [E83.51]  Yes      Resolved Hospital Problems   No resolved problems to display.               Plan:     8/17 temp max 99,   Temp better but pt not clearly better, large tumor apparent, need brain scan, will try to bronch in am.    Needs mobilized. Will stop xanax.                               .

## 2020-08-17 NOTE — PLAN OF CARE
Purposeful rounding every two hours this shift. Alert and oriented, becomes short of breath with exertion. 02 remains on at 3L NC.  Will be NPO after midnight for Bronchoscopy in the morning. Per Preop will be here for patient at 0630 am.

## 2020-08-17 NOTE — RESPIRATORY THERAPY
08/16/20 1927   Patient Assessment/Suction   Level of Consciousness (AVPU) alert   Respiratory Effort Mild;Labored;Short of breath   Expansion/Accessory Muscles/Retractions abdominal muscle use   All Lung Fields Breath Sounds diminished   Cough Frequency no cough   PRE-TX-O2   O2 Device (Oxygen Therapy) nasal cannula   Flow (L/min) 3   Oxygen Concentration (%) 32   SpO2 95 %   Pulse Oximetry Type Intermittent   Pulse 103   Resp (!) 21   Aerosol Therapy   $ Aerosol Therapy Charges Aerosol Treatment   Respiratory Treatment Status (SVN) given   Treatment Route (SVN) mask;oxygen   Patient Position (SVN) sitting on edge of bed   Signs of Intolerance (SVN) none   Breath Sounds Post-Respiratory Treatment   Throughout All Fields Post-Treatment All Fields   Throughout All Fields Post-Treatment aeration increased   Post-treatment Heart Rate (beats/min) 108   Post-treatment Resp Rate (breaths/min) 20   Ready to Wean/Extubation Screen   FIO2<=50 (chart decimal) 0.32

## 2020-08-17 NOTE — PROGRESS NOTES
[facility] Hospital Medicine  Progress Note    Patient Name: Kendra Lawrence  MRN: [id number]  Patient Class: IP- Inpatient   Admission Date: 8/13/2020  Length of Stay: 4 days  Attending Physician: Dorie Starks MD  Primary Care Provider: Louie Urena MD        Subjective:     Principal Problem:Sepsis        HPI:  Kendra Lawrence is a 64 y.o. female with a PMHx of COPD, opioid dependence, HTN, CAD, and GERD who presents to the ED with complaints of SOB x10 days. The patient reports SOB that has been constant and became progressively worse over the last 2 days. She reports exerting herself makes it worse and nothing makes it better. She endorses associated fatigue, chills, and right chest wall tightness. The patient denies any fever, CP, cough, congestion, abdominal pain, vomiting, diarrhea, dizziness, weakness, or dysuria. The patient states that she currently smokes e-cigarettes. She denies any known sick contacts. Her ED work up is significant for tachycardia, fever, tachypnea, hypoxia, leukocytosis, nitrite positive UTI, and CXR revealing dense consolidation within the right mid to lower lung consistent with pneumonia. Sepsis protocol was initiated in the ED and the patient received IV fluids, vancomycin, and zosyn. The patient will be admitted under hospital medicine for further work up and evaluation.     Overview/Hospital Course:  No notes on file    Interval History: complains of generalized weakness and fatigue. Still with BLUE on 3Liters NC.   On IV vanc and zosyn-- Vanco on hold due to elevated trough.    Continues to desat.  Pulmonary plans for bronch tomorrow.     CT chest  8/16 demonstrates Findings in the right middle lobe concerning for neoplasm, invading the mediastinum.  Masslike pneumonia is included in the differential but less likely.  Probable postobstructive changes in the right upper and lower lobes.   Enlarged subcarinal lymph node, concerning for a metastatic node.    Moderate, loculated-appearing right pleural effusion.      Review of Systems   Constitutional: Positive for activity change, appetite change, fatigue and fever. Negative for diaphoresis.   Respiratory: Positive for cough, shortness of breath and wheezing.    Cardiovascular: Negative for chest pain and leg swelling.   Gastrointestinal: Negative for abdominal distention and abdominal pain.   Genitourinary: Negative for dysuria and frequency.   Musculoskeletal: Positive for arthralgias and back pain.   Skin: Negative for rash.   Neurological: Negative for speech difficulty and numbness.   Psychiatric/Behavioral: The patient is nervous/anxious.      Objective:     Vital Signs (Most Recent):  Temp: 96.6 °F (35.9 °C) (08/17/20 1128)  Pulse: 99 (08/17/20 1128)  Resp: 18 (08/17/20 1128)  BP: 135/63 (08/17/20 1128)  SpO2: (!) 94 % (08/17/20 1128) Vital Signs (24h Range):  Temp:  [96.2 °F (35.7 °C)-98.8 °F (37.1 °C)] 96.6 °F (35.9 °C)  Pulse:  [] 99  Resp:  [8-21] 18  SpO2:  [94 %-98 %] 94 %  BP: (121-135)/(57-68) 135/63     Weight: 75.8 kg (167 lb 1.6 oz)  Body mass index is 28.68 kg/m².  No intake or output data in the 24 hours ending 08/17/20 1440   Physical Exam  Vitals signs and nursing note reviewed.   Constitutional:       Appearance: She is well-developed. She is ill-appearing.   HENT:      Head: Normocephalic.      Right Ear: External ear normal.      Left Ear: External ear normal.      Mouth/Throat:      Comments: Poor dentition  Eyes:      Conjunctiva/sclera: Conjunctivae normal.      Pupils: Pupils are equal, round, and reactive to light.   Neck:      Musculoskeletal: Normal range of motion and neck supple.   Cardiovascular:      Rate and Rhythm: Normal rate and regular rhythm.      Heart sounds: Normal heart sounds.   Pulmonary:      Effort: Pulmonary effort is normal.      Breath sounds: Wheezing and rhonchi present.      Comments: Diminished breath sounds on 3  nasal cannula  Abdominal:      General:  Bowel sounds are normal.      Palpations: Abdomen is soft.      Tenderness: There is no abdominal tenderness.   Musculoskeletal: Normal range of motion.   Skin:     General: Skin is warm and dry.   Neurological:      General: No focal deficit present.      Mental Status: She is alert and oriented to person, place, and time. Mental status is at baseline.      Motor: No weakness.      Comments: Generalized weakness   Psychiatric:         Behavior: Behavior normal.         Judgment: Judgment normal.      Comments: Anxious         Significant Labs:   BMP:   Recent Labs   Lab 08/17/20  0433   *      K 3.9      CO2 22*   BUN 15   CREATININE 1.5*   CALCIUM 7.6*   MG 2.0     CBC:   Recent Labs   Lab 08/16/20  0437 08/17/20  0433   WBC 16.82* 18.02*   HGB 10.4* 9.9*   HCT 37.0 34.4*   * 427*       Significant Imaging: I have reviewed and interpreted all pertinent imaging results/findings within the past 24 hours.      Assessment/Plan:      * Sepsis  This patient does have evidence of infective focus  My overall impression is sepsis.  Antibiotics given-   Antibiotics (From admission, onward)    Start     Stop Route Frequency Ordered    08/14/20 1930  vancomycin (VANCOCIN) 1,750 mg in dextrose 5 % 500 mL IVPB      -- IV Every 24 hours (non-standard times) 08/13/20 2222    08/14/20 0400  piperacillin-tazobactam 4.5 g in dextrose 5 % 100 mL IVPB (ready to mix system)      -- IV Every 8 hours (non-standard times) 08/13/20 2217    08/13/20 2217  vancomycin - pharmacy to dose  (vancomycin IVPB)      -- IV pharmacy to manage frequency 08/13/20 2217          Latest lactate reviewed, they are-  Recent Labs   Lab 08/13/20  1906 08/13/20  2307   LACTATE 1.6 1.2       Organ dysfunction indicated by Acute respiratory failure  Source- PNA and UTI  Source control Achieved by- Vancomycin and Zosyn  UA reviewed.  CXR reviewed.  Procalcitonin elevated.  Blood cx pending.  Continue IVF.      NOELLE (acute kidney  injury)  New due to poor oral intake. Initiate on  cc/hr. Trend Cr.      Malignant neoplasm of upper lobe of lung  Suspected. New finding on CT chest. Plan for bronch in am.  Concerning for mets. Check CT head per pulmonary recs.     Mediastinal lymphadenopathy  Noted on CT chest. Possible due to neoplasm.        Protein-calorie malnutrition, mild  Nutrition consulted. Body mass index is 25.03 kg/m².. Encourage maximal PO intake. Diet supplementation ordered per nutrition approval. Will encourage PO and monitor closely for weight changes.        Iron deficiency anemia secondary to inadequate dietary iron intake  Check iron studies. Add po fergon      Opioid dependence  Continue home suboxone.  reviewed.    Hypocalcemia  Corrected calcium 9.5, continue to trend.    Hypokalemia  Patient has hypokalemia which is currently uncontrolled. Last electrolytes reviewed-   Recent Labs   Lab 08/14/20  0421 08/14/20  1800 08/15/20  0435   K 3.4* 4.2 4.0   . Will replace potassium and monitor electrolytes closely. Continuous telemetry.  improved    Acute hypoxemic respiratory failure  Likely 2/2 PNA.  Continue abx and supplemental oxygen.  No significant improvement. Check CT chest- no previous imaging of chest.   CT chest with possible mass, post obs pneumonia. Continue IV abx. Pulmonary consulted     Chronic obstructive pulmonary disease with acute lower respiratory infection  Patient's COPD is uncontrolled due to worsening of baseline hypoxia currently.  Patient is currently off COPD Pathway. Continue scheduled inhalers Antibiotics and Supplemental oxygen and monitor respiratory status closely.   Procalcitonin elevated.  CXR reviewed.  Continue abx.  Duo nebs.  Consulted pulmonology.     Acute cystitis without hematuria  UA reviewed, follow culture.  Patient started on zosyn in the ED, continue for now.    + Klebsiella pneumonia    Pneumonia due to infectious organism  CXR reviewed.  Patient started on vancomycin and  zosyn in the ED, will continue for now.  Duo nebs q4 hours.  Supplemental oxygen as needed to keep oxygen satruation >90%.  Pulmonary consulted      VTE Risk Mitigation (From admission, onward)         Ordered     enoxaparin injection 40 mg  Every 24 hours      08/13/20 2217     IP VTE HIGH RISK PATIENT  Once      08/13/20 2217     Place sequential compression device  Until discontinued      08/13/20 2217                Discharge Planning   BRANDON: 8/16/2020     Code Status: Full Code   Is the patient medically ready for discharge?: No    Reason for patient still in hospital (select all that apply): Patient unstable, Patient trending condition, Laboratory test, Treatment, Imaging and Consult recommendations  Discharge Plan A: Home with family                  Natalia Guajardo NP  Department of Hospital Medicine   [facility]

## 2020-08-17 NOTE — PROGRESS NOTES
Pharmacokinetic Assessment Follow Up: IV Vancomycin    Vancomycin serum concentration assessment(s):    The trough level was drawn correctly and can be used to guide therapy at this time. The measurement is above the desired definitive target range of 15 to 20 mcg/mL.    Vancomycin Regimen Plan:    Discontinue the scheduled vancomycin regimen and re-dose when the random level is less than 20 mcg/mL, next level to be drawn at 0800 on 08/18/2020.    Drug levels (last 3 results):  Recent Labs   Lab Result Units 08/15/20  1840 08/17/20  0746   Vancomycin-Trough ug/mL 8.8* 37.4*       Pharmacy will continue to follow and monitor vancomycin.    Please contact pharmacy at extension [phone] for questions regarding this assessment.    Thank you for the consult,   Niall Dsouza       Patient brief summary:  Kendra Lawrence is a 64 y.o. female initiated on antimicrobial therapy with IV Vancomycin for treatment of lower respiratory infection    The patient's current regimen is pulse dosing    Drug Allergies:   Review of patient's allergies indicates:  No Known Allergies    Actual Body Weight:   75.8kg    Renal Function:   Estimated Creatinine Clearance: 36.7 mL/min (A) (based on SCr of 1.5 mg/dL (H)).,       CBC (last 72 hours):  Recent Labs   Lab Result Units 08/15/20  0436 08/16/20  0437 08/17/20  0433   WBC K/uL 12.86* 16.82* 18.02*   Hemoglobin g/dL 8.9* 10.4* 9.9*   Hematocrit % 31.0* 37.0 34.4*   Platelets K/uL 285 356* 427*   Gran% % 83.9* 82.1* 86.1*   Lymph% % 4.6* 5.1* 2.9*   Mono% % 10.6 11.6 10.0   Eosinophil% % 0.2 0.4 0.1   Basophil% % 0.2 0.2 0.2   Differential Method  Automated Automated Automated       Metabolic Panel (last 72 hours):  Recent Labs   Lab Result Units 08/14/20  1800 08/15/20  0435 08/16/20  0436 08/17/20  0433   Sodium mmol/L  --  136 138 138   Potassium mmol/L 4.2 4.0 4.0 3.9   Chloride mmol/L  --  101 102 102   CO2 mmol/L  --  28 25 22*   Glucose mg/dL  --  138* 116* 125*   BUN, Bld mg/dL  --  10 9  15   Creatinine mg/dL  --  0.8 0.8 1.5*   Albumin g/dL  --  2.0* 2.1* 1.8*   Total Bilirubin mg/dL  --  0.4 0.4 0.2   Alkaline Phosphatase U/L  --  147* 143* 135   AST U/L  --  29 14 13   ALT U/L  --  35 27 21   Magnesium mg/dL  --  1.8 1.9 2.0   Phosphorus mg/dL  --  2.0* 2.4* 3.0       Vancomycin Administrations:  vancomycin given in the last 96 hours                     vancomycin in dextrose 5 % 1 gram/250 mL IVPB 1,000 mg (mg) 1,000 mg New Bag 08/16/20 2126     1,000 mg New Bag  0927    vancomycin 1.5 g in dextrose 5 % 250 mL IVPB (ready to mix) (mg) 1,500 mg New Bag 08/15/20 2131    vancomycin (VANCOCIN) 1,750 mg in dextrose 5 % 500 mL IVPB (mg) 1,750 mg New Bag 08/14/20 2032    vancomycin in dextrose 5 % 1 gram/250 mL IVPB 1,000 mg (mg) 1,000 mg New Bag 08/13/20 1950                    Microbiologic Results:  Microbiology Results (last 7 days)       Procedure Component Value Units Date/Time    Blood culture x two cultures. Draw prior to antibiotics. [[id number]] Collected: 08/13/20 1906    Order Status: Completed Specimen: Blood from Antecubital, Left Updated: 08/16/20 1412     Blood Culture, Routine No Growth to date      No Growth to date      No Growth to date    Narrative:      Aerobic and anaerobic    Blood culture x two cultures. Draw prior to antibiotics. [[id number]] Collected: 08/13/20 1906    Order Status: Completed Specimen: Blood from Antecubital, Right Updated: 08/16/20 1412     Blood Culture, Routine No Growth to date      No Growth to date      No Growth to date    Narrative:      Aerobic and anaerobic    Urine culture [[id number]]  (Abnormal)  (Susceptibility) Collected: 08/13/20 1931    Order Status: Completed Specimen: Urine Updated: 08/16/20 1256     Urine Culture, Routine KLEBSIELLA PNEUMONIAE  >100,000 cfu/ml      Narrative:      Specimen Source->Urine    Culture, Respiratory with Gram Stain [[id number]]     Order Status: No result Specimen: Respiratory

## 2020-08-17 NOTE — PROGRESS NOTES
Kendra Lawrence [id number] is a 64 y.o. female who had been consulted for vancomycin dosing.    Vancomycin has been discontinued.  Pharmacy consult for vancomycin dosing in no longer required.      Thank you for allowing us to participate in this patient's care.     Ann Marie Pantoja

## 2020-08-17 NOTE — PT/OT/SLP PROGRESS
Physical Therapy Treatment    Patient Name:  Kendra Lawrence   MRN:  [id number]    Recommendations:     Discharge Recommendations:  home health PT   Discharge Equipment Recommendations: shower chair   Barriers to discharge: None    Assessment:     Kendra Lawrence is a 64 y.o. female admitted with a medical diagnosis of Sepsis.  She presents with the following impairments/functional limitations:  weakness, impaired self care skills, impaired functional mobilty, gait instability, decreased safety awareness, impaired cardiopulmonary response to activity . Treatment limited as patient appeared lethargic , having difficulty remaining awake without stimulation( verbal / tactile). Patient attributes to medication. Nurse Cui informed. Required encouragement to participate in therapy.     Rehab Prognosis: Fair; patient would benefit from acute skilled PT services to address these deficits and reach maximum level of function.    Recent Surgery: Procedure(s) (LRB):  Bronchoscopy- 730 or noon, floro not needed (N/A)      Plan:     During this hospitalization, patient to be seen 6 x/week to address the identified rehab impairments via gait training, therapeutic activities, therapeutic exercises and progress toward the following goals:    · Plan of Care Expires:  09/14/20    Subjective     Chief Complaint: reports feeling weak.    Patient/Family Comments/goals: none stated  Pain/Comfort:  · Pain Rating 1: 0/10      Objective:     Communicated with nurse Cui prior to session.  Patient found supine with bed alarm, oxygen, peripheral IV upon PT entry to room.     General Precautions: Standard, fall   Orthopedic Precautions:N/A   Braces: N/A     Functional Mobility:  · Bed Mobility:     · Rolling Left:  minimum assistance  · Supine to Sit: minimum assistance  · Transfers:     · Sit to Stand:  minimum assistance with hand-held assist  · Gait: few steps to BSC then to chair with Min A and verbal cues for safety.      AM-PAC 6 CLICK  MOBILITY          Therapeutic Activities and Exercises:   Transferred EOB > BSC ( sat briefly with verbal cues to remain awake) > chair with min A . SpO2 >95 % with activity .    Able to perform hygiene task post void with Min A in standing.    Patient left up in chair with all lines intact, call button in reach, chair alarm on and nurse Basilia notified..    GOALS:   Multidisciplinary Problems     Physical Therapy Goals        Problem: Physical Therapy Goal    Goal Priority Disciplines Outcome Goal Variances Interventions   Physical Therapy Goal     PT, PT/OT Ongoing, Progressing     Description: Goals to be met by: 2020     Patient will increase functional independence with mobility by performin. Supine to sit with Supervision  2. Sit to stand transfer with Supervision  3. Bed to chair transfer with Supervision using LRAD  4. Gait  x 250 feet with Supervision using LRAD  5. Lower extremity exercise program x20 reps per handout, with independence                     Time Tracking:     PT Received On: 20  PT Start Time: 1023     PT Stop Time: 1037  PT Total Time (min): 14 min     Billable Minutes: Therapeutic Activity 14min    Treatment Type: Treatment  PT/PTA: PTA     PTA Visit Number: 1     Minerva Greenfield PTA  2020

## 2020-08-17 NOTE — CARE UPDATE
Receiving aerosol tx Q4.       08/17/20 0657   Patient Assessment/Suction   Level of Consciousness (AVPU) responds to voice   Respiratory Effort Unlabored   Expansion/Accessory Muscles/Retractions abdominal muscle use   All Lung Fields Breath Sounds diminished   SUDHEER Breath Sounds rhonchi   RUL Breath Sounds rhonchi   Cough Frequency infrequent   Cough Type nonproductive   PRE-TX-O2   O2 Device (Oxygen Therapy) nasal cannula   $ Is the patient on Low Flow Oxygen? Yes   Flow (L/min) 3   Oxygen Concentration (%) 32   SpO2 96 %   Pulse Oximetry Type Intermittent   $ Pulse Oximetry - Multiple Charge Pulse Oximetry - Multiple   Pulse 85   Resp 18   Aerosol Therapy   $ Aerosol Therapy Charges Aerosol Treatment   Respiratory Treatment Status (SVN) given   Treatment Route (SVN) mask;oxygen   Patient Position (SVN) HOB elevated   Signs of Intolerance (SVN) none   Breath Sounds Post-Respiratory Treatment   Throughout All Fields Post-Treatment All Fields   Throughout All Fields Post-Treatment aeration increased   Post-treatment Heart Rate (beats/min) 88   Post-treatment Resp Rate (breaths/min) 18   Ready to Wean/Extubation Screen   FIO2<=50 (chart decimal) 0.32

## 2020-08-17 NOTE — SUBJECTIVE & OBJECTIVE
Interval History: complains of generalized weakness and fatigue. Still with BLUE on 3Liters NC.   On IV vanc and zosyn-- Vanco on hold due to elevated trough.    Continues to desat.  Pulmonary plans for bronch tomorrow.     CT chest  8/16 demonstrates Findings in the right middle lobe concerning for neoplasm, invading the mediastinum.  Masslike pneumonia is included in the differential but less likely.  Probable postobstructive changes in the right upper and lower lobes.   Enlarged subcarinal lymph node, concerning for a metastatic node.   Moderate, loculated-appearing right pleural effusion.      Review of Systems   Constitutional: Positive for activity change, appetite change, fatigue and fever. Negative for diaphoresis.   Respiratory: Positive for cough, shortness of breath and wheezing.    Cardiovascular: Negative for chest pain and leg swelling.   Gastrointestinal: Negative for abdominal distention and abdominal pain.   Genitourinary: Negative for dysuria and frequency.   Musculoskeletal: Positive for arthralgias and back pain.   Skin: Negative for rash.   Neurological: Negative for speech difficulty and numbness.   Psychiatric/Behavioral: The patient is nervous/anxious.      Objective:     Vital Signs (Most Recent):  Temp: 96.6 °F (35.9 °C) (08/17/20 1128)  Pulse: 99 (08/17/20 1128)  Resp: 18 (08/17/20 1128)  BP: 135/63 (08/17/20 1128)  SpO2: (!) 94 % (08/17/20 1128) Vital Signs (24h Range):  Temp:  [96.2 °F (35.7 °C)-98.8 °F (37.1 °C)] 96.6 °F (35.9 °C)  Pulse:  [] 99  Resp:  [8-21] 18  SpO2:  [94 %-98 %] 94 %  BP: (121-135)/(57-68) 135/63     Weight: 75.8 kg (167 lb 1.6 oz)  Body mass index is 28.68 kg/m².  No intake or output data in the 24 hours ending 08/17/20 1440   Physical Exam  Vitals signs and nursing note reviewed.   Constitutional:       Appearance: She is well-developed. She is ill-appearing.   HENT:      Head: Normocephalic.      Right Ear: External ear normal.      Left Ear: External ear  normal.      Mouth/Throat:      Comments: Poor dentition  Eyes:      Conjunctiva/sclera: Conjunctivae normal.      Pupils: Pupils are equal, round, and reactive to light.   Neck:      Musculoskeletal: Normal range of motion and neck supple.   Cardiovascular:      Rate and Rhythm: Normal rate and regular rhythm.      Heart sounds: Normal heart sounds.   Pulmonary:      Effort: Pulmonary effort is normal.      Breath sounds: Wheezing and rhonchi present.      Comments: Diminished breath sounds on 3  nasal cannula  Abdominal:      General: Bowel sounds are normal.      Palpations: Abdomen is soft.      Tenderness: There is no abdominal tenderness.   Musculoskeletal: Normal range of motion.   Skin:     General: Skin is warm and dry.   Neurological:      General: No focal deficit present.      Mental Status: She is alert and oriented to person, place, and time. Mental status is at baseline.      Motor: No weakness.      Comments: Generalized weakness   Psychiatric:         Behavior: Behavior normal.         Judgment: Judgment normal.      Comments: Anxious         Significant Labs:   BMP:   Recent Labs   Lab 08/17/20  0433   *      K 3.9      CO2 22*   BUN 15   CREATININE 1.5*   CALCIUM 7.6*   MG 2.0     CBC:   Recent Labs   Lab 08/16/20  0437 08/17/20  0433   WBC 16.82* 18.02*   HGB 10.4* 9.9*   HCT 37.0 34.4*   * 427*       Significant Imaging: I have reviewed and interpreted all pertinent imaging results/findings within the past 24 hours.

## 2020-08-18 PROBLEM — R06.02 SOB (SHORTNESS OF BREATH): Status: ACTIVE | Noted: 2020-01-01

## 2020-08-18 PROBLEM — J96.21 ACUTE ON CHRONIC RESPIRATORY FAILURE WITH HYPOXIA: Status: ACTIVE | Noted: 2020-01-01

## 2020-08-18 PROBLEM — J96.00 ACUTE RESPIRATORY FAILURE: Status: ACTIVE | Noted: 2020-01-01

## 2020-08-18 NOTE — PLAN OF CARE
Transferred to ICU on bed with monitor and BIPAP. Report given to Caridad. Pt cooperative, calm, questions answered, reassurance provided. O2 Sats 98% on 50% Bipap. . No further mottling noted.

## 2020-08-18 NOTE — PLAN OF CARE
"Pt transferred to ICU s/p bronch.  BiPAP placed and breathing/resting easier with in use.  Briefly removed and placed on NC at 4L.  Pt able to maintain sat but with prolonged expirations.  Right lung field extremely diminished BS.    Daughters at bedside and updated per Dr Brothers on pt status and POC.  Questions and concerns addressed.  Lethargic and reports "just tired", sleeps between care.  Confused as to situation at times.  Easily reoriented.  IVF.  CAYLA.  SCDs in use.  Safety maintained.  "

## 2020-08-18 NOTE — PLAN OF CARE
Anesthesia - Izabel and Cousin remain at bedside. Pt remains agitated, trying to get out of bed. States needs to void, placed on bedpan. CXR done, breathing treatment started. O2 sats remain in mid 80's, 's. Mottling noted to arms, legs, chest.

## 2020-08-18 NOTE — PLAN OF CARE
Dr Brothers at bedside. Dr Mercado and bijan Paiz crna remain at bedside. Pt beginning to settle. O2 Sats beginning to trend upward, HR decreasing to 120's. Pt speaking appropriately. Dr Brothers ordered EKG and BIPAP and transfer to ICU

## 2020-08-18 NOTE — PLAN OF CARE
Dr Brothers notified of pt remaining with decreased O2 Sats despite breathing treatment. Tachycardic with 's. Agitated. Mottled.

## 2020-08-18 NOTE — NURSING
Pt transferred from [facility] into [facility] via bed.  Settled in room and ICU bed.  transferred with BiPAP.  15/5, 45%.  Pt lethargic.  Oriented to room and call light.  IVF restarted.  No signs of distress at this time, breathing easier.  Remains NPO.  AM meds held d/t NPO and BiPAP in use.

## 2020-08-18 NOTE — SIGNIFICANT EVENT
Results for QUINCY WATT (MRN [id number]) as of 8/18/2020 17:55   Ref. Range 8/18/2020 17:41   POC PH Latest Ref Range: 7.35 - 7.45  7.267 (LL)   POC PCO2 Latest Ref Range: 35 - 45 mmHg 52.8 (H)   POC PO2 Latest Ref Range: 80 - 100 mmHg 84   POC BE Latest Ref Range: -2 to 2 mmol/L -3   POC HCO3 Latest Ref Range: 24 - 28 mmol/L 24.1   POC SATURATED O2 Latest Ref Range: 95 - 100 % 94 (L)   POC TCO2 Latest Ref Range: 23 - 27 mmol/L 26   Sample Unknown ARTERIAL   DelSys Unknown Nasal Can   Allens Test Unknown Pass   Site Unknown LR   Secure chat to Dr. Brothers

## 2020-08-18 NOTE — CARE UPDATE
Patient in Endo in Respiratory distress. Placed her on bipap as per orders from Dr Brothers and will be transferring to ICU. Receiving aerosol tx Q4 and prn. Will continue to monitor.       08/18/20 0846   Patient Assessment/Suction   Level of Consciousness (AVPU) alert   Respiratory Effort Labored   Expansion/Accessory Muscles/Retractions abdominal muscle use   All Lung Fields Breath Sounds wheezes, expiratory   PRE-TX-O2   O2 Device (Oxygen Therapy) BiPAP   $ Is the patient on Low Flow Oxygen? Yes   Oxygen Concentration (%) 50   SpO2 95 %   Pulse Oximetry Type Continuous   $ Pulse Oximetry - Multiple Charge Pulse Oximetry - Multiple   Pulse (!) 133   Resp (!) 27   Ready to Wean/Extubation Screen   FIO2<=50 (chart decimal) 0.5   Preset CPAP/BiPAP Settings   Mode Of Delivery BiPAP   $ CPAP/BiPAP Daily Charge BiPAP/CPAP Daily   $ Initial CPAP/BiPAP Setup? Yes   $ Is patient using? Yes   Size of Mask Small/Medium   Sized Appropriately? Yes   Equipment Type V60   Airway Device Type medium full face mask   Humidifier not applicable   Ipap 15   EPAP (cm H2O) 5   Pressure Support (cm H2O) 10   Set Rate (Breaths/Min) 12   ITime (sec) 1.45   Rise Time (sec) 3   Patient CPAP/BiPAP Settings   Timed Inspiration (Sec) 1.45   IPAP Rise Time (sec) 3   RR Total (Breaths/Min) 24   Tidal Volume (mL) 532   VE Minute Ventilation (L/min) 12.8 L/min   Peak Inspiratory Pressure (cm H2O) 15   TiTOT (%) 27   Total Leak (L/Min) 0   Patient Trigger - ST Mode Only (%) 95   CPAP/BiPAP Alarms   High Pressure (cm H2O) 22   Low Pressure (cm H2O) 12   Low Pressure Delay (Sec) 20   Minute Ventilation (L/Min) 3   High RR (breaths/min) 35   Low RR (breaths/min) 10   Apnea (Sec) 20   Education   $ Education BiPAP;15 min

## 2020-08-18 NOTE — CARE UPDATE
Receiving aerosol tx Q4.     08/18/20 0800   Aerosol Therapy   $ Aerosol Therapy Charges Patient unavailable  (Transferred for a procedure)

## 2020-08-18 NOTE — PLAN OF CARE
Patient was restless tonight.  Pulled PIV out in right AC, restarted in left FA. Patient states she doesn't feel well.  Patient has been NPO since midnight for scheduled Bronchoscopy today.

## 2020-08-18 NOTE — PT/OT/SLP PROGRESS
Physical Therapy      Patient Name:  Kendra Lawrence   MRN:  [id number]    Patient not seen today secondary to Other (Comment)(Patient tranfserred to ICU. Will require orders to resume P.T.)..    Minerva Greenfield, PTA

## 2020-08-18 NOTE — ANESTHESIA PREPROCEDURE EVALUATION
08/18/2020  Kendra Lawrence is a 64 y.o., female.    Anesthesia Evaluation    I have reviewed the Patient Summary Reports.    I have reviewed the Nursing Notes. I have reviewed the NPO Status.   I have reviewed the Medications.     Review of Systems  Anesthesia Hx:  No problems with previous Anesthesia    Social:  Smoker History of drug dependence    Cardiovascular:   Hypertension CAD   Angina    Pulmonary:   Pneumonia COPD Asthma Malignant neoplasm of upper lobe of lung    Renal/:   Chronic Renal Disease    Hepatic/GI:   GERD Pancreatic mass    Musculoskeletal:  Musculoskeletal Normal    Neurological:  Neurology Normal    Dermatological:  Skin Normal    Psych:  Psychiatric Normal           Physical Exam  General:  Well nourished    Airway/Jaw/Neck:  Airway Findings: Mouth Opening: Normal Tongue: Normal  General Airway Assessment: Adult  Mallampati: II  TM Distance: Normal, at least 6 cm        Eyes/Ears/Nose:  EYES/EARS/NOSE FINDINGS: Normal   Dental:  Dental Findings: Upper Dentures   Chest/Lungs:  Chest/Lungs Findings: Normal Respiratory Rate     Heart/Vascular:  Heart Findings: Rate: Normal  Rhythm: Regular Rhythm        Mental Status:  Mental Status Findings:  Cooperative, Alert and Oriented         Anesthesia Plan  Type of Anesthesia, risks & benefits discussed:  Anesthesia Type:  general  Patient's Preference:   Intra-op Monitoring Plan: standard ASA monitors  Intra-op Monitoring Plan Comments:   Post Op Pain Control Plan:   Post Op Pain Control Plan Comments:   Induction:   IV  Beta Blocker:  Patient is not currently on a Beta-Blocker (No further documentation required).       Informed Consent: Patient understands risks and agrees with Anesthesia plan.  Questions answered. Anesthesia consent signed with patient.  ASA Score: 4     Day of Surgery Review of History & Physical: I have interviewed and  examined the patient. I have reviewed the patient's H&P dated:    H&P update referred to the provider.         Ready For Surgery From Anesthesia Perspective.

## 2020-08-18 NOTE — PLAN OF CARE
08/17/20 1943   Patient Assessment/Suction   Level of Consciousness (AVPU) alert   Respiratory Effort Normal;Unlabored   Expansion/Accessory Muscles/Retractions abdominal muscle use   All Lung Fields Breath Sounds Anterior:;Lateral:   SUDHEER Breath Sounds wheezes, expiratory   LLL Breath Sounds diminished   RUL Breath Sounds wheezes, expiratory   RML Breath Sounds wheezes, expiratory   RLL Breath Sounds diminished   Rhythm/Pattern, Respiratory shortness of breath   Cough Frequency infrequent   Cough Type nonproductive   PRE-TX-O2   O2 Device (Oxygen Therapy) nasal cannula   $ Is the patient on Low Flow Oxygen? Yes   Flow (L/min) 3   SpO2 (!) 94 %   Pulse Oximetry Type Intermittent   $ Pulse Oximetry - Multiple Charge Pulse Oximetry - Multiple   Pulse (!) 120   Resp (!) 24   Aerosol Therapy   $ Aerosol Therapy Charges Aerosol Treatment   Daily Review of Necessity (SVN) completed   Respiratory Treatment Status (SVN) given   Treatment Route (SVN) mask   Patient Position (SVN) HOB elevated   Post Treatment Assessment (SVN) breath sounds improved   Signs of Intolerance (SVN) none   Breath Sounds Post-Respiratory Treatment   Throughout All Fields Post-Treatment All Fields   Throughout All Fields Post-Treatment aeration increased   Post-treatment Heart Rate (beats/min) 118   Post-treatment Resp Rate (breaths/min) 18

## 2020-08-18 NOTE — PROGRESS NOTES
8/18/2020   Progress Note  PULMONARY    Admit Date: 8/13/2020 08/18/2020      SUBJECTIVE:     Aug 17 ct with mass,    Feels weak, min ambulation, min appetite.    Seems much worse today at bedside    8/18 post bronch distress when coming out sedation.  Now bipap and icu.    PFSH and Allergies reviewed.    OBJECTIVE:     Vitals (Most recent):  Vitals:    08/18/20 1200   BP: (!) 141/74   Pulse: 110   Resp: (!) 22   Temp:        Vitals (24 hour range):  Temp:  [96.5 °F (35.8 °C)-98.1 °F (36.7 °C)]   Pulse:  []   Resp:  [17-81]   BP: (111-199)/(61-97)   SpO2:  [85 %-100 %]       Intake/Output Summary (Last 24 hours) at 8/18/2020 1203  Last data filed at 8/18/2020 0855  Gross per 24 hour   Intake 2676.67 ml   Output 750 ml   Net 1926.67 ml          Physical Exam:  The patient's neuro status (alertness,orientation,cognitive function,motor skills,), pharyngeal exam (oral lesions, hygiene, abn dentition,), Neck (jvd,mass,thyroid,nodes in neck and above/below clavicle),RESPIRATORY(symmetry,effort,fremitus,percussion,auscultation),  Cor(rhythm,heart tones including gallops,perfusion,edema)ABD(distention,hepatic&splenomegaly,tenderness,masses), Skin(rash,cyanosis),Psyc(affect,judgement,).  Exam negative except for these pertinent findings:    Decreased bs right, good left, looks weak,debilitated, developed resp distress when arousing from bronch - livedo retucularis noted.      Radiographs reviewed: view by direct vision -   8/18 cxr increased opacification.    ct with mass rul ant/post and rml,   No results found for this or any previous visit.]    Labs     Recent Labs   Lab 08/18/20  0412   WBC 19.52*   HGB 10.5*   HCT 36.3*   *     Recent Labs   Lab 08/18/20  0412      K 3.5      CO2 26   BUN 18   CREATININE 1.6*   *   CALCIUM 8.9   MG 2.3   PHOS 2.6*   AST 12   ALT 18   ALKPHOS 138*   BILITOT 0.3   PROT 6.8   ALBUMIN 1.9*     Recent Labs   Lab 08/18/20  1021   PH 7.243*   PCO2 59.1*   PO2  97   HCO3 25.5     Microbiology Results (last 7 days)     Procedure Component Value Units Date/Time    AFB Culture & Smear [[id number]] Collected: 08/18/20 0826    Order Status: Sent Specimen: Respiratory from Bronchial Wash Updated: 08/18/20 0842    Culture, Respiratory with Gram Stain [[id number]] Collected: 08/18/20 0826    Order Status: Sent Specimen: Respiratory from Bronchial Wash Updated: 08/18/20 0842    Culture, Respiratory with Gram Stain [[id number]] Collected: 08/18/20 0825    Order Status: Canceled Specimen: Respiratory from Bronchial Wash     Blood culture x two cultures. Draw prior to antibiotics. [[id number]] Collected: 08/13/20 1906    Order Status: Completed Specimen: Blood from Antecubital, Left Updated: 08/17/20 1412     Blood Culture, Routine No Growth to date      No Growth to date      No Growth to date      No Growth to date    Narrative:      Aerobic and anaerobic    Blood culture x two cultures. Draw prior to antibiotics. [[id number]] Collected: 08/13/20 1906    Order Status: Completed Specimen: Blood from Antecubital, Right Updated: 08/17/20 1412     Blood Culture, Routine No Growth to date      No Growth to date      No Growth to date      No Growth to date    Narrative:      Aerobic and anaerobic    Urine culture [[id number]]  (Abnormal)  (Susceptibility) Collected: 08/13/20 1931    Order Status: Completed Specimen: Urine Updated: 08/16/20 1256     Urine Culture, Routine KLEBSIELLA PNEUMONIAE  >100,000 cfu/ml      Narrative:      Specimen Source->Urine          Impression:  Active Hospital Problems    Diagnosis  POA    *Sepsis [A41.9]  Yes    SOB (shortness of breath) [R06.02]  Yes    Acute respiratory failure [J96.00]  Yes    Malignant neoplasm of upper lobe of lung [C34.10]  Yes    NOELLE (acute kidney injury) [N17.9]  Yes    Mediastinal lymphadenopathy [R59.0]  Yes    Protein-calorie malnutrition, mild [E44.1]  Yes    Opioid dependence [F11.20]  Yes    Iron deficiency anemia secondary  to inadequate dietary iron intake [D50.8]  Yes    Pneumonia due to infectious organism [J18.9]  Yes    Acute cystitis without hematuria [N30.00]  Yes    Chronic obstructive pulmonary disease with acute lower respiratory infection [J44.0]  Yes    Acute hypoxemic respiratory failure [J96.01]  Yes    Hypokalemia [E87.6]  Yes    Hypocalcemia [E83.51]  Yes      Resolved Hospital Problems   No resolved problems to display.               Plan:     8/17 temp max 99,   Temp better but pt not clearly better, large tumor apparent, need brain scan, will try to bronch in am.    Needs mobilized. Will stop xanax.     8/18 off xanax and still sedate, ct head ok - no contrast.    abg marginal on niv.      Discussed with daughter Larissa, called Lorelei/home numbers earlier with no answer.      Addendum-pt in icu now, mild sob reported off niv- no headache, some asterixis.  Will check abg, dose steroids, use niv.  2 daughthers - Larissa and Lorelei - are in room. Details of pneumonia and cancer discussed.  Would ask onc eval.  May need consideration xrt- not typically done in pt.  May need intubation but cancer seems to be major underlying dz 5 days into pneumonia rx.      The following were evaluated and adjusted as needed: mechanical ventilator settings and weaning status, sedation and neurologic status, antibiotics, hemodynamics, support tubes and access lines and invasive monitoring, acid base balance and oxygenation needs, input and output and renal status and CODE STATUS/OUTLOOK DISCUSSED WITH AVAILABLE NEXT OF  KIN       Critical Care  - THE PATIENT HAS A HIGH PROBABILITY OF IMMINENT OR LIFE THREATENING DETERIORATION.  Over 50%time of encounter was in direct care at bedside.  Time was 30 to 74 minutes required for patient care.  Time needed for all of the above totaled 55 minutes.                      .     After infromed consent, review of ct, sedation by anesthesia- pt had right nares bronch.  Larynx was nl, and tracheobronchial  tree was abnormal in right upper lobe anterior segment fish mouth, narrowing and wall edema distal bronchus intermedius with only a dimple for oriface of rml.  Lower lobe airways were patent but 50% narrowed - no clear endobronchial disease seen.  Pre treatment with lidocaine with epi was done to controll oozing of blood from minimal scope trauma.  Large yellow green mucous plug was removed- scope had to be withdrawn.  Tracheobronchomalacia was apparent on right.  rml oriface was bx with difficulty as no clear lumen seen.  About 8 bx taken endobronchial and transbronchial- forceps passed through dimple and bx taken.    ebl 10 cc. No complications.  Wash for cytology , afb, culture.  bx from rml for path. Post cxr pending.

## 2020-08-18 NOTE — NURSING
Patient with purewick in place but no urine output. Patient's daughters stated patient said she needed to pee twice. Abdomen slightly distended. I bladder scanned patient which showed urine greater than 650 cc. Spoke to Dr. Starks who gave order to place Salcido

## 2020-08-18 NOTE — SUBJECTIVE & OBJECTIVE
Interval History:  Patient evaluated in postprocedure area she is noted to have severe dyspnea and hypoxia and is unable to speak.  ABGs obtained.  Pulmonology at bedside.  She is currently on 100% non-rebreather.    Status post bronchoscopy per Dr. Brothers.  Likely neoplasm recommend consult oncology.    Review of Systems   Unable to perform ROS: Acuity of condition   Constitutional: Positive for activity change.   HENT: Positive for dental problem.    Respiratory: Positive for cough, shortness of breath and wheezing.    Genitourinary: Positive for flank pain.     Objective:     Vital Signs (Most Recent):  Temp: 98.7 °F (37.1 °C) (08/18/20 1530)  Pulse: 109 (08/18/20 1600)  Resp: (!) 36 (08/18/20 1600)  BP: (!) 152/83 (08/18/20 1600)  SpO2: 97 % (08/18/20 1600) Vital Signs (24h Range):  Temp:  [96.5 °F (35.8 °C)-98.7 °F (37.1 °C)] 98.7 °F (37.1 °C)  Pulse:  [] 109  Resp:  [12-81] 36  SpO2:  [85 %-100 %] 97 %  BP: (111-199)/(61-97) 152/83     Weight: 75.8 kg (167 lb 1.6 oz)  Body mass index is 28.68 kg/m².    Intake/Output Summary (Last 24 hours) at 8/18/2020 1717  Last data filed at 8/18/2020 0855  Gross per 24 hour   Intake 1421.67 ml   Output 400 ml   Net 1021.67 ml      Physical Exam  Vitals signs and nursing note reviewed.   Constitutional:       General: She is in acute distress.      Appearance: She is well-developed. She is diaphoretic.      Comments: Currently unable to speak   HENT:      Head: Normocephalic.      Right Ear: External ear normal.      Left Ear: External ear normal.      Nose: Nose normal.      Mouth/Throat:      Mouth: Mucous membranes are moist.      Pharynx: Oropharynx is clear.   Eyes:      Conjunctiva/sclera: Conjunctivae normal.      Pupils: Pupils are equal, round, and reactive to light.   Neck:      Musculoskeletal: Normal range of motion and neck supple.   Cardiovascular:      Rate and Rhythm: Regular rhythm. Tachycardia present.      Heart sounds: No murmur.   Pulmonary:       Effort: Respiratory distress present.      Breath sounds: Wheezing and rhonchi present.      Comments: On 100 NRB. Use of accessory muscles, unable to speak due to severe dyspnea.   Abdominal:      General: Bowel sounds are normal.      Palpations: Abdomen is soft.   Musculoskeletal: Normal range of motion.   Skin:     General: Skin is warm.   Neurological:      Mental Status: She is oriented to person, place, and time.   Psychiatric:         Judgment: Judgment normal.      Comments: anxious         Significant Labs:   BMP:   Recent Labs   Lab 08/18/20  0412   *      K 3.5      CO2 26   BUN 18   CREATININE 1.6*   CALCIUM 8.9   MG 2.3     CBC:   Recent Labs   Lab 08/17/20  0433 08/18/20  0412   WBC 18.02* 19.52*   HGB 9.9* 10.5*   HCT 34.4* 36.3*   * 586*       Significant Imaging: I have reviewed and interpreted all pertinent imaging results/findings within the past 24 hours.

## 2020-08-18 NOTE — TELEPHONE ENCOUNTER
Spoke with ICU RNCaridad regarding pt consult--took all information needed to document--handed Consult paperwork to DENNY Light--as per Dr. Starks, consult was marked Urgent due to Lung Mass/possible cancer

## 2020-08-18 NOTE — ANESTHESIA POSTPROCEDURE EVALUATION
Anesthesia Post Evaluation    Patient: Kendra Lawrence    Procedure(s) Performed: Procedure(s) (LRB):  Bronchoscopy- 730 or noon, floro not needed (N/A)    Final Anesthesia Type: general    Patient location during evaluation: PACU  Patient participation: Yes- Able to Participate  Level of consciousness: awake and alert  Post-procedure vital signs: reviewed and stable  Pain management: adequate  Airway patency: patent    PONV status at discharge: No PONV  Anesthetic complications: no      Cardiovascular status: hemodynamically stable  Respiratory status: unassisted and room air  Hydration status: euvolemic  Follow-up not needed.          Vitals Value Taken Time   /67 08/18/20 1101   Temp 36.5 °C (97.7 °F) 08/18/20 0930   Pulse 101 08/18/20 1115   Resp 17 08/18/20 1115   SpO2 100 % 08/18/20 1115   Vitals shown include unvalidated device data.      Event Time   Out of Recovery 08/18/2020 09:00:00         Pain/Riky Score: Pain Rating Prior to Med Admin: 4 (8/17/2020 12:21 AM)  Pain Rating Post Med Admin: 0 (8/17/2020  1:21 AM)  Riky Score: 8 (8/18/2020  8:55 AM)

## 2020-08-18 NOTE — PHYSICIAN QUERY
PT Name: Kendra Lawrence  MR #: [id number]    CAUSE AND EFFECT RELATIONSHIP CLARIFICATION     CDS: Wendy Doyle RN, CCDS         Contact information :ext [phone] [email or web address]       This form is a permanent document in the medical record.     Query Date: August 18, 2020    By submitting this query, we are merely seeking further clarification of documentation. Please utilize your independent clinical judgment when addressing the question(s) below.    Supporting Clinical Findings   Location in Medical Record         Sepsis  Source- PNA and UTI  Source control Achieved by- Vancomycin and Zosyn                                                              Urine culture     KLEBSIELLA PNEUMONIAE   >100,000 cfu/ml                                                                                                          Acute cystitis without hematuria  UA reviewed, follow culture.  Patient started on zosyn in the ED, continue for now.  + Klebsiella pneumonia         H&P 8/14/20          Lab 8/13/20          Hospital Medicine PN 8/17/20                                         Provider, please clarify if there is any clinical correlation between Sepsis and KLEBSIELLA PNEUMONIAE  .           Are the conditions:      [ x  ] Due to or associated with each other   [   ] Unrelated to each other   [   ] Other explanation (Please Specify): ______________   [  ] Clinically Undetermined                                                                               Please document in your progress notes daily for the duration of treatment until resolved and include in your discharge summary.

## 2020-08-18 NOTE — RESPIRATORY THERAPY
08/18/20 0729   Patient Assessment/Suction   Level of Consciousness (AVPU) alert   PRE-TX-O2   O2 Device (Oxygen Therapy) nasal cannula   Flow (L/min) 6   SpO2 100 %   Pulse Oximetry Type Continuous   Pulse 103   Resp 20   Aerosol Therapy   $ Aerosol Therapy Charges Aerosol Treatment   Respiratory Treatment Status (SVN) given   Treatment Route (SVN) mask   Patient Position (SVN) HOB elevated   Signs of Intolerance (SVN) none   Breath Sounds Post-Respiratory Treatment   Post-treatment Heart Rate (beats/min) 102   Post-treatment Resp Rate (breaths/min) 20

## 2020-08-18 NOTE — PROCEDURES
8/18/2020     After infromed consent, review of ct, sedation by anesthesia- pt had right nares bronch.  Larynx was nl, and tracheobronchial tree was abnormal in right upper lobe anterior segment fish mouth, narrowing and wall edema distal bronchus intermedius with only a dimple for oriface of rml.  Lower lobe airways were patent but 50% narrowed - no clear endobronchial disease seen.  Pre treatment with lidocaine with epi was done to controll oozing of blood from minimal scope trauma.  Large yellow green mucous plug was removed- scope had to be withdrawn.  Tracheobronchomalacia was apparent on right.  rml oriface was bx with difficulty as no clear lumen seen.  About 8 bx taken endobronchial and transbronchial- forceps passed through dimple and bx taken.    ebl 10 cc. No complications.  Wash for cytology , afb, culture.  bx from rml for path. Post cxr pending.

## 2020-08-19 NOTE — CHAPLAIN
Visited w/pt for about 10 minutes; said she's feeling better than before; welcomed me to pray for her, held hands in silence for some time after the prayer; accepted rosalittle for comfort; Roman [employer]; offered compassionate presence; Lord, in your mercy.

## 2020-08-19 NOTE — HOSPITAL COURSE
Patient was admitted with diagnosis of sepsis due to acute cystitis and pneumonia.  She was treated with broad-spectrum antibiotics.  She continued to have shortness of breath, and required oxygen.  A CT scan of was done of the chest which showed a lung mass in the right upper lobe anterior and posterior, as well as the right middle lobe.  Also a pleural effusion on the same side.  We consulted with pulmonology.  Bronchoscopy was performed.  Airway washings and biopsies were taken.  Pulmonologist commented that most of the airways in the right lung were occluded, and were likely causing pneumonia.  After the procedure, patient was very short of breath and was unable to speak due to dyspnea.  She was using accessory muscles and was on 100% non-rebreather.  She was then monitored in the ICU and placed on noninvasive positive-pressure ventilation.  She began to have less dyspnea.  We consulted with Oncology.  The biopsies from the bronch came back positive for small cell lung cancer.  Oncology spoke with patient and educated her about chemotherapy.  They planned on giving  16 every 21 days, in addition to carboplatin, again every 21 days.  Patient understood the risks and the benefits of therapy, and she agreed.  Pulmonology and the hospitalist decided that physical therapy and physical rehabilitation would be most ideal prior to beginning chemotherapy.  Oncology also stated that the patient would need a PET CT as an outpatient for staging.  The pneumonia would have to be fully treated prior to initiating chemotherapy.  We sent referrals to skilled nursing facilities.  At the same time the patient was suffering from acute kidney injury.  She was given intravenous fluids, which helped to bring her creatinine down to 1.3 from a peak of 1.6.  On day one of admission, her creatinine was 0.8.  She was improving from a respiratory standpoint, but then sometime after getting out of ICU, she began having shortness of breath  again.  Antibiotics were changed.  BiPAP was used at night while sleeping.  A new chest x-ray was done which showed opacification of the right middle lobe and right lower lobe and part of the right upper lobe.  Procalcitonin level increased.  Oncology decided to start chemotherapy immediately in order to give the patient the best chance of recovery.  She was transferred to [facility] in order to receive the chemo.    Physical Exam  Vitals signs reviewed.   Constitutional:       General: She is in acute distress (mild).      Appearance: She is ill-appearing. She is not diaphoretic.   Eyes:      General:         Right eye: No discharge.         Left eye: No discharge.   Neck:      Vascular: No JVD.   Cardiovascular:      Rate and Rhythm: Regular rhythm. Tachycardia present.   Pulmonary:      Effort: Respiratory distress (mild) present. No tachypnea.      Breath sounds: Examination of the right-upper field reveals decreased breath sounds. Examination of the right-middle field reveals decreased breath sounds. Examination of the right-lower field reveals decreased breath sounds. Decreased breath sounds present. No rhonchi or rales.

## 2020-08-19 NOTE — NURSING
Ok with Dr Starks to hold MRI until pt more stable.  Updated DENNY Lea hem/onc, agrees and ok with holding until tomrorow.  S/c Amee in MRI and updated on situation and awaiting pt more stable, possibly tomorrow.  She will make note for crew tomorrow.

## 2020-08-19 NOTE — ASSESSMENT & PLAN NOTE
Patient with Hypercapnic and Hypoxic Respiratory failure which is Acute on Chronic.  she is on home oxygen at 2 LPM. Supplemental ventilation was provided and noted- Oxygen Concentration (%):  [32-45] 32.   Differential diagnosis includes - COPD and Pneumonia Labs and images were reviewed.  Will treat underlying causes and adjust management of respiratory failure as follows  ABG  Recent Labs   Lab 08/19/20  0837   PH 7.281*   PCO2 55.0*   PO2 100   HCO3 25.9   BE -1   POCSATURATED 97

## 2020-08-19 NOTE — PLAN OF CARE
Pt progressing.  More awake/alert this afternoon and able to tolerate liquids.  Fidgety towards the more awake she is.  Tolerated NC all shift at 3L.  Still with IVF and CAYLA.  Adequate urine output.  Outstanding MRI and may require sedative as pt reports claustrophobic.  Poss transfer out of unit tomorrow and pt aware.  Able to participate with ST and PT but needing repeated coaxing to stay awake.  Sats maintained some while exercising but decreased to low 80s when lying in bed with HOB down and being pulled to top of bed.  POC discussed safety maintained.

## 2020-08-19 NOTE — PROGRESS NOTES
Pt remains in ICU overnight. Afebrile. Becoming more oriented throughout the shift, now AAOx4. NS @ 100. Bipap weaned to 3L NC this AM with o2 sats WNL. NS - ST on monitor. Salcido in place with adequate u/o. Pt became anxious during shift, eICU MD ordered ativan x 1 dose; pt tolerated well. Pt remains free of any falls, injuries, or new skin breakdown during this shift. Bed in lowest position with call light in reach.

## 2020-08-19 NOTE — ASSESSMENT & PLAN NOTE
Likely to be renal was on normal saline 100 cc an hour  Creatinine did not improve IV fluids  Will get urine lytes tomorrow if kidney function does not improve  Patient is eating will hold off on the IV fluids due to lower extremity swelling

## 2020-08-19 NOTE — PLAN OF CARE
Problem: SLP Goal  Goal: SLP Goal  Description:   1. Pt will consume dental soft textures and thin liquids with functional oral phase and no overt s/s penetration/aspiration   Outcome: Ongoing, Progressing    Clinical swallow evaluation completed. REC Mechanical soft textures (IDDSI 5) with thin liquids. May require assistance with meals. Will follow.

## 2020-08-19 NOTE — NURSING
Dr Raudel koch.  Updated on pt status and plan from hem/onc.  Aware of awaiting lab results and and pt's toleration of MRI.  And needing anxiety/sedative for it.  Reluctant to admin medication causing respiratory depression.  Ok to hold off on MRI at this time until pt more awake/breathing stable.    Ok to initiate clear liquid diet and advance as tolerated to dysphagia level 5 diet with thin liquids.

## 2020-08-19 NOTE — ASSESSMENT & PLAN NOTE
Patient's COPD is controlled currently.  Patient is currently off COPD Pathway. Continue scheduled inhalers Antibiotics and Supplemental oxygen and monitor respiratory status closely.   Procalcitonin elevated.  CXR reviewed.  Continue abx.  Duo nebs.  Consulted pulmonology.

## 2020-08-19 NOTE — PT/OT/SLP RE-EVAL
Physical Therapy Re-evaluation    Patient Name:  Kendra Lawrence   MRN:  [id number]    Recommendations:     Discharge Recommendations:  nursing facility, skilled   Discharge Equipment Recommendations: shower chair   Barriers to discharge: None    Assessment:     Kendra Lawrence is a 64 y.o. female admitted with a medical diagnosis of Sepsis.  She presents with the following impairments/functional limitations:  weakness, impaired endurance, impaired self care skills, impaired functional mobilty, impaired balance, impaired cardiopulmonary response to activity.Patient is agreeable to participation with PT re-evaluation. Patient was asleep upon entering the room and requires repeated stimulation to remain awake and keep her eyes open. SpO2 of 97% at rest on 3LO2. She requires ModA for supine to sit transfer. She sat EOB for 15 minutes performing LE therex. She declines standing or chair transfer today. She returned to supine. Oxygen saturation dropped into mid to low 80s upon supine with SpO2 increased to 4L for recovery to 97%. Patient was given a verbal and visual demonstration of pursed lip breathing and verbalizes understanding. However, education did not transfer into learning with patient continuing mouth breathing. RN aware.     Rehab Prognosis:  Fair; patient would benefit from acute skilled PT services to address these deficits and reach maximum level of function.      Recent Surgery: Procedure(s) (LRB):  Bronchoscopy- 730 or noon, floro not needed (N/A) 1 Day Post-Op    Plan:     During this hospitalization, patient to be seen daily to address the above listed problems via gait training, therapeutic activities, therapeutic exercises  · Plan of Care Expires:  09/14/20   Plan of Care Reviewed with: patient    Subjective     Communicated with JORDI Mclean prior to session.  Patient found HOB elevated with oxygen, blood pressure cuff, silva catheter, peripheral IV, pulse ox (continuous), SCD, telemetry upon PT entry to  "room, agreeable to evaluation.      Chief Complaint: "didn't get much sleep last night"  Patient comments/goals: to take socks off after re-evaluation   Pain/Comfort:  · Pain Rating 1: 0/10    Patients cultural, spiritual, Hinduism conflicts given the current situation:        Objective:     Patient found with: oxygen, blood pressure cuff, silva catheter, peripheral IV, pulse ox (continuous), SCD, telemetry     General Precautions: Standard, aspiration, fall, respiratory   Orthopedic Precautions:N/A   Braces: N/A     Exams:  · Cognitive Exam:  Patient is oriented to Person, Place, Time and Situation  · RUE ROM: WFL  · LUE ROM: WFL  · RLE Strength: 4/5 with some difficulty following commands for MMT  · LLE Strength: 4/5 with some difficulty following commands for MMT    Functional Mobility:  · Bed Mobility:     · Rolling Left:  minimum assistance  · Rolling Right: minimum assistance  · Supine to Sit: moderate assistance  · Sit to Supine: moderate assistance  · Balance: Sitting: requires [facility] for seated balance    AM-PAC 6 CLICK MOBILITY  Total Score:12       Therapeutic Activities and Exercises:   Patient was educated on the importance of OOB activity and functional mobility to negate negative effects of prolonged bed rest during hospitalization, safe transfers and ambulation, and D/C planning, PLB     Patient left HOB elevated with all lines intact, call button in reach and RN notified.    GOALS:   Multidisciplinary Problems     Physical Therapy Goals        Problem: Physical Therapy Goal    Goal Priority Disciplines Outcome Goal Variances Interventions   Physical Therapy Goal     PT, PT/OT Ongoing, Progressing     Description: Goals to be met by: 2020     Patient will increase functional independence with mobility by performin. Supine to sit with Supervision  2. Sit to stand transfer with Supervision  3. Bed to chair transfer with Supervision using LRAD  4. Gait  x 150 feet with Supervision using " LRAD  5. Lower extremity exercise program x20 reps per handout, with independence                     History:     Past Medical History:   Diagnosis Date    Asthma     Cardiac angina     Chronic abdominal pain     Claustrophobia     COPD (chronic obstructive pulmonary disease)     Coronary artery disease     GERD (gastroesophageal reflux disease)     History of drug dependence     Hypertension        Past Surgical History:   Procedure Laterality Date    BREAST BIOPSY Bilateral 20 yrs ago    benign    BRONCHOSCOPY N/A 2020    Procedure: Bronchoscopy- 730 or noon, floro not needed;  Surgeon: Andrew Brothers MD;  Location: [facility] ENDO;  Service: Endoscopy;  Laterality: N/A;     SECTION      CHOLECYSTECTOMY      ENDOSCOPIC ULTRASOUND OF UPPER GASTROINTESTINAL TRACT Left 2018    Procedure: ULTRASOUND, UPPER GI TRACT, ENDOSCOPIC;  Surgeon: Paras Negrete MD;  Location: [facility];  Service: Endoscopy;  Laterality: Left;    ESOPHAGOGASTRODUODENOSCOPY N/A 2018    Procedure: EGD (ESOPHAGOGASTRODUODENOSCOPY);  Surgeon: Paras Negrete MD;  Location: [facility];  Service: Endoscopy;  Laterality: N/A;    ESOPHAGOGASTRODUODENOSCOPY N/A 2018    Procedure: EGD (ESOPHAGOGASTRODUODENOSCOPY);  Surgeon: Paras Negrete MD;  Location: [facility];  Service: Endoscopy;  Laterality: N/A;    HYSTERECTOMY      MAGNETIC RESONANCE IMAGING N/A 2019    Procedure: MRI (Magnetic Resonance Imagine) needs anesthesia;  Surgeon: Raffy Charles MD;  Location: [facility];  Service: Anesthesiology;  Laterality: N/A;    OOPHORECTOMY      TEMPOROMANDIBULAR JOINT SURGERY      TONSILLECTOMY         Time Tracking:     PT Received On: 20  PT Start Time: 858     PT Stop Time: 925  PT Total Time (min): 27 min     Billable Minutes: Re-eval 10 and Therapeutic Activity 17      Rachel Sue, PT  2020

## 2020-08-19 NOTE — PROGRESS NOTES
[facility] Hospital Medicine  Progress Note    Patient Name: Kendra Lawrence  MRN: [id number]  Patient Class: IP- Inpatient   Admission Date: 8/13/2020  Length of Stay: 5 days  Attending Physician: Dorie Starks MD  Primary Care Provider: Louie Urena MD        Subjective:     Principal Problem:Sepsis        HPI:  Kendra Lawrence is a 64 y.o. female with a PMHx of COPD, opioid dependence, HTN, CAD, and GERD who presents to the ED with complaints of SOB x10 days. The patient reports SOB that has been constant and became progressively worse over the last 2 days. She reports exerting herself makes it worse and nothing makes it better. She endorses associated fatigue, chills, and right chest wall tightness. The patient denies any fever, CP, cough, congestion, abdominal pain, vomiting, diarrhea, dizziness, weakness, or dysuria. The patient states that she currently smokes e-cigarettes. She denies any known sick contacts. Her ED work up is significant for tachycardia, fever, tachypnea, hypoxia, leukocytosis, nitrite positive UTI, and CXR revealing dense consolidation within the right mid to lower lung consistent with pneumonia. Sepsis protocol was initiated in the ED and the patient received IV fluids, vancomycin, and zosyn. The patient will be admitted under hospital medicine for further work up and evaluation.     Overview/Hospital Course:  Patient monitored closely during hospitalization.  She was was admitted with COPD exacerbation on in initiated on COPD pathway including scheduled DuoNebs, IV antibiotics and steroids.  Pulmonology consulted and steroids were discontinued.  Patient with oxygen requirement.  She was noted to have worsening of respiratory status with desaturation of her to 97% on oxygen.  CT of the chest ordered and demonstrated Findings in the right middle lobe concerning for neoplasm, invading the mediastinum.  Masslike pneumonia is included in the differential but less likely.   Probable postobstructive changes in the right upper and lower lobes.   Enlarged subcarinal lymph node, concerning for a metastatic node. Moderate, loculated-appearing right pleural effusion.  She was recommended for bronchoscopy p.r.n. she is status post bronch per Dr. Brothers on 08/18/2020.  Postprocedure she was noted to have acute respiratory failure requiring BiPAP.  She was transferred to ICU for closer monitoring.    Interval History:  Patient evaluated in postprocedure area she is noted to have severe dyspnea and hypoxia and is unable to speak.  ABGs obtained.  Pulmonology at bedside.  She is currently on 100% non-rebreather.    Status post bronchoscopy per Dr. Brothers.  Likely neoplasm recommend consult oncology.    Review of Systems   Unable to perform ROS: Acuity of condition   Constitutional: Positive for activity change.   HENT: Positive for dental problem.    Respiratory: Positive for cough, shortness of breath and wheezing.    Genitourinary: Positive for flank pain.     Objective:     Vital Signs (Most Recent):  Temp: 98.7 °F (37.1 °C) (08/18/20 1530)  Pulse: 109 (08/18/20 1600)  Resp: (!) 36 (08/18/20 1600)  BP: (!) 152/83 (08/18/20 1600)  SpO2: 97 % (08/18/20 1600) Vital Signs (24h Range):  Temp:  [96.5 °F (35.8 °C)-98.7 °F (37.1 °C)] 98.7 °F (37.1 °C)  Pulse:  [] 109  Resp:  [12-81] 36  SpO2:  [85 %-100 %] 97 %  BP: (111-199)/(61-97) 152/83     Weight: 75.8 kg (167 lb 1.6 oz)  Body mass index is 28.68 kg/m².    Intake/Output Summary (Last 24 hours) at 8/18/2020 1717  Last data filed at 8/18/2020 0855  Gross per 24 hour   Intake 1421.67 ml   Output 400 ml   Net 1021.67 ml      Physical Exam  Vitals signs and nursing note reviewed.   Constitutional:       General: She is in acute distress.      Appearance: She is well-developed. She is diaphoretic.      Comments: Currently unable to speak   HENT:      Head: Normocephalic.      Right Ear: External ear normal.      Left Ear: External ear normal.       Nose: Nose normal.      Mouth/Throat:      Mouth: Mucous membranes are moist.      Pharynx: Oropharynx is clear.   Eyes:      Conjunctiva/sclera: Conjunctivae normal.      Pupils: Pupils are equal, round, and reactive to light.   Neck:      Musculoskeletal: Normal range of motion and neck supple.   Cardiovascular:      Rate and Rhythm: Regular rhythm. Tachycardia present.      Heart sounds: No murmur.   Pulmonary:      Effort: Respiratory distress present.      Breath sounds: Wheezing and rhonchi present.      Comments: On 100 NRB. Use of accessory muscles, unable to speak due to severe dyspnea.   Abdominal:      General: Bowel sounds are normal.      Palpations: Abdomen is soft.   Musculoskeletal: Normal range of motion.   Skin:     General: Skin is warm.   Neurological:      Mental Status: She is oriented to person, place, and time.   Psychiatric:         Judgment: Judgment normal.      Comments: anxious         Significant Labs:   BMP:   Recent Labs   Lab 08/18/20  0412   *      K 3.5      CO2 26   BUN 18   CREATININE 1.6*   CALCIUM 8.9   MG 2.3     CBC:   Recent Labs   Lab 08/17/20  0433 08/18/20  0412   WBC 18.02* 19.52*   HGB 9.9* 10.5*   HCT 34.4* 36.3*   * 586*       Significant Imaging: I have reviewed and interpreted all pertinent imaging results/findings within the past 24 hours.      Assessment/Plan:      * Sepsis  This patient does have evidence of infective focus  My overall impression is sepsis.  Antibiotics given-   Antibiotics (From admission, onward)    Start     Stop Route Frequency Ordered    08/14/20 1930  vancomycin (VANCOCIN) 1,750 mg in dextrose 5 % 500 mL IVPB      -- IV Every 24 hours (non-standard times) 08/13/20 2222    08/14/20 0400  piperacillin-tazobactam 4.5 g in dextrose 5 % 100 mL IVPB (ready to mix system)      -- IV Every 8 hours (non-standard times) 08/13/20 2217 08/13/20 2217  vancomycin - pharmacy to dose  (vancomycin IVPB)      -- IV pharmacy to  manage frequency 08/13/20 2217          Latest lactate reviewed, they are-  Recent Labs   Lab 08/13/20  1906 08/13/20  2307   LACTATE 1.6 1.2       Organ dysfunction indicated by Acute respiratory failure  Source- PNA and UTI  Source control Achieved by- Vancomycin and Zosyn  UA reviewed.  CXR reviewed.  Procalcitonin elevated.  Blood cx pending.  Continue IVF.      Mass of middle lobe of right lung   likely neoplasm.  Consult Oncology for recommendations.      NOELLE (acute kidney injury)  New due to poor oral intake. Initiate on  cc/hr. Trend Cr.      Malignant neoplasm of upper lobe of lung  Suspected. New finding on CT chest. Plan for bronch in am.  Concerning for mets. Check CT head per pulmonary recs- CT head negative  Consult oncology.  Status post bronch, bx obtained.    Mediastinal lymphadenopathy  Noted on CT chest. Possible due to neoplasm.        Protein-calorie malnutrition, mild  Nutrition consulted. Body mass index is 25.03 kg/m².. Encourage maximal PO intake. Diet supplementation ordered per nutrition approval. Will encourage PO and monitor closely for weight changes.        Iron deficiency anemia secondary to inadequate dietary iron intake  Check iron studies. Add po fergon      Opioid dependence  Continue home suboxone.  reviewed.    Hypocalcemia  Corrected calcium 9.5, continue to trend.    Hypokalemia  Patient has hypokalemia which is currently uncontrolled. Last electrolytes reviewed-   Recent Labs   Lab 08/14/20  0421 08/14/20  1800 08/15/20  0435   K 3.4* 4.2 4.0   . Will replace potassium and monitor electrolytes closely. Continuous telemetry.  improved    Acute hypoxemic respiratory failure  Likely 2/2 PNA.  Continue abx and supplemental oxygen.  No significant improvement. Check CT chest- no previous imaging of chest.   CT chest with possible mass, post obs pneumonia. Continue IV abx. Pulmonary consulted     We worsening post bronchoscopy.  Patient placed on 100% non-rebreather ABGs  obtained.  She is recommended to transfer to ICU.    Re-evaluated patient and she is on BiPAP resting comfortably.    Chronic obstructive pulmonary disease with acute lower respiratory infection  Patient's COPD is uncontrolled due to worsening of baseline hypoxia currently.  Patient is currently off COPD Pathway. Continue scheduled inhalers Antibiotics and Supplemental oxygen and monitor respiratory status closely.   Procalcitonin elevated.  CXR reviewed.  Continue abx.  Duo nebs.  Consulted pulmonology.     Acute cystitis without hematuria  UA reviewed, follow culture.  Patient started on zosyn in the ED, continue for now.    + Klebsiella pneumonia    Pneumonia due to infectious organism  CXR reviewed.  Patient started on vancomycin and zosyn in the ED, will continue for now.  Duo nebs q4 hours.  Supplemental oxygen as needed to keep oxygen satruation >90%.  Pulmonary consulted        VTE Risk Mitigation (From admission, onward)         Ordered     enoxaparin injection 40 mg  Every 24 hours      08/13/20 2217     IP VTE HIGH RISK PATIENT  Once      08/13/20 2217     Place sequential compression device  Until discontinued      08/13/20 2217                Discharge Planning   BRANDON: 8/19/2020     Code Status: Full Code   Is the patient medically ready for discharge?: No    Reason for patient still in hospital (select all that apply): Patient unstable, Patient new problem, Treatment, Imaging and Consult recommendations  Discharge Plan A: Home with family            Critical care time spent on the evaluation and treatment of severe organ dysfunction, review of pertinent labs and imaging studies, discussions with consulting providers and discussions with patient/family: 30 minutes.      Natalia Guajardo NP  Department of Hospital Medicine   [facility]

## 2020-08-19 NOTE — ASSESSMENT & PLAN NOTE
Suspected. New finding on CT chest.   Had a bronchoscopy yesterday.  Awaiting biopsy results  Concerning for mets. Check CT head per pulmonary recs- CT head negative  Hematology oncology on board  Status post bronch, bx obtained.

## 2020-08-19 NOTE — SUBJECTIVE & OBJECTIVE
Interval History:  PATIENT CURRENTLY DOING WELL.  States that she feels much better.  Much more oriented comfortable    Review of Systems  Objective:     Vital Signs (Most Recent):  Temp: 99 °F (37.2 °C) (08/19/20 1505)  Pulse: 102 (08/19/20 1730)  Resp: 18 (08/19/20 1730)  BP: (!) 141/69 (08/19/20 1730)  SpO2: 100 % (08/19/20 1730) Vital Signs (24h Range):  Temp:  [97.7 °F (36.5 °C)-99.7 °F (37.6 °C)] 99 °F (37.2 °C)  Pulse:  [] 102  Resp:  [13-37] 18  SpO2:  [94 %-100 %] 100 %  BP: (112-177)/(57-94) 141/69     Weight: 75.8 kg (167 lb 1.6 oz)  Body mass index is 28.68 kg/m².    Intake/Output Summary (Last 24 hours) at 8/19/2020 1811  Last data filed at 8/19/2020 1744  Gross per 24 hour   Intake 4325 ml   Output 1935 ml   Net 2390 ml      Physical Exam  Vitals signs and nursing note reviewed.   Constitutional:       General: She is in acute distress.      Appearance: Normal appearance. She is well-developed. She is diaphoretic.      Comments: Currently unable to speak   HENT:      Head: Normocephalic and atraumatic.      Right Ear: External ear normal.      Left Ear: External ear normal.      Nose: Nose normal.      Mouth/Throat:      Mouth: Mucous membranes are moist.      Pharynx: Oropharynx is clear.   Eyes:      Conjunctiva/sclera: Conjunctivae normal.      Pupils: Pupils are equal, round, and reactive to light.   Neck:      Musculoskeletal: Normal range of motion and neck supple.   Cardiovascular:      Rate and Rhythm: Regular rhythm. Tachycardia present.      Heart sounds: No murmur.   Pulmonary:      Effort: No respiratory distress.      Breath sounds: No wheezing or rhonchi.      Comments: On 100 NRB. Use of accessory muscles, unable to speak due to severe dyspnea.   Abdominal:      General: Bowel sounds are normal.      Palpations: Abdomen is soft.      Tenderness: There is no abdominal tenderness.   Musculoskeletal: Normal range of motion.   Skin:     General: Skin is warm.      Capillary Refill:  Capillary refill takes less than 2 seconds.   Neurological:      General: No focal deficit present.      Mental Status: She is alert and oriented to person, place, and time.      Cranial Nerves: No cranial nerve deficit.      Motor: No weakness.   Psychiatric:         Mood and Affect: Mood normal.         Judgment: Judgment normal.      Comments: anxious         Significant Labs:   BMP:   Recent Labs   Lab 08/18/20  0412 08/19/20  1147   * 140*    144   K 3.5 3.9    108   CO2 26 22*   BUN 18 27*   CREATININE 1.6* 1.5*   CALCIUM 8.9 9.1   MG 2.3  --      CBC:   Recent Labs   Lab 08/18/20  0412 08/19/20  1006   WBC 19.52* 16.93*   HGB 10.5* 10.5*   HCT 36.3* 36.6*   * 442*       Significant Imaging: I have reviewed all pertinent imaging results/findings within the past 24 hours.

## 2020-08-19 NOTE — PROGRESS NOTES
[facility] Hospital Medicine  Progress Note    Patient Name: Kendra Lawrence  MRN: [id number]  Patient Class: IP- Inpatient   Admission Date: 8/13/2020  Length of Stay: 6 days  Attending Physician: Dorie Starks MD  Primary Care Provider: Louie Urena MD        Subjective:     Principal Problem:Sepsis        HPI:  Kendra Lawrence is a 64 y.o. female with a PMHx of COPD, opioid dependence, HTN, CAD, and GERD who presents to the ED with complaints of SOB x10 days. The patient reports SOB that has been constant and became progressively worse over the last 2 days. She reports exerting herself makes it worse and nothing makes it better. She endorses associated fatigue, chills, and right chest wall tightness. The patient denies any fever, CP, cough, congestion, abdominal pain, vomiting, diarrhea, dizziness, weakness, or dysuria. The patient states that she currently smokes e-cigarettes. She denies any known sick contacts. Her ED work up is significant for tachycardia, fever, tachypnea, hypoxia, leukocytosis, nitrite positive UTI, and CXR revealing dense consolidation within the right mid to lower lung consistent with pneumonia. Sepsis protocol was initiated in the ED and the patient received IV fluids, vancomycin, and zosyn. The patient will be admitted under hospital medicine for further work up and evaluation.     Overview/Hospital Course:  Patient monitored closely during hospitalization.  She was was admitted with pneumonia and COPD exacerbation. She was  initiated on COPD pathway including scheduled DuoNebs, IV antibiotics and steroids.  She was noted to have UTI + Abnormal   KLEBSIELLA PNEUMONIAE. Pulmonology consulted and steroids were discontinued.  Patient with oxygen requirement.  She was noted to have worsening of respiratory status with desaturation of her to 97% on oxygen.  CT of the chest ordered and demonstrated Findings in the right middle lobe concerning for neoplasm, invading the  mediastinum.  Masslike pneumonia is included in the differential but less likely.  Probable postobstructive changes in the right upper and lower lobes Enlarged subcarinal lymph node, concerning for a metastatic node. Moderate, loculated-appearing right pleural effusion.  She was recommended for bronchoscopy p.r.n. she is status post bronch per Dr. Brothers on 08/18/2020.  Postprocedure she was noted to have acute respiratory failure requiring BiPAP.  She was transferred to ICU for closer monitoring.    Interval History:  PATIENT CURRENTLY DOING WELL.  States that she feels much better.  Much more oriented comfortable    Review of Systems  Unable to get review system due to patient's current condition:  Lethargic and confused the    Objective:     Vital Signs (Most Recent):  Temp: 99 °F (37.2 °C) (08/19/20 1505)  Pulse: 102 (08/19/20 1730)  Resp: 18 (08/19/20 1730)  BP: (!) 141/69 (08/19/20 1730)  SpO2: 100 % (08/19/20 1730) Vital Signs (24h Range):  Temp:  [97.7 °F (36.5 °C)-99.7 °F (37.6 °C)] 99 °F (37.2 °C)  Pulse:  [] 102  Resp:  [13-37] 18  SpO2:  [94 %-100 %] 100 %  BP: (112-177)/(57-94) 141/69     Weight: 75.8 kg (167 lb 1.6 oz)  Body mass index is 28.68 kg/m².    Intake/Output Summary (Last 24 hours) at 8/19/2020 1811  Last data filed at 8/19/2020 1744  Gross per 24 hour   Intake 4325 ml   Output 1935 ml   Net 2390 ml      Physical Exam  Vitals signs and nursing note reviewed.   Constitutional:       General: She is in acute distress.      Appearance: Normal appearance. She is well-developed. She is diaphoretic.      Comments: Currently unable to speak   HENT:      Head: Normocephalic and atraumatic.      Right Ear: External ear normal.      Left Ear: External ear normal.      Nose: Nose normal.      Mouth/Throat:      Mouth: Mucous membranes are moist.      Pharynx: Oropharynx is clear.   Eyes:      Conjunctiva/sclera: Conjunctivae normal.      Pupils: Pupils are equal, round, and reactive to light.    Neck:      Musculoskeletal: Normal range of motion and neck supple.   Cardiovascular:      Rate and Rhythm: Regular rhythm. Tachycardia present.      Heart sounds: No murmur.   Pulmonary:      Effort: No respiratory distress.      Breath sounds: No wheezing or rhonchi.      Comments: On 100 NRB. Use of accessory muscles, unable to speak due to severe dyspnea.   Abdominal:      General: Bowel sounds are normal.      Palpations: Abdomen is soft.      Tenderness: There is no abdominal tenderness.   Musculoskeletal: Normal range of motion.   Skin:     General: Skin is warm.      Capillary Refill: Capillary refill takes less than 2 seconds.   Neurological:      General: No focal deficit present.      Mental Status: She is alert and oriented to person, place, and time.      Cranial Nerves: No cranial nerve deficit.      Motor: No weakness.   Psychiatric:         Mood and Affect: Mood normal.         Judgment: Judgment normal.      Comments: anxious         Significant Labs:   BMP:   Recent Labs   Lab 08/18/20  0412 08/19/20  1147   * 140*    144   K 3.5 3.9    108   CO2 26 22*   BUN 18 27*   CREATININE 1.6* 1.5*   CALCIUM 8.9 9.1   MG 2.3  --      CBC:   Recent Labs   Lab 08/18/20  0412 08/19/20  1006   WBC 19.52* 16.93*   HGB 10.5* 10.5*   HCT 36.3* 36.6*   * 442*       Significant Imaging: I have reviewed all pertinent imaging results/findings within the past 24 hours.      Assessment/Plan:      * Sepsis  This patient does have evidence of infective focus  My overall impression is sepsis.  Antibiotics given-   Antibiotics (From admission, onward)    Start     Stop Route Frequency Ordered    08/14/20 0400  piperacillin-tazobactam 4.5 g in dextrose 5 % 100 mL IVPB (ready to mix system)      -- IV Every 8 hours (non-standard times) 08/13/20 2217          Latest lactate reviewed, they are-  No results for input(s): LACTATE in the last 72 hours.  Organ dysfunction indicated by Acute respiratory  failure   But respiratory status stable patient denies any shortness of breath or chest pain  Source- PNA and UTI  Source control Achieved by- Vancomycin and Zosyn  UA reviewed.  CXR reviewed.  Procalcitonin elevated.  Will stop IV fluids  Microbiology Results (last 7 days)     Procedure Component Value Units Date/Time    AFB Culture & Smear [[id number]] Collected: 08/18/20 0826    Order Status: Completed Specimen: Respiratory from Bronchial Wash Updated: 08/19/20 1508     AFB CULTURE STAIN No acid fast bacilli seen.    Blood culture x two cultures. Draw prior to antibiotics. [[id number]] Collected: 08/13/20 1906    Order Status: Completed Specimen: Blood from Antecubital, Right Updated: 08/19/20 1412     Blood Culture, Routine No growth after 5 days.    Narrative:      Aerobic and anaerobic    Blood culture x two cultures. Draw prior to antibiotics. [[id number]] Collected: 08/13/20 1906    Order Status: Completed Specimen: Blood from Antecubital, Left Updated: 08/19/20 1412     Blood Culture, Routine No growth after 5 days.    Narrative:      Aerobic and anaerobic    Culture, Respiratory with Gram Stain [[id number]] Collected: 08/18/20 0826    Order Status: Completed Specimen: Respiratory from Bronchial Wash Updated: 08/19/20 0841     Respiratory Culture No Growth     Gram Stain (Respiratory) <10 epithelial cells per low power field.     Gram Stain (Respiratory) Rare WBC's     Gram Stain (Respiratory) No organisms seen    Culture, Respiratory with Gram Stain [[id number]] Collected: 08/18/20 0825    Order Status: Canceled Specimen: Respiratory from Bronchial Wash     Urine culture [[id number]]  (Abnormal)  (Susceptibility) Collected: 08/13/20 1931    Order Status: Completed Specimen: Urine Updated: 08/16/20 1256     Urine Culture, Routine KLEBSIELLA PNEUMONIAE  >100,000 cfu/ml      Narrative:      Specimen Source->Urine              Mass of middle lobe of right lung   likely neoplasm.  Consult Oncology for  recommendations.      Acute on chronic respiratory failure with hypoxia  Patient with Hypercapnic and Hypoxic Respiratory failure which is Acute on Chronic.  she is on home oxygen at 2 LPM. Supplemental ventilation was provided and noted- Oxygen Concentration (%):  [32-45] 32.   Differential diagnosis includes - COPD and Pneumonia Labs and images were reviewed.  Will treat underlying causes and adjust management of respiratory failure as follows  ABG  Recent Labs   Lab 08/19/20  0837   PH 7.281*   PCO2 55.0*   PO2 100   HCO3 25.9   BE -1   POCSATURATED 97             NOELLE (acute kidney injury)  Likely to be renal was on normal saline 100 cc an hour  Creatinine did not improve IV fluids  Will get urine lytes tomorrow if kidney function does not improve  Patient is eating will hold off on the IV fluids due to lower extremity swelling      Malignant neoplasm of upper lobe of lung  Suspected. New finding on CT chest.   Had a bronchoscopy yesterday.  Awaiting biopsy results  Concerning for mets. Check CT head per pulmonary recs- CT head negative  Hematology oncology on board  Status post bronch, bx obtained.    Mediastinal lymphadenopathy  Noted on CT chest. Possible due to neoplasm.        Protein-calorie malnutrition, mild  Nutrition consulted. Body mass index is 28.68 kg/m².. Encourage maximal PO intake. Diet supplementation ordered per nutrition approval. Will encourage PO and monitor closely for weight changes.        Iron deficiency anemia secondary to inadequate dietary iron intake  Check iron studies.   Continue p.o. ferrous sulfate      Opioid dependence  Continue home suboxone.  reviewed.    Hypocalcemia  Corrected calcium 9.5, continue to trend.    Hypokalemia  Patient has hypokalemia which is currently uncontrolled. Last electrolytes reviewed-   Recent Labs   Lab 08/14/20  0421 08/14/20  1800 08/15/20  0435   K 3.4* 4.2 4.0   . Will replace potassium and monitor electrolytes closely. Continuous  telemetry.  improved    Acute hypoxemic respiratory failure  Likely 2/2 PNA.  Continue abx and supplemental oxygen.  No significant improvement. Check CT chest- no previous imaging of chest.   CT chest with possible mass, post obs pneumonia. Continue IV abx. Pulmonary consulted      worsening post bronchoscopy currently her resp status is stable.  Patient Oxygen saturation dropped into mid to low 80s upon supine with SpO2 increased to 4L for recovery to 97%        Chronic obstructive pulmonary disease with acute lower respiratory infection  Patient's COPD is controlled currently.  Patient is currently off COPD Pathway. Continue scheduled inhalers Antibiotics and Supplemental oxygen and monitor respiratory status closely.   Procalcitonin elevated.  CXR reviewed.  Continue abx.  Duo nebs.  Consulted pulmonology.     Acute cystitis without hematuria  UA reviewed, follow culture.  Patient started on zosyn in the ED, continue for now.    + Klebsiella pneumonia    Pneumonia due to infectious organism  CXR reviewed.   will continue vanc and Zosyn for today.  Will try and deescalate antibiotics tomorrow  Duo nebs q4 hours.  Supplemental oxygen as needed to keep oxygen satruation >90%.  Pulmonary consulted        VTE Risk Mitigation (From admission, onward)         Ordered     enoxaparin injection 40 mg  Every 24 hours      08/13/20 2217     IP VTE HIGH RISK PATIENT  Once      08/13/20 2217     Place sequential compression device  Until discontinued      08/13/20 2217                Discharge Planning   BRANDON: 8/19/2020     Code Status: Full Code   Is the patient medically ready for discharge?: No    Reason for patient still in hospital (select all that apply): Treatment  Discharge Plan A: Home with family            Critical care time spent on the evaluation and treatment of severe organ dysfunction, review of pertinent labs and imaging studies, discussions with consulting providers and discussions with patient/family: 60  minutes.      Dorie Starks MD  Department of Hospital Medicine   [facility]

## 2020-08-19 NOTE — CONSULTS
[facility]     Subjective:      PATIENT:   Kendra Lawrence  :    1955  MRN:    [id number]  Admit Date:    2020  DATE OF VISIT:  2020    Reason for Consult: Lung Mass    Patient ID: Kendra Lawrence is a 64 y.o. female PMHx of COPD, opioid dependence, HTN, CAD, and GERD who presents to the ED with complaints of SOB x10 days. The patient reported SOB that has been constant and became progressively worse over the last 2 days. She reported to exerting herself makes it worse and nothing makes it better. She endorses associated fatigue, chills, and right chest wall tightness.  She was noted to have worsening of respiratory status with desaturation of her to 97% on oxygen.  CT of the chest ordered and demonstrated findings in the right middle lobe concerning for neoplasm, invading the mediastinum.  Masslike pneumonia is included in the differential but less likely.  CT head WO contrast shows asymmetric prominence of the frontal horn/body of the right lateral ventricle with slight leftward bowing of the septum pellucidum. Probable postobstructive changes in the right upper and lower lobes. Enlarged subcarinal lymph node, concerning for a metastatic node. Moderate, loculated-appearing right pleural effusion.  Bronchoscopy performed on 2020 by Dr. Andrew Brothers and pathology is pending. Pt states that she has a decrease in SOB. She denies cough, fever/chills, N/V/D, melena, hematochezia, hemoptysis.    Review of Systems   Constitutional: Positive for activity change and fatigue. Negative for appetite change, chills, fever and unexpected weight change.   HENT: Positive for dental problem. Negative for mouth sores and trouble swallowing.    Eyes: Negative for photophobia and visual disturbance.   Respiratory: Negative for cough, chest tightness, shortness of breath, wheezing and stridor.    Cardiovascular: Negative for chest pain and leg swelling.   Gastrointestinal: Negative for abdominal pain,  constipation, diarrhea, nausea and vomiting.   Musculoskeletal: Negative for arthralgias, back pain and myalgias.   Skin: Negative for color change, pallor, rash and wound.   Hematological: Negative for adenopathy. Bruises/bleeds easily.   Psychiatric/Behavioral: Negative for agitation, behavioral problems, confusion, decreased concentration and dysphoric mood.        Past Medical History:   Diagnosis Date    Asthma     Cardiac angina     Chronic abdominal pain     Claustrophobia     COPD (chronic obstructive pulmonary disease)     Coronary artery disease     GERD (gastroesophageal reflux disease)     History of drug dependence     Hypertension        Family History   Problem Relation Age of Onset    Breast cancer Sister 55    Breast cancer Sister 50    Cancer Mother     Heart disease Mother     Hypertension Mother     Cancer Father        Past Surgical History:   Procedure Laterality Date    BREAST BIOPSY Bilateral 20 yrs ago    benign    BRONCHOSCOPY N/A 2020    Procedure: Bronchoscopy- 730 or noon, floro not needed;  Surgeon: Andrew Brothers MD;  Location: [facility];  Service: Endoscopy;  Laterality: N/A;     SECTION      CHOLECYSTECTOMY      ENDOSCOPIC ULTRASOUND OF UPPER GASTROINTESTINAL TRACT Left 2018    Procedure: ULTRASOUND, UPPER GI TRACT, ENDOSCOPIC;  Surgeon: Paras Negrete MD;  Location: [facility];  Service: Endoscopy;  Laterality: Left;    ESOPHAGOGASTRODUODENOSCOPY N/A 2018    Procedure: EGD (ESOPHAGOGASTRODUODENOSCOPY);  Surgeon: Paras Negrete MD;  Location: [facility];  Service: Endoscopy;  Laterality: N/A;    ESOPHAGOGASTRODUODENOSCOPY N/A 2018    Procedure: EGD (ESOPHAGOGASTRODUODENOSCOPY);  Surgeon: Paras Negrete MD;  Location: [facility];  Service: Endoscopy;  Laterality: N/A;    HYSTERECTOMY      MAGNETIC RESONANCE IMAGING N/A 2019    Procedure: MRI (Magnetic Resonance Imagine) needs anesthesia;  Surgeon: Raffy ATKINSON  MD Melvin;  [facility]: [facility];  Service: Anesthesiology;  Laterality: N/A;    OOPHORECTOMY      TEMPOROMANDIBULAR JOINT SURGERY      TONSILLECTOMY         Social History     Socioeconomic History    Marital status:      Spouse name: Not on file    Number of children: Not on file    Years of education: Not on file    Highest education level: Not on file   Occupational History    Not on file   Social Needs    Financial resource strain: Not on file    Food insecurity     Worry: Not on file     Inability: Not on file    Transportation needs     Medical: Not on file     Non-medical: Not on file   Tobacco Use    Smoking status: Current Every Day Smoker     Years: 52.00     Types: Vaping w/o nicotine    Smokeless tobacco: Never Used   Substance and Sexual Activity    Alcohol use: No     Frequency: Never    Drug use: Yes     Types: Hydrocodone    Sexual activity: Not on file   Lifestyle    Physical activity     Days per week: Not on file     Minutes per session: Not on file    Stress: Not on file   Relationships    Social connections     Talks on phone: Not on file     Gets together: Not on file     Attends Christian service: Not on file     Active member of club or organization: Not on file     Attends meetings of clubs or organizations: Not on file     Relationship status: Not on file   Other Topics Concern    Not on file   Social History Narrative    Not on file        albuterol-ipratropium  3 mL Nebulization Q4H    buprenorphine-naloxone  1 each Sublingual BID    DULoxetine  60 mg Oral Daily    enoxaparin  40 mg Subcutaneous Q24H    ferrous sulfate  325 mg Oral Daily    ketamine        lidocaine (PF) 10 mg/ml (1%)        lidocaine (PF) 20 mg/mL (2%)        lidocaine (PF)        lidocaine HCl 2%        lidocaine-EPINEPHrine (PF) 1%-1:200,000        methylPREDNISolone sodium succinate  40 mg Intravenous Daily    multivitamin  1 tablet Oral Daily    oxymetazoline         "pantoprazole  40 mg Oral Daily    piperacillin-tazobactam (ZOSYN) IVPB  4.5 g Intravenous Q8H    senna-docusate 8.6-50 mg  1 tablet Oral BID        sodium chloride 0.9% 100 mL/hr at 08/19/20 0515       acetaminophen, albuterol-ipratropium, dextrose 50%, dextrose 50%, glucagon (human recombinant), glucose, glucose, magnesium oxide, magnesium oxide, ondansetron, potassium chloride, potassium chloride, potassium chloride, sodium chloride 0.9%    Review of patient's allergies indicates:  No Known Allergies  All medications and past history have been reviewed.    Objective:      Vitals:  /75   Pulse 102   Temp 98.3 °F (36.8 °C) (Oral)   Resp (!) 28   Ht 5' 4" (1.626 m)   Wt 75.8 kg (167 lb 1.6 oz)   LMP  (LMP Unknown)   SpO2 98%   Breastfeeding No   BMI 28.68 kg/m²    Body surface area is 1.85 meters squared.    Last 24 Hours:    Intake/Output Summary (Last 24 hours) at 8/19/2020 0901  Last data filed at 8/19/2020 0800  Gross per 24 hour   Intake 300 ml   Output 1525 ml   Net -1225 ml     Weight Readings:  Wt Readings from Last 5 Encounters:   08/17/20 75.8 kg (167 lb 1.6 oz)   03/06/20 75.8 kg (167 lb 1.7 oz)   02/07/20 77.7 kg (171 lb 4.8 oz)   01/10/20 78.6 kg (173 lb 4.5 oz)   12/13/19 77.1 kg (169 lb 15.6 oz)        Physical Exam  Constitutional:       General: She is not in acute distress.     Appearance: Normal appearance. She is ill-appearing.   HENT:      Head: Normocephalic and atraumatic.      Right Ear: External ear normal.      Left Ear: External ear normal.      Nose: Nose normal. No congestion or rhinorrhea.   Eyes:      General:         Right eye: No discharge.         Left eye: No discharge.      Extraocular Movements: Extraocular movements intact.      Conjunctiva/sclera: Conjunctivae normal.      Pupils: Pupils are equal, round, and reactive to light.   Neck:      Musculoskeletal: Normal range of motion and neck supple. No neck rigidity.   Cardiovascular:      Rate and Rhythm: Normal " rate and regular rhythm.      Heart sounds: Normal heart sounds. No murmur.   Pulmonary:      Effort: No respiratory distress.      Breath sounds: No stridor. Wheezing and rhonchi present.      Comments: Tachypnic  Abdominal:      General: Bowel sounds are normal. There is distension.      Palpations: Abdomen is soft.      Tenderness: There is no abdominal tenderness. There is no guarding.   Musculoskeletal: Normal range of motion.         General: No deformity.   Lymphadenopathy:      Cervical: No cervical adenopathy.   Skin:     General: Skin is warm and dry.      Coloration: Skin is pale.      Findings: No erythema or rash.   Neurological:      Mental Status: She is alert and oriented to person, place, and time.      Comments: Lethargic     Psychiatric:         Mood and Affect: Mood normal.         Behavior: Behavior normal.         Thought Content: Thought content normal.         Judgment: Judgment normal.         Labs:  Recent Labs   Lab 08/16/20 0437 08/17/20  0433 08/18/20  0412   RBC 3.84* 3.68* 3.87*   HGB 10.4* 9.9* 10.5*   HCT 37.0 34.4* 36.3*   * 427* 586*   MCV 96 94 94     Recent Labs   Lab 08/16/20  0436 08/17/20  0433 08/18/20  0412    138 139   K 4.0 3.9 3.5    102 101   CO2 25 22* 26   BUN 9 15 18   CREATININE 0.8 1.5* 1.6*   * 125* 122*   CALCIUM 7.8* 7.6* 8.9   MG 1.9 2.0 2.3   PHOS 2.4* 3.0 2.6*   ALKPHOS 143* 135 138*   PROT 6.3 6.0 6.8   ALBUMIN 2.1* 1.8* 1.9*   BILITOT 0.4 0.2 0.3   AST 14 13 12   ALT 27 21 18     Recent Labs   Lab 08/14/20  0421   TSH 0.515     CT Head WO 08/17/2020  COMPARISON:  None.     FINDINGS:  There is no acute intracranial hemorrhage.  Asymmetric prominence of the frontal horn/body of the right lateral ventricle with slight leftward bowing of the septum pellucidum.  No evidence of hydrocephalus.  No mass effect or midline shift is present.  The gray-white matter differentiation is within normal limits. Mild opacification of the right  mastoid air cells.  Partially visualized postsurgical changes of bilateral maxillary antrostomies.  Mild mucosal thickening within the ethmoid and frontal sinuses.  The visualized portions of the orbits are normal.  No fractures are identified.     Impression:     1. No evidence of an acute intracranial abnormality.  Clinical correlation and consider further evaluation with MRI of the brain.  2. Asymmetric prominence of the frontal horn/body of the right lateral ventricle with slight bowing of the septum pellucidum, which may reflect a developmental variant or possible intraventricular simple cyst.    CT Chest WO 08/15/2020  FINDINGS:  There is an enlarged subcarinal lymph node with short axis diameter of 1.8 cm series 3, image 46.  No other enlarged mediastinal or axillary lymph nodes are present.     The aorta is normal in caliber with scattered calcific plaque.  Coronary artery calcifications are present.  There is a trace left pleural effusion.  There is a moderate loculated appearing right pleural effusion.  .     There is ill-defined soft tissue thickening surrounding the bronchus intermedius, right lower and middle lobe bronchi.  There is volume loss, atelectasis, consolidation in the right lower lobe.  There is consolidation in the inferior right upper lobe.  There is very dense, masslike opacification of the right middle lobe.  Findings are concerning for neoplasm, rather than pneumonia.  There is no fat plane between the abnormality in the anterior right mid hemithorax and the mediastinum, as demonstrated on series 3, image 48.  This is difficult to measure accurately, as it may represent a combination of neoplasm and collapsed lung.  However, an estimate of 11.8 x 8.7 x 8.7 cm is obtained on series 3, image 50 and series 603, image 107.     There is dependent atelectasis at the left lung base.  No left lung nodules are present.     No suspicious findings are noted in the bones.     Intra and extrahepatic  biliary ductal dilatation appears unchanged compared to the 2018 CT of the abdomen.  No other abnormalities are noted in the visualized portions of the upper abdomen.     Impression:     Findings in the right middle lobe concerning for neoplasm, invading the mediastinum.  Masslike pneumonia is included in the differential but less likely.  Probable postobstructive changes in the right upper and lower lobes.     Enlarged subcarinal lymph node, concerning for a metastatic node.     Moderate, loculated-appearing right pleural effusion.     Trace left pleural effusion, dependent atelectasis left lung.     This report was flagged in Epic as abnormal.    XR Chest 08/18/2020  FINDINGS:  There is dense opacification of the lower 2/3 of the right hemithorax now, consistent with mass in the right middle lobe and right pleural effusion as noted on the prior CT chest.  No pneumothorax is seen.  The left lung is clear.  The cardiac size is obscured.     Impression:     Dense opacification of the lower 2/3 of the right chest secondary to right middle lobe mass and right pleural effusion noted on prior CT.  This is an increase in density since the prior chest x-ray  All lab results and imaging results have been reviewed and discussed with the patient.     Assessment and Plan:     Mass of middle lobe of right lung     Malignant neoplasm of upper lobe of lung    Anemia    -RML lung mass with mediastinal invasion is likely neoplasm.   -Awaiting pathology from bronchoscopy for verification of malignancy  -Asymmetric prominence of the frontal horn/body of the right lateral ventricle with slight leftward bowing of the septum pellucidum. I am placing order for MRI brain W/WO for better assessment to r/o any possible lesions not seen on CT.  -Continue care for pneumonia, respiratory failure and COPD via pulmonology.  -Anemia likely mixed anemia due to iron deficiency and chronic disease. Pt iron levels are <10. Continue oral iron. I am  ordering a ferritin.   -H/H 10.5/36.3. Anemia stable. If H/H <7/21 or if symptomatically anemic/hemodynamically unstable, then transfuse 1 unit PRBC PRN.  -Platelets up to 586. This is likely reactive thrombocytosis. Once pneumonia is controlled and patient is stabilized, platelets should slowly decrease.   -We will continue to monitor and await results.       Sincerely,  Destin Valente PA-C    Note is available for collaborating MD; Dr. Acuña for review.    Electronically signed by: Destin Valente PA-C

## 2020-08-19 NOTE — PLAN OF CARE
Problem: Physical Therapy Goal  Goal: Physical Therapy Goal  Description: Goals to be met by: 2020     Patient will increase functional independence with mobility by performin. Supine to sit with Supervision  2. Sit to stand transfer with Supervision  3. Bed to chair transfer with Supervision using LRAD  4. Gait  x 150 feet with Supervision using LRAD  5. Lower extremity exercise program x20 reps per handout, with independence    Outcome: Ongoing, Progressing     Goals modified upon re-evaluation on 2020

## 2020-08-19 NOTE — CARE UPDATE
08/19/20 0812   Patient Assessment/Suction   Level of Consciousness (AVPU) alert   All Lung Fields Breath Sounds diminished   PRE-TX-O2   O2 Device (Oxygen Therapy) nasal cannula   $ Is the patient on Low Flow Oxygen? Yes   Flow (L/min) 3   Oxygen Concentration (%) 32   SpO2 100 %   Pulse Oximetry Type Continuous   $ Pulse Oximetry - Multiple Charge Pulse Oximetry - Multiple   Pulse 91   Resp (!) 25   Aerosol Therapy   $ Aerosol Therapy Charges Aerosol Treatment   Daily Review of Necessity (SVN) completed   Respiratory Treatment Status (SVN) given   Treatment Route (SVN) mask   Patient Position (SVN) semi-Hall's   Post Treatment Assessment (SVN) breath sounds unchanged   Signs of Intolerance (SVN) none   Breath Sounds Post-Respiratory Treatment   Throughout All Fields Post-Treatment All Fields   Throughout All Fields Post-Treatment no change   Post-treatment Heart Rate (beats/min) 94   Post-treatment Resp Rate (breaths/min) 22   Wound Care   $ Wound Care Tech Time 15 min   Area of Concern Left;Right;Cheek;Bridge of nose;Chin   Skin Color/Characteristics without discoloration   Skin Temperature warm   Preset CPAP/BiPAP Settings   Mode Of Delivery Standby   Received patient on 3L NC, resting comfortably, Bipap at head of bed on standby, Q4 Duoneb, tolerated well, vitals as charted.

## 2020-08-19 NOTE — ASSESSMENT & PLAN NOTE
Likely 2/2 PNA.  Continue abx and supplemental oxygen.  No significant improvement. Check CT chest- no previous imaging of chest.   CT chest with possible mass, post obs pneumonia. Continue IV abx. Pulmonary consulted      worsening post bronchoscopy currently her resp status is stable.  Patient Oxygen saturation dropped into mid to low 80s upon supine with SpO2 increased to 4L for recovery to 97%

## 2020-08-19 NOTE — EICU
Notified of anxiety and agitation    Patient sat up and removed her BiPap with subsequent desaturation.  Tachycardic and tachypneic.  Was able to redirect but still appears anxious.    Ordered a one time dose of ativan 1 mg IV

## 2020-08-19 NOTE — RESPIRATORY THERAPY
08/18/20 1915   Patient Assessment/Suction   Level of Consciousness (AVPU) alert   Respiratory Effort Normal;Unlabored   Expansion/Accessory Muscles/Retractions no use of accessory muscles   All Lung Fields Breath Sounds diminished;rhonchi   Rhythm/Pattern, Respiratory assisted mechanically;depth regular;pattern regular;unlabored   Cough Frequency infrequent   PRE-TX-O2   O2 Device (Oxygen Therapy) BiPAP   $ Is the patient on Low Flow Oxygen? Yes   Oxygen Concentration (%) 45   SpO2 100 %   Pulse Oximetry Type Continuous   $ Pulse Oximetry - Multiple Charge Pulse Oximetry - Multiple   Pulse 94   Resp 15   Aerosol Therapy   $ Aerosol Therapy Charges Aerosol Treatment   Daily Review of Necessity (SVN) completed   Respiratory Treatment Status (SVN) given   Treatment Route (SVN) in-line   Patient Position (SVN) semi-Hall's   Post Treatment Assessment (SVN) breath sounds improved   Signs of Intolerance (SVN) none   Breath Sounds Post-Respiratory Treatment   Post-treatment Heart Rate (beats/min) 96   Post-treatment Resp Rate (breaths/min) 16   Ready to Wean/Extubation Screen   FIO2<=50 (chart decimal) 0.45   Preset CPAP/BiPAP Settings   Mode Of Delivery BiPAP   $ CPAP/BiPAP Daily Charge BiPAP/CPAP Daily   $ Initial CPAP/BiPAP Setup? No   $ Is patient using? Yes   Size of Mask Medium/Large   Sized Appropriately? Yes   Equipment Type V60   Airway Device Type medium full face mask   Humidifier not applicable   Ipap 16   EPAP (cm H2O) 6   Pressure Support (cm H2O) 10   Set Rate (Breaths/Min) 12   ITime (sec) 1   Rise Time (sec) 1   Patient CPAP/BiPAP Settings   RR Total (Breaths/Min) 14   Tidal Volume (mL) 621   VE Minute Ventilation (L/min) 8 L/min   Peak Inspiratory Pressure (cm H2O) 17   TiTOT (%) 23   Patient Trigger - ST Mode Only (%) 70

## 2020-08-19 NOTE — PT/OT/SLP EVAL
Speech Language Pathology Evaluation  Bedside Swallow    Patient Name:  Kendra Lawrence   MRN:  [id number]  Admitting Diagnosis: Sepsis    Recommendations:                 General Recommendations:  Will follow and upgrade when deemed appropriate  Diet recommendations:  Mechanical soft(IDDSI 5), Thin   Aspiration Precautions: Assistance with meals, Feed only when awake/alert, Meds whole 1 at a time and Standard aspiration precautions   General Precautions: Standard, aspiration, fall  Communication strategies:  none    History:     Past Medical History:   Diagnosis Date    Asthma     Cardiac angina     Chronic abdominal pain     Claustrophobia     COPD (chronic obstructive pulmonary disease)     Coronary artery disease     GERD (gastroesophageal reflux disease)     History of drug dependence     Hypertension        Past Surgical History:   Procedure Laterality Date    BREAST BIOPSY Bilateral 20 yrs ago    benign    BRONCHOSCOPY N/A 2020    Procedure: Bronchoscopy- 730 or noon, floro not needed;  Surgeon: Andrew Brothers MD;  Location: [facility];  Service: Endoscopy;  Laterality: N/A;     SECTION      CHOLECYSTECTOMY      ENDOSCOPIC ULTRASOUND OF UPPER GASTROINTESTINAL TRACT Left 2018    Procedure: ULTRASOUND, UPPER GI TRACT, ENDOSCOPIC;  Surgeon: Paras Negrete MD;  Location: [facility];  Service: Endoscopy;  Laterality: Left;    ESOPHAGOGASTRODUODENOSCOPY N/A 2018    Procedure: EGD (ESOPHAGOGASTRODUODENOSCOPY);  Surgeon: Paras Negrete MD;  Location: [facility];  Service: Endoscopy;  Laterality: N/A;    ESOPHAGOGASTRODUODENOSCOPY N/A 2018    Procedure: EGD (ESOPHAGOGASTRODUODENOSCOPY);  Surgeon: Paras Negrete MD;  Location: [facility];  Service: Endoscopy;  Laterality: N/A;    HYSTERECTOMY      MAGNETIC RESONANCE IMAGING N/A 2019    Procedure: MRI (Magnetic Resonance Imagine) needs anesthesia;  Surgeon: Raffy Charles MD;  Location: [facility];   Service: Anesthesiology;  Laterality: N/A;    OOPHORECTOMY      TEMPOROMANDIBULAR JOINT SURGERY      TONSILLECTOMY         Social History: Patient lives with  and daughter.    Prior Intubation HX:  Bronch yesterday    Modified Barium Swallow: None in Epic    Imaging:  X-Ray Chest 1 View   Final Result      Dense opacification of the lower 2/3 of the right chest secondary to right middle lobe mass and right pleural effusion noted on prior CT.  This is an increase in density since the prior chest x-ray         Electronically signed by: Bryan Bey MD   Date:    08/18/2020   Time:    08:33      CT Head Without Contrast   Final Result      1. No evidence of an acute intracranial abnormality.  Clinical correlation and consider further evaluation with MRI of the brain.   2. Asymmetric prominence of the frontal horn/body of the right lateral ventricle with slight bowing of the septum pellucidum, which may reflect a developmental variant or possible intraventricular simple cyst.         Electronically signed by: Daljit Ponce   Date:    08/17/2020   Time:    16:57      CT Chest Without Contrast   Final Result   Abnormal      Findings in the right middle lobe concerning for neoplasm, invading the mediastinum.  Masslike pneumonia is included in the differential but less likely.  Probable postobstructive changes in the right upper and lower lobes.      Enlarged subcarinal lymph node, concerning for a metastatic node.      Moderate, loculated-appearing right pleural effusion.      Trace left pleural effusion, dependent atelectasis left lung.      This report was flagged in Epic as abnormal.         Electronically signed by: Suad Vizcarra   Date:    08/15/2020   Time:    15:01      X-Ray Chest AP Portable   Final Result      Dense consolidation within the right mid to lower lung consistent with pneumonia.         Electronically signed by: Leonel Beasley   Date:    08/13/2020   Time:    19:49      X-Ray Chest  "PA And Lateral    (Results Pending)   MRI Brain W WO Contrast    (Results Pending)        Prior diet: Soft solids.    Occupation/hobbies/homemaking: PLOF; ambulatory, does not drive, independent with ADLs.    Subjective   Re: dysphagia, "Sometimes but not all the time. Like with the meat. The hard meat."    Objective:   Pt seen in ICU for clinical swallow evaluation. Asleep upon entrance into room; mouth breathing.  Opens eye to verbal/tactile stimulation; lethargic. Initially pt required repeated stimulation to maintain alertness, however, this improved as session progressed. Oriented x4.     Oral Musculature Evaluation  · Oral Musculature: WFL  · Dentition: scattered dentition, teeth in poor condition  · Secretion Management: adequate  · Mucosal Quality: dry  · Mandibular Strength and Mobility: WFL  · Oral Labial Strength and Mobility: WFL  · Lingual Strength and Mobility: WFL  · Volitional Cough: adequate  · Volitional Swallow: able to palpate laryngeal rise  · Voice Prior to PO Intake: clear    Bedside Swallow Eval:   Consistencies Assessed:  · Thin liquids --via tsp, cup, straw, continuous straw sips  · Puree --appelsauce  · Mixed consistencies --diced pears   · Meds, one at a time, with water/juice via straw    Oral Phase:   · Prolonged oral prep with diced pears; poor dentition  · WFL    Pharyngeal Phase:   · no overt clinical signs/symptoms of aspiration   · Single, loose cough upon completion of study    Compensatory Strategies  · None    Assessment:   Clinical swallow evaluation completed. REC Mechanical soft textures (IDDSI 5) with thin liquids. May require assistance with meals. Will follow.     Goals:   Multidisciplinary Problems     SLP Goals        Problem: SLP Goal    Goal Priority Disciplines Outcome   SLP Goal     SLP Ongoing, Progressing   Description:   1. Pt will consume dental soft textures and thin liquids with functional oral phase and no overt s/s penetration/aspiration              "       Plan:     · Patient to be seen:  3 x/week   · Plan of Care expires:  08/26/20  · Plan of Care reviewed with:  patient   · SLP Follow-Up:  Yes       Discharge recommendations:      Barriers to Discharge:  None    Time Tracking:     SLP Treatment Date:   08/19/20  Speech Start Time:  1015  Speech Stop Time:  1037     Speech Total Time (min):  22 min    Billable Minutes: Eval Swallow and Oral Function 22 and Total Time 22    Kaley Clark CCC-SLP  08/19/2020

## 2020-08-19 NOTE — ASSESSMENT & PLAN NOTE
Likely 2/2 PNA.  Continue abx and supplemental oxygen.  No significant improvement. Check CT chest- no previous imaging of chest.   CT chest with possible mass, post obs pneumonia. Continue IV abx. Pulmonary consulted     We worsening post bronchoscopy.  Patient placed on 100% non-rebreather ABGs obtained.  She is recommended to transfer to ICU.    Re-evaluated patient and she is on BiPAP resting comfortably.

## 2020-08-19 NOTE — PROGRESS NOTES
8/19/2020   Progress Note  PULMONARY    Admit Date: 8/13/2020 08/19/2020      SUBJECTIVE:     Aug 17 ct with mass,    Feels weak, min ambulation, min appetite.    Seems much worse today at bedside    8/18 post bronch distress when coming out sedation.  Now bipap and icu.  8/19 looks much better this am, alert with min sleepiness (more her usual?) - denies sob.      PFSH and Allergies reviewed.    OBJECTIVE:     Vitals (Most recent):  Vitals:    08/19/20 0600   BP: 136/66   Pulse: 100   Resp: (!) 26   Temp:        Vitals (24 hour range):  Temp:  [97.7 °F (36.5 °C)-99.7 °F (37.6 °C)]   Pulse:  []   Resp:  [12-81]   BP: (111-199)/(57-97)   SpO2:  [85 %-100 %]       Intake/Output Summary (Last 24 hours) at 8/19/2020 0810  Last data filed at 8/19/2020 0621  Gross per 24 hour   Intake 700 ml   Output 1600 ml   Net -900 ml          Physical Exam:  The patient's neuro status (alertness,orientation,cognitive function,motor skills,), pharyngeal exam (oral lesions, hygiene, abn dentition,), Neck (jvd,mass,thyroid,nodes in neck and above/below clavicle),RESPIRATORY(symmetry,effort,fremitus,percussion,auscultation),  Cor(rhythm,heart tones including gallops,perfusion,edema)ABD(distention,hepatic&splenomegaly,tenderness,masses), Skin(rash,cyanosis),Psyc(affect,judgement,).  Exam negative except for these pertinent findings:    Decreased bs right, good left, alert,sl sluggish, no edema, no asterixis       Radiographs reviewed: view by direct vision -   8/18 cxr increased opacification.    ct with mass rul ant/post and rml,   No results found for this or any previous visit.]    Labs     No results for input(s): WBC, HGB, HCT, PLT, BAND, METAMYELOCYT, MYELOPCT, HGBA1C in the last 24 hours.  Recent Labs   Lab 08/18/20  1224   TROPONINI 0.080*     Recent Labs   Lab 08/18/20  1741   PH 7.267*   PCO2 52.8*   PO2 84   HCO3 24.1     Microbiology Results (last 7 days)     Procedure Component Value Units Date/Time    Culture,  Respiratory with Gram Stain [[id number]] Collected: 08/18/20 0826    Order Status: Completed Specimen: Respiratory from Bronchial Wash Updated: 08/18/20 1748     Gram Stain (Respiratory) <10 epithelial cells per low power field.     Gram Stain (Respiratory) Rare WBC's     Gram Stain (Respiratory) No organisms seen    AFB Culture & Smear [[id number]] Collected: 08/18/20 0826    Order Status: Sent Specimen: Respiratory from Bronchial Wash Updated: 08/18/20 1418    Blood culture x two cultures. Draw prior to antibiotics. [[id number]] Collected: 08/13/20 1906    Order Status: Completed Specimen: Blood from Antecubital, Left Updated: 08/18/20 1412     Blood Culture, Routine No Growth to date      No Growth to date      No Growth to date      No Growth to date      No Growth to date    Narrative:      Aerobic and anaerobic    Blood culture x two cultures. Draw prior to antibiotics. [[id number]] Collected: 08/13/20 1906    Order Status: Completed Specimen: Blood from Antecubital, Right Updated: 08/18/20 1412     Blood Culture, Routine No Growth to date      No Growth to date      No Growth to date      No Growth to date      No Growth to date    Narrative:      Aerobic and anaerobic    Culture, Respiratory with Gram Stain [[id number]] Collected: 08/18/20 0825    Order Status: Canceled Specimen: Respiratory from Bronchial Wash     Urine culture [[id number]]  (Abnormal)  (Susceptibility) Collected: 08/13/20 1931    Order Status: Completed Specimen: Urine Updated: 08/16/20 1256     Urine Culture, Routine KLEBSIELLA PNEUMONIAE  >100,000 cfu/ml      Narrative:      Specimen Source->Urine          Impression:  Active Hospital Problems    Diagnosis  POA    *Sepsis [A41.9]  Yes    SOB (shortness of breath) [R06.02]  Yes    Acute on chronic respiratory failure with hypoxia [J96.21]  Yes    Poor dentition [K08.9]  Yes    Mass of middle lobe of right lung [R91.8]  Yes    Malignant neoplasm of upper lobe of lung [C34.10]  Yes    NOELLE  (acute kidney injury) [N17.9]  Yes    Mediastinal lymphadenopathy [R59.0]  Yes    Protein-calorie malnutrition, mild [E44.1]  Yes    Opioid dependence [F11.20]  Yes    Iron deficiency anemia secondary to inadequate dietary iron intake [D50.8]  Yes    Pneumonia due to infectious organism [J18.9]  Yes    Acute cystitis without hematuria [N30.00]  Yes    Chronic obstructive pulmonary disease with acute lower respiratory infection [J44.0]  Yes    Acute hypoxemic respiratory failure [J96.01]  Yes    Hypokalemia [E87.6]  Yes    Hypocalcemia [E83.51]  Yes      Resolved Hospital Problems   No resolved problems to display.               Plan:     8/17 temp max 99,   Temp better but pt not clearly better, large tumor apparent, need brain scan, will try to bronch in am.    Needs mobilized. Will stop xanax.     8/18 off xanax and still sedate, ct head ok - no contrast.    abg marginal on niv.      Discussed with daughter Larissa, called [place]/home numbers earlier with no answer.      Addendum-pt in icu now, mild sob reported off niv- no headache, some asterixis.  Will check abg, dose steroids, use niv.  2 teresas - Larissa and Lorelei - are in room. Details of pneumonia and cancer discussed.  Would ask onc eval.  May need consideration xrt- not typically done in pt.  May need intubation but cancer seems to be major underlying dz 5 days into pneumonia rx.         8/19 - t to 99.7 post bronch - not ususual.    Will f/u cbc/cmp/abg  Was anxious when niv removed last pm - got ativan- chronic substance use on suboxone.    No organism on gram stain.

## 2020-08-19 NOTE — ASSESSMENT & PLAN NOTE
CXR reviewed.   will continue vanc and Zosyn for today.  Will try and deescalate antibiotics tomorrow  Duo nebs q4 hours.  Supplemental oxygen as needed to keep oxygen satruation >90%.  Pulmonary consulted

## 2020-08-19 NOTE — ASSESSMENT & PLAN NOTE
Suspected. New finding on CT chest. Plan for bronch in am.  Concerning for mets. Check CT head per pulmonary recs- CT head negative  Consult oncology.  Status post bronch, bx obtained.

## 2020-08-19 NOTE — NURSING
Pt accidentally disconnected self from BiPAP and became extremely anxious. Pt placed back on Bipap and RN remained at bedside until pt became calmer. Called eICU and spoke to JORDI Luong. RN will inform eICU MD of bedside RN's request for something for anxiety. Will continue to monitor, awaiting new orders

## 2020-08-19 NOTE — ASSESSMENT & PLAN NOTE
This patient does have evidence of infective focus  My overall impression is sepsis.  Antibiotics given-   Antibiotics (From admission, onward)    Start     Stop Route Frequency Ordered    08/14/20 0400  piperacillin-tazobactam 4.5 g in dextrose 5 % 100 mL IVPB (ready to mix system)      -- IV Every 8 hours (non-standard times) 08/13/20 2217          Latest lactate reviewed, they are-  No results for input(s): LACTATE in the last 72 hours.  Organ dysfunction indicated by Acute respiratory failure   But respiratory status stable patient denies any shortness of breath or chest pain  Source- PNA and UTI  Source control Achieved by- Vancomycin and Zosyn  UA reviewed.  CXR reviewed.  Procalcitonin elevated.  Will stop IV fluids  Microbiology Results (last 7 days)     Procedure Component Value Units Date/Time    AFB Culture & Smear [[id number]] Collected: 08/18/20 0826    Order Status: Completed Specimen: Respiratory from Bronchial Wash Updated: 08/19/20 1508     AFB CULTURE STAIN No acid fast bacilli seen.    Blood culture x two cultures. Draw prior to antibiotics. [[id number]] Collected: 08/13/20 1906    Order Status: Completed Specimen: Blood from Antecubital, Right Updated: 08/19/20 1412     Blood Culture, Routine No growth after 5 days.    Narrative:      Aerobic and anaerobic    Blood culture x two cultures. Draw prior to antibiotics. [[id number]] Collected: 08/13/20 1906    Order Status: Completed Specimen: Blood from Antecubital, Left Updated: 08/19/20 1412     Blood Culture, Routine No growth after 5 days.    Narrative:      Aerobic and anaerobic    Culture, Respiratory with Gram Stain [[id number]] Collected: 08/18/20 0826    Order Status: Completed Specimen: Respiratory from Bronchial Wash Updated: 08/19/20 0841     Respiratory Culture No Growth     Gram Stain (Respiratory) <10 epithelial cells per low power field.     Gram Stain (Respiratory) Rare WBC's     Gram Stain (Respiratory) No organisms seen    Culture,  Respiratory with Gram Stain [[id number]] Collected: 08/18/20 0825    Order Status: Canceled Specimen: Respiratory from Bronchial Wash     Urine culture [[id number]]  (Abnormal)  (Susceptibility) Collected: 08/13/20 1931    Order Status: Completed Specimen: Urine Updated: 08/16/20 1256     Urine Culture, Routine KLEBSIELLA PNEUMONIAE  >100,000 cfu/ml      Narrative:      Specimen Source->Urine

## 2020-08-19 NOTE — ASSESSMENT & PLAN NOTE
Nutrition consulted. Body mass index is 28.68 kg/m².. Encourage maximal PO intake. Diet supplementation ordered per nutrition approval. Will encourage PO and monitor closely for weight changes.

## 2020-08-19 NOTE — NURSING
1030: Seen by speech and rec noted.  Pt lethargic through out and needing repeated stimulation and arousal.  Will update primary on diet recs.      1040:  Spoke with NATE Vann.  Informed pt states she is claustrophobic, has had an MRI previously and needed medication to tolerate.  Also informed him that awaiting lab results prior to contrast and unsure if pt is able to be flat for an extended period of time at this time.  Will keep updated.

## 2020-08-20 NOTE — PROGRESS NOTES
[facility]     Subjective:      PATIENT:   Kendra Lawrence  :    1955  MRN:    [id number]  Admit Date:    2020  DATE OF VISIT:  2020    Reason for Consult: Lung Mass    Patient ID: Kendra Lawrence is a 64 y.o. female PMHx of COPD, opioid dependence, HTN, CAD, and GERD who presents to the ED with complaints of SOB x10 days. The patient reported SOB that has been constant and became progressively worse over the last 2 days. She reported to exerting herself makes it worse and nothing makes it better. She endorses associated fatigue, chills, and right chest wall tightness.  She was noted to have worsening of respiratory status with desaturation of her to 97% on oxygen.  CT of the chest ordered and demonstrated findings in the right middle lobe concerning for neoplasm, invading the mediastinum.  Masslike pneumonia is included in the differential but less likely.  CT head WO contrast shows asymmetric prominence of the frontal horn/body of the right lateral ventricle with slight leftward bowing of the septum pellucidum. Probable postobstructive changes in the right upper and lower lobes. Enlarged subcarinal lymph node, concerning for a metastatic node. Moderate, loculated-appearing right pleural effusion.  Bronchoscopy performed on 2020 by Dr. Andrew Brothers and pathology is pending. Pt states that she has a decrease in SOB. She denies cough, fever/chills, N/V/D, melena, hematochezia, hemoptysis.    2020: biopsy lung   2020: Pt states she woke up more fatigued today and had nausea and was given antinausea medication which has reduced her nausea. Pt still has bruising on bilateral forearms.     Review of Systems   Constitutional: Positive for activity change and fatigue. Negative for appetite change, chills, fever and unexpected weight change.   HENT: Positive for dental problem. Negative for mouth sores and trouble swallowing.    Eyes: Negative for photophobia and visual  disturbance.   Respiratory: Positive for cough and shortness of breath. Negative for chest tightness, wheezing and stridor.    Cardiovascular: Negative for chest pain and leg swelling.   Gastrointestinal: Positive for abdominal pain and nausea. Negative for constipation, diarrhea and vomiting.   Endocrine: Positive for cold intolerance.   Musculoskeletal: Negative for arthralgias, back pain and myalgias.   Skin: Negative for color change, pallor, rash and wound.   Neurological: Positive for weakness and light-headedness.   Hematological: Negative for adenopathy. Bruises/bleeds easily.   Psychiatric/Behavioral: Negative for agitation, behavioral problems, confusion, decreased concentration and dysphoric mood.        Past Medical History:   Diagnosis Date    Asthma     Cardiac angina     Chronic abdominal pain     Claustrophobia     COPD (chronic obstructive pulmonary disease)     Coronary artery disease     GERD (gastroesophageal reflux disease)     History of drug dependence     Hypertension        Family History   Problem Relation Age of Onset    Breast cancer Sister 55    Breast cancer Sister 50    Cancer Mother     Heart disease Mother     Hypertension Mother     Cancer Father        Past Surgical History:   Procedure Laterality Date    BREAST BIOPSY Bilateral 20 yrs ago    benign    BRONCHOSCOPY N/A 2020    Procedure: Bronchoscopy- 730 or noon, floro not needed;  Surgeon: Andrew Brothers MD;  Location[facility];  Service: Endoscopy;  Laterality: N/A;     SECTION      CHOLECYSTECTOMY      ENDOSCOPIC ULTRASOUND OF UPPER GASTROINTESTINAL TRACT Left 2018    Procedure: ULTRASOUND, UPPER GI TRACT, ENDOSCOPIC;  Surgeon: Paras eNgrete MD;  Location: [facility];  Service: Endoscopy;  Laterality: Left;    ESOPHAGOGASTRODUODENOSCOPY N/A 2018    Procedure: EGD (ESOPHAGOGASTRODUODENOSCOPY);  Surgeon: Paras Negrete MD;  Location: [facility];  Service: Endoscopy;   Laterality: N/A;    ESOPHAGOGASTRODUODENOSCOPY N/A 12/21/2018    Procedure: EGD (ESOPHAGOGASTRODUODENOSCOPY);  Surgeon: Paras Negrete MD;  Location: [facility];  Service: Endoscopy;  Laterality: N/A;    HYSTERECTOMY      MAGNETIC RESONANCE IMAGING N/A 2/19/2019    Procedure: MRI (Magnetic Resonance Imagine) needs anesthesia;  Surgeon: Raffy Charles MD;  Location: [facility];  Service: Anesthesiology;  Laterality: N/A;    OOPHORECTOMY      TEMPOROMANDIBULAR JOINT SURGERY      TONSILLECTOMY         Social History     Socioeconomic History    Marital status:      Spouse name: Not on file    Number of children: Not on file    Years of education: Not on file    Highest education level: Not on file   Occupational History    Not on file   Social Needs    Financial resource strain: Not on file    Food insecurity     Worry: Not on file     Inability: Not on file    Transportation needs     Medical: Not on file     Non-medical: Not on file   Tobacco Use    Smoking status: Current Every Day Smoker     Years: 52.00     Types: Vaping w/o nicotine    Smokeless tobacco: Never Used   Substance and Sexual Activity    Alcohol use: No     Frequency: Never    Drug use: Yes     Types: Hydrocodone    Sexual activity: Not on file   Lifestyle    Physical activity     Days per week: Not on file     Minutes per session: Not on file    Stress: Not on file   Relationships    Social connections     Talks on phone: Not on file     Gets together: Not on file     Attends Zoroastrian service: Not on file     Active member of club or organization: Not on file     Attends meetings of clubs or organizations: Not on file     Relationship status: Not on file   Other Topics Concern    Not on file   Social History Narrative    Not on file        albuterol-ipratropium  3 mL Nebulization Q4H    buprenorphine-naloxone  1 each Sublingual BID    DULoxetine  60 mg Oral Daily    enoxaparin  40 mg Subcutaneous Q24H     "ferrous sulfate  325 mg Oral Daily    ketamine        lidocaine (PF) 10 mg/ml (1%)        lidocaine (PF) 20 mg/mL (2%)        lidocaine (PF)        lidocaine HCl 2%        lidocaine-EPINEPHrine (PF) 1%-1:200,000        methylPREDNISolone sodium succinate  40 mg Intravenous Daily    multivitamin  1 tablet Oral Daily    oxymetazoline        pantoprazole  40 mg Oral Daily    piperacillin-tazobactam (ZOSYN) IVPB  4.5 g Intravenous Q8H    senna-docusate 8.6-50 mg  1 tablet Oral BID           acetaminophen, albuterol-ipratropium, ALPRAZolam, dextrose 50%, dextrose 50%, glucagon (human recombinant), glucose, glucose, magnesium oxide, magnesium oxide, ondansetron, potassium chloride, potassium chloride, potassium chloride, sodium chloride 0.9%    Review of patient's allergies indicates:  No Known Allergies  All medications and past history have been reviewed.    Objective:      Vitals:  BP (!) 185/89   Pulse 94   Temp 98.6 °F (37 °C)   Resp 16   Ht 5' 4" (1.626 m)   Wt 75.8 kg (167 lb 1.6 oz)   LMP  (LMP Unknown)   SpO2 96%   Breastfeeding No   BMI 28.68 kg/m²    Body surface area is 1.85 meters squared.    Last 24 Hours:    Intake/Output Summary (Last 24 hours) at 8/20/2020 0831  Last data filed at 8/20/2020 0600  Gross per 24 hour   Intake 4225 ml   Output 970 ml   Net 3255 ml     Weight Readings:  Wt Readings from Last 5 Encounters:   08/17/20 75.8 kg (167 lb 1.6 oz)   03/06/20 75.8 kg (167 lb 1.7 oz)   02/07/20 77.7 kg (171 lb 4.8 oz)   01/10/20 78.6 kg (173 lb 4.5 oz)   12/13/19 77.1 kg (169 lb 15.6 oz)        Physical Exam  Constitutional:       General: She is not in acute distress.     Appearance: Normal appearance. She is ill-appearing.   HENT:      Head: Normocephalic and atraumatic.      Right Ear: External ear normal.      Left Ear: External ear normal.      Nose: Nose normal. No congestion or rhinorrhea.   Eyes:      General:         Right eye: No discharge.         Left eye: No discharge. "      Extraocular Movements: Extraocular movements intact.      Conjunctiva/sclera: Conjunctivae normal.      Pupils: Pupils are equal, round, and reactive to light.   Neck:      Musculoskeletal: Normal range of motion and neck supple. No neck rigidity.   Cardiovascular:      Rate and Rhythm: Normal rate and regular rhythm.      Heart sounds: Normal heart sounds. No murmur.   Pulmonary:      Effort: No respiratory distress.      Breath sounds: No stridor. Wheezing and rhonchi present.      Comments: Tachypnic  Abdominal:      General: Bowel sounds are normal. There is distension.      Palpations: Abdomen is soft.      Tenderness: There is no abdominal tenderness. There is no guarding.   Musculoskeletal: Normal range of motion.         General: No deformity.   Lymphadenopathy:      Cervical: No cervical adenopathy.   Skin:     General: Skin is warm and dry.      Coloration: Skin is pale.      Findings: No erythema or rash.   Neurological:      Mental Status: She is alert and oriented to person, place, and time.      Comments: Lethargic     Psychiatric:         Mood and Affect: Mood normal.         Behavior: Behavior normal.         Thought Content: Thought content normal.         Judgment: Judgment normal.         Labs:  Recent Labs   Lab 08/18/20  0412 08/19/20  1006 08/20/20  0333   RBC 3.87* 3.95* 3.67*   HGB 10.5* 10.5* 9.7*   HCT 36.3* 36.6* 34.0*   * 442* 567*   MCV 94 93 93     Recent Labs   Lab 08/16/20  0436 08/17/20  0433 08/18/20  0412 08/19/20  1147 08/20/20  0333    138 139 144 141   K 4.0 3.9 3.5 3.9 3.6    102 101 108 106   CO2 25 22* 26 22* 25   BUN 9 15 18 27* 32*   CREATININE 0.8 1.5* 1.6* 1.5* 1.6*   * 125* 122* 140* 159*   CALCIUM 7.8* 7.6* 8.9 9.1 9.4   MG 1.9 2.0 2.3  --   --    PHOS 2.4* 3.0 2.6*  --   --    ALKPHOS 143* 135 138* 111 99   PROT 6.3 6.0 6.8 6.6 6.4   ALBUMIN 2.1* 1.8* 1.9* 1.9* 2.0*   BILITOT 0.4 0.2 0.3 0.2 0.2   AST 14 13 12 16 16   ALT 27 21 18 17 18      Recent Labs   Lab 08/14/20  0421   TSH 0.515       LUNG MASS, ENDOBRONCHIAL BIOPSY:   Small cell carcinoma.   Comment:  The biopsy reveals poorly preserved crushed small round blue cell   tumor with abundant necrosis.  There is marked nuclear molding, high N/C   ratio and abundant mitotic activity.  Tumor cells are positive for AE1/AE3   cytokeratin, synaptophysin and chromogranin.  TTF-1, CD45 and P63 are   negative.  A Ki-67 proliferation index is greater than 90%.  The   immunoprofile supports neuroendocrine differentiation and taken together with   morphology findings are consistent with small cell carcinoma.      Comment: Interp By Christian Magallanes M.D., Signed on 08/20/2020 at 14:51             CT Head WO 08/17/2020  COMPARISON:  None.     FINDINGS:  There is no acute intracranial hemorrhage.  Asymmetric prominence of the frontal horn/body of the right lateral ventricle with slight leftward bowing of the septum pellucidum.  No evidence of hydrocephalus.  No mass effect or midline shift is present.  The gray-white matter differentiation is within normal limits. Mild opacification of the right mastoid air cells.  Partially visualized postsurgical changes of bilateral maxillary antrostomies.  Mild mucosal thickening within the ethmoid and frontal sinuses.  The visualized portions of the orbits are normal.  No fractures are identified.     Impression:     1. No evidence of an acute intracranial abnormality.  Clinical correlation and consider further evaluation with MRI of the brain.  2. Asymmetric prominence of the frontal horn/body of the right lateral ventricle with slight bowing of the septum pellucidum, which may reflect a developmental variant or possible intraventricular simple cyst.    CT Chest WO 08/15/2020  FINDINGS:  There is an enlarged subcarinal lymph node with short axis diameter of 1.8 cm series 3, image 46.  No other enlarged mediastinal or axillary lymph nodes are present.     The aorta is  normal in caliber with scattered calcific plaque.  Coronary artery calcifications are present.  There is a trace left pleural effusion.  There is a moderate loculated appearing right pleural effusion.  .     There is ill-defined soft tissue thickening surrounding the bronchus intermedius, right lower and middle lobe bronchi.  There is volume loss, atelectasis, consolidation in the right lower lobe.  There is consolidation in the inferior right upper lobe.  There is very dense, masslike opacification of the right middle lobe.  Findings are concerning for neoplasm, rather than pneumonia.  There is no fat plane between the abnormality in the anterior right mid hemithorax and the mediastinum, as demonstrated on series 3, image 48.  This is difficult to measure accurately, as it may represent a combination of neoplasm and collapsed lung.  However, an estimate of 11.8 x 8.7 x 8.7 cm is obtained on series 3, image 50 and series 603, image 107.     There is dependent atelectasis at the left lung base.  No left lung nodules are present.     No suspicious findings are noted in the bones.     Intra and extrahepatic biliary ductal dilatation appears unchanged compared to the 2018 CT of the abdomen.  No other abnormalities are noted in the visualized portions of the upper abdomen.     Impression:     Findings in the right middle lobe concerning for neoplasm, invading the mediastinum.  Masslike pneumonia is included in the differential but less likely.  Probable postobstructive changes in the right upper and lower lobes.     Enlarged subcarinal lymph node, concerning for a metastatic node.     Moderate, loculated-appearing right pleural effusion.     Trace left pleural effusion, dependent atelectasis left lung.     This report was flagged in Epic as abnormal.    XR Chest 08/18/2020  FINDINGS:  There is dense opacification of the lower 2/3 of the right hemithorax now, consistent with mass in the right middle lobe and right pleural  effusion as noted on the prior CT chest.  No pneumothorax is seen.  The left lung is clear.  The cardiac size is obscured.     Impression:     Dense opacification of the lower 2/3 of the right chest secondary to right middle lobe mass and right pleural effusion noted on prior CT.  This is an increase in density since the prior chest x-ray  All lab results and imaging results have been reviewed and discussed with the patient.     Assessment and Plan:     Mass of middle lobe of right lung     Malignant neoplasm of upper lobe of lung    Normocytic Anemia    -RML lung mass with mediastinal invasion Small cell carcinoma   -Continue care for pneumonia, respiratory failure and COPD via pulmonology.  Pt needs staging yet renal failure precludes the use of IV contrast   Will consider discussing chemo for small cell carcinoma

## 2020-08-20 NOTE — PLAN OF CARE
Problem: Physical Therapy Goal  Goal: Physical Therapy Goal  Description: Goals to be met by: 2020     Patient will increase functional independence with mobility by performin. Supine to sit with Supervision  2. Sit to stand transfer with Supervision  3. Bed to chair transfer with Supervision using LRAD  4. Gait  x 150 feet with Supervision using LRAD  5. Lower extremity exercise program x20 reps per handout, with independence    Outcome: Ongoing, Progressing

## 2020-08-20 NOTE — CARE UPDATE
08/20/20 0719   Patient Assessment/Suction   Level of Consciousness (AVPU) alert   Respiratory Effort Normal;Unlabored   Expansion/Accessory Muscles/Retractions no use of accessory muscles;no retractions;expansion symmetric   All Lung Fields Breath Sounds diminished;wheezes, expiratory   Rhythm/Pattern, Respiratory depth regular;pattern regular;unlabored   Cough Frequency no cough   PRE-TX-O2   O2 Device (Oxygen Therapy) nasal cannula   $ Is the patient on Low Flow Oxygen? Yes   Flow (L/min) 3   SpO2 100 %   Pulse Oximetry Type Continuous   $ Pulse Oximetry - Multiple Charge Pulse Oximetry - Multiple   Pulse 93   Resp 15   Aerosol Therapy   $ Aerosol Therapy Charges Aerosol Treatment   Daily Review of Necessity (SVN) completed   Respiratory Treatment Status (SVN) given   Treatment Route (SVN) mask   Patient Position (SVN) HOB elevated   Post Treatment Assessment (SVN) breath sounds unchanged   Signs of Intolerance (SVN) none   Breath Sounds Post-Respiratory Treatment   Throughout All Fields Post-Treatment All Fields   Throughout All Fields Post-Treatment no change   Post-treatment Heart Rate (beats/min) 95   Post-treatment Resp Rate (breaths/min) 18       Aerosol treatments q4 with Duoneb.

## 2020-08-20 NOTE — PLAN OF CARE
Cm attended morning rounds. Pt will possibly transfer to the floor today.  PT/OT to evaluate.       08/20/20 1234   Discharge Reassessment   Assessment Type Discharge Planning Reassessment   Provided patient/caregiver education on the expected discharge date and the discharge plan No   Do you have any problems affording any of your prescribed medications? No   Discharge Plan A Home with family;Home Health   Discharge Plan B Home with family

## 2020-08-20 NOTE — NURSING
Tolerated clear liquid diet.  Advanced to full liquid for breakfast.  If tolerates ok to advance to [facility] soft dysphagia level 5 with thin liquids

## 2020-08-20 NOTE — PLAN OF CARE
Patient remains in ICU on 2 L. Received Phenergan and Xanax this AM for MRI and has been drowsy since. Patient refusing to get out of bed. Patient with loud expiratory wheezing. Receiving Duonebs q4 hours. Safety maintained.    Statement Selected

## 2020-08-20 NOTE — PLAN OF CARE
Nc 3lpm, good urine output, silva, sr, likes to sleep a lot, very easy to arouse, drinking well with no trouble.

## 2020-08-20 NOTE — PT/OT/SLP PROGRESS
"Speech Language Pathology Treatment    Patient Name:  Kendra Lawrence   MRN:  [id number]  Admitting Diagnosis: Sepsis    Recommendations:                 General Recommendations:  Follow to assess tolerance of upgraded textures; currently on full liquid diet  Diet recommendations:  Mechanical soft(IDDSI 5), Liquid Diet Level: Thin   Aspiration Precautions: Assistance with meals, Feed only when awake/alert, HOB to 90 degrees, Standard aspiration precautions and Wear oxygen during intake   General Precautions: Standard, aspiration  Communication strategies:  none    Subjective   "I like the Boost."  "Did ya'll change shifts on me?"    Objective:   Pt seen in ICU to assess tolerance of upgraded textures, however, she is on full liquid diet today 2' N/V this AM. Pt was sedated for MRI this AM and remains lethargic. Opens eyes to voice but difficulty maintaining alertness required frequent verbal/tactile stimulation. Consumed cup of chocolate pudding and juice via straw without overt s/s swallow dysfunction. Required feeding assistance.  Unable to assess with upgraded textures 2' current diet order of full liquids.     Has the patient been evaluated by SLP for swallowing?   Yes  Keep patient NPO? No   Current Respiratory Status: nasal cannula      Assessment:   Unable to assess with upgraded textures 2' diet order of full liquids. Advance to mechanical soft (IDDSI 5) as tolerated. Will follow and advance as tolerated.     Goals:   Multidisciplinary Problems     SLP Goals        Problem: SLP Goal    Goal Priority Disciplines Outcome   SLP Goal     SLP Ongoing, Progressing   Description:   1. Pt will consume dental soft textures and thin liquids with functional oral phase and no overt s/s penetration/aspiration                    Plan:     · Patient to be seen:  3 x/week   · Plan of Care expires:  08/26/20  · Plan of Care reviewed with:  patient   · SLP Follow-Up:  Yes       Discharge recommendations:      Barriers to " Discharge:  None    Time Tracking:     SLP Treatment Date:   08/20/20  Speech Start Time:  1324  Speech Stop Time:  1333     Speech Total Time (min):  9 min    Billable Minutes: Treatment Swallowing Dysfunction 9 and Total Time 9    Kaley Clark CCC-SLP  08/20/2020

## 2020-08-20 NOTE — NURSING
Patient request that she not have to get up in chair or get a bath,  Request that it wait till later today.

## 2020-08-20 NOTE — PROGRESS NOTES
8/20/2020   Progress Note  PULMONARY    Admit Date: 8/13/2020 08/20/2020      SUBJECTIVE:     Aug 17 ct with mass,    Feels weak, min ambulation, min appetite.    Seems much worse today at bedside    8/18 post bronch distress when coming out sedation.  Now bipap and icu.  8/19 looks much better this am, alert with min sleepiness (more her usual?) - denies sob.  8/20 breathing is good, looking to get out of bed, no c/o          PFSH and Allergies reviewed.    OBJECTIVE:     Vitals (Most recent):  Vitals:    08/20/20 0719   BP:    Pulse: 93   Resp: 15   Temp:        Vitals (24 hour range):  Temp:  [98.3 °F (36.8 °C)-99 °F (37.2 °C)]   Pulse:  []   Resp:  [12-34]   BP: (126-177)/(60-94)   SpO2:  [94 %-100 %]       Intake/Output Summary (Last 24 hours) at 8/20/2020 0754  Last data filed at 8/20/2020 0600  Gross per 24 hour   Intake 4225 ml   Output 1095 ml   Net 3130 ml          Physical Exam:  The patient's neuro status (alertness,orientation,cognitive function,motor skills,), pharyngeal exam (oral lesions, hygiene, abn dentition,), Neck (jvd,mass,thyroid,nodes in neck and above/below clavicle),RESPIRATORY(symmetry,effort,fremitus,percussion,auscultation),  Cor(rhythm,heart tones including gallops,perfusion,edema)ABD(distention,hepatic&splenomegaly,tenderness,masses), Skin(rash,cyanosis),Psyc(affect,judgement,).  Exam negative except for these pertinent findings:    Decreased bs right, good left, alert,not sluggish, no edema, no asterixis       Radiographs reviewed: view by direct vision -   8/18 cxr increased opacification.    ct with mass rul ant/post and rml,   No results found for this or any previous visit.]    Labs     Recent Labs   Lab 08/20/20  0333   WBC 17.59*   HGB 9.7*   HCT 34.0*   *     Recent Labs   Lab 08/20/20  0333      K 3.6      CO2 25   BUN 32*   CREATININE 1.6*   *   CALCIUM 9.4   AST 16   ALT 18   ALKPHOS 99   BILITOT 0.2   PROT 6.4   ALBUMIN 2.0*     Recent Labs    Lab 08/19/20 0837   PH 7.281*   PCO2 55.0*   PO2 100   HCO3 25.9     Microbiology Results (last 7 days)     Procedure Component Value Units Date/Time    AFB Culture & Smear [[id number]] Collected: 08/18/20 0826    Order Status: Completed Specimen: Respiratory from Bronchial Wash Updated: 08/19/20 2127     AFB Culture & Smear Culture in progress     AFB CULTURE STAIN No acid fast bacilli seen.    Blood culture x two cultures. Draw prior to antibiotics. [[id number]] Collected: 08/13/20 1906    Order Status: Completed Specimen: Blood from Antecubital, Right Updated: 08/19/20 1412     Blood Culture, Routine No growth after 5 days.    Narrative:      Aerobic and anaerobic    Blood culture x two cultures. Draw prior to antibiotics. [[id number]] Collected: 08/13/20 1906    Order Status: Completed Specimen: Blood from Antecubital, Left Updated: 08/19/20 1412     Blood Culture, Routine No growth after 5 days.    Narrative:      Aerobic and anaerobic    Culture, Respiratory with Gram Stain [[id number]] Collected: 08/18/20 0826    Order Status: Completed Specimen: Respiratory from Bronchial Wash Updated: 08/19/20 0841     Respiratory Culture No Growth     Gram Stain (Respiratory) <10 epithelial cells per low power field.     Gram Stain (Respiratory) Rare WBC's     Gram Stain (Respiratory) No organisms seen    Culture, Respiratory with Gram Stain [[id number]] Collected: 08/18/20 0825    Order Status: Canceled Specimen: Respiratory from Bronchial Wash     Urine culture [[id number]]  (Abnormal)  (Susceptibility) Collected: 08/13/20 1931    Order Status: Completed Specimen: Urine Updated: 08/16/20 1256     Urine Culture, Routine KLEBSIELLA PNEUMONIAE  >100,000 cfu/ml      Narrative:      Specimen Source->Urine          Impression:  Active Hospital Problems    Diagnosis  POA    *Sepsis [A41.9]  Yes    Lung mass [R91.8]  No    SOB (shortness of breath) [R06.02]  Yes    Acute on chronic respiratory failure with hypoxia [J96.21]  Yes     Poor dentition [K08.9]  Yes    Mass of middle lobe of right lung [R91.8]  Yes    Malignant neoplasm of upper lobe of lung [C34.10]  Yes    NOELLE (acute kidney injury) [N17.9]  Yes    Mediastinal lymphadenopathy [R59.0]  Yes    Protein-calorie malnutrition, mild [E44.1]  Yes    Opioid dependence [F11.20]  Yes    Iron deficiency anemia secondary to inadequate dietary iron intake [D50.8]  Yes    Pneumonia due to infectious organism [J18.9]  Yes    Acute cystitis without hematuria [N30.00]  Yes    Chronic obstructive pulmonary disease with acute lower respiratory infection [J44.0]  Yes    Acute hypoxemic respiratory failure [J96.01]  Yes    Hypokalemia [E87.6]  Yes    Hypocalcemia [E83.51]  Yes      Resolved Hospital Problems   No resolved problems to display.               Plan:     8/17 temp max 99,   Temp better but pt not clearly better, large tumor apparent, need brain scan, will try to bronch in am.    Needs mobilized. Will stop xanax.     8/18 off xanax and still sedate, ct head ok - no contrast.    abg marginal on niv.      Discussed with daughter Larissa, called Lorelei/home numbers earlier with no answer.      Addendum-pt in icu now, mild sob reported off niv- no headache, some asterixis.  Will check abg, dose steroids, use niv.  2 daughthers - Larissa and Lorelei - are in room. Details of pneumonia and cancer discussed.  Would ask onc eval.  May need consideration xrt- not typically done in pt.  May need intubation but cancer seems to be major underlying dz 5 days into pneumonia rx.         8/19 - t to 99.7 post bronch - not ususual.    Will f/u cbc/cmp/abg  Was anxious when niv removed last pm - got ativan- chronic substance use on suboxone.    No organism on gram stain.     8/20 progressing wrt appearance, bronch bx/cytology in process this am.  No  Growth bronch wash.  Creat 1.6.  Labs stable,need therapy.  onc seeing.  Will resume xanax prn.

## 2020-08-20 NOTE — PT/OT/SLP PROGRESS
Physical Therapy Treatment    Patient Name:  Kendra Lawrence   MRN:  [id number]    Recommendations:     Discharge Recommendations:  nursing facility, skilled   Discharge Equipment Recommendations: shower chair   Barriers to discharge: None    Assessment:     Kendra Lawrence is a 64 y.o. female admitted with a medical diagnosis of Sepsis.  She presents with the following impairments/functional limitations:  weakness, impaired endurance, impaired self care skills, impaired functional mobilty, impaired balance, impaired cardiopulmonary response to activity. Patient hesitant to participate with PT treatment after going for MRI secondary to fatigue. She is only agreeable to therex in bed. She performed 10 reps of LE therex with verbal cueing and eyes remaining closed throughout session. Patient continues to breath in/out through her mouth despite repeated cueing.     Rehab Prognosis: Fair; patient would benefit from acute skilled PT services to address these deficits and reach maximum level of function.    Recent Surgery: Procedure(s) (LRB):  Bronchoscopy- 730 or noon, floro not needed (N/A) 2 Days Post-Op    Plan:     During this hospitalization, patient to be seen daily to address the identified rehab impairments via gait training, therapeutic activities, therapeutic exercises and progress toward the following goals:    · Plan of Care Expires:  09/14/20    Subjective     Chief Complaint: sleepy  Patient/Family Comments/goals: get some rest   Pain/Comfort:  · Pain Rating 1: 0/10      Objective:     Communicated with JORDI Rucker prior to session.  Patient found HOB elevated with blood pressure cuff, oxygen, peripheral IV, pulse ox (continuous), SCD, telemetry upon PT entry to room.     General Precautions: Standard, aspiration, droplet, respiratory   Orthopedic Precautions:N/A   Braces: N/A     Functional Mobility:  · Patient declines any functional mobility today      AM-PAC 6 CLICK MOBILITY          Therapeutic Activities and  Exercises:   Patient was educated on the importance of OOB activity and functional mobility to negate negative effects of prolonged bed rest during hospitalization, safe transfers and ambulation, D/C planning, PLB    She performed 20 reps of AP, HS, and assisted SLR; she is unable to perform quad sets despite max cueing     Patient left HOB elevated with all lines intact and call button in reach..    GOALS:   Multidisciplinary Problems     Physical Therapy Goals        Problem: Physical Therapy Goal    Goal Priority Disciplines Outcome Goal Variances Interventions   Physical Therapy Goal     PT, PT/OT Ongoing, Progressing     Description: Goals to be met by: 2020     Patient will increase functional independence with mobility by performin. Supine to sit with Supervision  2. Sit to stand transfer with Supervision  3. Bed to chair transfer with Supervision using LRAD  4. Gait  x 150 feet with Supervision using LRAD  5. Lower extremity exercise program x20 reps per handout, with independence                     Time Tracking:     PT Received On: 20  PT Start Time: 1338     PT Stop Time: 1348  PT Total Time (min): 10 min     Billable Minutes: Therapeutic Exercise 10    Treatment Type: Treatment  PT/PTA: PT     PTA Visit Number: 0     Rachel Sue, PT  2020

## 2020-08-20 NOTE — RESPIRATORY THERAPY
08/19/20 1922   Patient Assessment/Suction   Level of Consciousness (AVPU) alert   Respiratory Effort Unlabored   Expansion/Accessory Muscles/Retractions no use of accessory muscles   All Lung Fields Breath Sounds diminished   Rhythm/Pattern, Respiratory unlabored   Cough Frequency no cough   PRE-TX-O2   O2 Device (Oxygen Therapy) nasal cannula   Flow (L/min) 3   Oxygen Concentration (%) 32   SpO2 98 %   Pulse Oximetry Type Continuous   $ Pulse Oximetry - Multiple Charge Pulse Oximetry - Multiple   Pulse 99   Resp (!) 24   Aerosol Therapy   $ Aerosol Therapy Charges Aerosol Treatment   Respiratory Treatment Status (SVN) given   Treatment Route (SVN) mask   Patient Position (SVN) HOB elevated   Post Treatment Assessment (SVN) breath sounds unchanged   Signs of Intolerance (SVN) none   Breath Sounds Post-Respiratory Treatment   Throughout All Fields Post-Treatment All Fields   Throughout All Fields Post-Treatment no change   Post-treatment Heart Rate (beats/min) 95   Post-treatment Resp Rate (breaths/min) 17   Ready to Wean/Extubation Screen   FIO2<=50 (chart decimal) 0.32

## 2020-08-20 NOTE — TELEPHONE ENCOUNTER
Kaley, ICU RN contacted our office to let us know that patient was already in MRI & she is tolerating it so far at this point--sts that she messaged DENNY Katz letting him know this information as well--both parties voiced understanding--no further action needed at this time

## 2020-08-21 PROBLEM — C34.90 LUNG CANCER: Status: ACTIVE | Noted: 2020-01-01

## 2020-08-21 PROBLEM — E44.0 MODERATE MALNUTRITION: Status: ACTIVE | Noted: 2020-01-01

## 2020-08-21 PROBLEM — E83.51 HYPOCALCEMIA: Status: RESOLVED | Noted: 2020-01-01 | Resolved: 2020-01-01

## 2020-08-21 NOTE — PROGRESS NOTES
"[facility]  Adult Nutrition  Progress Note    SUMMARY     Intervention: texture modified diet and nutrition supplement therapy-commercial beverage    Recommendation:    1) Advance  diet texture per SLP ( mechanical soft, thin liquids) no further restrictions for now to promote PO intakes   2) Continues boost plus with meals , encourage intakes  3) Weigh pt weekly, replete iron and phos    4) If pt not eating 50% of meals and supplements in 24 hr rec initiated PPN: D 5 AA 2.75 @ 75 ml/hr + standard lipids   ( provides 49 g protein (54% EPN), 1004 kcal ( 61% EEN))    Goals: 1) PO intakes > 50% of meals and supplements in < 48 hours or nutrition support intitiated  Nutrition Goal Status: new  Communication of RD Recs: reviewed with physician(POC, sticky note, second sign, reviewed with RN)    Reason for Assessment    Reason For Assessment: LOS  Diagnosis: (sepsis)  Relevant Medical History: COPD, opioid abuse, CAD, GERD  Interdisciplinary Rounds: attended    General Information Comments: 65 y/o female admitted with COPD exacerbation, pneumonia, and UTI. Lung mass, possible CA as well. Poor appetite x 7-10 days PTA, 0% intake of solids on cardiac diet 8/13-18, 0-25% full liquids/clears 8/18-today says she drank 2 boost plus yesterday. 0% boost today and only ate a few bites of grits and mashed potatoes per RN. Tells RN she, "has no interest in food." @ visit claimed to have been mildly nauseous earlier today. NFPE done 8/21/20, no wastign seen. Wt gain per chart review.    Nutrition Discharge Planning: to be determined-Cardiac diet, texture per SLP + boost pudding with meals    Nutrition Risk Screen    Nutrition Risk Screen: no indicators present    Nutrition/Diet History    Patient Reported Diet/Restrictions/Preferences: general  Typical Food/Fluid Intake: eating well PTA prior to feeling SOB  Spiritual, Cultural Beliefs, Religion Practices, Values that Affect Care: no  Food Allergies: " "NKFA  Factors Affecting Nutritional Intake: nausea/vomiting, decreased appetite    Anthropometrics    Temp: 98.1 °F (36.7 °C)  Height Method: Measured(office 3/6/20)  Height: 5' 4"  Height (inches): 64 in  Weight Method: Bed Scale  Weight: 75.8 kg (167 lb 1.7 oz)  Weight (lb): 167.11 lb  Ideal Body Weight (IBW), Female: 120 lb  % Ideal Body Weight, Female (lb): 139.26 %  BMI (Calculated): 28.7  BMI Grade: 25 - 29.9 - overweight  Usual Body Weight (UBW), k.4 kg(19)  % Usual Body Weight: 104.92  % Weight Change From Usual Weight: 4.7 %       Lab/Procedures/Meds    Pertinent Labs Reviewed: reviewed  BMP  Lab Results   Component Value Date     2020    K 3.6 2020     2020    CO2 28 2020    BUN 34 (H) 2020    CREATININE 1.6 (H) 2020    CALCIUM 9.4 2020    ANIONGAP 10 2020    ESTGFRAFRICA 38 (A) 2020    EGFRNONAA 33 (A) 2020     Lab Results   Component Value Date    CALCIUM 9.4 2020    PHOS 2.6 (L) 2020     Lab Results   Component Value Date    ALBUMIN 2.1 (L) 2020     Lab Results   Component Value Date    IRON <10 (L) 08/15/2020    TIBC 229 (L) 08/15/2020    FERRITIN 203 2020       Pertinent Medications Reviewed: reviewed  Pertinent Medications Comments: iron, methylprednisolone, MVI, senna, zofran, KCl, phenergan    Estimated/Assessed Needs    Weight Used For Calorie Calculations: 75.8 kg (167 lb 1.7 oz)  Energy Calorie Requirements (kcal): MSJ ( x 1.25 critical care) = 1605 kcal  Energy Need Method: Glynn Muller  Protein Requirements: 1.2 g protein/kg ( critical care) = 91 g protein  Weight Used For Protein Calculations: 75.8 kg (167 lb 1.7 oz)  Fluid Requirements (mL): 1600 ml or per MD  Estimated Fluid Requirement Method: RDA Method  CHO Requirement: N/A      Nutrition Prescription Ordered    Current Diet Order: Mechanical soft diet    Evaluation of Received Nutrient/Fluid Intake    Energy Calories Required: " not meeting needs  Protein Required: not meeting needs  Fluid Required: not meeting needs  Tolerance: tolerating  % Intake of Estimated Energy Needs: 25-30%  % Meal Intake: 25%    Nutrition Risk    Level of Risk/Frequency of Follow-up: moderate - high 2-3 x weekly    Assessment and Plan    Moderate malnutrition  Contributing Nutrition Diagnosis  Moderate acute illness related malnutrition  Related to (etiology):   SOB, nausea, decreased appetite    Signs and Symptoms (as evidenced by):   1) PO intakes < 50% EEN x 15-18 days  2) mild-moderate edema    Interventions:  above    Nutrition Diagnosis Status:   new           Monitor and Evaluation    Food and Nutrient Intake: energy intake, food and beverage intake  Food and Nutrient Adminstration: diet order, enteral and parenteral nutrition administration  Anthropometric Measurements: weight  Biochemical Data, Medical Tests and Procedures: electrolyte and renal panel, gastrointestinal profile  Nutrition-Focused Physical Findings: overall appearance     Malnutrition Assessment  Malnutrition Type: acute illness or injury  Skin (Micronutrient): (Arcadio = 18)  Teeth (Micronutrient): (poor dentition noted)   Micronutrient Evaluation: suspected deficiency  Micronutrient Evaluation Comments: iron   Energy Intake (Malnutrition): less than or equal to 50% for greater than or equal to 5 days           Edema (Fluid Accumulation): (2-3+)   Subcutaneous Fat Loss (Final Summary): well nourished  Muscle Loss Evaluation (Final Summary): well nourished         Nutrition Follow-Up    RD Follow-up?: Yes

## 2020-08-21 NOTE — CARE UPDATE
08/21/20 0648   Patient Assessment/Suction   Level of Consciousness (AVPU) alert   Respiratory Effort Normal;Unlabored   Expansion/Accessory Muscles/Retractions no use of accessory muscles;no retractions;expansion symmetric   All Lung Fields Breath Sounds diminished   Rhythm/Pattern, Respiratory unlabored;pattern regular;depth regular   Cough Frequency infrequent   Cough Type fair;nonproductive   PRE-TX-O2   O2 Device (Oxygen Therapy) nasal cannula   $ Is the patient on Low Flow Oxygen? Yes   Flow (L/min) 2   SpO2 100 %   Pulse Oximetry Type Continuous   $ Pulse Oximetry - Multiple Charge Pulse Oximetry - Multiple   Pulse 92   Resp 16   Aerosol Therapy   $ Aerosol Therapy Charges Aerosol Treatment   Daily Review of Necessity (SVN) completed   Respiratory Treatment Status (SVN) given   Treatment Route (SVN) mask   Patient Position (SVN) HOB elevated   Post Treatment Assessment (SVN) breath sounds unchanged   Signs of Intolerance (SVN) none   Breath Sounds Post-Respiratory Treatment   Throughout All Fields Post-Treatment All Fields   Throughout All Fields Post-Treatment no change   Post-treatment Heart Rate (beats/min) 95   Post-treatment Resp Rate (breaths/min) 16   Wound Care   $ Wound Care Tech Time 15 min   Area of Concern Cheek;Bridge of nose   Skin Color/Characteristics pale   Skin Temperature warm   Preset CPAP/BiPAP Settings   Mode Of Delivery Standby     Aerosol treatments q4 with Duoneb. Bipap QHS.

## 2020-08-21 NOTE — ASSESSMENT & PLAN NOTE
This patient does have evidence of infective focus  My overall impression is sepsis.  Antibiotics given-   Antibiotics (From admission, onward)    Start     Stop Route Frequency Ordered    08/14/20 0400  piperacillin-tazobactam 4.5 g in dextrose 5 % 100 mL IVPB (ready to mix system)      -- IV Every 8 hours (non-standard times) 08/13/20 2217          Latest lactate reviewed, they are-  No results for input(s): LACTATE in the last 72 hours.  Organ dysfunction indicated by Acute respiratory failure   But respiratory status stable patient denies any shortness of breath or chest pain  Source- PNA and UTI  Source control Achieved by- Vancomycin and Zosyn  UA reviewed.  CXR reviewed.  Procalcitonin elevated.  Will stop IV fluids  Microbiology Results (last 7 days)     Procedure Component Value Units Date/Time    Culture, Respiratory with Gram Stain [[id number]]  (Abnormal) Collected: 08/18/20 0826    Order Status: Completed Specimen: Respiratory from Bronchial Wash Updated: 08/20/20 1443     Respiratory Culture JAYY ALBICANS  Moderate        JAYY DUBLINIENSIS  Moderate       Gram Stain (Respiratory) <10 epithelial cells per low power field.     Gram Stain (Respiratory) Rare WBC's     Gram Stain (Respiratory) No organisms seen    AFB Culture & Smear [[id number]] Collected: 08/18/20 0826    Order Status: Completed Specimen: Respiratory from Bronchial Wash Updated: 08/19/20 2127     AFB Culture & Smear Culture in progress     AFB CULTURE STAIN No acid fast bacilli seen.    Blood culture x two cultures. Draw prior to antibiotics. [[id number]] Collected: 08/13/20 1906    Order Status: Completed Specimen: Blood from Antecubital, Right Updated: 08/19/20 1412     Blood Culture, Routine No growth after 5 days.    Narrative:      Aerobic and anaerobic    Blood culture x two cultures. Draw prior to antibiotics. [[id number]] Collected: 08/13/20 1906    Order Status: Completed Specimen: Blood from Antecubital, Left Updated: 08/19/20  1412     Blood Culture, Routine No growth after 5 days.    Narrative:      Aerobic and anaerobic    Culture, Respiratory with Gram Stain [[id number]] Collected: 08/18/20 0825    Order Status: Canceled Specimen: Respiratory from Bronchial Wash     Urine culture [[id number]]  (Abnormal)  (Susceptibility) Collected: 08/13/20 1931    Order Status: Completed Specimen: Urine Updated: 08/16/20 1256     Urine Culture, Routine KLEBSIELLA PNEUMONIAE  >100,000 cfu/ml      Narrative:      Specimen Source->Urine

## 2020-08-21 NOTE — SUBJECTIVE & OBJECTIVE
Interval History:  Patient currently states that she feels much better.  No acute events overnight continues to be weak    Review of Systems   Unable to perform ROS: Acuity of condition   Constitutional: Positive for activity change. Negative for appetite change, chills, fatigue and fever.   HENT: Positive for dental problem. Negative for congestion, hearing loss, rhinorrhea, sore throat, trouble swallowing and voice change.    Respiratory: Positive for cough and wheezing. Negative for chest tightness and shortness of breath.    Cardiovascular: Negative for chest pain, palpitations and leg swelling.   Gastrointestinal: Negative for abdominal pain, blood in stool, diarrhea, nausea and vomiting.   Genitourinary: Positive for flank pain. Negative for difficulty urinating, frequency, hematuria and urgency.   Musculoskeletal: Negative for back pain, joint swelling and neck stiffness.   Skin: Negative for pallor and rash.   Neurological: Negative for tremors, seizures, syncope, speech difficulty, weakness, numbness and headaches.   Hematological: Negative for adenopathy.   Psychiatric/Behavioral: Negative for agitation, behavioral problems, confusion and sleep disturbance.     Objective:     Vital Signs (Most Recent):  Temp: 98.8 °F (37.1 °C) (08/21/20 0328)  Pulse: 92 (08/21/20 0648)  Resp: 16 (08/21/20 0648)  BP: 131/69 (08/21/20 0500)  SpO2: 100 % (08/21/20 0648) Vital Signs (24h Range):  Temp:  [98.4 °F (36.9 °C)-98.8 °F (37.1 °C)] 98.8 °F (37.1 °C)  Pulse:  [] 92  Resp:  [10-47] 16  SpO2:  [89 %-100 %] 100 %  BP: (123-179)/(60-91) 131/69     Weight: 75.8 kg (167 lb 1.6 oz)  Body mass index is 28.68 kg/m².    Intake/Output Summary (Last 24 hours) at 8/21/2020 0835  Last data filed at 8/21/2020 0600  Gross per 24 hour   Intake 490 ml   Output 1162 ml   Net -672 ml      Physical Exam  Vitals signs and nursing note reviewed.   Constitutional:       General: She is not in acute distress.     Appearance: Normal  appearance. She is well-developed. She is not diaphoretic.   HENT:      Head: Normocephalic and atraumatic.      Right Ear: External ear normal.      Left Ear: External ear normal.      Nose: Nose normal.      Mouth/Throat:      Mouth: Mucous membranes are moist.      Pharynx: Oropharynx is clear.   Eyes:      Conjunctiva/sclera: Conjunctivae normal.      Pupils: Pupils are equal, round, and reactive to light.   Neck:      Musculoskeletal: Normal range of motion and neck supple.   Cardiovascular:      Rate and Rhythm: Normal rate and regular rhythm.      Heart sounds: No murmur.   Pulmonary:      Effort: No respiratory distress.      Breath sounds: Wheezing present. No rhonchi.   Abdominal:      General: Bowel sounds are normal.      Palpations: Abdomen is soft.      Tenderness: There is no abdominal tenderness.   Musculoskeletal: Normal range of motion.   Skin:     General: Skin is warm.      Capillary Refill: Capillary refill takes less than 2 seconds.   Neurological:      General: No focal deficit present.      Mental Status: She is alert and oriented to person, place, and time.      Cranial Nerves: No cranial nerve deficit.      Motor: No weakness.   Psychiatric:         Mood and Affect: Mood normal.         Judgment: Judgment normal.      Comments: anxious         Significant Labs:   BMP:   Recent Labs   Lab 08/21/20  0327   *      K 3.6      CO2 28   BUN 34*   CREATININE 1.6*   CALCIUM 9.4     CBC:   Recent Labs   Lab 08/19/20  1006 08/20/20  0333 08/21/20  0327   WBC 16.93* 17.59* 17.97*   HGB 10.5* 9.7* 9.6*   HCT 36.6* 34.0* 32.9*   * 567* 594*       Significant Imaging: I have reviewed all pertinent imaging results/findings within the past 24 hours.

## 2020-08-21 NOTE — ASSESSMENT & PLAN NOTE
Contributing Nutrition Diagnosis  Moderate acute illness related malnutrition  Related to (etiology):   SOB, nausea, decreased appetite    Signs and Symptoms (as evidenced by):   1) PO intakes < 50% EEN x 15-18 days  2) mild-moderate edema    Interventions:  above    Nutrition Diagnosis Status:   new

## 2020-08-21 NOTE — ASSESSMENT & PLAN NOTE
Patient with Hypercapnic and Hypoxic Respiratory failure which is Acute on Chronic.  she is on home oxygen at 2 LPM. Supplemental ventilation was provided and noted-  .   Differential diagnosis includes - COPD and Pneumonia Labs and images were reviewed.  Will treat underlying causes and adjust management of respiratory failure as follows  ABG  Recent Labs   Lab 08/19/20  0837   PH 7.281*   PCO2 55.0*   PO2 100   HCO3 25.9   BE -1   POCSATURATED 97

## 2020-08-21 NOTE — PLAN OF CARE
Intervention: texture modified diet and nutrition supplement therapy-commercial beverage    Recommendation:    1) Advance  diet texture per SLP ( mechanical soft, thin liquids) no further restrictions for now to promote PO intakes   2) Continues boost plus with meals , encourage intakes  3) Weigh pt weekly, replete iron and phos    4) If pt not eating 50% of meals and supplements in 24 hr rec initiated PPN: D 5 AA 2.75 @ 75 ml/hr + standard lipids   ( provides 49 g protein (54% EPN), 1004 kcal ( 61% EEN))    Goals: 1) PO intakes > 50% of meals and supplements in < 48 hours or nutrition support intitiated  Nutrition Goal Status: new  Communication of RD Recs: reviewed with physician(POC, sticky note, second sign, reviewed with RN)

## 2020-08-21 NOTE — PROGRESS NOTES
[facility] Hospital Medicine  Progress Note    Patient Name: Kendra Lawrence  MRN: [id number]  Patient Class: IP- Inpatient   Admission Date: 8/13/2020  Length of Stay: 8 days  Attending Physician: Dorie Starks MD  Primary Care Provider: Louie Urena MD        Subjective:     Principal Problem:Sepsis        HPI:  Kendra Lawrence is a 64 y.o. female with a PMHx of COPD, opioid dependence, HTN, CAD, and GERD who presents to the ED with complaints of SOB x10 days. The patient reports SOB that has been constant and became progressively worse over the last 2 days. She reports exerting herself makes it worse and nothing makes it better. She endorses associated fatigue, chills, and right chest wall tightness. The patient denies any fever, CP, cough, congestion, abdominal pain, vomiting, diarrhea, dizziness, weakness, or dysuria. The patient states that she currently smokes e-cigarettes. She denies any known sick contacts. Her ED work up is significant for tachycardia, fever, tachypnea, hypoxia, leukocytosis, nitrite positive UTI, and CXR revealing dense consolidation within the right mid to lower lung consistent with pneumonia. Sepsis protocol was initiated in the ED and the patient received IV fluids, vancomycin, and zosyn. The patient will be admitted under hospital medicine for further work up and evaluation.     Overview/Hospital Course:  Patient monitored closely during hospitalization.  She was was admitted with pneumonia and COPD exacerbation. She was  initiated on COPD pathway including scheduled DuoNebs, IV antibiotics and steroids.  She was noted to have UTI + Abnormal   KLEBSIELLA PNEUMONIAE. Pulmonology consulted and steroids were discontinued.  Patient with oxygen requirement.  She was noted to have worsening of respiratory status with desaturation of her to 97% on oxygen.  CT of the chest ordered and demonstrated Findings in the right middle lobe concerning for neoplasm, invading the  mediastinum.  Masslike pneumonia is included in the differential but less likely.  Probable postobstructive changes in the right upper and lower lobes Enlarged subcarinal lymph node, concerning for a metastatic node. Moderate, loculated-appearing right pleural effusion.  She was recommended for bronchoscopy p.r.n. she is status post bronch per Dr. Brothers on 08/18/2020.  Postprocedure she was noted to have acute respiratory failure requiring BiPAP.  She was transferred to ICU for closer monitoring.    Interval History:  Patient currently states that she feels much better.  No acute events overnight continues to be weak    Review of Systems   Unable to perform ROS: Acuity of condition   Constitutional: Positive for activity change. Negative for appetite change, chills, fatigue and fever.   HENT: Positive for dental problem. Negative for congestion, hearing loss, rhinorrhea, sore throat, trouble swallowing and voice change.    Respiratory: Positive for cough and wheezing. Negative for chest tightness and shortness of breath.    Cardiovascular: Negative for chest pain, palpitations and leg swelling.   Gastrointestinal: Negative for abdominal pain, blood in stool, diarrhea, nausea and vomiting.   Genitourinary: Positive for flank pain. Negative for difficulty urinating, frequency, hematuria and urgency.   Musculoskeletal: Negative for back pain, joint swelling and neck stiffness.   Skin: Negative for pallor and rash.   Neurological: Negative for tremors, seizures, syncope, speech difficulty, weakness, numbness and headaches.   Hematological: Negative for adenopathy.   Psychiatric/Behavioral: Negative for agitation, behavioral problems, confusion and sleep disturbance.     Objective:     Vital Signs (Most Recent):  Temp: 98.8 °F (37.1 °C) (08/21/20 0328)  Pulse: 92 (08/21/20 0648)  Resp: 16 (08/21/20 0648)  BP: 131/69 (08/21/20 0500)  SpO2: 100 % (08/21/20 0648) Vital Signs (24h Range):  Temp:  [98.4 °F (36.9 °C)-98.8 °F  (37.1 °C)] 98.8 °F (37.1 °C)  Pulse:  [] 92  Resp:  [10-47] 16  SpO2:  [89 %-100 %] 100 %  BP: (123-179)/(60-91) 131/69     Weight: 75.8 kg (167 lb 1.6 oz)  Body mass index is 28.68 kg/m².    Intake/Output Summary (Last 24 hours) at 8/21/2020 0835  Last data filed at 8/21/2020 0600  Gross per 24 hour   Intake 490 ml   Output 1162 ml   Net -672 ml      Physical Exam  Vitals signs and nursing note reviewed.   Constitutional:       General: She is not in acute distress.     Appearance: Normal appearance. She is well-developed. She is not diaphoretic.   HENT:      Head: Normocephalic and atraumatic.      Right Ear: External ear normal.      Left Ear: External ear normal.      Nose: Nose normal.      Mouth/Throat:      Mouth: Mucous membranes are moist.      Pharynx: Oropharynx is clear.   Eyes:      Conjunctiva/sclera: Conjunctivae normal.      Pupils: Pupils are equal, round, and reactive to light.   Neck:      Musculoskeletal: Normal range of motion and neck supple.   Cardiovascular:      Rate and Rhythm: Normal rate and regular rhythm.      Heart sounds: No murmur.   Pulmonary:      Effort: No respiratory distress.      Breath sounds: Wheezing present. No rhonchi.   Abdominal:      General: Bowel sounds are normal.      Palpations: Abdomen is soft.      Tenderness: There is no abdominal tenderness.   Musculoskeletal: Normal range of motion.   Skin:     General: Skin is warm.      Capillary Refill: Capillary refill takes less than 2 seconds.   Neurological:      General: No focal deficit present.      Mental Status: She is alert and oriented to person, place, and time.      Cranial Nerves: No cranial nerve deficit.      Motor: No weakness.   Psychiatric:         Mood and Affect: Mood normal.         Judgment: Judgment normal.      Comments: anxious         Significant Labs:   BMP:   Recent Labs   Lab 08/21/20  0327   *      K 3.6      CO2 28   BUN 34*   CREATININE 1.6*   CALCIUM 9.4     CBC:    Recent Labs   Lab 08/19/20  1006 08/20/20  0333 08/21/20  0327   WBC 16.93* 17.59* 17.97*   HGB 10.5* 9.7* 9.6*   HCT 36.6* 34.0* 32.9*   * 567* 594*       Significant Imaging: I have reviewed all pertinent imaging results/findings within the past 24 hours.      Assessment/Plan:      * Sepsis  This patient does have evidence of infective focus  My overall impression is sepsis.  Antibiotics given-   Antibiotics (From admission, onward)    Start     Stop Route Frequency Ordered    08/14/20 0400  piperacillin-tazobactam 4.5 g in dextrose 5 % 100 mL IVPB (ready to mix system)      -- IV Every 8 hours (non-standard times) 08/13/20 2217          Latest lactate reviewed, they are-  No results for input(s): LACTATE in the last 72 hours.  Organ dysfunction indicated by Acute respiratory failure   But respiratory status stable patient denies any shortness of breath or chest pain  Source- PNA and UTI  Source control Achieved by- Vancomycin and Zosyn  UA reviewed.  CXR reviewed.  Procalcitonin elevated.  Will stop IV fluids  Microbiology Results (last 7 days)     Procedure Component Value Units Date/Time    Culture, Respiratory with Gram Stain [[id number]]  (Abnormal) Collected: 08/18/20 0826    Order Status: Completed Specimen: Respiratory from Bronchial Wash Updated: 08/20/20 1443     Respiratory Culture JAYY ALBICANS  Moderate        JAYY DUBLINIENSIS  Moderate       Gram Stain (Respiratory) <10 epithelial cells per low power field.     Gram Stain (Respiratory) Rare WBC's     Gram Stain (Respiratory) No organisms seen    AFB Culture & Smear [[id number]] Collected: 08/18/20 0826    Order Status: Completed Specimen: Respiratory from Bronchial Wash Updated: 08/19/20 2127     AFB Culture & Smear Culture in progress     AFB CULTURE STAIN No acid fast bacilli seen.    Blood culture x two cultures. Draw prior to antibiotics. [[id number]] Collected: 08/13/20 1906    Order Status: Completed Specimen: Blood from  Antecubital, Right Updated: 08/19/20 1412     Blood Culture, Routine No growth after 5 days.    Narrative:      Aerobic and anaerobic    Blood culture x two cultures. Draw prior to antibiotics. [[id number]] Collected: 08/13/20 1906    Order Status: Completed Specimen: Blood from Antecubital, Left Updated: 08/19/20 1412     Blood Culture, Routine No growth after 5 days.    Narrative:      Aerobic and anaerobic    Culture, Respiratory with Gram Stain [[id number]] Collected: 08/18/20 0825    Order Status: Canceled Specimen: Respiratory from Bronchial Wash     Urine culture [[id number]]  (Abnormal)  (Susceptibility) Collected: 08/13/20 1931    Order Status: Completed Specimen: Urine Updated: 08/16/20 1256     Urine Culture, Routine KLEBSIELLA PNEUMONIAE  >100,000 cfu/ml      Narrative:      Specimen Source->Urine              Lung mass  bronch bx/cytology in process   Oncology on board will follow recommendations    Mass of middle lobe of right lung   likely neoplasm.  Consult Oncology for recommendations.      Poor dentition  History noted  Currently on Zosyn due to anaerobic coverage      Acute on chronic respiratory failure with hypoxia  Patient with Hypercapnic and Hypoxic Respiratory failure which is Acute on Chronic.  she is on home oxygen at 2 LPM. Supplemental ventilation was provided and noted-  .   Differential diagnosis includes - COPD and Pneumonia Labs and images were reviewed.  Will treat underlying causes and adjust management of respiratory failure as follows  ABG  Recent Labs   Lab 08/19/20  0837   PH 7.281*   PCO2 55.0*   PO2 100   HCO3 25.9   BE -1   POCSATURATED 97             SOB (shortness of breath)        NOELLE (acute kidney injury)  Likely to be renal was on normal saline 100 cc an hour  Creatinine did not improve IV fluids  Will get urine lytes tomorrow if kidney function does not improve  Patient is eating will hold off on the IV fluids due to lower extremity swelling      Malignant neoplasm of  upper lobe of lung  Suspected. New finding on CT chest.   Had a bronchoscopy yesterday.  Awaiting biopsy results  Concerning for mets. Check CT head per pulmonary recs- CT head negative  Hematology oncology on board  Status post bronch, bx obtained.    Mediastinal lymphadenopathy  Noted on CT chest. Possible due to neoplasm.        Protein-calorie malnutrition, mild   Nutrition consulted. Body mass index is 28.68 kg/m².. Encourage maximal PO intake. Diet supplementation ordered per nutrition approval. Will encourage PO and monitor closely for weight changes.        Iron deficiency anemia secondary to inadequate dietary iron intake  Lab Results   Component Value Date    IRON <10 (L) 08/15/2020    TIBC 229 (L) 08/15/2020    FERRITIN 203 08/19/2020       Continue p.o. ferrous sulfate      Opioid dependence  Continue home suboxone.  reviewed.    Hypokalemia   currently controlled. Last electrolytes reviewed-   Recent Labs   Lab 08/19/20  1147 08/20/20  0333 08/21/20  0327   K 3.9 3.6 3.6   . Will replace potassium and monitor electrolytes closely. Continuous telemetry.  improved    Acute hypoxemic respiratory failure  Likely 2/2 PNA.  Continue abx and supplemental oxygen.  No significant improvement. Check CT chest- no previous imaging of chest.   CT chest with possible mass, post obs pneumonia. Continue IV abx. Pulmonary consulted      worsening post bronchoscopy currently her resp status is stable.  Patient Oxygen saturation dropped into mid to low 80s upon supine with SpO2 increased to 4L for recovery to 97%        Chronic obstructive pulmonary disease with acute lower respiratory infection  Patient's COPD is controlled currently.  Patient is currently off COPD Pathway. Continue scheduled inhalers Antibiotics and Supplemental oxygen and monitor respiratory status closely.   Procalcitonin elevated.  CXR reviewed.  Continue abx.  Duo nebs.  Consulted pulmonology.     Acute cystitis without hematuria  UA reviewed,  follow culture.  Patient started on zosyn in the ED, continue for now.    + Klebsiella pneumonia    Pneumonia due to infectious organism  CXR reviewed.   will continue Zosyn for today.  Will try and deescalate antibiotics tomorrow  Duo nebs q4 hours.  Supplemental oxygen as needed to keep oxygen satruation >90%.  Pulmonary consulted        VTE Risk Mitigation (From admission, onward)         Ordered     enoxaparin injection 40 mg  Every 24 hours      08/13/20 2217     IP VTE HIGH RISK PATIENT  Once      08/13/20 2217     Place sequential compression device  Until discontinued      08/13/20 2217                Discharge Planning   BRANDON: 8/19/2020     Code Status: Full Code   Is the patient medically ready for discharge?: No    Reason for patient still in hospital (select all that apply): Treatment  Discharge Plan A: Home with family, Home Health            Critical care time spent on the evaluation and treatment of severe organ dysfunction, review of pertinent labs and imaging studies, discussions with consulting providers and discussions with patient/family: 60 minutes.      Dorie Starks MD  Department of Hospital Medicine   [facility]

## 2020-08-21 NOTE — PROGRESS NOTES
8/21/2020   Progress Note  PULMONARY    Admit Date: 8/13/2020 08/21/2020      SUBJECTIVE:     Aug 17 ct with mass,    Feels weak, min ambulation, min appetite.    Seems much worse today at bedside    8/18 post bronch distress when coming out sedation.  Now bipap and icu.  8/19 looks much better this am, alert with min sleepiness (more her usual?) - denies sob.  8/20 breathing is good, looking to get out of bed, no c/o  8/21 no new c/o- was up in chair with min assistance.           PFSH and Allergies reviewed.    OBJECTIVE:     Vitals (Most recent):  Vitals:    08/21/20 0648   BP:    Pulse: 92   Resp: 16   Temp:        Vitals (24 hour range):  Temp:  [98.4 °F (36.9 °C)-98.8 °F (37.1 °C)]   Pulse:  []   Resp:  [10-47]   BP: (123-179)/(60-91)   SpO2:  [89 %-100 %]       Intake/Output Summary (Last 24 hours) at 8/21/2020 0834  Last data filed at 8/21/2020 0600  Gross per 24 hour   Intake 490 ml   Output 1162 ml   Net -672 ml          Physical Exam:  The patient's neuro status (alertness,orientation,cognitive function,motor skills,), pharyngeal exam (oral lesions, hygiene, abn dentition,), Neck (jvd,mass,thyroid,nodes in neck and above/below clavicle),RESPIRATORY(symmetry,effort,fremitus,percussion,auscultation),  Cor(rhythm,heart tones including gallops,perfusion,edema)ABD(distention,hepatic&splenomegaly,tenderness,masses), Skin(rash,cyanosis),Psyc(affect,judgement,).  Exam negative except for these pertinent findings:    Decreased bs right, good left, alert,not sluggish, no edema, no asterixis       Radiographs reviewed: view by direct vision -   8/18 cxr increased opacification.    ct with mass rul ant/post and rml,   No results found for this or any previous visit.]    Labs     Recent Labs   Lab 08/21/20  0327   WBC 17.97*   HGB 9.6*   HCT 32.9*   *     Recent Labs   Lab 08/21/20  0327      K 3.6      CO2 28   BUN 34*   CREATININE 1.6*   *   CALCIUM 9.4   AST 25   ALT 35   ALKPHOS  94   BILITOT 0.2   PROT 6.5   ALBUMIN 2.1*     No results for input(s): PH, PCO2, PO2, HCO3 in the last 24 hours.  Microbiology Results (last 7 days)     Procedure Component Value Units Date/Time    Culture, Respiratory with Gram Stain [[id number]]  (Abnormal) Collected: 08/18/20 0826    Order Status: Completed Specimen: Respiratory from Bronchial Wash Updated: 08/20/20 1443     Respiratory Culture JAYY ALBICANS  Moderate        JAYY DUBLINIENSIS  Moderate       Gram Stain (Respiratory) <10 epithelial cells per low power field.     Gram Stain (Respiratory) Rare WBC's     Gram Stain (Respiratory) No organisms seen    AFB Culture & Smear [[id number]] Collected: 08/18/20 0826    Order Status: Completed Specimen: Respiratory from Bronchial Wash Updated: 08/19/20 2127     AFB Culture & Smear Culture in progress     AFB CULTURE STAIN No acid fast bacilli seen.    Blood culture x two cultures. Draw prior to antibiotics. [[id number]] Collected: 08/13/20 1906    Order Status: Completed Specimen: Blood from Antecubital, Right Updated: 08/19/20 1412     Blood Culture, Routine No growth after 5 days.    Narrative:      Aerobic and anaerobic    Blood culture x two cultures. Draw prior to antibiotics. [[id number]] Collected: 08/13/20 1906    Order Status: Completed Specimen: Blood from Antecubital, Left Updated: 08/19/20 1412     Blood Culture, Routine No growth after 5 days.    Narrative:      Aerobic and anaerobic    Culture, Respiratory with Gram Stain [[id number]] Collected: 08/18/20 0825    Order Status: Canceled Specimen: Respiratory from Bronchial Wash     Urine culture [[id number]]  (Abnormal)  (Susceptibility) Collected: 08/13/20 1931    Order Status: Completed Specimen: Urine Updated: 08/16/20 1256     Urine Culture, Routine KLEBSIELLA PNEUMONIAE  >100,000 cfu/ml      Narrative:      Specimen Source->Urine          Impression:  Active Hospital Problems    Diagnosis  POA    *Sepsis [A41.9]  Yes    Lung mass [R91.8]  No     SOB (shortness of breath) [R06.02]  Yes    Acute on chronic respiratory failure with hypoxia [J96.21]  Yes    Poor dentition [K08.9]  Yes    Mass of middle lobe of right lung [R91.8]  Yes    Malignant neoplasm of upper lobe of lung [C34.10]  Yes    NOELLE (acute kidney injury) [N17.9]  Yes    Mediastinal lymphadenopathy [R59.0]  Yes    Protein-calorie malnutrition, mild [E44.1]  Yes    Opioid dependence [F11.20]  Yes    Iron deficiency anemia secondary to inadequate dietary iron intake [D50.8]  Yes    Pneumonia due to infectious organism [J18.9]  Yes    Acute cystitis without hematuria [N30.00]  Yes    Chronic obstructive pulmonary disease with acute lower respiratory infection [J44.0]  Yes    Acute hypoxemic respiratory failure [J96.01]  Yes    Hypokalemia [E87.6]  Yes    Hypocalcemia [E83.51]  Yes      Resolved Hospital Problems   No resolved problems to display.               Plan:     8/17 temp max 99,   Temp better but pt not clearly better, large tumor apparent, need brain scan, will try to bronch in am.    Needs mobilized. Will stop xanax.     8/18 off xanax and still sedate, ct head ok - no contrast.    abg marginal on niv.      Discussed with daughter Larissa, called [place]/home numbers earlier with no answer.      Addendum-pt in icu now, mild sob reported off niv- no headache, some asterixis.  Will check abg, dose steroids, use niv.  2 daughthers - Larissa and Lorelei - are in room. Details of pneumonia and cancer discussed.  Would ask onc eval.  May need consideration xrt- not typically done in pt.  May need intubation but cancer seems to be major underlying dz 5 days into pneumonia rx.         8/19 - t to 99.7 post bronch - not ususual.    Will f/u cbc/cmp/abg  Was anxious when niv removed last pm - got ativan- chronic substance use on suboxone.    No organism on gram stain.     8/20 progressing wrt appearance, bronch bx/cytology in process this am.  No  Growth bronch wash.  Creat 1.6.  Labs  stable,need therapy.  onc seeing.  Will resume xanax prn.        8/21 pt's pneumonia responding well.  Most airways right lung occluded - likely causing pneumonia and resp failure.  Debility not too bad and will not recover well if airways not restored.  From pulmonary perspective need rx to open airways.  Chemo risk with pneumonia seems small, xrt alternative.  Will check renal u/s. Would defer sm cell lung ca rx plans to Dr Acuña.  Not using niv, could go to floor.  Will stop xanax as ppt sleepiness even at low dose.    Discussed with daughter Larissa.

## 2020-08-21 NOTE — PT/OT/SLP PROGRESS
Physical Therapy Treatment    Patient Name:  Kendra Lawrence   MRN:  [id number]    Recommendations:     Discharge Recommendations:  nursing facility, skilled   Discharge Equipment Recommendations: shower chair   Barriers to discharge: None    Assessment:     Kendra Lawrence is a 64 y.o. female admitted with a medical diagnosis of Sepsis.  She presents with the following impairments/functional limitations:  weakness, impaired endurance, impaired functional mobilty, gait instability, impaired balance, decreased lower extremity function, impaired cardiopulmonary response to activity .  Patient agreeable to PT treatment this afternoon but did refused gait training.  Patient presented supine in bed and was able to transfer supine to sit with SBA, sit to stand CGA and EOB to BSC with CGA.  Patient was even able to stand x 90 seconds with RW with CGA with O2 sats remaining > 97%.      Rehab Prognosis: Good; patient would benefit from acute skilled PT services to address these deficits and reach maximum level of function.    Recent Surgery: Procedure(s) (LRB):  Bronchoscopy- 730 or noon, floro not needed (N/A) 3 Days Post-Op    Plan:     During this hospitalization, patient to be seen daily to address the identified rehab impairments via gait training, therapeutic activities, therapeutic exercises and progress toward the following goals:    · Plan of Care Expires:  09/14/20    Subjective     Chief Complaint: fatigue  Patient/Family Comments/goals: go home  Pain/Comfort:  ·        Objective:     Communicated with nurse prior to session.  Patient found supine with bed alarm, oxygen, peripheral IV, pulse ox (continuous), telemetry upon PT entry to room.     General Precautions: Standard, fall   Orthopedic Precautions:N/A   Braces:       Functional Mobility:  · Bed Mobility:     · Supine to Sit: stand by assistance  · Sit to Supine: stand by assistance  · Transfers:     · Sit to Stand:  contact guard assistance with rolling  walker  · Bed to Chair: contact guard assistance with  no AD  using  Step Transfer      AM-PAC 6 CLICK MOBILITY          Therapeutic Activities and Exercises:   exercise to include LAQ, seated hip flexion, isometric hip abd and isometric hip add.  All done x 12 reps with 3 second hold on isometrics.  Transfer training supine to/from sit with SBA, sit to stand with CGA, EOB to BSC with CGA  Standing tolerance x 90 seconds with RW with CGA    Patient left sitting on BSC with call button in reach, nurse notified and PT nandini Borjas present..    GOALS:   Multidisciplinary Problems     Physical Therapy Goals        Problem: Physical Therapy Goal    Goal Priority Disciplines Outcome Goal Variances Interventions   Physical Therapy Goal     PT, PT/OT Ongoing, Progressing     Description: Goals to be met by: 2020     Patient will increase functional independence with mobility by performin. Supine to sit with Supervision  2. Sit to stand transfer with Supervision  3. Bed to chair transfer with Supervision using LRAD  4. Gait  x 150 feet with Supervision using LRAD  5. Lower extremity exercise program x20 reps per handout, with independence                     Time Tracking:     PT Received On: 20  PT Start Time: 1327     PT Stop Time: 1355  PT Total Time (min): 28 min     Billable Minutes: Therapeutic Activity 14 and Therapeutic Exercise 14    Treatment Type: Treatment  PT/PTA: PT     PTA Visit Number: 0     Chris Megilligan, PT  2020

## 2020-08-21 NOTE — PROGRESS NOTES
[facility]     Subjective:      PATIENT:   Kendra Lawrence  :    1955  MRN:    [id number]  Admit Date:    2020  DATE OF VISIT:  2020    Reason for Consult: Lung Mass    Patient ID: Kendra Lawrence is a 64 y.o. female PMHx of COPD, opioid dependence, HTN, CAD, and GERD who presents to the ED with complaints of SOB x10 days. The patient reported SOB that has been constant and became progressively worse over the last 2 days. She reported to exerting herself makes it worse and nothing makes it better. She endorses associated fatigue, chills, and right chest wall tightness.  She was noted to have worsening of respiratory status with desaturation of her to 97% on oxygen.  CT of the chest ordered and demonstrated findings in the right middle lobe concerning for neoplasm, invading the mediastinum.  Masslike pneumonia is included in the differential but less likely.  CT head WO contrast shows asymmetric prominence of the frontal horn/body of the right lateral ventricle with slight leftward bowing of the septum pellucidum. Probable postobstructive changes in the right upper and lower lobes. Enlarged subcarinal lymph node, concerning for a metastatic node. Moderate, loculated-appearing right pleural effusion.  Bronchoscopy performed on 2020 by Dr. Andrew Brothers and pathology is pending. Pt states that she has a decrease in SOB. She denies cough, fever/chills, N/V/D, melena, hematochezia, hemoptysis.    2020: biopsy lung     2020: Pt states she woke up more fatigued today and had nausea and was given antinausea medication which has reduced her nausea. Pt still has bruising on bilateral forearms.     2020: Pt seen at bedside with daughter, Larissa. Biopsy of right lung shows small cell carcinoma. Pt has been informed that pathology came back as malignancy. MRI of brain negative for mets.     Review of Systems   Constitutional: Positive for activity change and fatigue. Negative for  appetite change, chills, fever and unexpected weight change.   HENT: Positive for dental problem. Negative for mouth sores and trouble swallowing.    Eyes: Negative for photophobia and visual disturbance.   Respiratory: Positive for cough and shortness of breath. Negative for chest tightness, wheezing and stridor.    Cardiovascular: Negative for chest pain and leg swelling.   Gastrointestinal: Positive for abdominal pain and nausea. Negative for constipation, diarrhea and vomiting.   Endocrine: Positive for cold intolerance.   Musculoskeletal: Negative for arthralgias, back pain and myalgias.   Skin: Negative for color change, pallor, rash and wound.   Neurological: Positive for weakness and light-headedness.   Hematological: Negative for adenopathy. Bruises/bleeds easily.   Psychiatric/Behavioral: Negative for agitation, behavioral problems, confusion, decreased concentration and dysphoric mood.        Past Medical History:   Diagnosis Date    Asthma     Cardiac angina     Chronic abdominal pain     Claustrophobia     COPD (chronic obstructive pulmonary disease)     Coronary artery disease     GERD (gastroesophageal reflux disease)     History of drug dependence     Hypertension     Hypocalcemia 2020       Family History   Problem Relation Age of Onset    Breast cancer Sister 55    Breast cancer Sister 50    Cancer Mother     Heart disease Mother     Hypertension Mother     Cancer Father        Past Surgical History:   Procedure Laterality Date    BREAST BIOPSY Bilateral 20 yrs ago    benign    BRONCHOSCOPY N/A 2020    Procedure: Bronchoscopy- 730 or noon, floro not needed;  Surgeon: Adnrew Brothers MD;  Location: [facility];  Service: Endoscopy;  Laterality: N/A;     SECTION      CHOLECYSTECTOMY      ENDOSCOPIC ULTRASOUND OF UPPER GASTROINTESTINAL TRACT Left 2018    Procedure: ULTRASOUND, UPPER GI TRACT, ENDOSCOPIC;  Surgeon: Paras Negrete MD;  Location: [facility];   Service: Endoscopy;  Laterality: Left;    ESOPHAGOGASTRODUODENOSCOPY N/A 12/18/2018    Procedure: EGD (ESOPHAGOGASTRODUODENOSCOPY);  Surgeon: Paras Negrete MD;  Location[facility];  Service: Endoscopy;  Laterality: N/A;    ESOPHAGOGASTRODUODENOSCOPY N/A 12/21/2018    Procedure: EGD (ESOPHAGOGASTRODUODENOSCOPY);  Surgeon: Paras Negrete MD;  Location: [facility];  Service: Endoscopy;  Laterality: N/A;    HYSTERECTOMY      MAGNETIC RESONANCE IMAGING N/A 2/19/2019    Procedure: MRI (Magnetic Resonance Imagine) needs anesthesia;  Surgeon: Raffy Charles MD;  Location: [facility];  Service: Anesthesiology;  Laterality: N/A;    OOPHORECTOMY      TEMPOROMANDIBULAR JOINT SURGERY      TONSILLECTOMY         Social History     Socioeconomic History    Marital status:      Spouse name: Not on file    Number of children: Not on file    Years of education: Not on file    Highest education level: Not on file   Occupational History    Not on file   Social Needs    Financial resource strain: Not on file    Food insecurity     Worry: Not on file     Inability: Not on file    Transportation needs     Medical: Not on file     Non-medical: Not on file   Tobacco Use    Smoking status: Current Every Day Smoker     Years: 52.00     Types: Vaping w/o nicotine    Smokeless tobacco: Never Used   Substance and Sexual Activity    Alcohol use: No     Frequency: Never    Drug use: Yes     Types: Hydrocodone    Sexual activity: Not on file   Lifestyle    Physical activity     Days per week: Not on file     Minutes per session: Not on file    Stress: Not on file   Relationships    Social connections     Talks on phone: Not on file     Gets together: Not on file     Attends Latter day service: Not on file     Active member of club or organization: Not on file     Attends meetings of clubs or organizations: Not on file     Relationship status: Not on file   Other Topics Concern    Not on file   Social  "History Narrative    Not on file        albuterol-ipratropium  3 mL Nebulization Q4H    buprenorphine-naloxone  1 each Sublingual BID    cefTRIAXone (ROCEPHIN) IVPB  1 g Intravenous Q24H    DULoxetine  60 mg Oral Daily    enoxaparin  40 mg Subcutaneous Q24H    ferrous sulfate  325 mg Oral Daily    ketamine        lidocaine (PF) 10 mg/ml (1%)        lidocaine (PF) 20 mg/mL (2%)        lidocaine (PF)        lidocaine HCl 2%        lidocaine-EPINEPHrine (PF) 1%-1:200,000        methylPREDNISolone sodium succinate  40 mg Intravenous Daily    multivitamin  1 tablet Oral Daily    oxymetazoline        pantoprazole  40 mg Oral Daily    senna-docusate 8.6-50 mg  1 tablet Oral BID           acetaminophen, albuterol-ipratropium, ALPRAZolam, dextrose 50%, dextrose 50%, glucagon (human recombinant), glucose, glucose, magnesium oxide, magnesium oxide, ondansetron, potassium chloride, potassium chloride, potassium chloride, promethazine (PHENERGAN) IVPB, sodium chloride 0.9%    Review of patient's allergies indicates:  No Known Allergies  All medications and past history have been reviewed.    Objective:      Vitals:  BP (!) 173/89   Pulse 100   Temp 98 °F (36.7 °C) (Oral)   Resp (!) 32   Ht 5' 4" (1.626 m)   Wt 75.8 kg (167 lb 1.7 oz)   LMP  (LMP Unknown)   SpO2 98%   Breastfeeding No   BMI 28.68 kg/m²    Body surface area is 1.85 meters squared.    Last 24 Hours:    Intake/Output Summary (Last 24 hours) at 8/21/2020 1648  Last data filed at 8/21/2020 1426  Gross per 24 hour   Intake 900 ml   Output 1077 ml   Net -177 ml     Weight Readings:  Wt Readings from Last 5 Encounters:   08/21/20 75.8 kg (167 lb 1.7 oz)   03/06/20 75.8 kg (167 lb 1.7 oz)   02/07/20 77.7 kg (171 lb 4.8 oz)   01/10/20 78.6 kg (173 lb 4.5 oz)   12/13/19 77.1 kg (169 lb 15.6 oz)        Physical Exam  Constitutional:       General: She is not in acute distress.     Appearance: Normal appearance. She is ill-appearing.   HENT:      " Head: Normocephalic and atraumatic.      Right Ear: External ear normal.      Left Ear: External ear normal.      Nose: Nose normal. No congestion or rhinorrhea.   Eyes:      General:         Right eye: No discharge.         Left eye: No discharge.      Extraocular Movements: Extraocular movements intact.      Conjunctiva/sclera: Conjunctivae normal.      Pupils: Pupils are equal, round, and reactive to light.   Neck:      Musculoskeletal: Normal range of motion and neck supple. No neck rigidity.   Cardiovascular:      Rate and Rhythm: Normal rate and regular rhythm.      Heart sounds: Normal heart sounds. No murmur.   Pulmonary:      Effort: No respiratory distress.      Breath sounds: No stridor. Wheezing and rhonchi present.      Comments: Tachypnic  Abdominal:      General: Bowel sounds are normal. There is distension.      Palpations: Abdomen is soft.      Tenderness: There is no abdominal tenderness. There is no guarding.   Musculoskeletal: Normal range of motion.         General: No deformity.   Lymphadenopathy:      Cervical: No cervical adenopathy.   Skin:     General: Skin is warm and dry.      Coloration: Skin is pale.      Findings: No erythema or rash.   Neurological:      Mental Status: She is alert and oriented to person, place, and time.      Comments: Lethargic     Psychiatric:         Mood and Affect: Mood normal.         Behavior: Behavior normal.         Thought Content: Thought content normal.         Judgment: Judgment normal.         Labs:  Recent Labs   Lab 08/19/20  1006 08/20/20  0333 08/21/20  0327   RBC 3.95* 3.67* 3.55*   HGB 10.5* 9.7* 9.6*   HCT 36.6* 34.0* 32.9*   * 567* 594*   MCV 93 93 93     Recent Labs   Lab 08/16/20  0436 08/17/20  0433 08/18/20  0412 08/19/20  1147 08/20/20  0333 08/21/20  0327    138 139 144 141 143   K 4.0 3.9 3.5 3.9 3.6 3.6    102 101 108 106 105   CO2 25 22* 26 22* 25 28   BUN 9 15 18 27* 32* 34*   CREATININE 0.8 1.5* 1.6* 1.5* 1.6* 1.6*    * 125* 122* 140* 159* 142*   CALCIUM 7.8* 7.6* 8.9 9.1 9.4 9.4   MG 1.9 2.0 2.3  --   --   --    PHOS 2.4* 3.0 2.6*  --   --   --    ALKPHOS 143* 135 138* 111 99 94   PROT 6.3 6.0 6.8 6.6 6.4 6.5   ALBUMIN 2.1* 1.8* 1.9* 1.9* 2.0* 2.1*   BILITOT 0.4 0.2 0.3 0.2 0.2 0.2   AST 14 13 12 16 16 25   ALT 27 21 18 17 18 35     No results for input(s): TSH in the last 168 hours.    LUNG MASS, ENDOBRONCHIAL BIOPSY:   Small cell carcinoma.   Comment:  The biopsy reveals poorly preserved crushed small round blue cell   tumor with abundant necrosis.  There is marked nuclear molding, high N/C   ratio and abundant mitotic activity.  Tumor cells are positive for AE1/AE3   cytokeratin, synaptophysin and chromogranin.  TTF-1, CD45 and P63 are   negative.  A Ki-67 proliferation index is greater than 90%.  The   immunoprofile supports neuroendocrine differentiation and taken together with   morphology findings are consistent with small cell carcinoma.      Comment: Interp By Christian Magallanes M.D., Signed on 08/20/2020 at 14:51             CT Head WO 08/17/2020  COMPARISON:  None.     FINDINGS:  There is no acute intracranial hemorrhage.  Asymmetric prominence of the frontal horn/body of the right lateral ventricle with slight leftward bowing of the septum pellucidum.  No evidence of hydrocephalus.  No mass effect or midline shift is present.  The gray-white matter differentiation is within normal limits. Mild opacification of the right mastoid air cells.  Partially visualized postsurgical changes of bilateral maxillary antrostomies.  Mild mucosal thickening within the ethmoid and frontal sinuses.  The visualized portions of the orbits are normal.  No fractures are identified.     Impression:     1. No evidence of an acute intracranial abnormality.  Clinical correlation and consider further evaluation with MRI of the brain.  2. Asymmetric prominence of the frontal horn/body of the right lateral ventricle with slight bowing of  the septum pellucidum, which may reflect a developmental variant or possible intraventricular simple cyst.    CT Chest WO 08/15/2020  FINDINGS:  There is an enlarged subcarinal lymph node with short axis diameter of 1.8 cm series 3, image 46.  No other enlarged mediastinal or axillary lymph nodes are present.     The aorta is normal in caliber with scattered calcific plaque.  Coronary artery calcifications are present.  There is a trace left pleural effusion.  There is a moderate loculated appearing right pleural effusion.  .     There is ill-defined soft tissue thickening surrounding the bronchus intermedius, right lower and middle lobe bronchi.  There is volume loss, atelectasis, consolidation in the right lower lobe.  There is consolidation in the inferior right upper lobe.  There is very dense, masslike opacification of the right middle lobe.  Findings are concerning for neoplasm, rather than pneumonia.  There is no fat plane between the abnormality in the anterior right mid hemithorax and the mediastinum, as demonstrated on series 3, image 48.  This is difficult to measure accurately, as it may represent a combination of neoplasm and collapsed lung.  However, an estimate of 11.8 x 8.7 x 8.7 cm is obtained on series 3, image 50 and series 603, image 107.     There is dependent atelectasis at the left lung base.  No left lung nodules are present.     No suspicious findings are noted in the bones.     Intra and extrahepatic biliary ductal dilatation appears unchanged compared to the 2018 CT of the abdomen.  No other abnormalities are noted in the visualized portions of the upper abdomen.     Impression:     Findings in the right middle lobe concerning for neoplasm, invading the mediastinum.  Masslike pneumonia is included in the differential but less likely.  Probable postobstructive changes in the right upper and lower lobes.     Enlarged subcarinal lymph node, concerning for a metastatic node.     Moderate,  loculated-appearing right pleural effusion.     Trace left pleural effusion, dependent atelectasis left lung.     This report was flagged in Epic as abnormal.    XR Chest 08/18/2020  FINDINGS:  There is dense opacification of the lower 2/3 of the right hemithorax now, consistent with mass in the right middle lobe and right pleural effusion as noted on the prior CT chest.  No pneumothorax is seen.  The left lung is clear.  The cardiac size is obscured.     Impression:     Dense opacification of the lower 2/3 of the right chest secondary to right middle lobe mass and right pleural effusion noted on prior CT.  This is an increase in density since the prior chest x-ray  All lab results and imaging results have been reviewed and discussed with the patient.     Assessment and Plan:     Small Cell Carcinoma of Right Lung     Malignant neoplasm of upper lobe of lung    Normocytic Anemia    -Folate and vitamin B12 WNL.   -Continue care for pneumonia, respiratory failure and COPD via pulmonology.  -Discussed chemotherapy regimen of VP16 days 1,2,3 every 21 day cycle and carboplatin day1 every 21 day cycle.   -I met with the patient today for chemotherapy education. She will be starting treatment with VP16 and carboplatin. We discussed the mechanism of action, potential side effects of this treatment as well as ways she can manage them at home. Some of these side effects include but or not limited to fever, nausea, vomiting, decreased appetite, fatigue, weakness, cytopenias, myalgia/arthralgia, constipation, diarrhea, bleeding, headache, shortness of breath, nail changes, taste change, hair thinning/loss, mood disturbances, or edema. We will obtain labs prior to treatments and pt will follow-up with the physician or PA for toxicity monitoring throughout treatment. TableApp education was verbalized with patient regarding each chemotherapy drug.  Patient notified to call anytime 24/7 because their is a physician on call for  any problems that may arise. Patient also notified to report to [facility] if they can not get in touch with a physician after hours. The patient acknowledged full understanding of the risks, recommendations and plan. she understands risks vs benefits of therapy. I have answered all of the patient's questions.   -Per nursing, pulmonology and hospital medicine have decided that SNF would be best option for patient prior to starting chemotherapy. I will send a staff message to the clinic to make sure pt follows up with our clinic when she leaves SNF facility. I have also included our office's contact information with chemotherapy folder to pt's daughter and told her to call our office with any questions or concerns.     Sincerely,    Destin Valente PA-C

## 2020-08-21 NOTE — PLAN OF CARE
Patient alert and oriented. Denies any pain. Lungs coarse, productive cough with thick bloody sputum. 2 L NC in use. SOB noted with activity. Sat up in chair and worked with physical therapy today. Generalized edema noted. Salcido catheter discontinued. Voided 50 cc Freely. U/S retroperiteneal kidneys done at bedside. Safety measures in place. Daughter at bedside. Patient and family spoke to doctors regarding diagnosis and plan of care.

## 2020-08-21 NOTE — CARE UPDATE
08/20/20 1929   Patient Assessment/Suction   Level of Consciousness (AVPU) alert   Respiratory Effort Normal;Unlabored   Expansion/Accessory Muscles/Retractions no use of accessory muscles;no retractions   All Lung Fields Breath Sounds diminished   Rhythm/Pattern, Respiratory unlabored   Cough Frequency no cough   Cough Type good;nonproductive   PRE-TX-O2   O2 Device (Oxygen Therapy) nasal cannula   $ Is the patient on Low Flow Oxygen? Yes   Flow (L/min) 2   SpO2 (!) 93 %   Pulse Oximetry Type Continuous   $ Pulse Oximetry - Multiple Charge Pulse Oximetry - Multiple   Oximetry Probe Site No Change Needed   Pulse 95   Resp 16   Aerosol Therapy   $ Aerosol Therapy Charges Aerosol Treatment   Respiratory Treatment Status (SVN) given   Treatment Route (SVN) mask   Patient Position (SVN) HOB elevated   Post Treatment Assessment (SVN) breath sounds unchanged   Signs of Intolerance (SVN) none   Wound Care   $ Wound Care Tech Time 15 min   Area of Concern Back of head;Bridge of nose   Skin Color/Characteristics pale   Skin Temperature warm

## 2020-08-21 NOTE — ASSESSMENT & PLAN NOTE
currently controlled. Last electrolytes reviewed-   Recent Labs   Lab 08/19/20  1147 08/20/20  0333 08/21/20  0327   K 3.9 3.6 3.6   . Will replace potassium and monitor electrolytes closely. Continuous telemetry.  improved

## 2020-08-21 NOTE — SUBJECTIVE & OBJECTIVE
Interval History:  Patient currently doing well states that she feels much more comfortable.  Continues to be weak    Review of Systems   Unable to perform ROS: Acuity of condition   Constitutional: Positive for activity change. Negative for appetite change, chills, fatigue and fever.   HENT: Positive for dental problem. Negative for congestion, hearing loss, rhinorrhea, sore throat, trouble swallowing and voice change.    Respiratory: Positive for cough and wheezing. Negative for chest tightness and shortness of breath.    Cardiovascular: Negative for chest pain, palpitations and leg swelling.   Gastrointestinal: Negative for abdominal pain, blood in stool, diarrhea, nausea and vomiting.   Genitourinary: Positive for flank pain. Negative for difficulty urinating, frequency, hematuria and urgency.   Musculoskeletal: Negative for back pain, joint swelling and neck stiffness.   Skin: Negative for pallor and rash.   Neurological: Negative for tremors, seizures, syncope, speech difficulty, weakness, numbness and headaches.   Hematological: Negative for adenopathy.   Psychiatric/Behavioral: Negative for agitation, behavioral problems, confusion and sleep disturbance.     Objective:     Vital Signs (Most Recent):  Temp: 98.1 °F (36.7 °C) (08/21/20 1156)  Pulse: 100 (08/21/20 1445)  Resp: 17 (08/21/20 1445)  BP: (!) 174/81 (08/21/20 1330)  SpO2: 98 % (08/21/20 1445) Vital Signs (24h Range):  Temp:  [98.1 °F (36.7 °C)-98.8 °F (37.1 °C)] 98.1 °F (36.7 °C)  Pulse:  [] 100  Resp:  [10-52] 17  SpO2:  [87 %-100 %] 98 %  BP: (131-174)/(63-90) 174/81     Weight: 75.8 kg (167 lb 1.7 oz)  Body mass index is 28.68 kg/m².    Intake/Output Summary (Last 24 hours) at 8/21/2020 1523  Last data filed at 8/21/2020 1426  Gross per 24 hour   Intake 900 ml   Output 1192 ml   Net -292 ml      Physical Exam  Vitals signs and nursing note reviewed.   Constitutional:       General: She is not in acute distress.     Appearance: Normal  appearance. She is well-developed. She is not diaphoretic.   HENT:      Head: Normocephalic and atraumatic.      Right Ear: External ear normal.      Left Ear: External ear normal.      Nose: Nose normal.      Mouth/Throat:      Mouth: Mucous membranes are moist.      Pharynx: Oropharynx is clear.   Eyes:      Conjunctiva/sclera: Conjunctivae normal.      Pupils: Pupils are equal, round, and reactive to light.   Neck:      Musculoskeletal: Normal range of motion and neck supple.   Cardiovascular:      Rate and Rhythm: Normal rate and regular rhythm.      Heart sounds: No murmur.   Pulmonary:      Effort: No respiratory distress.      Breath sounds: No wheezing or rhonchi.   Abdominal:      General: Bowel sounds are normal.      Palpations: Abdomen is soft.      Tenderness: There is no abdominal tenderness.   Musculoskeletal: Normal range of motion.   Skin:     General: Skin is warm.      Capillary Refill: Capillary refill takes less than 2 seconds.   Neurological:      General: No focal deficit present.      Mental Status: She is alert and oriented to person, place, and time.      Cranial Nerves: No cranial nerve deficit.      Motor: No weakness.   Psychiatric:         Mood and Affect: Mood normal.         Judgment: Judgment normal.      Comments: anxious         Significant Labs:   BMP:   Recent Labs   Lab 08/21/20  0327   *      K 3.6      CO2 28   BUN 34*   CREATININE 1.6*   CALCIUM 9.4     CBC:   Recent Labs   Lab 08/20/20  0333 08/21/20  0327   WBC 17.59* 17.97*   HGB 9.7* 9.6*   HCT 34.0* 32.9*   * 594*       Significant Imaging: I have reviewed all pertinent imaging results/findings within the past 24 hours.

## 2020-08-21 NOTE — PLAN OF CARE
"POC reviewed. VSS, afebrile. Appropriate sats on 2L NC. Reports SOB with exertion. Drowsy at shift start, easily aroused. More alert throughout shift. Juice/water provided per request; no nausea. Salcido to gravity with adequate UOP. Bath/linen change complete, pt independent in positioning. Denied getting up to chair, states she will "do it tomorrow." Skin intact. Bruising noted. Comfort promoted, safety maintained.  "

## 2020-08-21 NOTE — ASSESSMENT & PLAN NOTE
Lab Results   Component Value Date    IRON <10 (L) 08/15/2020    TIBC 229 (L) 08/15/2020    FERRITIN 203 08/19/2020       Continue p.o. ferrous sulfate

## 2020-08-22 PROBLEM — J96.11 CHRONIC RESPIRATORY FAILURE WITH HYPOXIA: Status: ACTIVE | Noted: 2020-01-01

## 2020-08-22 NOTE — PT/OT/SLP PROGRESS
Speech Language Pathology Treatment    Patient Name:  Kendra Lawrence   MRN:  [id number]  Admitting Diagnosis: Sepsis    Recommendations:                 General Recommendations:  monitor for tolerance of mech soft textures, educate re mech soft diet  Diet recommendations:  Mechanical soft(IDDSI 5), Liquid Diet Level: Thin   Aspiration Precautions: Standard aspiration precautions   General Precautions: Standard, aspiration, fall(mech soft textures (IDDSI 6))  Communication strategies:  none    Subjective     I drank the Boost without any coughing.  Patient goals: Not eat any of the lunch       Objective:     Has the patient been evaluated by SLP for swallowing?   Yes  Keep patient NPO? No   Current Respiratory Status: nasal cannula      Pt seen for tolerance of mech soft diet textures.  Pt was informed therapist would return for observation at lunch, but she drank an entire Boost before therapist could arrive.  See Care Plan for tx details.    Assessment:     Kendra Lawrence is a 64 y.o. female with an SLP diagnosis of Dysphagia.  She presents with oral prep dysphagia due to poor dentition, and self-reports choking on meat due to inadequate chewing.  Will attempt recheck on Monday, and provide further education as needed..    Goals:   Multidisciplinary Problems     SLP Goals        Problem: SLP Goal    Goal Priority Disciplines Outcome   SLP Goal     SLP Ongoing, Progressing   Description:   1. Pt will consume dental soft textures and thin liquids with functional oral phase and no overt s/s penetration/aspiration                    Plan:     · Patient to be seen:  3 x/week   · Plan of Care expires:  08/26/20  · Plan of Care reviewed with:  patient, daughter   · SLP Follow-Up:  Yes       Discharge recommendations:      Barriers to Discharge:  None    Time Tracking:     SLP Treatment Date:   08/22/20  Speech Start Time:  1154  Speech Stop Time:  1207     Speech Total Time (min):  13 min    Billable Minutes: Treatment  Swallowing Dysfunction 13    Vanessa Multani CCC-SLP/A  08/22/2020

## 2020-08-22 NOTE — PLAN OF CARE
Problem: SLP Goal  Goal: SLP Goal  Description:   1. Pt will consume dental soft textures and thin liquids with functional oral phase and no overt s/s penetration/aspiration   Outcome: Ongoing, Progressing   Pt refused to eat any of the [facility] soft lunch plate presented, stating she just drank an entire Boost.  Pt & daughter reported coughing during meals is usual, and that pt frequently chokes on meat which she has not adequately chewed.  Education provided to pt & daughter re prep of a [facility] soft diet & foods to avoid.

## 2020-08-22 NOTE — ASSESSMENT & PLAN NOTE
Suspected. New finding on CT chest.   Had a bronchoscopy yesterday.  Biopsy shows Small cell carcinoma  Concerning for mets. Check CT head per pulmonary recs- CT head negative  Hematology oncology on board  Status post bronch, bx obtained.

## 2020-08-22 NOTE — ASSESSMENT & PLAN NOTE
Suspected. New finding on CT chest.   Had a bronchoscopy yesterday.  Biopsy shows Small cell carcinoma  Concerning for mets. Check CT head per pulmonary recs- CT head negative  Hematology oncology on board  Status post bronch, bx obtained.  Needs SNF for optimization prior to chemo

## 2020-08-22 NOTE — ASSESSMENT & PLAN NOTE
This patient does have evidence of infective focus  My overall impression is sepsis.  Antibiotics given-   Antibiotics (From admission, onward)    Start     Stop Route Frequency Ordered    08/21/20 1700  cefTRIAXone (ROCEPHIN) 1 g/50 mL D5W IVPB      -- IV Every 24 hours (non-standard times) 08/21/20 1647          Latest lactate reviewed, they are-  No results for input(s): LACTATE in the last 72 hours.  Organ dysfunction indicated by Acute respiratory failure   But respiratory status stable patient denies any shortness of breath or chest pain  Source- PNA and UTI  Source control Achieved by- Vancomycin and Zosyn  UA reviewed.  CXR reviewed.  Procalcitonin elevated.  Will stop IV fluids  Microbiology Results (last 7 days)     Procedure Component Value Units Date/Time    Culture, Respiratory with Gram Stain [[id number]]  (Abnormal) Collected: 08/18/20 0826    Order Status: Completed Specimen: Respiratory from Bronchial Wash Updated: 08/21/20 1306     Respiratory Culture JAYY ALBICANS  Moderate        JAYY DUBLINIENSIS  Moderate        JAYY GLABRATA  Moderate       Gram Stain (Respiratory) <10 epithelial cells per low power field.     Gram Stain (Respiratory) Rare WBC's     Gram Stain (Respiratory) No organisms seen    AFB Culture & Smear [[id number]] Collected: 08/18/20 0826    Order Status: Completed Specimen: Respiratory from Bronchial Wash Updated: 08/19/20 2127     AFB Culture & Smear Culture in progress     AFB CULTURE STAIN No acid fast bacilli seen.    Blood culture x two cultures. Draw prior to antibiotics. [[id number]] Collected: 08/13/20 1906    Order Status: Completed Specimen: Blood from Antecubital, Right Updated: 08/19/20 1412     Blood Culture, Routine No growth after 5 days.    Narrative:      Aerobic and anaerobic    Blood culture x two cultures. Draw prior to antibiotics. [[id number]] Collected: 08/13/20 1906    Order Status: Completed Specimen: Blood from Antecubital, Left Updated: 08/19/20  1412     Blood Culture, Routine No growth after 5 days.    Narrative:      Aerobic and anaerobic    Culture, Respiratory with Gram Stain [[id number]] Collected: 08/18/20 0825    Order Status: Canceled Specimen: Respiratory from Bronchial Wash     Urine culture [[id number]]  (Abnormal)  (Susceptibility) Collected: 08/13/20 1931    Order Status: Completed Specimen: Urine Updated: 08/16/20 1256     Urine Culture, Routine KLEBSIELLA PNEUMONIAE  >100,000 cfu/ml      Narrative:      Specimen Source->Urine

## 2020-08-22 NOTE — ASSESSMENT & PLAN NOTE
Likely to be intrinsic injury ATN  Creatinine did not improve IV fluids  Will get urine lytes tomorrow if kidney function does not improve  Patient is eating will hold off on the IV fluids due to lower extremity swelling

## 2020-08-22 NOTE — PLAN OF CARE
POC reviewed with Patient verbalizes understanding PT ambulated in room and hallway tolerated well Denies SOB Vss afebrile,bed low side rails up bed alarm activated side rails up x 3 call light in easy reach, Telemonitored call light in easy reach

## 2020-08-22 NOTE — PT/OT/SLP PROGRESS
Physical Therapy Treatment    Patient Name:  Kendra Lawrence   MRN:  [id number]    Recommendations:     Discharge Recommendations:  nursing facility, skilled   Discharge Equipment Recommendations: shower chair   Barriers to discharge:     Assessment:     Kendra Lawrence is a 64 y.o. female admitted with a medical diagnosis of Sepsis.  She presents with the following impairments/functional limitations:  weakness, decreased lower extremity function, impaired functional mobilty, impaired self care skills, impaired endurance, impaired cardiopulmonary response to activity .    Rehab Prognosis: Good; patient would benefit from acute skilled PT services to address these deficits and reach maximum level of function.    Recent Surgery: Procedure(s) (LRB):  Bronchoscopy- 730 or noon, floro not needed (N/A) 4 Days Post-Op    Plan:     During this hospitalization, patient to be seen daily to address the identified rehab impairments via gait training, therapeutic activities, therapeutic exercises and progress toward the following goals:    · Plan of Care Expires:  09/14/20    Subjective     Chief Complaint: none stated  Patient/Family Comments/goals: none stated  Pain/Comfort:  · Pain Rating 1: 0/10      Objective:     Communicated with nurse prior to session.  Patient found supine with   upon PT entry to room.     General Precautions: Standard, fall   Orthopedic Precautions:N/A   Braces: N/A     Functional Mobility:  · Bed Mobility:     · Rolling Right: stand by assistance  · Supine to Sit: stand by assistance  · Transfers:     · Sit to Stand:  moderate assistance with rolling walker  · Gait: RW 60' min A for safety with 2nd A for IV      AM-PAC 6 CLICK MOBILITY          Therapeutic Activities and Exercises:       Patient left up in chair with all lines intact, call button in reach, chair alarm on and nurse notified..    GOALS:   Multidisciplinary Problems     Physical Therapy Goals        Problem: Physical Therapy Goal    Goal  Priority Disciplines Outcome Goal Variances Interventions   Physical Therapy Goal     PT, PT/OT Ongoing, Progressing     Description: Goals to be met by: 2020     Patient will increase functional independence with mobility by performin. Supine to sit with Supervision  2. Sit to stand transfer with Supervision  3. Bed to chair transfer with Supervision using LRAD  4. Gait  x 150 feet with Supervision using LRAD  5. Lower extremity exercise program x20 reps per handout, with independence                     Time Tracking:     PT Received On: 20  PT Start Time: 0850     PT Stop Time: 905  PT Total Time (min): 15 min     Billable Minutes: Gait Training 15    Treatment Type: Treatment  PT/PTA: PTA     PTA Visit Number: 1     Paradise Teague PTA  2020

## 2020-08-22 NOTE — PROGRESS NOTES
[facility] Hospital Medicine  Progress Note    Patient Name: Kendra Lawrence  MRN: [id number]  Patient Class: IP- Inpatient   Admission Date: 8/13/2020  Length of Stay: 9 days  Attending Physician: Jaylan Pink,*  Primary Care Provider: Louie Urena MD        Subjective:     Principal Problem:Sepsis        HPI:  Kendra Lawrence is a 64 y.o. female with a PMHx of COPD, opioid dependence, HTN, CAD, and GERD who presents to the ED with complaints of SOB x10 days. The patient reports SOB that has been constant and became progressively worse over the last 2 days. She reports exerting herself makes it worse and nothing makes it better. She endorses associated fatigue, chills, and right chest wall tightness. The patient denies any fever, CP, cough, congestion, abdominal pain, vomiting, diarrhea, dizziness, weakness, or dysuria. The patient states that she currently smokes e-cigarettes. She denies any known sick contacts. Her ED work up is significant for tachycardia, fever, tachypnea, hypoxia, leukocytosis, nitrite positive UTI, and CXR revealing dense consolidation within the right mid to lower lung consistent with pneumonia. Sepsis protocol was initiated in the ED and the patient received IV fluids, vancomycin, and zosyn. The patient will be admitted under hospital medicine for further work up and evaluation.     Overview/Hospital Course:  Patient monitored closely during hospitalization.  She was was admitted with pneumonia and COPD exacerbation. She was  initiated on COPD pathway including scheduled DuoNebs, IV antibiotics and steroids.  She was noted to have UTI + Abnormal   KLEBSIELLA PNEUMONIAE. Pulmonology consulted and steroids were discontinued.  Patient with oxygen requirement.  She was noted to have worsening of respiratory status with desaturation of her to 97% on oxygen.  CT of the chest ordered and demonstrated Findings in the right middle lobe concerning for neoplasm, invading the  mediastinum.  Masslike pneumonia is included in the differential but less likely.  Probable postobstructive changes in the right upper and lower lobes Enlarged subcarinal lymph node, concerning for a metastatic node. Moderate, loculated-appearing right pleural effusion.  She was recommended for bronchoscopy p.r.n. she is status post bronch per Dr. Brothers on 08/18/2020.  Postprocedure she was noted to have acute respiratory failure requiring BiPAP.  She was transferred to ICU for closer monitoring.    Interval History:  Today patient reports feeling weak.  Apparently sat up with physical therapy today still not tolerating much by mouth.  Case management consult for skilled nursing placement.  White count elevated today.    Review of Systems   Unable to perform ROS: Acuity of condition   Constitutional: Positive for activity change. Negative for appetite change, chills, fatigue and fever.   HENT: Positive for dental problem. Negative for congestion, hearing loss, rhinorrhea, sore throat, trouble swallowing and voice change.    Respiratory: Positive for cough and wheezing. Negative for chest tightness and shortness of breath.    Cardiovascular: Negative for chest pain, palpitations and leg swelling.   Gastrointestinal: Negative for abdominal pain, blood in stool, diarrhea, nausea and vomiting.   Genitourinary: Positive for flank pain. Negative for difficulty urinating, frequency, hematuria and urgency.   Musculoskeletal: Negative for back pain, joint swelling and neck stiffness.   Skin: Negative for pallor and rash.   Neurological: Negative for tremors, seizures, syncope, speech difficulty, weakness, numbness and headaches.   Hematological: Negative for adenopathy.   Psychiatric/Behavioral: Negative for agitation, behavioral problems, confusion and sleep disturbance.     Objective:     Vital Signs (Most Recent):  Temp: 96.5 °F (35.8 °C) (08/22/20 0745)  Pulse: 97 (08/22/20 0745)  Resp: 19 (08/22/20 0745)  BP: (!) 184/94  (08/22/20 0745)  SpO2: (!) 92 % (08/22/20 0745) Vital Signs (24h Range):  Temp:  [96.3 °F (35.7 °C)-98.2 °F (36.8 °C)] 96.5 °F (35.8 °C)  Pulse:  [] 97  Resp:  [13-52] 19  SpO2:  [87 %-100 %] 92 %  BP: (146-184)/(68-98) 184/94     Weight: 82.3 kg (181 lb 8 oz)  Body mass index is 31.15 kg/m².    Intake/Output Summary (Last 24 hours) at 8/22/2020 1120  Last data filed at 8/21/2020 2009  Gross per 24 hour   Intake 610 ml   Output 50 ml   Net 560 ml      Physical Exam  Vitals signs and nursing note reviewed.   Constitutional:       General: She is not in acute distress.     Appearance: Normal appearance. She is well-developed. She is not diaphoretic.   HENT:      Head: Normocephalic and atraumatic.      Right Ear: External ear normal.      Left Ear: External ear normal.      Nose: Nose normal.      Mouth/Throat:      Mouth: Mucous membranes are moist.      Pharynx: Oropharynx is clear.   Eyes:      Conjunctiva/sclera: Conjunctivae normal.      Pupils: Pupils are equal, round, and reactive to light.   Neck:      Musculoskeletal: Normal range of motion and neck supple.   Cardiovascular:      Rate and Rhythm: Normal rate and regular rhythm.      Heart sounds: No murmur.   Pulmonary:      Effort: No respiratory distress.      Breath sounds: No wheezing or rhonchi.   Abdominal:      General: Bowel sounds are normal.      Palpations: Abdomen is soft.      Tenderness: There is no abdominal tenderness.   Musculoskeletal: Normal range of motion.   Skin:     General: Skin is warm.      Capillary Refill: Capillary refill takes less than 2 seconds.   Neurological:      General: No focal deficit present.      Mental Status: She is alert and oriented to person, place, and time.      Cranial Nerves: No cranial nerve deficit.      Motor: No weakness.   Psychiatric:         Mood and Affect: Mood normal.         Judgment: Judgment normal.      Comments: anxious         Significant Labs:   BMP:   Recent Labs   Lab 08/22/20  0454    *      K 3.6      CO2 29   BUN 33*   CREATININE 1.6*   CALCIUM 9.9     CBC:   Recent Labs   Lab 08/21/20  0327 08/22/20  0454   WBC 17.97* 21.47*   HGB 9.6* 10.5*   HCT 32.9* 35.2*   * 622*       Significant Imaging: I have reviewed all pertinent imaging results/findings within the past 24 hours.      Assessment/Plan:      * Sepsis  This patient does have evidence of infective focus  My overall impression is sepsis.  Antibiotics given-   Antibiotics (From admission, onward)    Start     Stop Route Frequency Ordered    08/21/20 1700  cefTRIAXone (ROCEPHIN) 1 g/50 mL D5W IVPB      -- IV Every 24 hours (non-standard times) 08/21/20 1647          Latest lactate reviewed, they are-  No results for input(s): LACTATE in the last 72 hours.  Organ dysfunction indicated by Acute respiratory failure   But respiratory status stable patient denies any shortness of breath or chest pain  Source- PNA and UTI  Source control Achieved by- Vancomycin and Zosyn  UA reviewed.  CXR reviewed.  Procalcitonin elevated.  Will stop IV fluids  Microbiology Results (last 7 days)     Procedure Component Value Units Date/Time    Culture, Respiratory with Gram Stain [[id number]]  (Abnormal) Collected: 08/18/20 0826    Order Status: Completed Specimen: Respiratory from Bronchial Wash Updated: 08/21/20 1306     Respiratory Culture JAYY ALBICANS  Moderate        JAYY DUBLINIENSIS  Moderate        JAYY GLABRATA  Moderate       Gram Stain (Respiratory) <10 epithelial cells per low power field.     Gram Stain (Respiratory) Rare WBC's     Gram Stain (Respiratory) No organisms seen    AFB Culture & Smear [[id number]] Collected: 08/18/20 0826    Order Status: Completed Specimen: Respiratory from Bronchial Wash Updated: 08/19/20 2127     AFB Culture & Smear Culture in progress     AFB CULTURE STAIN No acid fast bacilli seen.    Blood culture x two cultures. Draw prior to antibiotics. [[id number]] Collected: 08/13/20 1906     Order Status: Completed Specimen: Blood from Antecubital, Right Updated: 08/19/20 1412     Blood Culture, Routine No growth after 5 days.    Narrative:      Aerobic and anaerobic    Blood culture x two cultures. Draw prior to antibiotics. [[id number]] Collected: 08/13/20 1906    Order Status: Completed Specimen: Blood from Antecubital, Left Updated: 08/19/20 1412     Blood Culture, Routine No growth after 5 days.    Narrative:      Aerobic and anaerobic    Culture, Respiratory with Gram Stain [[id number]] Collected: 08/18/20 0825    Order Status: Canceled Specimen: Respiratory from Bronchial Wash     Urine culture [[id number]]  (Abnormal)  (Susceptibility) Collected: 08/13/20 1931    Order Status: Completed Specimen: Urine Updated: 08/16/20 1256     Urine Culture, Routine KLEBSIELLA PNEUMONIAE  >100,000 cfu/ml      Narrative:      Specimen Source->Urine              Lung cancer  Oncology following      Lung mass  bronch bx/cytology in process   Oncology on board will follow recommendations    Mass of middle lobe of right lung  Small cell ca Consult Oncology for recommendations.      Poor dentition  History noted  Currently on Zosyn due to anaerobic coverage      Chronic respiratory failure with hypoxia  Patient with Hypercapnic and Hypoxic Respiratory failure which is Acute on Chronic.  she is on home oxygen at 2 LPM. Supplemental ventilation was provided and noted-  .   Differential diagnosis includes - COPD and Pneumonia Labs and images were reviewed.  Will treat underlying causes and adjust management of respiratory failure as follows      SOB (shortness of breath)        NOELLE (acute kidney injury)  Likely to be intrinsic injury ATN  Creatinine did not improve IV fluids  Will get urine lytes tomorrow if kidney function does not improve  Patient is eating will hold off on the IV fluids due to lower extremity swelling      Malignant neoplasm of upper lobe of lung  Suspected. New finding on CT chest.   Had a  bronchoscopy yesterday.  Biopsy shows Small cell carcinoma  Concerning for mets. Check CT head per pulmonary recs- CT head negative  Hematology oncology on board  Status post bronch, bx obtained.  Needs SNF for optimization prior to chemo    Mediastinal lymphadenopathy  Noted on CT chest. Due to lung ca      Moderate malnutrition   Nutrition consulted. Body mass index is 28.68 kg/m².. Encourage maximal PO intake. Diet supplementation ordered per nutrition approval. Will encourage PO and monitor closely for weight changes.        Iron deficiency anemia secondary to inadequate dietary iron intake  Lab Results   Component Value Date    IRON <10 (L) 08/15/2020    TIBC 229 (L) 08/15/2020    FERRITIN 203 08/19/2020       Continue p.o. ferrous sulfate      Opioid dependence  Continue home suboxone.  reviewed.    Hypokalemia   currently controlled. Last electrolytes reviewed-   Recent Labs   Lab 08/20/20  0333 08/21/20  0327   K 3.6 3.6   . Will replace potassium and monitor electrolytes closely. Continuous telemetry.  improved    Acute hypoxemic respiratory failure  Likely 2/2 PNA.  Continue abx and supplemental oxygen.  No significant improvement. Check CT chest- no previous imaging of chest.   CT chest with possible mass, post obs pneumonia. Continue IV abx. Pulmonary consulted      worsening post bronchoscopy currently her resp status is stable.  Patient Oxygen saturation dropped into mid to low 80s upon supine with SpO2 increased to 4L for recovery to 97%        Chronic obstructive pulmonary disease with acute lower respiratory infection  Patient's COPD is controlled currently.  Patient is currently off COPD Pathway. Continue scheduled inhalers Antibiotics and Supplemental oxygen and monitor respiratory status closely.   Procalcitonin elevated.  CXR reviewed.  Continue abx.  Duo nebs.  Consulted pulmonology.     Acute cystitis without hematuria  UA reviewed, follow culture.  Patient started on zosyn in the ED,  continue for now.    + Klebsiella pneumonia    Pneumonia due to infectious organism  CXR reviewed.  Switched to ceftriaxone.    Duo nebs q4 hours.  Supplemental oxygen as needed to keep oxygen satruation >90%.  Pulmonary consulted        VTE Risk Mitigation (From admission, onward)         Ordered     enoxaparin injection 40 mg  Every 24 hours      08/13/20 2217     IP VTE HIGH RISK PATIENT  Once      08/13/20 2217     Place sequential compression device  Until discontinued      08/13/20 2217                Discharge Planning   BRANDON: 8/19/2020     Code Status: Full Code   Is the patient medically ready for discharge?: No    Reason for patient still in hospital (select all that apply): Consult recommendations and PT / OT recommendations  Discharge Plan A: Home with family, Home Health                  Jaylan Pink MD  Department of Hospital Medicine   [facility]

## 2020-08-22 NOTE — PROGRESS NOTES
[facility] Hospital Medicine  Progress Note    Patient Name: Kendra Lawrence  MRN: [id number]  Patient Class: IP- Inpatient   Admission Date: 8/13/2020  Length of Stay: 9 days  Attending Physician: Dorie Starks MD  Primary Care Provider: Louie Urena MD        Subjective:     Principal Problem:Sepsis        HPI:  Kendra Lawrence is a 64 y.o. female with a PMHx of COPD, opioid dependence, HTN, CAD, and GERD who presents to the ED with complaints of SOB x10 days. The patient reports SOB that has been constant and became progressively worse over the last 2 days. She reports exerting herself makes it worse and nothing makes it better. She endorses associated fatigue, chills, and right chest wall tightness. The patient denies any fever, CP, cough, congestion, abdominal pain, vomiting, diarrhea, dizziness, weakness, or dysuria. The patient states that she currently smokes e-cigarettes. She denies any known sick contacts. Her ED work up is significant for tachycardia, fever, tachypnea, hypoxia, leukocytosis, nitrite positive UTI, and CXR revealing dense consolidation within the right mid to lower lung consistent with pneumonia. Sepsis protocol was initiated in the ED and the patient received IV fluids, vancomycin, and zosyn. The patient will be admitted under hospital medicine for further work up and evaluation.     Overview/Hospital Course:  Patient monitored closely during hospitalization.  She was was admitted with pneumonia and COPD exacerbation. She was  initiated on COPD pathway including scheduled DuoNebs, IV antibiotics and steroids.  She was noted to have UTI + Abnormal   KLEBSIELLA PNEUMONIAE. Pulmonology consulted and steroids were discontinued.  Patient with oxygen requirement.  She was noted to have worsening of respiratory status with desaturation of her to 97% on oxygen.  CT of the chest ordered and demonstrated Findings in the right middle lobe concerning for neoplasm, invading the  mediastinum.  Masslike pneumonia is included in the differential but less likely.  Probable postobstructive changes in the right upper and lower lobes Enlarged subcarinal lymph node, concerning for a metastatic node. Moderate, loculated-appearing right pleural effusion.  She was recommended for bronchoscopy p.r.n. she is status post bronch per Dr. Brothers on 08/18/2020.  Postprocedure she was noted to have acute respiratory failure requiring BiPAP.  She was transferred to ICU for closer monitoring.    Interval History:  Patient currently doing well states that she feels much more comfortable.  Continues to be weak    Review of Systems   Unable to perform ROS: Acuity of condition   Constitutional: Positive for activity change. Negative for appetite change, chills, fatigue and fever.   HENT: Positive for dental problem. Negative for congestion, hearing loss, rhinorrhea, sore throat, trouble swallowing and voice change.    Respiratory: Positive for cough and wheezing. Negative for chest tightness and shortness of breath.    Cardiovascular: Negative for chest pain, palpitations and leg swelling.   Gastrointestinal: Negative for abdominal pain, blood in stool, diarrhea, nausea and vomiting.   Genitourinary: Positive for flank pain. Negative for difficulty urinating, frequency, hematuria and urgency.   Musculoskeletal: Negative for back pain, joint swelling and neck stiffness.   Skin: Negative for pallor and rash.   Neurological: Negative for tremors, seizures, syncope, speech difficulty, weakness, numbness and headaches.   Hematological: Negative for adenopathy.   Psychiatric/Behavioral: Negative for agitation, behavioral problems, confusion and sleep disturbance.     Objective:     Vital Signs (Most Recent):  Temp: 98.1 °F (36.7 °C) (08/21/20 1156)  Pulse: 100 (08/21/20 1445)  Resp: 17 (08/21/20 1445)  BP: (!) 174/81 (08/21/20 1330)  SpO2: 98 % (08/21/20 1445) Vital Signs (24h Range):  Temp:  [98.1 °F (36.7 °C)-98.8 °F  (37.1 °C)] 98.1 °F (36.7 °C)  Pulse:  [] 100  Resp:  [10-52] 17  SpO2:  [87 %-100 %] 98 %  BP: (131-174)/(63-90) 174/81     Weight: 75.8 kg (167 lb 1.7 oz)  Body mass index is 28.68 kg/m².    Intake/Output Summary (Last 24 hours) at 8/21/2020 1523  Last data filed at 8/21/2020 1426  Gross per 24 hour   Intake 900 ml   Output 1192 ml   Net -292 ml      Physical Exam  Vitals signs and nursing note reviewed.   Constitutional:       General: She is not in acute distress.     Appearance: Normal appearance. She is well-developed. She is not diaphoretic.   HENT:      Head: Normocephalic and atraumatic.      Right Ear: External ear normal.      Left Ear: External ear normal.      Nose: Nose normal.      Mouth/Throat:      Mouth: Mucous membranes are moist.      Pharynx: Oropharynx is clear.   Eyes:      Conjunctiva/sclera: Conjunctivae normal.      Pupils: Pupils are equal, round, and reactive to light.   Neck:      Musculoskeletal: Normal range of motion and neck supple.   Cardiovascular:      Rate and Rhythm: Normal rate and regular rhythm.      Heart sounds: No murmur.   Pulmonary:      Effort: No respiratory distress.      Breath sounds: No wheezing or rhonchi.   Abdominal:      General: Bowel sounds are normal.      Palpations: Abdomen is soft.      Tenderness: There is no abdominal tenderness.   Musculoskeletal: Normal range of motion.   Skin:     General: Skin is warm.      Capillary Refill: Capillary refill takes less than 2 seconds.   Neurological:      General: No focal deficit present.      Mental Status: She is alert and oriented to person, place, and time.      Cranial Nerves: No cranial nerve deficit.      Motor: No weakness.   Psychiatric:         Mood and Affect: Mood normal.         Judgment: Judgment normal.      Comments: anxious         Significant Labs:   BMP:   Recent Labs   Lab 08/21/20  0327   *      K 3.6      CO2 28   BUN 34*   CREATININE 1.6*   CALCIUM 9.4     CBC:    Recent Labs   Lab 08/20/20  0333 08/21/20  0327   WBC 17.59* 17.97*   HGB 9.7* 9.6*   HCT 34.0* 32.9*   * 594*       Significant Imaging: I have reviewed all pertinent imaging results/findings within the past 24 hours.      Assessment/Plan:      * Sepsis  This patient does have evidence of infective focus  My overall impression is sepsis.  Antibiotics given-   Antibiotics (From admission, onward)    Start     Stop Route Frequency Ordered    08/21/20 1700  cefTRIAXone (ROCEPHIN) 1 g/50 mL D5W IVPB      -- IV Every 24 hours (non-standard times) 08/21/20 1647          Latest lactate reviewed, they are-  No results for input(s): LACTATE in the last 72 hours.  Organ dysfunction indicated by Acute respiratory failure   But respiratory status stable patient denies any shortness of breath or chest pain  Source- PNA and UTI  Source control Achieved by- Vancomycin and Zosyn  UA reviewed.  CXR reviewed.  Procalcitonin elevated.  Will stop IV fluids  Microbiology Results (last 7 days)     Procedure Component Value Units Date/Time    Culture, Respiratory with Gram Stain [[id number]]  (Abnormal) Collected: 08/18/20 0826    Order Status: Completed Specimen: Respiratory from Bronchial Wash Updated: 08/21/20 1306     Respiratory Culture JAYY ALBICANS  Moderate        JAYY DUBLINIENSIS  Moderate        JAYY GLABRATA  Moderate       Gram Stain (Respiratory) <10 epithelial cells per low power field.     Gram Stain (Respiratory) Rare WBC's     Gram Stain (Respiratory) No organisms seen    AFB Culture & Smear [[id number]] Collected: 08/18/20 0826    Order Status: Completed Specimen: Respiratory from Bronchial Wash Updated: 08/19/20 2127     AFB Culture & Smear Culture in progress     AFB CULTURE STAIN No acid fast bacilli seen.    Blood culture x two cultures. Draw prior to antibiotics. [[id number]] Collected: 08/13/20 1906    Order Status: Completed Specimen: Blood from Antecubital, Right Updated: 08/19/20 1412     Blood  Culture, Routine No growth after 5 days.    Narrative:      Aerobic and anaerobic    Blood culture x two cultures. Draw prior to antibiotics. [[id number]] Collected: 08/13/20 1906    Order Status: Completed Specimen: Blood from Antecubital, Left Updated: 08/19/20 1412     Blood Culture, Routine No growth after 5 days.    Narrative:      Aerobic and anaerobic    Culture, Respiratory with Gram Stain [[id number]] Collected: 08/18/20 0825    Order Status: Canceled Specimen: Respiratory from Bronchial Wash     Urine culture [[id number]]  (Abnormal)  (Susceptibility) Collected: 08/13/20 1931    Order Status: Completed Specimen: Urine Updated: 08/16/20 1256     Urine Culture, Routine KLEBSIELLA PNEUMONIAE  >100,000 cfu/ml      Narrative:      Specimen Source->Urine              Lung cancer  Oncology following      Lung mass  bronch bx/cytology in process   Oncology on board will follow recommendations    Mass of middle lobe of right lung  Small cell ca Consult Oncology for recommendations.      Poor dentition  History noted  Currently on Zosyn due to anaerobic coverage      Chronic respiratory failure with hypoxia  Patient with Hypercapnic and Hypoxic Respiratory failure which is Acute on Chronic.  she is on home oxygen at 2 LPM. Supplemental ventilation was provided and noted-  .   Differential diagnosis includes - COPD and Pneumonia Labs and images were reviewed.  Will treat underlying causes and adjust management of respiratory failure as follows      SOB (shortness of breath)        NOELLE (acute kidney injury)  Likely to be intrinsic injury ATN  Creatinine did not improve IV fluids  Will get urine lytes tomorrow if kidney function does not improve  Patient is eating will hold off on the IV fluids due to lower extremity swelling      Malignant neoplasm of upper lobe of lung  Suspected. New finding on CT chest.   Had a bronchoscopy yesterday.  Biopsy shows Small cell carcinoma  Concerning for mets. Check CT head per pulmonary  recs- CT head negative  Hematology oncology on board  Status post bronch, bx obtained.    Mediastinal lymphadenopathy  Noted on CT chest. Due to lung ca      Moderate malnutrition   Nutrition consulted. Body mass index is 28.68 kg/m².. Encourage maximal PO intake. Diet supplementation ordered per nutrition approval. Will encourage PO and monitor closely for weight changes.        Iron deficiency anemia secondary to inadequate dietary iron intake  Lab Results   Component Value Date    IRON <10 (L) 08/15/2020    TIBC 229 (L) 08/15/2020    FERRITIN 203 08/19/2020       Continue p.o. ferrous sulfate      Opioid dependence  Continue home suboxone.  reviewed.    Hypokalemia   currently controlled. Last electrolytes reviewed-   Recent Labs   Lab 08/20/20  0333 08/21/20  0327   K 3.6 3.6   . Will replace potassium and monitor electrolytes closely. Continuous telemetry.  improved    Acute hypoxemic respiratory failure  Likely 2/2 PNA.  Continue abx and supplemental oxygen.  No significant improvement. Check CT chest- no previous imaging of chest.   CT chest with possible mass, post obs pneumonia. Continue IV abx. Pulmonary consulted      worsening post bronchoscopy currently her resp status is stable.  Patient Oxygen saturation dropped into mid to low 80s upon supine with SpO2 increased to 4L for recovery to 97%        Chronic obstructive pulmonary disease with acute lower respiratory infection  Patient's COPD is controlled currently.  Patient is currently off COPD Pathway. Continue scheduled inhalers Antibiotics and Supplemental oxygen and monitor respiratory status closely.   Procalcitonin elevated.  CXR reviewed.  Continue abx.  Duo nebs.  Consulted pulmonology.     Acute cystitis without hematuria  UA reviewed, follow culture.  Patient started on zosyn in the ED, continue for now.    + Klebsiella pneumonia    Pneumonia due to infectious organism  CXR reviewed.  Switched to ceftriaxone.    Duo nebs q4  hours.  Supplemental oxygen as needed to keep oxygen satruation >90%.  Pulmonary consulted        VTE Risk Mitigation (From admission, onward)         Ordered     enoxaparin injection 40 mg  Every 24 hours      08/13/20 2217     IP VTE HIGH RISK PATIENT  Once      08/13/20 2217     Place sequential compression device  Until discontinued      08/13/20 2217                Discharge Planning   BRANDON: 8/19/2020     Code Status: Full Code   Is the patient medically ready for discharge?: No    Reason for patient still in hospital (select all that apply): Treatment  Discharge Plan A: Home with family, Home Health                  Dorie Starks MD  Department of Hospital Medicine   [facility]

## 2020-08-22 NOTE — ASSESSMENT & PLAN NOTE
currently controlled. Last electrolytes reviewed-   Recent Labs   Lab 08/20/20  0333 08/21/20  0327   K 3.6 3.6   . Will replace potassium and monitor electrolytes closely. Continuous telemetry.  improved

## 2020-08-22 NOTE — CARE UPDATE
08/21/20 2000   Patient Assessment/Suction   Level of Consciousness (AVPU) alert   Respiratory Effort Unlabored   Expansion/Accessory Muscles/Retractions no use of accessory muscles;no retractions   All Lung Fields Breath Sounds coarse   Rhythm/Pattern, Respiratory no shortness of breath reported;depth regular;pattern regular;unlabored   Cough Frequency infrequent   Cough Type good;nonproductive   PRE-TX-O2   O2 Device (Oxygen Therapy) nasal cannula   $ Is the patient on Low Flow Oxygen? Yes   Flow (L/min) 2   SpO2 100 %   Pulse Oximetry Type Intermittent   $ Pulse Oximetry - Multiple Charge Pulse Oximetry - Multiple   Pulse 100   Resp 16   Positioning   Body Position positioned/repositioned independently   Aerosol Therapy   $ Aerosol Therapy Charges Aerosol Treatment   Respiratory Treatment Status (SVN) given   Treatment Route (SVN) mask   Patient Position (SVN) HOB elevated   Post Treatment Assessment (SVN) breath sounds improved   Signs of Intolerance (SVN) none   Wound Care   $ Wound Care Tech Time 15 min   Area of Concern Cheek;Bridge of nose  (pt has duoderm on bridge of nose .)   Skin Color/Characteristics without discoloration   Respiratory Interventions   Cough And Deep Breathing done with encouragement   Airway/Ventilation Management pulmonary hygiene promoted   Breathing Techniques/Airway Clearance deep/controlled cough encouraged   Preset CPAP/BiPAP Settings   Mode Of Delivery Standby

## 2020-08-22 NOTE — SUBJECTIVE & OBJECTIVE
Interval History:  Today patient reports feeling weak.  Apparently sat up with physical therapy today still not tolerating much by mouth.  Case management consult for skilled nursing placement.  White count elevated today.    Review of Systems   Unable to perform ROS: Acuity of condition   Constitutional: Positive for activity change. Negative for appetite change, chills, fatigue and fever.   HENT: Positive for dental problem. Negative for congestion, hearing loss, rhinorrhea, sore throat, trouble swallowing and voice change.    Respiratory: Positive for cough and wheezing. Negative for chest tightness and shortness of breath.    Cardiovascular: Negative for chest pain, palpitations and leg swelling.   Gastrointestinal: Negative for abdominal pain, blood in stool, diarrhea, nausea and vomiting.   Genitourinary: Positive for flank pain. Negative for difficulty urinating, frequency, hematuria and urgency.   Musculoskeletal: Negative for back pain, joint swelling and neck stiffness.   Skin: Negative for pallor and rash.   Neurological: Negative for tremors, seizures, syncope, speech difficulty, weakness, numbness and headaches.   Hematological: Negative for adenopathy.   Psychiatric/Behavioral: Negative for agitation, behavioral problems, confusion and sleep disturbance.     Objective:     Vital Signs (Most Recent):  Temp: 96.5 °F (35.8 °C) (08/22/20 0745)  Pulse: 97 (08/22/20 0745)  Resp: 19 (08/22/20 0745)  BP: (!) 184/94 (08/22/20 0745)  SpO2: (!) 92 % (08/22/20 0745) Vital Signs (24h Range):  Temp:  [96.3 °F (35.7 °C)-98.2 °F (36.8 °C)] 96.5 °F (35.8 °C)  Pulse:  [] 97  Resp:  [13-52] 19  SpO2:  [87 %-100 %] 92 %  BP: (146-184)/(68-98) 184/94     Weight: 82.3 kg (181 lb 8 oz)  Body mass index is 31.15 kg/m².    Intake/Output Summary (Last 24 hours) at 8/22/2020 1120  Last data filed at 8/21/2020 2009  Gross per 24 hour   Intake 610 ml   Output 50 ml   Net 560 ml      Physical Exam  Vitals signs and nursing  note reviewed.   Constitutional:       General: She is not in acute distress.     Appearance: Normal appearance. She is well-developed. She is not diaphoretic.   HENT:      Head: Normocephalic and atraumatic.      Right Ear: External ear normal.      Left Ear: External ear normal.      Nose: Nose normal.      Mouth/Throat:      Mouth: Mucous membranes are moist.      Pharynx: Oropharynx is clear.   Eyes:      Conjunctiva/sclera: Conjunctivae normal.      Pupils: Pupils are equal, round, and reactive to light.   Neck:      Musculoskeletal: Normal range of motion and neck supple.   Cardiovascular:      Rate and Rhythm: Normal rate and regular rhythm.      Heart sounds: No murmur.   Pulmonary:      Effort: No respiratory distress.      Breath sounds: No wheezing or rhonchi.   Abdominal:      General: Bowel sounds are normal.      Palpations: Abdomen is soft.      Tenderness: There is no abdominal tenderness.   Musculoskeletal: Normal range of motion.   Skin:     General: Skin is warm.      Capillary Refill: Capillary refill takes less than 2 seconds.   Neurological:      General: No focal deficit present.      Mental Status: She is alert and oriented to person, place, and time.      Cranial Nerves: No cranial nerve deficit.      Motor: No weakness.   Psychiatric:         Mood and Affect: Mood normal.         Judgment: Judgment normal.      Comments: anxious         Significant Labs:   BMP:   Recent Labs   Lab 08/22/20  0454   *      K 3.6      CO2 29   BUN 33*   CREATININE 1.6*   CALCIUM 9.9     CBC:   Recent Labs   Lab 08/21/20  0327 08/22/20  0454   WBC 17.97* 21.47*   HGB 9.6* 10.5*   HCT 32.9* 35.2*   * 622*       Significant Imaging: I have reviewed all pertinent imaging results/findings within the past 24 hours.

## 2020-08-22 NOTE — ASSESSMENT & PLAN NOTE
CXR reviewed.  Switched to ceftriaxone.    Duo nebs q4 hours.  Supplemental oxygen as needed to keep oxygen satruation >90%.  Pulmonary consulted

## 2020-08-22 NOTE — PLAN OF CARE
08/22/20 0714   Patient Assessment/Suction   Respiratory Effort Unlabored   Expansion/Accessory Muscles/Retractions expansion symmetric;no retractions   All Lung Fields Breath Sounds Anterior:;Lateral:;coarse   Rhythm/Pattern, Respiratory no shortness of breath reported   Cough Frequency infrequent   PRE-TX-O2   O2 Device (Oxygen Therapy) nasal cannula   $ Is the patient on Low Flow Oxygen? Yes   Flow (L/min) 2   SpO2 98 %   Pulse Oximetry Type Intermittent   $ Pulse Oximetry - Multiple Charge Pulse Oximetry - Multiple   Pulse 82   Resp 16   Aerosol Therapy   $ Aerosol Therapy Charges Aerosol Treatment   Respiratory Treatment Status (SVN) given   Treatment Route (SVN) mask   Patient Position (SVN) HOB elevated   Post Treatment Assessment (SVN) breath sounds unchanged   Signs of Intolerance (SVN) none   Breath Sounds Post-Respiratory Treatment   Throughout All Fields Post-Treatment All Fields   Throughout All Fields Post-Treatment no change   Post-treatment Heart Rate (beats/min) 98   Post-treatment Resp Rate (breaths/min) 18

## 2020-08-22 NOTE — ASSESSMENT & PLAN NOTE
Patient with Hypercapnic and Hypoxic Respiratory failure which is Acute on Chronic.  she is on home oxygen at 2 LPM. Supplemental ventilation was provided and noted-  .   Differential diagnosis includes - COPD and Pneumonia Labs and images were reviewed.  Will treat underlying causes and adjust management of respiratory failure as follows

## 2020-08-23 NOTE — CARE UPDATE
08/22/20 2000   Patient Assessment/Suction   Level of Consciousness (AVPU) alert   Respiratory Effort Unlabored   Expansion/Accessory Muscles/Retractions no use of accessory muscles;no retractions   All Lung Fields Breath Sounds diminished   Rhythm/Pattern, Respiratory no shortness of breath reported   Cough Frequency infrequent   Cough Type good;nonproductive   PRE-TX-O2   O2 Device (Oxygen Therapy) nasal cannula   $ Is the patient on Low Flow Oxygen? Yes   Flow (L/min) 2   SpO2 100 %   Pulse Oximetry Type Intermittent   $ Pulse Oximetry - Multiple Charge Pulse Oximetry - Multiple   Pulse 103   Resp 16   Positioning   Body Position positioned/repositioned independently   Aerosol Therapy   $ Aerosol Therapy Charges Aerosol Treatment   Respiratory Treatment Status (SVN) given   Treatment Route (SVN) mask   Patient Position (SVN) HOB elevated   Post Treatment Assessment (SVN) breath sounds unchanged   Signs of Intolerance (SVN) none

## 2020-08-23 NOTE — ASSESSMENT & PLAN NOTE
Suspected. New finding on CT chest.   Had a bronchoscopy yesterday.  Biopsy shows Small cell carcinoma  Concerning for mets. Check CT head per pulmonary recs- CT head negative  Hematology oncology on board  Status post bronch, bx obtained.  Needs SNF for optimization prior to chemo. CM consulted

## 2020-08-23 NOTE — PROGRESS NOTES
[facility]  Hematology/Oncology  Progress Note    Patient Name: Kendra Lawrence  Admission Date: 8/13/2020  Hospital Length of Stay: 10 days  Code Status: Full Code     Subjective:     Interval History:     Awaiting SNF placement   Breathing less labored    Medications:  Continuous Infusions:  Scheduled Meds:   albuterol-ipratropium  3 mL Nebulization Q4H WAKE    buprenorphine-naloxone  1 each Sublingual BID    cefTRIAXone (ROCEPHIN) IVPB  1 g Intravenous Q24H    DULoxetine  60 mg Oral Daily    enoxaparin  40 mg Subcutaneous Q24H    ferrous sulfate  325 mg Oral Daily    ketamine        lidocaine (PF) 10 mg/ml (1%)        lidocaine (PF) 20 mg/mL (2%)        lidocaine (PF)        lidocaine HCl 2%        lidocaine-EPINEPHrine (PF) 1%-1:200,000        methylPREDNISolone sodium succinate  40 mg Intravenous Daily    multivitamin  1 tablet Oral Daily    oxymetazoline        pantoprazole  40 mg Oral Daily    senna-docusate 8.6-50 mg  1 tablet Oral BID     PRN Meds:acetaminophen, albuterol-ipratropium, ALPRAZolam, dextrose 50%, dextrose 50%, glucagon (human recombinant), glucose, glucose, magnesium oxide, magnesium oxide, ondansetron, potassium chloride, potassium chloride, potassium chloride, promethazine (PHENERGAN) IVPB, sodium chloride 0.9%     Review of Systems   Constitutional: Negative for fatigue, fever and unexpected weight change.   HENT: Negative for rhinorrhea and sore throat.    Eyes: Negative for photophobia.   Respiratory: Positive for shortness of breath. Negative for cough.    Cardiovascular: Negative for chest pain and leg swelling.   Gastrointestinal: Negative for abdominal pain, constipation, diarrhea, nausea and vomiting.   Endocrine: Negative for cold intolerance and heat intolerance.   Genitourinary: Negative for dysuria, frequency, hematuria and urgency.   Musculoskeletal: Negative for arthralgias, back pain and neck pain.   Skin: Negative for pallor and rash.    Neurological: Positive for weakness. Negative for numbness and headaches.   Hematological: Negative for adenopathy. Does not bruise/bleed easily.   Psychiatric/Behavioral: Negative for agitation.   All other systems reviewed and are negative.    Objective:     Vital Signs (Most Recent):  Temp: 98.1 °F (36.7 °C) (08/23/20 0342)  Pulse: 92 (08/23/20 0658)  Resp: 20 (08/23/20 0658)  BP: (!) 167/80 (08/23/20 0342)  SpO2: 95 % (08/23/20 0658) Vital Signs (24h Range):  Temp:  [96.7 °F (35.9 °C)-98.1 °F (36.7 °C)] 98.1 °F (36.7 °C)  Pulse:  [] 92  Resp:  [16-20] 20  SpO2:  [95 %-100 %] 95 %  BP: (139-176)/(80-83) 167/80     Weight: 82.4 kg (181 lb 9.6 oz)  Body mass index is 31.17 kg/m².  Body surface area is 1.93 meters squared.      Intake/Output Summary (Last 24 hours) at 8/23/2020 0911  Last data filed at 8/23/2020 0435  Gross per 24 hour   Intake 1380 ml   Output --   Net 1380 ml       Physical Exam  Vitals signs and nursing note reviewed.   Constitutional:       Appearance: She is well-developed.   HENT:      Head: Normocephalic and atraumatic.      Mouth/Throat:      Pharynx: No oropharyngeal exudate.   Eyes:      General: No scleral icterus.     Conjunctiva/sclera: Conjunctivae normal.   Neck:      Musculoskeletal: Normal range of motion and neck supple.      Vascular: No JVD.      Trachea: No tracheal deviation.   Cardiovascular:      Rate and Rhythm: Normal rate and regular rhythm.      Heart sounds: Normal heart sounds. No murmur (2= capillary refill).   Pulmonary:      Effort: Pulmonary effort is normal. No respiratory distress.      Breath sounds: No wheezing.   Abdominal:      General: Bowel sounds are normal. There is no distension.      Palpations: Abdomen is soft.   Musculoskeletal: Normal range of motion.   Skin:     General: Skin is warm and dry.      Findings: No erythema or rash.   Neurological:      Mental Status: She is alert and oriented to person, place, and time.      Cranial Nerves: No  cranial nerve deficit.   Psychiatric:         Thought Content: Thought content normal.         Significant Labs:     Lab Results   Component Value Date    WBC 20.71 (H) 08/23/2020    RBC 3.63 (L) 08/23/2020    HGB 9.9 (L) 08/23/2020    HCT 34.1 (L) 08/23/2020    MCV 94 08/23/2020    MCH 27.3 08/23/2020    MCHC 29.0 (L) 08/23/2020    RDW 13.9 08/23/2020     (H) 08/23/2020    MPV 9.4 08/23/2020    GRAN 16.8 (H) 08/23/2020    GRAN 81.2 (H) 08/23/2020    LYMPH 1.4 08/23/2020    LYMPH 6.5 (L) 08/23/2020    MONO 1.9 (H) 08/23/2020    MONO 9.0 08/23/2020    EOS 0.1 08/23/2020    BASO 0.03 08/23/2020    EOSINOPHIL 0.5 08/23/2020    BASOPHIL 0.1 08/23/2020     CMP  Sodium   Date Value Ref Range Status   08/23/2020 141 136 - 145 mmol/L Final     Potassium   Date Value Ref Range Status   08/23/2020 3.6 3.5 - 5.1 mmol/L Final     Chloride   Date Value Ref Range Status   08/23/2020 100 95 - 110 mmol/L Final     CO2   Date Value Ref Range Status   08/23/2020 29 23 - 29 mmol/L Final     Glucose   Date Value Ref Range Status   08/23/2020 127 (H) 70 - 110 mg/dL Final     BUN, Bld   Date Value Ref Range Status   08/23/2020 28 (H) 8 - 23 mg/dL Final     Creatinine   Date Value Ref Range Status   08/23/2020 1.5 (H) 0.5 - 1.4 mg/dL Final     Calcium   Date Value Ref Range Status   08/23/2020 9.5 8.7 - 10.5 mg/dL Final     Total Protein   Date Value Ref Range Status   08/23/2020 6.1 6.0 - 8.4 g/dL Final     Albumin   Date Value Ref Range Status   08/23/2020 2.2 (L) 3.5 - 5.2 g/dL Final     Total Bilirubin   Date Value Ref Range Status   08/23/2020 0.1 0.1 - 1.0 mg/dL Final     Comment:     For infants and newborns, interpretation of results should be based  on gestational age, weight and in agreement with clinical  observations.  Premature Infant recommended reference ranges:  Up to 24 hours.............<8.0 mg/dL  Up to 48 hours............<12.0 mg/dL  3-5 days..................<15.0 mg/dL  6-29 days.................<15.0 mg/dL        Alkaline Phosphatase   Date Value Ref Range Status   08/23/2020 76 55 - 135 U/L Final     AST   Date Value Ref Range Status   08/23/2020 16 10 - 40 U/L Final     ALT   Date Value Ref Range Status   08/23/2020 40 10 - 44 U/L Final     Anion Gap   Date Value Ref Range Status   08/23/2020 12 8 - 16 mmol/L Final     eGFR if    Date Value Ref Range Status   08/23/2020 42 (A) >60 mL/min/1.73 m^2 Final     eGFR if non    Date Value Ref Range Status   08/23/2020 36 (A) >60 mL/min/1.73 m^2 Final     Comment:     Calculation used to obtain the estimated glomerular filtration  rate (eGFR) is the CKD-EPI equation.            Assessment/Plan:     Active Diagnoses:    Diagnosis Date Noted POA    PRINCIPAL PROBLEM:  Sepsis [A41.9] 08/13/2020 Yes    Lung cancer [C34.90] 08/21/2020 Yes    Lung mass [R91.8]  No    SOB (shortness of breath) [R06.02] 08/18/2020 Yes    Chronic respiratory failure with hypoxia [J96.11] 08/18/2020 Yes    Poor dentition [K08.9]  Yes    Mass of middle lobe of right lung [R91.8]  Yes    Malignant neoplasm of upper lobe of lung [C34.10] 08/17/2020 Yes    NOELLE (acute kidney injury) [N17.9] 08/17/2020 Yes    Mediastinal lymphadenopathy [R59.0] 08/16/2020 Yes    Moderate malnutrition [E44.0] 08/15/2020 No    Opioid dependence [F11.20] 08/14/2020 Yes    Iron deficiency anemia secondary to inadequate dietary iron intake [D50.8] 08/14/2020 Yes    Pneumonia due to infectious organism [J18.9] 08/13/2020 Yes    Acute cystitis without hematuria [N30.00] 08/13/2020 Yes    Chronic obstructive pulmonary disease with acute lower respiratory infection [J44.0] 08/13/2020 Yes    Acute hypoxemic respiratory failure [J96.01] 08/13/2020 Yes    Hypokalemia [E87.6] 08/13/2020 Yes      Problems Resolved During this Admission:    Diagnosis Date Noted Date Resolved POA    Hypocalcemia [E83.51] 08/13/2020 08/21/2020 Yes       SCLC needs chemo as soon as she recovers from  PNA  Explained staging thus far   Needs pet ct as an outpatient   Will follow along closely    Berna Acuña MD  Hematology/Oncology  [facility]

## 2020-08-23 NOTE — SUBJECTIVE & OBJECTIVE
Interval History:  Reports doing well today.  Sitting up in chair.  Tolerating breakfast.  Asked for boost.  Participating with physical therapy.  Modest improvement leukocytosis    Review of Systems   Unable to perform ROS: Acuity of condition   Constitutional: Positive for activity change. Negative for appetite change, chills, fatigue and fever.   HENT: Positive for dental problem. Negative for congestion, hearing loss, rhinorrhea, sore throat, trouble swallowing and voice change.    Respiratory: Positive for cough and wheezing. Negative for chest tightness and shortness of breath.    Cardiovascular: Negative for chest pain, palpitations and leg swelling.   Gastrointestinal: Negative for abdominal pain, blood in stool, diarrhea, nausea and vomiting.   Genitourinary: Positive for flank pain. Negative for difficulty urinating, frequency, hematuria and urgency.   Musculoskeletal: Negative for back pain, joint swelling and neck stiffness.   Skin: Negative for pallor and rash.   Neurological: Negative for tremors, seizures, syncope, speech difficulty, weakness, numbness and headaches.   Hematological: Negative for adenopathy.   Psychiatric/Behavioral: Negative for agitation, behavioral problems, confusion and sleep disturbance.     Objective:     Vital Signs (Most Recent):  Temp: 96.8 °F (36 °C) (08/23/20 1154)  Pulse: 108 (08/23/20 1154)  Resp: 18 (08/23/20 1154)  BP: (!) 181/96 (08/23/20 1154)  SpO2: (!) 89 % (08/23/20 1154) Vital Signs (24h Range):  Temp:  [96.1 °F (35.6 °C)-98.1 °F (36.7 °C)] 96.8 °F (36 °C)  Pulse:  [] 108  Resp:  [16-20] 18  SpO2:  [89 %-100 %] 89 %  BP: (139-181)/(79-96) 181/96     Weight: 82.4 kg (181 lb 9.6 oz)  Body mass index is 31.17 kg/m².    Intake/Output Summary (Last 24 hours) at 8/23/2020 1235  Last data filed at 8/23/2020 0800  Gross per 24 hour   Intake 1860 ml   Output --   Net 1860 ml      Physical Exam  Vitals signs and nursing note reviewed.   Constitutional:       General:  She is not in acute distress.     Appearance: Normal appearance. She is well-developed. She is not diaphoretic.   HENT:      Head: Normocephalic and atraumatic.      Right Ear: External ear normal.      Left Ear: External ear normal.      Nose: Nose normal.      Mouth/Throat:      Mouth: Mucous membranes are moist.      Pharynx: Oropharynx is clear.   Eyes:      Conjunctiva/sclera: Conjunctivae normal.      Pupils: Pupils are equal, round, and reactive to light.   Neck:      Musculoskeletal: Normal range of motion and neck supple.   Cardiovascular:      Rate and Rhythm: Normal rate and regular rhythm.      Heart sounds: No murmur.   Pulmonary:      Effort: No respiratory distress.      Breath sounds: No wheezing or rhonchi.   Abdominal:      General: Bowel sounds are normal.      Palpations: Abdomen is soft.      Tenderness: There is no abdominal tenderness.   Musculoskeletal: Normal range of motion.   Skin:     General: Skin is warm.      Capillary Refill: Capillary refill takes less than 2 seconds.   Neurological:      General: No focal deficit present.      Mental Status: She is alert and oriented to person, place, and time.      Cranial Nerves: No cranial nerve deficit.      Motor: No weakness.   Psychiatric:         Mood and Affect: Mood normal.         Judgment: Judgment normal.      Comments: anxious         Significant Labs:   BMP:   Recent Labs   Lab 08/23/20  0520   *      K 3.6      CO2 29   BUN 28*   CREATININE 1.5*   CALCIUM 9.5     CBC:   Recent Labs   Lab 08/22/20  0454 08/23/20  0520   WBC 21.47* 20.71*   HGB 10.5* 9.9*   HCT 35.2* 34.1*   * 493*       Significant Imaging: I have reviewed all pertinent imaging results/findings within the past 24 hours.

## 2020-08-23 NOTE — NURSING
Notified by Resp Therapist who noticed pt's toes red. Assessed by nurse & found R toes 1-3 notably red, warmer than rest of foot. Denies any pain, numbness, tingling. Good equal Posterior Tibial & dorsalis Pedis pulses both feet. No noted edema R leg.   2664- Notified Dr Pink of R foot issue; received order to closely monitor foot for increasing adverse changes.

## 2020-08-23 NOTE — PROGRESS NOTES
"8/23/2020   Progress Note  PULMONARY    Admit Date: 8/13/2020 08/23/2020      SUBJECTIVE:     Aug 17 ct with mass,    Feels weak, min ambulation, min appetite.    Seems much worse today at bedside    8/18 post bronch distress when coming out sedation.  Now bipap and icu.  8/19 looks much better this am, alert with min sleepiness (more her usual?) - denies sob.  8/20 breathing is good, looking to get out of bed, no c/o  8/21 no new c/o- was up in chair with min assistance.     8/23/2020 - No new complaints and states that she is feeling "pretty good".  No new respiratory complaints.          PFSH and Allergies reviewed.    OBJECTIVE:     Vitals (Most recent):  Vitals:    08/23/20 1525   BP:    Pulse: 102   Resp: 18   Temp:        Vitals (24 hour range):  Temp:  [96.1 °F (35.6 °C)-98.1 °F (36.7 °C)]   Pulse:  []   Resp:  [16-20]   BP: (139-181)/(79-96)   SpO2:  [89 %-100 %]       Intake/Output Summary (Last 24 hours) at 8/23/2020 1715  Last data filed at 8/23/2020 1200  Gross per 24 hour   Intake 2100 ml   Output --   Net 2100 ml          Physical Exam:  The patient's neuro status (alertness,orientation,cognitive function,motor skills,), pharyngeal exam (oral lesions, hygiene, abn dentition,), Neck (jvd,mass,thyroid,nodes in neck and above/below clavicle),RESPIRATORY(symmetry,effort,fremitus,percussion,auscultation),  Cor(rhythm,heart tones including gallops,perfusion,edema)ABD(distention,hepatic&splenomegaly,tenderness,masses), Skin(rash,cyanosis),Psyc(affect,judgement,).  Exam negative except for these pertinent findings:    Decreased bs right, good left, alert,not sluggish, no edema, no asterixis       Radiographs reviewed: view by direct vision -   8/18 cxr increased opacification.    ct with mass rul ant/post and rml,   No results found for this or any previous visit.]    Labs     Recent Labs   Lab 08/23/20  0520   WBC 20.71*   HGB 9.9*   HCT 34.1*   *     Recent Labs   Lab 08/23/20  0520    "   K 3.6      CO2 29   BUN 28*   CREATININE 1.5*   *   CALCIUM 9.5   AST 16   ALT 40   ALKPHOS 76   BILITOT 0.1   PROT 6.1   ALBUMIN 2.2*     No results for input(s): PH, PCO2, PO2, HCO3 in the last 24 hours.  Microbiology Results (last 7 days)     Procedure Component Value Units Date/Time    Culture, Respiratory with Gram Stain [[id number]]  (Abnormal) Collected: 08/18/20 0826    Order Status: Completed Specimen: Respiratory from Bronchial Wash Updated: 08/21/20 1306     Respiratory Culture JAYY ALBICANS  Moderate        JAYY DUBLINIENSIS  Moderate        JAYY GLABRATA  Moderate       Gram Stain (Respiratory) <10 epithelial cells per low power field.     Gram Stain (Respiratory) Rare WBC's     Gram Stain (Respiratory) No organisms seen    AFB Culture & Smear [[id number]] Collected: 08/18/20 0826    Order Status: Completed Specimen: Respiratory from Bronchial Wash Updated: 08/19/20 2127     AFB Culture & Smear Culture in progress     AFB CULTURE STAIN No acid fast bacilli seen.    Blood culture x two cultures. Draw prior to antibiotics. [[id number]] Collected: 08/13/20 1906    Order Status: Completed Specimen: Blood from Antecubital, Right Updated: 08/19/20 1412     Blood Culture, Routine No growth after 5 days.    Narrative:      Aerobic and anaerobic    Blood culture x two cultures. Draw prior to antibiotics. [[id number]] Collected: 08/13/20 1906    Order Status: Completed Specimen: Blood from Antecubital, Left Updated: 08/19/20 1412     Blood Culture, Routine No growth after 5 days.    Narrative:      Aerobic and anaerobic    Culture, Respiratory with Gram Stain [[id number]] Collected: 08/18/20 0825    Order Status: Canceled Specimen: Respiratory from Bronchial Wash           Impression:  Active Hospital Problems    Diagnosis  POA    *Sepsis [A41.9]  Yes    Lung cancer [C34.90]  Yes    Lung mass [R91.8]  No    SOB (shortness of breath) [R06.02]  Yes    Chronic respiratory failure with hypoxia  [J96.11]  Yes    Poor dentition [K08.9]  Yes    Mass of middle lobe of right lung [R91.8]  Yes    Malignant neoplasm of upper lobe of lung [C34.10]  Yes    NOELLE (acute kidney injury) [N17.9]  Yes    Mediastinal lymphadenopathy [R59.0]  Yes    Moderate malnutrition [E44.0]  No    Opioid dependence [F11.20]  Yes    Iron deficiency anemia secondary to inadequate dietary iron intake [D50.8]  Yes    Pneumonia due to infectious organism [J18.9]  Yes    Acute cystitis without hematuria [N30.00]  Yes    Chronic obstructive pulmonary disease with acute lower respiratory infection [J44.0]  Yes    Acute hypoxemic respiratory failure [J96.01]  Yes    Hypokalemia [E87.6]  Yes      Resolved Hospital Problems    Diagnosis Date Resolved POA    Hypocalcemia [E83.51] 08/21/2020 Yes               Plan:     · Continue present treatments  · Seems to be getting better slowly  · No new issues  · Increase activity as able      Gracia Alfaro MD  [facility] Pulmonary/Critical Care

## 2020-08-23 NOTE — PLAN OF CARE
Breath sounds improved.  O2 2LPM NC continues with resp tx Q4 hours.  IV antibiotics continue.  TB test to be read Sunday.

## 2020-08-23 NOTE — PT/OT/SLP PROGRESS
Physical Therapy Treatment    Patient Name:  Kendra Lawrence   MRN:  [id number]    Recommendations:     Discharge Recommendations:  nursing facility, skilled   Discharge Equipment Recommendations: walker, rolling   Barriers to discharge: Decreased caregiver support    Assessment:     Kendra Lawrence is a 64 y.o. female admitted with a medical diagnosis of Sepsis.  She presents with the following impairments/functional limitations:  weakness, impaired endurance, impaired self care skills, impaired functional mobilty, gait instability, impaired cardiopulmonary response to activity . Pt requiring encouragement to mobilize- pt ambulated 50 ft with RW with O2 at 2 liters 87% with recovery 92%. Pt with slow lance and difficulty with turns. .Good support from daughter    Rehab Prognosis: Fair; patient would benefit from acute skilled PT services to address these deficits and reach maximum level of function.    Recent Surgery: Procedure(s) (LRB):  Bronchoscopy- 730 or noon, floro not needed (N/A) 5 Days Post-Op    Plan:     During this hospitalization, patient to be seen daily to address the identified rehab impairments via gait training, therapeutic activities, therapeutic exercises and progress toward the following goals:    · Plan of Care Expires:  09/14/20    Subjective     Chief Complaint: tired  Patient/Family Comments/goals: get well  Pain/Comfort:  · Pain Rating 1: 0/10      Objective:     Communicated with nurse Katz prior to session.  Patient found HOB elevated with telemetry, oxygen upon PT entry to room.     General Precautions: Standard, fall, respiratory   Orthopedic Precautions:N/A   Braces: N/A     Functional Mobility:  · Bed Mobility:     · Rolling Right: supervision  · Scooting: contact guard assistance  · Supine to Sit: contact guard assistance  · Transfers:     · Sit to Stand:  contact guard assistance with rolling walker  · Bed to Chair: contact guard assistance with  rolling walker  using  Stand  Pivot  · Gait: 50ft with RW min assist with O2 at 2 liters- slow lance, assist with turns      AM-PAC 6 CLICK MOBILITY          Therapeutic Activities and Exercises:  Patient was educated on the importance of OOB activity and functional mobility to negate negative effects of prolonged bed rest during hospitalization, safe transfers and ambulation, and D/C planning      OOB to chair and encouraged ankle pumping and LAQ x 100 reps, 10 reps q hour  Daughter present    Patient left up in chair with all lines intact, call button in reach and nurse sonya and daughter present..    GOALS:   Multidisciplinary Problems     Physical Therapy Goals        Problem: Physical Therapy Goal    Goal Priority Disciplines Outcome Goal Variances Interventions   Physical Therapy Goal     PT, PT/OT Ongoing, Progressing     Description: Goals to be met by: 2020     Patient will increase functional independence with mobility by performin. Supine to sit with Supervision  2. Sit to stand transfer with Supervision  3. Bed to chair transfer with Supervision using LRAD  4. Gait  x 150 feet with Supervision using LRAD  5. Lower extremity exercise program x20 reps per handout, with independence                     Time Tracking:     PT Received On: 20  PT Start Time: 1113     PT Stop Time: 1133  PT Total Time (min): 20 min     Billable Minutes: Gait Training 20    Treatment Type: Treatment  PT/PTA: PT     PTA Visit Number: 0     Fany Fraser, PT  2020

## 2020-08-23 NOTE — PLAN OF CARE
Problem: Physical Therapy Goal  Goal: Physical Therapy Goal  Description: Goals to be met by: 2020     Patient will increase functional independence with mobility by performin. Supine to sit with Supervision  2. Sit to stand transfer with Supervision  3. Bed to chair transfer with Supervision using LRAD  4. Gait  x 150 feet with Supervision using LRAD  5. Lower extremity exercise program x20 reps per handout, with independence    Outcome: Ongoing, Progressing   Pt ambulated 50ft with RW O2 at 2 liters SAT 87% with recovery 93%. OOB to chair- daughter present

## 2020-08-23 NOTE — PROGRESS NOTES
[facility] Hospital Medicine  Progress Note    Patient Name: Kendra Lawrence  MRN: [id number]  Patient Class: IP- Inpatient   Admission Date: 8/13/2020  Length of Stay: 10 days  Attending Physician: Jaylan Pink,*  Primary Care Provider: Louie Urena MD        Subjective:     Principal Problem:Sepsis        HPI:  Kendra Lawrence is a 64 y.o. female with a PMHx of COPD, opioid dependence, HTN, CAD, and GERD who presents to the ED with complaints of SOB x10 days. The patient reports SOB that has been constant and became progressively worse over the last 2 days. She reports exerting herself makes it worse and nothing makes it better. She endorses associated fatigue, chills, and right chest wall tightness. The patient denies any fever, CP, cough, congestion, abdominal pain, vomiting, diarrhea, dizziness, weakness, or dysuria. The patient states that she currently smokes e-cigarettes. She denies any known sick contacts. Her ED work up is significant for tachycardia, fever, tachypnea, hypoxia, leukocytosis, nitrite positive UTI, and CXR revealing dense consolidation within the right mid to lower lung consistent with pneumonia. Sepsis protocol was initiated in the ED and the patient received IV fluids, vancomycin, and zosyn. The patient will be admitted under hospital medicine for further work up and evaluation.     Overview/Hospital Course:  Patient monitored closely during hospitalization.  She was was admitted with pneumonia and COPD exacerbation. She was  initiated on COPD pathway including scheduled DuoNebs, IV antibiotics and steroids.  She was noted to have UTI + Abnormal   KLEBSIELLA PNEUMONIAE. Pulmonology consulted and steroids were discontinued.  Patient with oxygen requirement.  She was noted to have worsening of respiratory status with desaturation of her to 97% on oxygen.  CT of the chest ordered and demonstrated Findings in the right middle lobe concerning for neoplasm, invading  the mediastinum.  Masslike pneumonia is included in the differential but less likely.  Probable postobstructive changes in the right upper and lower lobes Enlarged subcarinal lymph node, concerning for a metastatic node. Moderate, loculated-appearing right pleural effusion.  She was recommended for bronchoscopy p.r.n. she is status post bronch per Dr. Brothers on 08/18/2020.  Postprocedure she was noted to have acute respiratory failure requiring BiPAP.  She was transferred to ICU for closer monitoring.    Interval History:  Reports doing well today.  Sitting up in chair.  Tolerating breakfast.  Asked for boost.  Participating with physical therapy.  Modest improvement leukocytosis    Review of Systems   Unable to perform ROS: Acuity of condition   Constitutional: Positive for activity change. Negative for appetite change, chills, fatigue and fever.   HENT: Positive for dental problem. Negative for congestion, hearing loss, rhinorrhea, sore throat, trouble swallowing and voice change.    Respiratory: Positive for cough and wheezing. Negative for chest tightness and shortness of breath.    Cardiovascular: Negative for chest pain, palpitations and leg swelling.   Gastrointestinal: Negative for abdominal pain, blood in stool, diarrhea, nausea and vomiting.   Genitourinary: Positive for flank pain. Negative for difficulty urinating, frequency, hematuria and urgency.   Musculoskeletal: Negative for back pain, joint swelling and neck stiffness.   Skin: Negative for pallor and rash.   Neurological: Negative for tremors, seizures, syncope, speech difficulty, weakness, numbness and headaches.   Hematological: Negative for adenopathy.   Psychiatric/Behavioral: Negative for agitation, behavioral problems, confusion and sleep disturbance.     Objective:     Vital Signs (Most Recent):  Temp: 96.8 °F (36 °C) (08/23/20 1154)  Pulse: 108 (08/23/20 1154)  Resp: 18 (08/23/20 1154)  BP: (!) 181/96 (08/23/20 1154)  SpO2: (!) 89 % (08/23/20  1154) Vital Signs (24h Range):  Temp:  [96.1 °F (35.6 °C)-98.1 °F (36.7 °C)] 96.8 °F (36 °C)  Pulse:  [] 108  Resp:  [16-20] 18  SpO2:  [89 %-100 %] 89 %  BP: (139-181)/(79-96) 181/96     Weight: 82.4 kg (181 lb 9.6 oz)  Body mass index is 31.17 kg/m².    Intake/Output Summary (Last 24 hours) at 8/23/2020 1235  Last data filed at 8/23/2020 0800  Gross per 24 hour   Intake 1860 ml   Output --   Net 1860 ml      Physical Exam  Vitals signs and nursing note reviewed.   Constitutional:       General: She is not in acute distress.     Appearance: Normal appearance. She is well-developed. She is not diaphoretic.   HENT:      Head: Normocephalic and atraumatic.      Right Ear: External ear normal.      Left Ear: External ear normal.      Nose: Nose normal.      Mouth/Throat:      Mouth: Mucous membranes are moist.      Pharynx: Oropharynx is clear.   Eyes:      Conjunctiva/sclera: Conjunctivae normal.      Pupils: Pupils are equal, round, and reactive to light.   Neck:      Musculoskeletal: Normal range of motion and neck supple.   Cardiovascular:      Rate and Rhythm: Normal rate and regular rhythm.      Heart sounds: No murmur.   Pulmonary:      Effort: No respiratory distress.      Breath sounds: No wheezing or rhonchi.   Abdominal:      General: Bowel sounds are normal.      Palpations: Abdomen is soft.      Tenderness: There is no abdominal tenderness.   Musculoskeletal: Normal range of motion.   Skin:     General: Skin is warm.      Capillary Refill: Capillary refill takes less than 2 seconds.   Neurological:      General: No focal deficit present.      Mental Status: She is alert and oriented to person, place, and time.      Cranial Nerves: No cranial nerve deficit.      Motor: No weakness.   Psychiatric:         Mood and Affect: Mood normal.         Judgment: Judgment normal.      Comments: anxious         Significant Labs:   BMP:   Recent Labs   Lab 08/23/20  0520   *      K 3.6      CO2  29   BUN 28*   CREATININE 1.5*   CALCIUM 9.5     CBC:   Recent Labs   Lab 08/22/20  0454 08/23/20  0520   WBC 21.47* 20.71*   HGB 10.5* 9.9*   HCT 35.2* 34.1*   * 493*       Significant Imaging: I have reviewed all pertinent imaging results/findings within the past 24 hours.      Assessment/Plan:      * Sepsis  This patient does have evidence of infective focus  My overall impression is sepsis.  Antibiotics given-   Antibiotics (From admission, onward)    Start     Stop Route Frequency Ordered    08/21/20 1700  cefTRIAXone (ROCEPHIN) 1 g/50 mL D5W IVPB      -- IV Every 24 hours (non-standard times) 08/21/20 1647          Latest lactate reviewed, they are-  No results for input(s): LACTATE in the last 72 hours.  Organ dysfunction indicated by Acute respiratory failure   But respiratory status stable patient denies any shortness of breath or chest pain  Source- PNA and UTI  Source control Achieved by- Vancomycin and Zosyn  UA reviewed.  CXR reviewed.  Procalcitonin elevated.  Will stop IV fluids  Microbiology Results (last 7 days)     Procedure Component Value Units Date/Time    Culture, Respiratory with Gram Stain [[id number]]  (Abnormal) Collected: 08/18/20 0826    Order Status: Completed Specimen: Respiratory from Bronchial Wash Updated: 08/21/20 1306     Respiratory Culture JAYY ALBICANS  Moderate        JAYY DUBLINIENSIS  Moderate        JAYY GLABRATA  Moderate       Gram Stain (Respiratory) <10 epithelial cells per low power field.     Gram Stain (Respiratory) Rare WBC's     Gram Stain (Respiratory) No organisms seen    AFB Culture & Smear [[id number]] Collected: 08/18/20 0826    Order Status: Completed Specimen: Respiratory from Bronchial Wash Updated: 08/19/20 2127     AFB Culture & Smear Culture in progress     AFB CULTURE STAIN No acid fast bacilli seen.    Blood culture x two cultures. Draw prior to antibiotics. [[id number]] Collected: 08/13/20 1906    Order Status: Completed Specimen: Blood  from Antecubital, Right Updated: 08/19/20 1412     Blood Culture, Routine No growth after 5 days.    Narrative:      Aerobic and anaerobic    Blood culture x two cultures. Draw prior to antibiotics. [[id number]] Collected: 08/13/20 1906    Order Status: Completed Specimen: Blood from Antecubital, Left Updated: 08/19/20 1412     Blood Culture, Routine No growth after 5 days.    Narrative:      Aerobic and anaerobic    Culture, Respiratory with Gram Stain [[id number]] Collected: 08/18/20 0825    Order Status: Canceled Specimen: Respiratory from Bronchial Wash     Urine culture [[id number]]  (Abnormal)  (Susceptibility) Collected: 08/13/20 1931    Order Status: Completed Specimen: Urine Updated: 08/16/20 1256     Urine Culture, Routine KLEBSIELLA PNEUMONIAE  >100,000 cfu/ml      Narrative:      Specimen Source->Urine              Lung cancer  Oncology following      Lung mass  bronch bx/cytology in process   Oncology on board will follow recommendations    Mass of middle lobe of right lung  Small cell ca Consult Oncology for recommendations.      Poor dentition  History noted  Currently on Zosyn due to anaerobic coverage      Chronic respiratory failure with hypoxia  Patient with Hypercapnic and Hypoxic Respiratory failure which is Acute on Chronic.  she is on home oxygen at 2 LPM. Supplemental ventilation was provided and noted-  .   Differential diagnosis includes - COPD and Pneumonia Labs and images were reviewed.  Will treat underlying causes and adjust management of respiratory failure as follows      SOB (shortness of breath)        NOELLE (acute kidney injury)  Likely to be intrinsic injury ATN  Creatinine did not improve IV fluids  Will get urine lytes tomorrow if kidney function does not improve  Patient is eating will hold off on the IV fluids due to lower extremity swelling      Malignant neoplasm of upper lobe of lung  Suspected. New finding on CT chest.   Had a bronchoscopy yesterday.  Biopsy shows Small cell  carcinoma  Concerning for mets. Check CT head per pulmonary recs- CT head negative  Hematology oncology on board  Status post bronch, bx obtained.  Needs SNF for optimization prior to chemo. CM consulted    Mediastinal lymphadenopathy  Noted on CT chest. Due to lung ca      Moderate malnutrition   Nutrition consulted. Body mass index is 28.68 kg/m².. Encourage maximal PO intake. Diet supplementation ordered per nutrition approval. Will encourage PO and monitor closely for weight changes.        Iron deficiency anemia secondary to inadequate dietary iron intake  Lab Results   Component Value Date    IRON <10 (L) 08/15/2020    TIBC 229 (L) 08/15/2020    FERRITIN 203 08/19/2020       Continue p.o. ferrous sulfate      Opioid dependence  Continue home suboxone.  reviewed.    Hypokalemia   currently controlled. Last electrolytes reviewed-   Recent Labs   Lab 08/20/20  0333 08/21/20  0327   K 3.6 3.6   . Will replace potassium and monitor electrolytes closely. Continuous telemetry.  improved    Acute hypoxemic respiratory failure  Likely 2/2 PNA.  Continue abx and supplemental oxygen.  No significant improvement. Check CT chest- no previous imaging of chest.   CT chest with possible mass, post obs pneumonia. Continue IV abx. Pulmonary consulted      worsening post bronchoscopy currently her resp status is stable.  Patient Oxygen saturation dropped into mid to low 80s upon supine with SpO2 increased to 4L for recovery to 97%        Chronic obstructive pulmonary disease with acute lower respiratory infection  Patient's COPD is controlled currently.  Patient is currently off COPD Pathway. Continue scheduled inhalers Antibiotics and Supplemental oxygen and monitor respiratory status closely.   Procalcitonin elevated.  CXR reviewed.  Continue abx.  Duo nebs.  Consulted pulmonology.     Acute cystitis without hematuria  UA reviewed, follow culture.  Patient started on zosyn in the ED, continue for now.    + Klebsiella  pneumonia    Pneumonia due to infectious organism  CXR reviewed.  Switched to ceftriaxone.    Duo nebs q4 hours.  Supplemental oxygen as needed to keep oxygen satruation >90%.  Pulmonary consulted        VTE Risk Mitigation (From admission, onward)         Ordered     enoxaparin injection 40 mg  Every 24 hours      08/13/20 2217     IP VTE HIGH RISK PATIENT  Once      08/13/20 2217     Place sequential compression device  Until discontinued      08/13/20 2217                Discharge Planning   BRANDON: 8/19/2020     Code Status: Full Code   Is the patient medically ready for discharge?: No    Reason for patient still in hospital (select all that apply): Treatment, Consult recommendations and PT / OT recommendations  Discharge Plan A: Home with family, Home Health                  Jaylan Pink MD  Department of Hospital Medicine   [facility]

## 2020-08-23 NOTE — PLAN OF CARE
08/23/20 0658   Patient Assessment/Suction   Respiratory Effort Unlabored   Expansion/Accessory Muscles/Retractions no retractions;expansion symmetric   All Lung Fields Breath Sounds Anterior:;Lateral:;diminished   Rhythm/Pattern, Respiratory unlabored   Cough Frequency infrequent   Cough Type good   PRE-TX-O2   O2 Device (Oxygen Therapy) nasal cannula w/ humidification   $ Is the patient on Low Flow Oxygen? Yes   Flow (L/min) 2   SpO2 95 %   Pulse Oximetry Type Intermittent   $ Pulse Oximetry - Multiple Charge Pulse Oximetry - Multiple   Pulse 92   Resp 20   Aerosol Therapy   $ Aerosol Therapy Charges Aerosol Treatment   Respiratory Treatment Status (SVN) given   Treatment Route (SVN) mask   Patient Position (SVN) HOB elevated   Post Treatment Assessment (SVN) breath sounds unchanged   Signs of Intolerance (SVN) none   Breath Sounds Post-Respiratory Treatment   Throughout All Fields Post-Treatment All Fields   Throughout All Fields Post-Treatment no change   Post-treatment Heart Rate (beats/min) 102   Post-treatment Resp Rate (breaths/min) 18

## 2020-08-24 PROBLEM — E87.6 HYPOKALEMIA: Status: RESOLVED | Noted: 2020-01-01 | Resolved: 2020-01-01

## 2020-08-24 PROBLEM — C34.91 SMALL CELL LUNG CANCER, RIGHT: Status: ACTIVE | Noted: 2020-01-01

## 2020-08-24 PROBLEM — J96.22 ACUTE ON CHRONIC RESPIRATORY FAILURE WITH HYPOXIA AND HYPERCAPNIA: Status: ACTIVE | Noted: 2020-01-01

## 2020-08-24 PROBLEM — N17.9 AKI (ACUTE KIDNEY INJURY): Status: RESOLVED | Noted: 2020-01-01 | Resolved: 2020-01-01

## 2020-08-24 NOTE — PROGRESS NOTES
"[facility]  Adult Nutrition  Progress Note    SUMMARY     Intervention: texture modified diet and nutrition supplement therapy-commercial beverage     Recommendation:    1) Continue mechanical soft, thin liquid diet  2) Continues boost plus 3-4 x daily per pt preference , encourage intakes  3) Weigh pt weekly, replete iron and phos     Goals: 1) PO intakes > 50% of meals and supplements in < 48 hours or nutrition support initiated 2) PO intakes 75% of EEN at f/u  Nutrition Goal Status: met/ new  Communication of RD Recs: POC, sticky note     Reason for Assessment     Reason For Assessment: follow up  Diagnosis: (sepsis)  Relevant Medical History: COPD, opioid abuse, CAD, GERD  Interdisciplinary Rounds: did not attend     General Information Comments: 63 y/o female admitted with COPD exacerbation, pneumonia, and UTI. Lung mass, possible CA as well. Poor appetite x 7-10 days PTA, 0% intake of solids on cardiac diet 8/13-18, 0-25% full liquids/clears 8/18-today says she drank 2 boost plus yesterday. 0% boost today and only ate a few bites of grits and mashed potatoes per RN. Tells RN she, "has no interest in food." @ visit claimed to have been mildly nauseous earlier today. NFPE done 8/21/20, no wasting seen. Wt gain per chart review.  8/24/20 Pt eating with variable appetite most 25-50% and 3 boost daily ( asking for 4). + nasal and NOELLE, K and Phos WNL. Nausea with smell of food.Noted toes red and R. Leg edema.     Nutrition Discharge Planning: to be determined-Cardiac diet, texture per SLP + boost plus as needed     Nutrition Risk Screen     Nutrition Risk Screen: no indicators present     Nutrition/Diet History     Patient Reported Diet/Restrictions/Preferences: general  Typical Food/Fluid Intake: eating well PTA prior to feeling SOB  Spiritual, Cultural Beliefs, Quaker Practices, Values that Affect Care: no  Food Allergies: NKFA  Factors Affecting Nutritional Intake: nausea, decreased " "appetite     Anthropometrics  Height Method: Measured(office 3/6/20)  Height: 5' 4"  Height (inches): 64 in  Weight Method: Bed Scale  Weight: 81.4 kg , 75.8 kg admit)  Weight (lb): 179 lb ( edema noted, wt gain)  Ideal Body Weight (IBW), Female: 120 lb  % Ideal Body Weight, Female (lb): 139.26 %  BMI (Calculated): 28.7 admission  BMI Grade: 25 - 29.9 - overweight  Usual Body Weight (UBW), k.4 kg(19)     Lab/Procedures/Meds     Pertinent Labs Reviewed: reviewed  BMP  Lab Results   Component Value Date     2020    K 3.7 2020    CL 98 2020    CO2 32 (H) 2020    BUN 27 (H) 2020    CREATININE 1.3 2020    CALCIUM 9.5 2020    ANIONGAP 11 2020    ESTGFRAFRICA 49 (A) 2020    EGFRNONAA 43 (A) 2020     Lab Results   Component Value Date    CALCIUM 9.5 2020    PHOS 2.6 (L) 2020     Lab Results   Component Value Date    ALBUMIN 2.2 (L) 2020   No results found for: POCTGLUCOSE    Pertinent Medications Reviewed: reviewed  Zofran, KCl, phenergan, iron, MVI, prednisone, senna     Estimated/Assessed Needs   admission  Weight Used For Calorie Calculations: 75.8 kg (167 lb 1.7 oz)  Energy Calorie Requirements (kcal): MSJ ( x 1.25 critical care) = 1605 kcal  Energy Need Method: Tomer-St Muller  Protein Requirements: 1.2 g protein/kg ( critical care) = 91 g protein  Weight Used For Protein Calculations: 75.8 kg (167 lb 1.7 oz)  Fluid Requirements (mL): 1600 ml or per MD  Estimated Fluid Requirement Method: RDA Method  CHO Requirement: N/A        Nutrition Prescription Ordered     Current Diet Order: Mechanical soft diet + boost plus TID     Evaluation of Received Nutrient/Fluid Intake     Energy Calories Required: meeting needs  Protein Required: meeting needs  Fluid Required:  meeting needs  Tolerance: tolerating  % Intake of Estimated Energy Needs: most %  % Meal Intake: 25-75% + 3 boost     Nutrition Risk     Level of " Risk/Frequency of Follow-up: moderate 2 x weekly     Assessment and Plan     Moderate malnutrition  Contributing Nutrition Diagnosis  Moderate acute illness related malnutrition  Related to (etiology):   SOB, nausea, decreased appetite     Signs and Symptoms (as evidenced by):   1) PO intakes < 50% EEN x 15-18 days  2) mild-moderate edema     Interventions:  above     Nutrition Diagnosis Status:   improving              Monitor and Evaluation     Food and Nutrient Intake: energy intake, food and beverage intake  Food and Nutrient Adminstration: diet order  Anthropometric Measurements: weight  Biochemical Data, Medical Tests and Procedures: electrolyte and renal panel, gastrointestinal profile  Nutrition-Focused Physical Findings: overall appearance      Malnutrition Assessment  Malnutrition Type: acute illness or injury  Skin (Micronutrient): (Arcadio = 19)  Teeth (Micronutrient): (poor dentition noted)   Micronutrient Evaluation: suspected deficiency  Micronutrient Evaluation Comments: iron   Energy Intake (Malnutrition): less than or equal to 50% for greater than or equal to 5 days           Edema (Fluid Accumulation): (2+)   Subcutaneous Fat Loss (Final Summary): well nourished  Muscle Loss Evaluation (Final Summary): well nourished       Nutrition Follow-Up     RD Follow-up?: Yes

## 2020-08-24 NOTE — NURSING
/139. Dr Latham notified, PRN hydralazine pushed. Rechecked BP - 178/85. Will continue to monitor.

## 2020-08-24 NOTE — PLAN OF CARE
Problem: Physical Therapy Goal  Goal: Physical Therapy Goal  Description: Goals to be met by: 2020     Patient will increase functional independence with mobility by performin. Supine to sit with Supervision  2. Sit to stand transfer with Supervision  3. Bed to chair transfer with Supervision using LRAD  4. Gait  x 150 feet with Supervision using LRAD  5. Lower extremity exercise program x20 reps per handout, with independence    Outcome: Ongoing, Progressing   Ambulated with rw and assistance for safety with O2 attached.

## 2020-08-24 NOTE — PLAN OF CARE
LOCET/142 called in to [facility] [phone].       08/24/20 1220   Post-Acute Status   Post-Acute Authorization Placement   Post-Acute Placement Status Pending State Certification

## 2020-08-24 NOTE — RESPIRATORY THERAPY
08/24/20 0720   Patient Assessment/Suction   Level of Consciousness (AVPU) alert   All Lung Fields Breath Sounds coarse;diminished   PRE-TX-O2   O2 Device (Oxygen Therapy) nasal cannula   $ Is the patient on Low Flow Oxygen? Yes   Flow (L/min) 2   Oxygen Concentration (%) 28   SpO2 (!) 93 %   Pulse Oximetry Type Intermittent   $ Pulse Oximetry - Multiple Charge Pulse Oximetry - Multiple   Pulse 89   Resp 14   Aerosol Therapy   $ Aerosol Therapy Charges Aerosol Treatment   Respiratory Treatment Status (SVN) given   Treatment Route (SVN) mask;oxygen   Patient Position (SVN) semi-Hall's   Post Treatment Assessment (SVN) breath sounds unchanged   Signs of Intolerance (SVN) none   Breath Sounds Post-Respiratory Treatment   Throughout All Fields Post-Treatment aeration increased   Post-treatment Heart Rate (beats/min) 87   Post-treatment Resp Rate (breaths/min) 14   Ready to Wean/Extubation Screen   FIO2<=50 (chart decimal) 0.28

## 2020-08-24 NOTE — PT/OT/SLP PROGRESS
Physical Therapy Treatment    Patient Name:  Kendra Lawrence   MRN:  [id number]    Recommendations:     Discharge Recommendations:  nursing facility, skilled   Discharge Equipment Recommendations: walker, rolling   Barriers to discharge: None    Assessment:     Kendra Lawrence is a 64 y.o. female admitted with a medical diagnosis of Sepsis.  She presents with the following impairments/functional limitations:  weakness, impaired endurance, impaired self care skills, impaired functional mobilty, gait instability, decreased safety awareness, impaired cardiopulmonary response to activity . Tolerated treatment. Ambulated with rw and Min A with O2 attached.     Rehab Prognosis: Fair; patient would benefit from acute skilled PT services to address these deficits and reach maximum level of function.    Recent Surgery: Procedure(s) (LRB):  Bronchoscopy- 730 or noon, floro not needed (N/A) 6 Days Post-Op    Plan:     During this hospitalization, patient to be seen daily to address the identified rehab impairments via gait training, therapeutic activities, therapeutic exercises and progress toward the following goals:    · Plan of Care Expires:  09/14/20    Subjective     Chief Complaint: none stated  Patient/Family Comments/goals: none stated  Pain/Comfort:  · Pain Rating 1: 0/10      Objective:     Communicated with nurse Mcbride prior to session.  Patient found supine with bed alarm, oxygen, telemetry upon PT entry to room.     General Precautions: Standard, fall, respiratory   Orthopedic Precautions:N/A   Braces: N/A     Functional Mobility:  · Bed Mobility:     · Rolling Right: contact guard assistance  · Supine to Sit: minimum assistance  · Transfers:     · Sit to Stand:  contact guard assistance with rolling walker  · Gait: 50' with rw and Min A with O2 attached.       AM-PAC 6 CLICK MOBILITY          Therapeutic Activities and Exercises:   Ambulated in hallway with rw and assistance.   Sit to stand with rw and CGA to remove  clothing ( preparing for bath) with PCT's present.    Patient left up in chair with all lines intact, call button in reach, chair alarm on, nurse Rae notified and PCT's present..    GOALS:   Multidisciplinary Problems     Physical Therapy Goals        Problem: Physical Therapy Goal    Goal Priority Disciplines Outcome Goal Variances Interventions   Physical Therapy Goal     PT, PT/OT Ongoing, Progressing     Description: Goals to be met by: 2020     Patient will increase functional independence with mobility by performin. Supine to sit with Supervision  2. Sit to stand transfer with Supervision  3. Bed to chair transfer with Supervision using LRAD  4. Gait  x 150 feet with Supervision using LRAD  5. Lower extremity exercise program x20 reps per handout, with independence                     Time Tracking:     PT Received On: 20  PT Start Time: 0855     PT Stop Time: 0910  PT Total Time (min): 15 min     Billable Minutes: Gait Training 15min    Treatment Type: Treatment  PT/PTA: PTA     PTA Visit Number: 1     Minerva Jean Pierre, PTA  2020

## 2020-08-24 NOTE — PT/OT/SLP PROGRESS
Speech Language Pathology      Kendra Lawrence  MRN: [id number]    Patient not seen today secondary to x2 attempts. This AM (8:34) pt refused breakfast 2' nausea and diarrhea. Returned for lunch. Pt sitting EOB; appears SOB and anxious. O2 sats 93%. Instructed pt/daugther not to eat until respiratory status improves due to aspriaon risk. Notified nurse via secure chat. Will follow-up 8/25/2020.    Kaley Clark, CCC-SLP

## 2020-08-24 NOTE — PROGRESS NOTES
8/24/2020   Progress Note  PULMONARY    Admit Date: 8/13/2020 08/24/2020      SUBJECTIVE:     Aug 17 ct with mass,    Feels weak, min ambulation, min appetite.    Seems much worse today at bedside    8/18 post bronch distress when coming out sedation.  Now bipap and icu.  8/19 looks much better this am, alert with min sleepiness (more her usual?) - denies sob.  8/20 breathing is good, looking to get out of bed, no c/o  8/21 no new c/o- was up in chair with min assistance.   8/24 air hungry climbing out of bed,           PFSH and Allergies reviewed.    OBJECTIVE:     Vitals (Most recent):  Vitals:    08/24/20 0841   BP: (!) 199/87   Pulse: 104   Resp: 16   Temp: 96.2 °F (35.7 °C)       Vitals (24 hour range):  Temp:  [96.1 °F (35.6 °C)-97.6 °F (36.4 °C)]   Pulse:  []   Resp:  [14-20]   BP: (139-199)/(74-96)   SpO2:  [89 %-99 %]       Intake/Output Summary (Last 24 hours) at 8/24/2020 0848  Last data filed at 8/24/2020 0521  Gross per 24 hour   Intake 770 ml   Output 200 ml   Net 570 ml          Physical Exam:  The patient's neuro status (alertness,orientation,cognitive function,motor skills,), pharyngeal exam (oral lesions, hygiene, abn dentition,), Neck (jvd,mass,thyroid,nodes in neck and above/below clavicle),RESPIRATORY(symmetry,effort,fremitus,percussion,auscultation),  Cor(rhythm,heart tones including gallops,perfusion,edema)ABD(distention,hepatic&splenomegaly,tenderness,masses), Skin(rash,cyanosis),Psyc(affect,judgement,).  Exam negative except for these pertinent findings:    aggitiated ,climbing out of bad, c/o cannot breathe abrupt onset.       Radiographs reviewed: view by direct vision -   8/18 cxr increased opacification.    ct with mass rul ant/post and rml,   No results found for this or any previous visit.]    Labs     No results for input(s): WBC, HGB, HCT, PLT, BAND, METAMYELOCYT, MYELOPCT, HGBA1C in the last 24 hours.  Recent Labs   Lab 08/24/20  0502      K 3.7   CL 98   CO2 32*   BUN  27*   CREATININE 1.3   *   CALCIUM 9.5   AST 14   ALT 36   ALKPHOS 74   BILITOT 0.2   PROT 6.1   ALBUMIN 2.2*     No results for input(s): PH, PCO2, PO2, HCO3 in the last 24 hours.  Microbiology Results (last 7 days)     Procedure Component Value Units Date/Time    Culture, Respiratory with Gram Stain [[id number]]  (Abnormal) Collected: 08/18/20 0826    Order Status: Completed Specimen: Respiratory from Bronchial Wash Updated: 08/21/20 1306     Respiratory Culture JAYY ALBICANS  Moderate        JAYY DUBLINIENSIS  Moderate        JAYY GLABRATA  Moderate       Gram Stain (Respiratory) <10 epithelial cells per low power field.     Gram Stain (Respiratory) Rare WBC's     Gram Stain (Respiratory) No organisms seen    AFB Culture & Smear [[id number]] Collected: 08/18/20 0826    Order Status: Completed Specimen: Respiratory from Bronchial Wash Updated: 08/19/20 2127     AFB Culture & Smear Culture in progress     AFB CULTURE STAIN No acid fast bacilli seen.    Blood culture x two cultures. Draw prior to antibiotics. [[id number]] Collected: 08/13/20 1906    Order Status: Completed Specimen: Blood from Antecubital, Right Updated: 08/19/20 1412     Blood Culture, Routine No growth after 5 days.    Narrative:      Aerobic and anaerobic    Blood culture x two cultures. Draw prior to antibiotics. [[id number]] Collected: 08/13/20 1906    Order Status: Completed Specimen: Blood from Antecubital, Left Updated: 08/19/20 1412     Blood Culture, Routine No growth after 5 days.    Narrative:      Aerobic and anaerobic    Culture, Respiratory with Gram Stain [[id number]] Collected: 08/18/20 0825    Order Status: Canceled Specimen: Respiratory from Bronchial Wash           Impression:  Active Hospital Problems    Diagnosis  POA    *Sepsis [A41.9]  Yes    Lung cancer [C34.90]  Yes    Lung mass [R91.8]  No    SOB (shortness of breath) [R06.02]  Yes    Chronic respiratory failure with hypoxia [J96.11]  Yes    Poor dentition  [K08.9]  Yes    Mass of middle lobe of right lung [R91.8]  Yes    Malignant neoplasm of upper lobe of lung [C34.10]  Yes    NOELLE (acute kidney injury) [N17.9]  Yes    Mediastinal lymphadenopathy [R59.0]  Yes    Moderate malnutrition [E44.0]  No    Opioid dependence [F11.20]  Yes    Iron deficiency anemia secondary to inadequate dietary iron intake [D50.8]  Yes    Pneumonia due to infectious organism [J18.9]  Yes    Acute cystitis without hematuria [N30.00]  Yes    Chronic obstructive pulmonary disease with acute lower respiratory infection [J44.0]  Yes    Acute hypoxemic respiratory failure [J96.01]  Yes    Hypokalemia [E87.6]  Yes      Resolved Hospital Problems    Diagnosis Date Resolved POA    Hypocalcemia [E83.51] 08/21/2020 Yes               Plan:     8/17 temp max 99,   Temp better but pt not clearly better, large tumor apparent, need brain scan, will try to bronch in am.    Needs mobilized. Will stop xanax.     8/18 off xanax and still sedate, ct head ok - no contrast.    abg marginal on niv.      Discussed with daughter Larissa, called [place]/home numbers earlier with no answer.      Addendum-pt in icu now, mild sob reported off niv- no headache, some asterixis.  Will check abg, dose steroids, use niv.  2 burkeyfnhers - Larissa and Lorelei - are in room. Details of pneumonia and cancer discussed.  Would ask onc eval.  May need consideration xrt- not typically done in pt.  May need intubation but cancer seems to be major underlying dz 5 days into pneumonia rx.         8/19 - t to 99.7 post bronch - not ususual.    Will f/u cbc/cmp/abg  Was anxious when niv removed last pm - got ativan- chronic substance use on suboxone.    No organism on gram stain.     8/20 progressing wrt appearance, bronch bx/cytology in process this am.  No  Growth bronch wash.  Creat 1.6.  Labs stable,need therapy.  onc seeing.  Will resume xanax prn.        8/21 pt's pneumonia responding well.  Most airways right lung occluded - likely  causing pneumonia and resp failure.  Debility not too bad and will not recover well if airways not restored.  From pulmonary perspective need rx to open airways.  Chemo risk with pneumonia seems small, xrt alternative.  Will check renal u/s. Would defer sm cell lung ca rx plans to Dr Acuña.  Not using niv, could go to floor.  Will stop xanax as ppt sleepiness even at low dose.    Discussed with daughter Larissa.  8/24 oncology looking for chemo post recovery,     Wbc 21 k on steroids, u/s kidney with min right abn,  Creat 1.3,     Go to prednisone 10 bid from 40/d solumedrol- but pt developed marked increase sob- will increase steroids to q 6 40.      Pt worsened- will f/u cbc/procal.  Zosyn changed to rocephin on 21st.     Pt seems unstable- may not progress without opening of airways.  Marked distress at bedside now for no apparent reason.  Suggest rx to open airways asap.

## 2020-08-24 NOTE — RESPIRATORY THERAPY
Results for QUINCY WATT (MRN [id number]) as of 8/24/2020 15:14   Ref. Range 8/24/2020 13:54   POC PH Latest Ref Range: 7.35 - 7.45  7.502 (H)   POC PCO2 Latest Ref Range: 35 - 45 mmHg 43.5   POC PO2 Latest Ref Range: 80 - 100 mmHg 80   POC BE Latest Ref Range: -2 to 2 mmol/L 11   POC HCO3 Latest Ref Range: 24 - 28 mmol/L 34.1 (H)   POC SATURATED O2 Latest Ref Range: 95 - 100 % 97   POC TCO2 Latest Ref Range: 23 - 27 mmol/L 35 (H)   Flow Unknown 6   Sample Unknown ARTERIAL   DelSys Unknown Nasal Can   Allens Test Unknown Pass   Site Unknown LR   Mode Unknown SPONT

## 2020-08-24 NOTE — PLAN OF CARE
Pt on BiPap. Tele [id number] in place, sinus rhythm. IV patent and flushed. Pt A&O. BP managed with PRN hydralazine. VSS. Safety maintained throughout shift, bed wheels locked, bed in lowest position, call light within reach. Daughter at bedside.

## 2020-08-24 NOTE — PLAN OF CARE
Intervention: texture modified diet and nutrition supplement therapy-commercial beverage     Recommendation:    1) Continue mechanical soft, thin liquid diet  2) Continues boost plus 3-4 x daily per pt preference , encourage intakes  3) Weigh pt weekly, replete iron and phos     Goals: 1) PO intakes > 50% of meals and supplements in < 48 hours or nutrition support initiated 2) PO intakes 75% of EEN at f/u  Nutrition Goal Status: met/ new  Communication of RD Recs: POC, sticky note

## 2020-08-24 NOTE — RESPIRATORY THERAPY
08/23/20 1922   Patient Assessment/Suction   Level of Consciousness (AVPU) alert   Respiratory Effort Unlabored   Expansion/Accessory Muscles/Retractions no use of accessory muscles   All Lung Fields Breath Sounds coarse   Rhythm/Pattern, Respiratory unlabored   Cough Frequency infrequent   Cough Type good;nonproductive   PRE-TX-O2   O2 Device (Oxygen Therapy) nasal cannula   Flow (L/min) 2   Oxygen Concentration (%) 28   SpO2 98 %  (Simultaneous filing. User may not have seen previous data.)   Pulse Oximetry Type Intermittent   $ Pulse Oximetry - Multiple Charge Pulse Oximetry - Multiple   Pulse 102  (Simultaneous filing. User may not have seen previous data.)   Resp 16  (Simultaneous filing. User may not have seen previous data.)   Temp 97.6 °F (36.4 °C)   BP (!) 184/85   Aerosol Therapy   $ Aerosol Therapy Charges Aerosol Treatment   Respiratory Treatment Status (SVN) given   Treatment Route (SVN) mask   Patient Position (SVN) HOB elevated   Post Treatment Assessment (SVN) breath sounds unchanged   Signs of Intolerance (SVN) none   Breath Sounds Post-Respiratory Treatment   Throughout All Fields Post-Treatment All Fields   Throughout All Fields Post-Treatment no change   Post-treatment Heart Rate (beats/min) 106   Post-treatment Resp Rate (breaths/min) 16   Ready to Wean/Extubation Screen   FIO2<=50 (chart decimal) 0.28

## 2020-08-24 NOTE — PLAN OF CARE
Mari Tijerina----8/24/2020 12:09:55 PM Under Review: Remote - Local Review  [email or web address]    [place]---8/24/2020 12:08:35 PM Under Review: Onsite Review  [email or web address]  8/24/2020 12:08:32 PM Note: Thank you. Probable denial d/t new cancer diagnosis/chemo needs. Will let you know [email or web address]     08/24/20 1232   Post-Acute Status   Post-Acute Authorization Placement   Post-Acute Placement Status Pending Post-Acute Clinical Review

## 2020-08-24 NOTE — RESPIRATORY THERAPY
Patient states that she quit smoking a week ago and does not needed Nicotine patch.  Information on the Smoking cessation trust given to patient for further use if needed.

## 2020-08-24 NOTE — PLAN OF CARE
Patient remains on O2 @LPM NC.  Resp TX cont Q4 hours.  IV antibiotics continue.  TB test negative.

## 2020-08-24 NOTE — PROGRESS NOTES
[facility]  Hematology/Oncology  Progress Note    Patient Name: Kendra Lawrence  Admission Date: 8/13/2020  Hospital Length of Stay: 11 days  Code Status: Full Code     Subjective:     Interval History:     Pt awaiting SNF placement. Pt doing physical therapy. Pt states she is feeling anxious due to tobacco cessation. Otherwise, she has no new complaints.     Medications:  Continuous Infusions:  Scheduled Meds:   albuterol-ipratropium  3 mL Nebulization Q4H WAKE    buprenorphine-naloxone  1 each Sublingual BID    cefTRIAXone (ROCEPHIN) IVPB  1 g Intravenous Q24H    DULoxetine  60 mg Oral Daily    enoxaparin  40 mg Subcutaneous Q24H    ferrous sulfate  325 mg Oral Daily    hydroCHLOROthiazide  25 mg Oral Daily    ketamine        lidocaine (PF) 10 mg/ml (1%)        lidocaine (PF) 20 mg/mL (2%)        lidocaine (PF)        lidocaine HCl 2%        lidocaine-EPINEPHrine (PF) 1%-1:200,000        losartan  100 mg Oral Daily    multivitamin  1 tablet Oral Daily    oxymetazoline        pantoprazole  40 mg Oral Daily    predniSONE  10 mg Oral BID    senna-docusate 8.6-50 mg  1 tablet Oral BID     PRN Meds:acetaminophen, albuterol-ipratropium, ALPRAZolam, dextrose 50%, dextrose 50%, glucagon (human recombinant), glucose, glucose, hydrALAZINE, magnesium oxide, magnesium oxide, ondansetron, potassium chloride, potassium chloride, potassium chloride, promethazine (PHENERGAN) IVPB, sodium chloride 0.9%     Review of Systems   Constitutional: Positive for fatigue. Negative for fever and unexpected weight change.   HENT: Positive for dental problem. Negative for rhinorrhea and sore throat.    Eyes: Negative for photophobia.   Respiratory: Positive for shortness of breath and wheezing. Negative for cough.    Cardiovascular: Negative for chest pain and leg swelling.   Gastrointestinal: Negative for abdominal pain, constipation, diarrhea, nausea and vomiting.   Endocrine: Negative for cold  intolerance and heat intolerance.   Genitourinary: Negative for dysuria, frequency, hematuria and urgency.   Musculoskeletal: Negative for arthralgias, back pain and neck pain.   Skin: Negative for pallor and rash.   Neurological: Positive for weakness. Negative for numbness and headaches.   Hematological: Negative for adenopathy. Does not bruise/bleed easily.   Psychiatric/Behavioral: Negative for agitation. The patient is nervous/anxious.    All other systems reviewed and are negative.    Objective:     Vital Signs (Most Recent):  Temp: 98.5 °F (36.9 °C) (08/24/20 1159)  Pulse: 105 (08/24/20 1215)  Resp: 18 (08/24/20 1159)  BP: (!) 178/85 (08/24/20 1215)  SpO2: 97 % (08/24/20 1215) Vital Signs (24h Range):  Temp:  [96.1 °F (35.6 °C)-98.5 °F (36.9 °C)] 98.5 °F (36.9 °C)  Pulse:  [] 105  Resp:  [14-22] 18  SpO2:  [93 %-99 %] 97 %  BP: (140-207)/() 178/85     Weight: 81.4 kg (179 lb 6.4 oz)  Body mass index is 30.79 kg/m².  Body surface area is 1.92 meters squared.      Intake/Output Summary (Last 24 hours) at 8/24/2020 1247  Last data filed at 8/24/2020 0521  Gross per 24 hour   Intake 530 ml   Output 200 ml   Net 330 ml       Physical Exam  Vitals signs and nursing note reviewed.   Constitutional:       Appearance: She is well-developed.   HENT:      Head: Normocephalic and atraumatic.      Mouth/Throat:      Pharynx: No oropharyngeal exudate.   Eyes:      General: No scleral icterus.     Conjunctiva/sclera: Conjunctivae normal.   Neck:      Musculoskeletal: Normal range of motion and neck supple.      Vascular: No JVD.      Trachea: No tracheal deviation.   Cardiovascular:      Rate and Rhythm: Regular rhythm. Tachycardia present.      Heart sounds: Murmur (2= capillary refill) present.   Pulmonary:      Effort: Pulmonary effort is normal. No respiratory distress.      Breath sounds: Wheezing present.   Abdominal:      General: Bowel sounds are normal. There is no distension.      Palpations: Abdomen  is soft.   Musculoskeletal: Normal range of motion.   Skin:     General: Skin is warm and dry.      Findings: No erythema or rash.   Neurological:      Mental Status: She is alert and oriented to person, place, and time.      Cranial Nerves: No cranial nerve deficit.   Psychiatric:         Thought Content: Thought content normal.      Comments: Anxious         Significant Labs:     Lab Results   Component Value Date    WBC 20.71 (H) 08/23/2020    RBC 3.63 (L) 08/23/2020    HGB 9.9 (L) 08/23/2020    HCT 34.1 (L) 08/23/2020    MCV 94 08/23/2020    MCH 27.3 08/23/2020    MCHC 29.0 (L) 08/23/2020    RDW 13.9 08/23/2020     (H) 08/23/2020    MPV 9.4 08/23/2020    GRAN 16.8 (H) 08/23/2020    GRAN 81.2 (H) 08/23/2020    LYMPH 1.4 08/23/2020    LYMPH 6.5 (L) 08/23/2020    MONO 1.9 (H) 08/23/2020    MONO 9.0 08/23/2020    EOS 0.1 08/23/2020    BASO 0.03 08/23/2020    EOSINOPHIL 0.5 08/23/2020    BASOPHIL 0.1 08/23/2020     CMP  Sodium   Date Value Ref Range Status   08/24/2020 141 136 - 145 mmol/L Final     Potassium   Date Value Ref Range Status   08/24/2020 3.7 3.5 - 5.1 mmol/L Final     Chloride   Date Value Ref Range Status   08/24/2020 98 95 - 110 mmol/L Final     CO2   Date Value Ref Range Status   08/24/2020 32 (H) 23 - 29 mmol/L Final     Glucose   Date Value Ref Range Status   08/24/2020 113 (H) 70 - 110 mg/dL Final     BUN, Bld   Date Value Ref Range Status   08/24/2020 27 (H) 8 - 23 mg/dL Final     Creatinine   Date Value Ref Range Status   08/24/2020 1.3 0.5 - 1.4 mg/dL Final     Calcium   Date Value Ref Range Status   08/24/2020 9.5 8.7 - 10.5 mg/dL Final     Total Protein   Date Value Ref Range Status   08/24/2020 6.1 6.0 - 8.4 g/dL Final     Albumin   Date Value Ref Range Status   08/24/2020 2.2 (L) 3.5 - 5.2 g/dL Final     Total Bilirubin   Date Value Ref Range Status   08/24/2020 0.2 0.1 - 1.0 mg/dL Final     Comment:     For infants and newborns, interpretation of results should be based  on  gestational age, weight and in agreement with clinical  observations.  Premature Infant recommended reference ranges:  Up to 24 hours.............<8.0 mg/dL  Up to 48 hours............<12.0 mg/dL  3-5 days..................<15.0 mg/dL  6-29 days.................<15.0 mg/dL       Alkaline Phosphatase   Date Value Ref Range Status   08/24/2020 74 55 - 135 U/L Final     AST   Date Value Ref Range Status   08/24/2020 14 10 - 40 U/L Final     ALT   Date Value Ref Range Status   08/24/2020 36 10 - 44 U/L Final     Anion Gap   Date Value Ref Range Status   08/24/2020 11 8 - 16 mmol/L Final     eGFR if    Date Value Ref Range Status   08/24/2020 49 (A) >60 mL/min/1.73 m^2 Final     eGFR if non    Date Value Ref Range Status   08/24/2020 43 (A) >60 mL/min/1.73 m^2 Final     Comment:     Calculation used to obtain the estimated glomerular filtration  rate (eGFR) is the CKD-EPI equation.            Assessment/Plan:     Active Diagnoses:    Diagnosis Date Noted POA    PRINCIPAL PROBLEM:  Sepsis [A41.9] 08/13/2020 Yes    Lung cancer [C34.90] 08/21/2020 Yes    Lung mass [R91.8]  No    SOB (shortness of breath) [R06.02] 08/18/2020 Yes    Chronic respiratory failure with hypoxia [J96.11] 08/18/2020 Yes    Poor dentition [K08.9]  Yes    Mass of middle lobe of right lung [R91.8]  Yes    Malignant neoplasm of upper lobe of lung [C34.10] 08/17/2020 Yes    NOELLE (acute kidney injury) [N17.9] 08/17/2020 Yes    Mediastinal lymphadenopathy [R59.0] 08/16/2020 Yes    Moderate malnutrition [E44.0] 08/15/2020 No    Opioid dependence [F11.20] 08/14/2020 Yes    Iron deficiency anemia secondary to inadequate dietary iron intake [D50.8] 08/14/2020 Yes    Pneumonia due to infectious organism [J18.9] 08/13/2020 Yes    Acute cystitis without hematuria [N30.00] 08/13/2020 Yes    Chronic obstructive pulmonary disease with acute lower respiratory infection [J44.0] 08/13/2020 Yes    Acute hypoxemic  respiratory failure [J96.01] 08/13/2020 Yes    Hypokalemia [E87.6] 08/13/2020 Yes      Problems Resolved During this Admission:    Diagnosis Date Noted Date Resolved POA    Hypocalcemia [E83.51] 08/13/2020 08/21/2020 Yes       SCLC needs chemo as soon as she recovers from PNA. Chemotherapy education already completed.  Dr. Acuña has discussed staging.    Leukocytosis secondary to pneumonia.  Monitoring anemia. Continue ferous sulfate 325mg daily.   Needs pet ct as an outpatient   Will follow along closely    Destin Valente PA-C  Hematology/Oncology  [facility]

## 2020-08-24 NOTE — PLAN OF CARE
SW met with patient at bedside regarding skilled nursing placement.  Patient daughter signed patient choice disclosure choosing [facility] and [facility].  SW sent patient information to both facilities via [facility] system for authorization and acceptance.       08/24/20 1201   Post-Acute Status   Post-Acute Authorization Placement   Post-Acute Placement Status Referrals Sent   Patient choice form signed by patient/caregiver List with quality metrics by geographic area provided

## 2020-08-24 NOTE — ASSESSMENT & PLAN NOTE
Contributing Nutrition Diagnosis  Moderate acute illness related malnutrition  Related to (etiology):   SOB, nausea, decreased appetite    Signs and Symptoms (as evidenced by):   1) PO intakes < 50% EEN x 15-18 days  2) mild-moderate edema    Interventions:  above    Nutrition Diagnosis Status:   continues

## 2020-08-25 PROBLEM — J18.9 OBSTRUCTIVE PNEUMONIA: Status: ACTIVE | Noted: 2020-01-01

## 2020-08-25 PROBLEM — C80.1 CANCER: Status: ACTIVE | Noted: 2020-01-01

## 2020-08-25 PROBLEM — J91.0 MALIGNANT PLEURAL EFFUSION: Status: ACTIVE | Noted: 2020-01-01

## 2020-08-25 PROBLEM — C34.90 LUNG CANCER: Status: ACTIVE | Noted: 2020-01-01

## 2020-08-25 NOTE — ASSESSMENT & PLAN NOTE
Patient's anemia is currently controlled. Has not received any PRBCs to date.. Etiology likely d/t iron def.  Current CBC reviewed-   Lab Results   Component Value Date    HGB 10.7 (L) 08/24/2020    HCT 35.5 (L) 08/24/2020     Monitor serial CBC and transfuse if patient becomes hemodynamically unstable, symptomatic or H/H drops below 7/21.

## 2020-08-25 NOTE — ASSESSMENT & PLAN NOTE
· Continue ABx and prednisone for now.  · Heme-onc arranging chemo-Rx.  · Rad-Onc consulted.  · Titrate supplemental oxygen to maintain SpO2 of 88-92%.  · Nightly/prn NIPPV.

## 2020-08-25 NOTE — ASSESSMENT & PLAN NOTE
This patient does have evidence of infective focus  My overall impression is sepsis.  Antibiotics given-   Antibiotics (From admission, onward)    Start     Stop Route Frequency Ordered    08/24/20 2330  piperacillin-tazobactam 3.375 g in dextrose 5 % 50 mL IVPB (ready to mix system)      -- IV Every 6 hours (non-standard times) 08/24/20 2228        Procalcitonin level higher.  Ceftriaxone changed back to Zosyn today.    Procalcitonin   Date Value Ref Range Status   08/24/2020 1.02 (H) <0.25 ng/mL Final     Comment:     A concentration < 0.25 ng/mL represents a low risk bacterial   infection.  Procalcitonin may not be accurate among patients with localized   infection, recent trauma or major surgery, immunosuppressed state,   invasive fungal infection, renal dysfunction. Decisions regarding   initiation or continuation of antibiotic therapy should not be based   solely on procalcitonin levels.     08/13/2020 0.57 (H) <0.25 ng/mL Final     Comment:     A concentration < 0.25 ng/mL represents a low risk bacterial   infection.  Procalcitonin may not be accurate among patients with localized   infection, recent trauma or major surgery, immunosuppressed state,   invasive fungal infection, renal dysfunction. Decisions regarding   initiation or continuation of antibiotic therapy should not be based   solely on procalcitonin levels.

## 2020-08-25 NOTE — HPI
Ms. Kendra Lawrence is a 64 year old lady with a history of COPD an heavy smoking who was in her USOH until a couple of months ago when she developed a progressive functional decline, dyspnea and more recently fevers.  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  She was treated for post-obstructive pneumonia and possible COPD and transferred to [facility] this afternoon for urgent initiation of chemotherapy.  Pulmonology was consulted for the above stated reasons.  When I examined Ms. Lawrence this evening, she was resting comfortably without ongoing respiratory symptoms at that time.  She reports an overall improving trajectory over the past 2 weeks, but over the weekend did have a couple of episodes where she required NIPPV and benzodiazepines for anxiety.  These have not recurred today.

## 2020-08-25 NOTE — PLAN OF CARE
POC discussed with patient, verbalized understanding. Patient with uneventful night, slept off and on throughout the night between care. VS stable. NSR on telemetry. AAOX3. NV wnl. Receiving IV fluids, ABX infused. 02@5L/NC, 94%. Tolerating diet well. Safety maintained with hourly rounds. Up to bedside commode multiple times, Clear yellow. No compliant voiced, call light at bedside. Bipap worn during hs hours. Received RX .

## 2020-08-25 NOTE — PROGRESS NOTES
[facility]  Hematology/Oncology  Progress Note    Patient Name: Kendra Lawrence  Admission Date: 8/13/2020  Hospital Length of Stay: 12 days  Code Status: Full Code     Subjective:     Interval History:     Pt seen in bed. She is complaining of diarrhea. SOB and cough are baseline per patient.     Medications:  Continuous Infusions:  Scheduled Meds:   albuterol-ipratropium  3 mL Nebulization Q4H WAKE    buprenorphine-naloxone  1 each Sublingual BID    DULoxetine  60 mg Oral Daily    enoxaparin  40 mg Subcutaneous Q24H    ferrous sulfate  325 mg Oral Daily    hydroCHLOROthiazide  25 mg Oral Daily    ketamine        lidocaine (PF) 10 mg/ml (1%)        lidocaine (PF) 20 mg/mL (2%)        lidocaine (PF)        lidocaine HCl 2%        lidocaine-EPINEPHrine (PF) 1%-1:200,000        losartan  100 mg Oral Daily    methylPREDNISolone sodium succinate  40 mg Intravenous Q6H    multivitamin  1 tablet Oral Daily    nicotine  1 patch Transdermal Daily    oxymetazoline        pantoprazole  40 mg Oral Daily    piperacillin-tazobactam (ZOSYN) IVPB  3.375 g Intravenous Q6H    senna-docusate 8.6-50 mg  1 tablet Oral BID     PRN Meds:acetaminophen, albuterol-ipratropium, ALPRAZolam, dextrose 50%, dextrose 50%, glucagon (human recombinant), glucose, glucose, hydrALAZINE, magnesium oxide, magnesium oxide, ondansetron, potassium chloride, potassium chloride, potassium chloride, promethazine (PHENERGAN) IVPB, sodium chloride 0.9%     Review of Systems   Constitutional: Positive for fatigue. Negative for fever and unexpected weight change.   HENT: Positive for dental problem. Negative for rhinorrhea and sore throat.    Eyes: Negative for photophobia.   Respiratory: Positive for cough, shortness of breath and wheezing.    Cardiovascular: Negative for chest pain and leg swelling.   Gastrointestinal: Positive for diarrhea. Negative for abdominal pain, constipation, nausea and vomiting.   Endocrine:  Negative for cold intolerance and heat intolerance.   Genitourinary: Negative for dysuria, frequency, hematuria and urgency.   Musculoskeletal: Negative for arthralgias, back pain and neck pain.   Skin: Negative for pallor and rash.   Neurological: Positive for weakness. Negative for numbness and headaches.   Hematological: Negative for adenopathy. Does not bruise/bleed easily.   Psychiatric/Behavioral: Negative for agitation, behavioral problems, confusion, decreased concentration and dysphoric mood.   All other systems reviewed and are negative.    Objective:     Vital Signs (Most Recent):  Temp: 96 °F (35.6 °C) (08/25/20 0751)  Pulse: 88 (08/25/20 0751)  Resp: 16 (08/25/20 0751)  BP: (!) 165/82 (08/25/20 0751)  SpO2: 96 % (08/25/20 0751) Vital Signs (24h Range):  Temp:  [96 °F (35.6 °C)-98.5 °F (36.9 °C)] 96 °F (35.6 °C)  Pulse:  [] 88  Resp:  [12-22] 16  SpO2:  [93 %-100 %] 96 %  BP: (143-207)/() 165/82     Weight: 81.4 kg (179 lb 6.4 oz)  Body mass index is 30.79 kg/m².  Body surface area is 1.92 meters squared.      Intake/Output Summary (Last 24 hours) at 8/25/2020 0850  Last data filed at 8/24/2020 1800  Gross per 24 hour   Intake 237 ml   Output --   Net 237 ml       Physical Exam  Vitals signs and nursing note reviewed.   Constitutional:       Appearance: She is well-developed.   HENT:      Head: Normocephalic and atraumatic.      Mouth/Throat:      Pharynx: No oropharyngeal exudate.   Eyes:      General: No scleral icterus.     Conjunctiva/sclera: Conjunctivae normal.   Neck:      Musculoskeletal: Normal range of motion and neck supple.      Vascular: No JVD.      Trachea: No tracheal deviation.   Cardiovascular:      Rate and Rhythm: Regular rhythm. Tachycardia present.      Heart sounds: Murmur (2= capillary refill) present.   Pulmonary:      Effort: Pulmonary effort is normal. No respiratory distress.      Breath sounds: Wheezing present.   Abdominal:      General: Bowel sounds are normal.  There is no distension.      Palpations: Abdomen is soft.   Musculoskeletal: Normal range of motion.   Skin:     General: Skin is warm and dry.      Findings: No erythema or rash.   Neurological:      Mental Status: She is alert and oriented to person, place, and time.      Cranial Nerves: No cranial nerve deficit.   Psychiatric:         Thought Content: Thought content normal.      Comments: Anxious         Significant Labs:     Lab Results   Component Value Date    WBC 25.08 (H) 08/24/2020    RBC 3.91 (L) 08/24/2020    HGB 10.7 (L) 08/24/2020    HCT 35.5 (L) 08/24/2020    MCV 91 08/24/2020    MCH 27.4 08/24/2020    MCHC 30.1 (L) 08/24/2020    RDW 14.2 08/24/2020     (H) 08/24/2020    MPV 9.7 08/24/2020    GRAN 97.0 (H) 08/24/2020    LYMPH 3.0 (L) 08/24/2020    MONO 0.0 (L) 08/24/2020    EOS 0.1 08/23/2020    BASO 0.03 08/23/2020    EOSINOPHIL 0.0 08/24/2020    BASOPHIL 0.0 08/24/2020     CMP  Sodium   Date Value Ref Range Status   08/24/2020 140 136 - 145 mmol/L Final     Potassium   Date Value Ref Range Status   08/24/2020 3.5 3.5 - 5.1 mmol/L Final     Chloride   Date Value Ref Range Status   08/24/2020 97 95 - 110 mmol/L Final     CO2   Date Value Ref Range Status   08/24/2020 31 (H) 23 - 29 mmol/L Final     Glucose   Date Value Ref Range Status   08/24/2020 181 (H) 70 - 110 mg/dL Final     BUN, Bld   Date Value Ref Range Status   08/24/2020 29 (H) 8 - 23 mg/dL Final     Creatinine   Date Value Ref Range Status   08/24/2020 1.3 0.5 - 1.4 mg/dL Final     Calcium   Date Value Ref Range Status   08/24/2020 10.0 8.7 - 10.5 mg/dL Final     Total Protein   Date Value Ref Range Status   08/24/2020 6.7 6.0 - 8.4 g/dL Final     Albumin   Date Value Ref Range Status   08/24/2020 2.5 (L) 3.5 - 5.2 g/dL Final     Total Bilirubin   Date Value Ref Range Status   08/24/2020 0.2 0.1 - 1.0 mg/dL Final     Comment:     For infants and newborns, interpretation of results should be based  on gestational age, weight and in  agreement with clinical  observations.  Premature Infant recommended reference ranges:  Up to 24 hours.............<8.0 mg/dL  Up to 48 hours............<12.0 mg/dL  3-5 days..................<15.0 mg/dL  6-29 days.................<15.0 mg/dL       Alkaline Phosphatase   Date Value Ref Range Status   08/24/2020 81 55 - 135 U/L Final     AST   Date Value Ref Range Status   08/24/2020 20 10 - 40 U/L Final     ALT   Date Value Ref Range Status   08/24/2020 39 10 - 44 U/L Final     Anion Gap   Date Value Ref Range Status   08/24/2020 12 8 - 16 mmol/L Final     eGFR if    Date Value Ref Range Status   08/24/2020 49 (A) >60 mL/min/1.73 m^2 Final     eGFR if non    Date Value Ref Range Status   08/24/2020 43 (A) >60 mL/min/1.73 m^2 Final     Comment:     Calculation used to obtain the estimated glomerular filtration  rate (eGFR) is the CKD-EPI equation.            Assessment/Plan:     Active Diagnoses:    Diagnosis Date Noted POA    PRINCIPAL PROBLEM:  Sepsis [A41.9] 08/13/2020 Yes    Acute on chronic respiratory failure with hypoxia and hypercapnia [J96.21, J96.22] 08/18/2020 Yes    Small cell lung cancer, right [C34.91] 08/17/2020 Yes    Moderate malnutrition [E44.0] 08/15/2020 No    Opioid dependence [F11.20] 08/14/2020 Yes    Iron deficiency anemia secondary to inadequate dietary iron intake [D50.8] 08/14/2020 Yes    Pneumonia due to infectious organism [J18.9] 08/13/2020 Yes    Acute cystitis without hematuria [N30.00] 08/13/2020 Yes    Chronic obstructive pulmonary disease with acute lower respiratory infection [J44.0] 08/13/2020 Yes      Problems Resolved During this Admission:    Diagnosis Date Noted Date Resolved POA    NOELLE (acute kidney injury) [N17.9] 08/17/2020 08/24/2020 Yes    Hypokalemia [E87.6] 08/13/2020 08/24/2020 Yes    Hypocalcemia [E83.51] 08/13/2020 08/21/2020 Yes       -I have spoken with the patient regarding in patient chemotherapy. Chemotherapy education  and consent completed on 08/21/2020 for VP16 days 1,2,3 every 21 days with carboplatin day 1 every 3 week cycle. I have discussed with Dr. Acuña and we are going to work on getting patient transferred to [facility] for treatment. I have spoken with patient about starting inpatient chemotherapy and answered all questions. She would like to proceed with inpatient chemotherapy at [facility] and is understanding that we will plan a transfer to [facility] to start inpatient chemotherapy. She states her daughter has her chemotherapy folder with the information I had given her during her chemotherapy education. -She will notify her daughters of change and knows she has the chemotherapy folder available to her with the information that I went over during her chemotherapy education.  -Leukocytosis secondary to pneumonia.  -Order for lomotil four times daily PRN diarrhea.   -Anemia improving. Continue ferous sulfate 325mg daily.    -Will follow along closely    Destin Valente PA-C  Hematology/Oncology  [facility]

## 2020-08-25 NOTE — PLAN OF CARE
Patient transferred to [facility].       08/25/20 1450   Final Note   Assessment Type Final Discharge Note   Anticipated Discharge Disposition Short Term

## 2020-08-25 NOTE — ASSESSMENT & PLAN NOTE
Proven by biopsy during this admission.  Oncologist and pulmonologist following.  Plan for now is to delay treatment until after she completes physical therapy at SNF.

## 2020-08-25 NOTE — NURSING
Pt left via ambulance transport, daughter at bedside. No iv access.      Report called to Erin @ [facility].

## 2020-08-25 NOTE — ASSESSMENT & PLAN NOTE
Today she has more shortness of breath.  Pulmonologist ordered treatments accordingly.  Monitor oximetry readings.  Continue supplementing oxygen.  Monitor ABG's.    ABG  Recent Labs   Lab 08/24/20  1354   PH 7.502*   PO2 80   PCO2 43.5   HCO3 34.1*   BE 11

## 2020-08-25 NOTE — ASSESSMENT & PLAN NOTE
Nutrition consulted. Body mass index is 30.79 kg/m².. Encourage maximal PO intake. Diet supplementation ordered per nutrition approval. Will encourage PO and monitor closely for weight changes.

## 2020-08-25 NOTE — RESPIRATORY THERAPY
08/24/20 2313   PRE-TX-O2   Oxygen Concentration (%) 45   Ready to Wean/Extubation Screen   FIO2<=50 (chart decimal) 0.45   Preset CPAP/BiPAP Settings   Mode Of Delivery BiPAP   $ Is patient using? Yes   Size of Mask Small/Medium   Sized Appropriately? Yes   Equipment Type V60   Airway Device Type medium full face mask   Ipap 15   EPAP (cm H2O) 5   Pressure Support (cm H2O) 10   Set Rate (Breaths/Min) 12   ITime (sec) 1   Rise Time (sec) 0.1   Patient CPAP/BiPAP Settings   Timed Inspiration (Sec) 1   IPAP Rise Time (sec) 0.1   RR Total (Breaths/Min) 16   Tidal Volume (mL) 890   VE Minute Ventilation (L/min) 14 L/min   Peak Inspiratory Pressure (cm H2O) 16   TiTOT (%) 32   Total Leak (L/Min) 2   Patient Trigger - ST Mode Only (%) 100   CPAP/BiPAP Alarms   High Pressure (cm H2O) 25   Low Pressure (cm H2O) 5   Minute Ventilation (L/Min) 3   High RR (breaths/min) 40   Low RR (breaths/min) 5

## 2020-08-25 NOTE — PLAN OF CARE
START ON PATHWAY REGIMEN - Small Cell Lung    [id number]        Etoposide (Toposar)       Carboplatin (Paraplatin)           Additional Orders: * All AUC calculations intended to be used in [place]   formula    **Always confirm dose/schedule in your pharmacy ordering system**    Patient Characteristics:  Newly Diagnosed, Preoperative or Nonsurgical Candidate (Clinical Staging), First   Line, Limited Stage, Nonsurgical Candidate  Therapeutic Status: Newly Diagnosed, Preoperative or Nonsurgical Candidate   (Clinical Staging)  AJCC T Category: cT2a  AJCC N Category: cN1  AJCC M Category: cM0  AJCC 8 Stage Grouping: IIB  Stage Classification: Limited  Surgical Candidacy: Nonsurgical Candidate  Intent of Therapy:  Non-Curative / Palliative Intent, Discussed with Patient

## 2020-08-25 NOTE — RESPIRATORY THERAPY
08/25/20 0707   Patient Assessment/Suction   Level of Consciousness (AVPU) alert   Respiratory Effort Normal;Unlabored   Expansion/Accessory Muscles/Retractions no use of accessory muscles;no retractions;expansion symmetric   All Lung Fields Breath Sounds wheezes, expiratory   Rhythm/Pattern, Respiratory unlabored;pattern regular;depth regular   Cough Frequency infrequent   Cough Type nonproductive;fair   PRE-TX-O2   O2 Device (Oxygen Therapy) nasal cannula   $ Is the patient on Low Flow Oxygen? Yes   Flow (L/min) 4  (decreased to 4lpm)   SpO2 99 %   Pulse Oximetry Type Intermittent   $ Pulse Oximetry - Multiple Charge Pulse Oximetry - Multiple   Pulse 80   Resp 18   Positioning HOB elevated 45 degrees   Aerosol Therapy   $ Aerosol Therapy Charges Aerosol Treatment   Respiratory Treatment Status (SVN) given   Treatment Route (SVN) mask   Patient Position (SVN) HOB elevated   Post Treatment Assessment (SVN) breath sounds unchanged   Signs of Intolerance (SVN) none   Breath Sounds Post-Respiratory Treatment   Throughout All Fields Post-Treatment All Fields   Throughout All Fields Post-Treatment no change   Post-treatment Heart Rate (beats/min) 85   Post-treatment Resp Rate (breaths/min) 20   Wound Care   $ Wound Care Tech Time 15 min   Area of Concern Bridge of nose;Cheek   Skin Color/Characteristics without discoloration   Skin Temperature warm   Preset CPAP/BiPAP Settings   Mode Of Delivery Standby;BiPAP   $ CPAP/BiPAP Daily Charge BiPAP/CPAP Daily   $ Initial CPAP/BiPAP Setup? No   $ Is patient using? No/refused  (bipap standby)

## 2020-08-25 NOTE — ASSESSMENT & PLAN NOTE
Clinically worse today.  WBC higher.  Procal higher.  More SOB.  Changed ceftriaxone back to Zosyn today.    Antibiotics (From admission, onward)    Start     Stop Route Frequency Ordered    08/24/20 2330  piperacillin-tazobactam 3.375 g in dextrose 5 % 50 mL IVPB (ready to mix system)      -- IV Every 6 hours (non-standard times) 08/24/20 7394

## 2020-08-25 NOTE — CONSULTS
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  -  Heme-onc arranging chemo-Rx.  -  Rad-Onc consulted.  -  Titrate supplemental oxygen to maintain SpO2 of 88-92%.  -  Nightly/prn NIPPV.         Subjective     No chief complaint on file.     History of Present Illness:  Ms. Kendra Lawrence is a 64 year old lady with a history of COPD an heavy smoking who was in her USOH until a couple of months ago when she developed a progressive functional decline, dyspnea and more recently fevers.  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  She was treated for post-obstructive pneumonia and possible COPD and transferred to [facility] this afternoon for urgent initiation of chemotherapy.  Pulmonology was consulted for the above stated reasons.  When I examined Ms. Lawrence this evening, she was resting comfortably without ongoing respiratory symptoms at that time.  She reports an overall improving trajectory over the past 2 weeks, but over the weekend did have a couple of episodes where she required NIPPV and benzodiazepines for anxiety.  These have not recurred today.     Past Medical History:   Diagnosis Date    Asthma     Cardiac angina     Chronic abdominal pain     Claustrophobia     COPD (chronic obstructive pulmonary disease)     Coronary artery disease     GERD (gastroesophageal reflux disease)     History of drug dependence     Hypertension     Hypocalcemia 8/13/2020     Past Surgical History:   Procedure Laterality Date    BREAST BIOPSY Bilateral 20 yrs ago    benign    BRONCHOSCOPY N/A 8/18/2020     Procedure: Bronchoscopy- 730 or noon, floro not needed;  Surgeon: Andrew Brothers MD;  Location: [facility] ENDO;  Service: Endoscopy;  Laterality: N/A;     SECTION      CHOLECYSTECTOMY      ENDOSCOPIC ULTRASOUND OF UPPER GASTROINTESTINAL TRACT Left 2018    Procedure: ULTRASOUND, UPPER GI TRACT, ENDOSCOPIC;  Surgeon: Paras Negrete MD;  Location: [facility];  Service: Endoscopy;  Laterality: Left;    ESOPHAGOGASTRODUODENOSCOPY N/A 2018    Procedure: EGD (ESOPHAGOGASTRODUODENOSCOPY);  Surgeon: Paras Negrete MD;  Location: [facility] ENDO;  Service: Endoscopy;  Laterality: N/A;    ESOPHAGOGASTRODUODENOSCOPY N/A 2018    Procedure: EGD (ESOPHAGOGASTRODUODENOSCOPY);  Surgeon: Paras Negrete MD;  Location: [facility];  Service: Endoscopy;  Laterality: N/A;    HYSTERECTOMY      MAGNETIC RESONANCE IMAGING N/A 2019    Procedure: MRI (Magnetic Resonance Imagine) needs anesthesia;  Surgeon: Raffy Charles MD;  Location: [facility];  Service: Anesthesiology;  Laterality: N/A;    OOPHORECTOMY      TEMPOROMANDIBULAR JOINT SURGERY      TONSILLECTOMY       Family History   Problem Relation Age of Onset    Breast cancer Sister 55    Breast cancer Sister 50    Cancer Mother     Heart disease Mother     Hypertension Mother     Cancer Father       Social History     Tobacco Use    Smoking status: Current Every Day Smoker     Years: 52.00     Types: Vaping w/o nicotine    Smokeless tobacco: Never Used   Substance and Sexual Activity    Alcohol use: No     Frequency: Never    Drug use: Yes     Types: Hydrocodone    Sexual activity: Not on file      Allergies:  Patient has no known allergies.     Facility-Administered Medications as of 2020   Medication Route Frequency    acetaminophen tablet 650 mg Oral Q4H PRN    albuterol-ipratropium 2.5 mg-0.5 mg/3 mL nebulizer solution 3 mL Nebulization Q4H WAKE    amitriptyline tablet 10 mg Oral Nightly PRN    aprepitant  (CINVANTI) injection 130 mg Intravenous 1 time in Clinic/HOD    buprenorphine-naloxone 8-2 mg Film 1 each Sublingual BID    CARBOplatin (PARAPLATIN) 480 mg in sodium chloride 0.9% 298 mL chemo infusion Intravenous 1 time in Clinic/HOD    [START ON 8/26/2020] DULoxetine DR capsule 60 mg Oral Daily    enoxaparin injection 40 mg Subcutaneous Q24H    etoposide (VEPESID) 100 mg/m2 = 184 mg in sodium chloride 0.9% 509.2 mL chemo infusion Intravenous Once    famotidine tablet 20 mg Oral BID    [START ON 8/26/2020] ferrous sulfate tablet 325 mg Oral Daily    fluticasone furoate-vilanteroL 200-25 mcg/dose diskus inhaler 1 puff Inhalation Daily    [START ON 8/26/2020] hydroCHLOROthiazide tablet 25 mg Oral Daily    [START ON 8/26/2020] losartan tablet 100 mg Oral Daily    magnesium oxide tablet 800 mg Oral PRN    magnesium oxide tablet 800 mg Oral PRN    [START ON 8/26/2020] nicotine 14 mg/24 hr 1 patch Transdermal Daily    ondansetron injection 4 mg Intravenous Q6H PRN    palonosetron (ALOXI) 0.25 mg, dexamethasone (DECADRON) 12 mg in sodium chloride 0.9% 50 mL IVPB Intravenous Once    piperacillin-tazobactam 4.5 g in dextrose 5 % 100 mL IVPB (ready to mix system) Intravenous Q8H    potassium chloride packet 40 mEq Oral PRN    potassium chloride packet 40 mEq Oral PRN    [START ON 8/26/2020] predniSONE tablet 40 mg Oral Daily     Outpatient Medications as of 8/25/2020   Medication    albuterol-ipratropium (DUO-NEB) 2.5 mg-0.5 mg/3 mL nebulizer solution    alendronate (FOSAMAX) 70 MG tablet    amitriptyline (ELAVIL) 10 MG tablet    buprenorphine-naloxone (SUBOXONE) 8-2 mg Film    DULoxetine (CYMBALTA) 60 MG capsule    hydroCHLOROthiazide (HYDRODIURIL) 25 MG tablet    losartan (COZAAR) 100 MG tablet    nitroGLYCERIN (NITROSTAT) 0.4 MG SL tablet    omeprazole (PRILOSEC) 40 MG capsule    ondansetron (ZOFRAN) 4 MG tablet    senna-docusate 8.6-50 mg (SENNA WITH DOCUSATE SODIUM) 8.6-50 mg per tablet     "TRELEGY ELLIPTA 100-62.5-25 mcg DsDv      Review of Systems   Unable to perform ROS  Constitutional: Positive for weight loss, activity change, appetite change, fatigue and weakness.   HENT: Negative for postnasal drip, trouble swallowing, voice change and congestion.    Eyes: Negative for redness and itching.   Respiratory: Positive for cough, sputum production, chest tightness, shortness of breath, wheezing and dyspnea on extertion. Negative for hemoptysis, orthopnea and pleurisy.    Cardiovascular: Negative for chest pain, palpitations and leg swelling.   Genitourinary: Negative for difficulty urinating and hematuria.   Endocrine: Negative for polydipsia, polyphagia and polyuria.    Musculoskeletal: Negative for back pain, gait problem and joint swelling.   Skin: Negative for rash.   Gastrointestinal: Negative for nausea, vomiting and abdominal pain.   Neurological: Negative for syncope, weakness and headaches.   Hematological: Negative for adenopathy. Does not bruise/bleed easily and no excessive bruising.   Psychiatric/Behavioral: Negative for confusion and sleep disturbance. The patient is nervous/anxious.    All other systems reviewed and are negative.     I have personally reviewed the following: active problem list, medication list, allergies, family history, social history, health maintenance, notes from last encounter, lab results, endoscopy procedure notes, imaging and nursing/provider documentation .    Objective     VS: Temp:  [96 °F (35.6 °C)-98.5 °F (36.9 °C)]   Pulse:  []   Resp:  [16-20]   BP: (133-180)/(82-98)   SpO2:  [93 %-99 %]    Estimated body mass index is 28 kg/m² as calculated from the following:    Height as of this encounter: 5' 4" (1.626 m).    Weight as of this encounter: 74 kg (163 lb 2.3 oz).   Ideal body weight: 54.7 kg (120 lb 9.5 oz)  Adjusted ideal body weight: 62.4 kg (137 lb 9.8 oz)   I/O: No intake or output data in the 24 hours ending 08/25/20 1805     Vent: SpO2 98% on " 4L NC   PE Physical Exam   Constitutional: She is oriented to person, place, and time. She appears well-developed and well-nourished. She is cooperative.  Non-toxic appearance. No distress. Nasal cannula in place.   HENT:   Head: Normocephalic.   Right Ear: External ear normal.   Left Ear: External ear normal.   Nose: Nose normal.   Mouth/Throat: Oropharynx is clear and moist. Abnormal dentition. No oropharyngeal exudate. Mallampati Score: II.   Neck: Normal range of motion. Neck supple. No JVD present. No thyromegaly present.   Cardiovascular: Normal rate, regular rhythm, normal heart sounds and intact distal pulses. Exam reveals no gallop and no friction rub.   No murmur heard.  Pulmonary/Chest: Normal expansion, hyperinflation and effort normal. She has decreased breath sounds (over the right base). She has wheezes. She has no rhonchi. She has no rales.   Abdominal: Soft. Bowel sounds are normal. She exhibits no distension and no mass. There is no hepatosplenomegaly. There is no abdominal tenderness.   Musculoskeletal: Normal range of motion.         General: No tenderness, deformity or edema.   Lymphadenopathy: No supraclavicular adenopathy is present.     She has no cervical adenopathy.     She has no axillary adenopathy.   Neurological: She is alert and oriented to person, place, and time. No cranial nerve deficit.   Skin: Skin is warm and dry. No rash noted. No cyanosis. Nails show no clubbing.   Psychiatric: She has a normal mood and affect. Her behavior is normal. Thought content normal.   Nursing note and vitals reviewed.       Recent Diagnostic Studies:  Labs I have personally reviewed and interpreted all recent labs / diagnostic studies, and noted the following relevant results:  CMP:   Recent Labs   Lab 08/24/20  0502 08/24/20  1419    140   K 3.7 3.5   CL 98 97   CO2 32* 31*   * 181*   BUN 27* 29*   CREATININE 1.3 1.3   CALCIUM 9.5 10.0   PROT 6.1 6.7   ALBUMIN 2.2* 2.5*   BILITOT 0.2 0.2    ALKPHOS 74 81   AST 14 20   ALT 36 39   ANIONGAP 11 12   EGFRNONAA 43* 43*     PCT:  1.02  WBC:  25  H/H:  10.7/35.5  Plt:  508  AB.502 / 43.5 / 80 / 35     Imaging I have personally reviewed and interpreted all available images and reviewed the associated Radiology reports.  My personal impression of the relevant studies is as follows:  I have reviewed and interpreted all pertinent imaging results/findings within the past 24 hours.    CT Chest:  8/15/2020:  Findings in the right middle lobe concerning for neoplasm, invading the mediastinum.  Masslike pneumonia is included in the differential but less likely.  Probable postobstructive changes in the right upper and lower lobes.   Enlarged subcarinal lymph node, concerning for a metastatic node.   Moderate, loculated-appearing right pleural effusion.   Trace left pleural effusion, dependent atelectasis left lung.    CT Head:  2020:  1. No evidence of an acute intracranial abnormality.  Clinical correlation and consider further evaluation with MRI of the brain.  2. Asymmetric prominence of the frontal horn/body of the right lateral ventricle with slight bowing of the septum pellucidum, which may reflect a developmental variant or possible intraventricular simple cyst.    MRI Brain  2020:  1. The study is motion degraded.  Also, the study was performed without contrast due to low GFR.  There is, however, no acute abnormality or change compared to recent head CT.  There is no hemorrhage, mass effect or acute infarction.  There are no definite regions of abnormal signal intensity in the brain to suggest possible edema.     CXR:  2020:  Left PICC line with the tip overlying the atrial caval junction.  There is no pneumothorax.   Persistent right pleural effusion with right middle and lower lobe airspace disease      Micro I have personally reviewed and interpreted the available culture data.  Relevant results are as follows.  Blood Culture   Lab  Results   Component Value Date    LABBLOO No growth after 5 days. 08/13/2020    LABBLOO No growth after 5 days. 08/13/2020   , Sputum Culture   Lab Results   Component Value Date    GSRESP <10 epithelial cells per low power field. 08/18/2020    GSRESP Rare WBC's 08/18/2020    GSRESP No organisms seen 08/18/2020    RESPIRATORYC JAYY ALBICANS  Moderate   (A) 08/18/2020    RESPIRATORYC JAYY DUBLINIENSIS  Moderate   (A) 08/18/2020    RESPIRATORYC JAYY GLABRATA  Moderate   (A) 08/18/2020    and Urine Culture    Lab Results   Component Value Date    LABURIN KLEBSIELLA PNEUMONIAE  >100,000 cfu/ml   (A) 08/13/2020        Previous Diagnostic Studies:  I have personally reviewed and interpreted the following labs/studies/images from previous encounters:  · LUNG MASS, ENDOBRONCHIAL BIOPSY:   · Small cell carcinoma.   · Comment:  The biopsy reveals poorly preserved crushed small round blue cell tumor with abundant necrosis.  There is marked nuclear molding, high N/C ratio and abundant mitotic activity.  Tumor cells are positive for AE1/AE3  cytokeratin, synaptophysin and chromogranin.  TTF-1, CD45 and P63 are negative.  A Ki-67 proliferation index is greater than 90%.  The  immunoprofile supports neuroendocrine differentiation and taken together with morphology findings are consistent with small cell carcinoma.     Assessment       Active Hospital Problems    Diagnosis    *Lung cancer    Obstructive pneumonia    Malignant pleural effusion    Anemia    Leukocytosis    Acute on chronic respiratory failure with hypoxia and hypercapnia    Small cell lung cancer, right    Opioid dependence    Chronic obstructive pulmonary disease with acute lower respiratory infection      My Impression:  Non-resectable SCLC with a questionable component of post-obstructive pneumonia here for urgent initiation of chemotherapy.    Plan     Pulmonary  · Continue ABx and prednisone for now.  · Heme-onc arranging chemo-Rx.  · Rad-Onc  consulted.  · Titrate supplemental oxygen to maintain SpO2 of 88-92%.  · Nightly/prn NIPPV.     Thank you for your consult. I will follow-up with patient. Please contact us if you have any additional questions.    Marcos Fermin MD  Pulmonary / Critical Care Medicine  [facility]

## 2020-08-25 NOTE — SUBJECTIVE & OBJECTIVE
Interval History:  More short of breath today.  More anxiety.  procalcitonin higher.    Review of Systems   Constitutional: Positive for fatigue. Negative for chills and fever.   Respiratory: Positive for shortness of breath. Negative for cough.    Cardiovascular: Negative for chest pain and leg swelling.   Gastrointestinal: Negative for abdominal pain, nausea and vomiting.     Objective:     Vital Signs (Most Recent):  Temp: 98.4 °F (36.9 °C) (08/24/20 2028)  Pulse: 102 (08/24/20 2028)  Resp: 18 (08/24/20 2028)  BP: (!) 179/87 (08/24/20 2028)  SpO2: 96 % (08/24/20 2028) Vital Signs (24h Range):  Temp:  [96.1 °F (35.6 °C)-98.5 °F (36.9 °C)] 98.4 °F (36.9 °C)  Pulse:  [] 102  Resp:  [12-22] 18  SpO2:  [93 %-100 %] 96 %  BP: (140-207)/() 179/87     Weight: 81.4 kg (179 lb 6.4 oz)  Body mass index is 30.79 kg/m².    Intake/Output Summary (Last 24 hours) at 8/24/2020 2228  Last data filed at 8/24/2020 1800  Gross per 24 hour   Intake 477 ml   Output --   Net 477 ml      Physical Exam  Vitals signs reviewed.   Constitutional:       General: She is in acute distress (mild).      Appearance: She is ill-appearing. She is not diaphoretic.   Eyes:      General:         Right eye: No discharge.         Left eye: No discharge.   Neck:      Vascular: No JVD.   Cardiovascular:      Rate and Rhythm: Regular rhythm. Tachycardia present.   Pulmonary:      Effort: Respiratory distress (mild) present. No tachypnea.      Breath sounds: Examination of the right-upper field reveals decreased breath sounds. Examination of the right-middle field reveals decreased breath sounds. Examination of the right-lower field reveals decreased breath sounds. Decreased breath sounds present. No rhonchi or rales.   Abdominal:      General: Bowel sounds are normal. There is no distension.      Palpations: Abdomen is soft.      Tenderness: There is no abdominal tenderness.   Skin:     General: Skin is warm.      Findings: No rash.    Neurological:      Mental Status: She is alert.         Significant Labs: All pertinent labs within the past 24 hours have been reviewed.    Significant Imaging: I have reviewed all pertinent imaging results/findings within the past 24 hours.

## 2020-08-25 NOTE — PROGRESS NOTES
8/25/2020   Progress Note  PULMONARY    Admit Date: 8/13/2020 08/25/2020      SUBJECTIVE:     Aug 17 ct with mass,    Feels weak, min ambulation, min appetite.    Seems much worse today at bedside    8/18 post bronch distress when coming out sedation.  Now bipap and icu.  8/19 looks much better this am, alert with min sleepiness (more her usual?) - denies sob.  8/20 breathing is good, looking to get out of bed, no c/o  8/21 no new c/o- was up in chair with min assistance.   8/24 air hungry climbing out of bed,   8/25 no new c/o- niv used last pm and helped. Poor appetite, feels better today          PFSH and Allergies reviewed.    OBJECTIVE:     Vitals (Most recent):  Vitals:    08/25/20 0707   BP:    Pulse: 80   Resp: 18   Temp:        Vitals (24 hour range):  Temp:  [96.2 °F (35.7 °C)-98.5 °F (36.9 °C)]   Pulse:  []   Resp:  [12-22]   BP: (143-207)/()   SpO2:  [93 %-100 %]       Intake/Output Summary (Last 24 hours) at 8/25/2020 0748  Last data filed at 8/24/2020 1800  Gross per 24 hour   Intake 237 ml   Output --   Net 237 ml          Physical Exam:  The patient's neuro status (alertness,orientation,cognitive function,motor skills,), pharyngeal exam (oral lesions, hygiene, abn dentition,), Neck (jvd,mass,thyroid,nodes in neck and above/below clavicle),RESPIRATORY(symmetry,effort,fremitus,percussion,auscultation),  Cor(rhythm,heart tones including gallops,perfusion,edema)ABD(distention,hepatic&splenomegaly,tenderness,masses), Skin(rash,cyanosis),Psyc(affect,judgement,).  Exam negative except for these pertinent findings:    aggitiated ,climbing out of bad, c/o cannot breathe abrupt onset.       Radiographs reviewed: view by direct vision -   cxr 8/24 opacified right lung stable.    8/18 cxr increased opacification.    ct with mass rul ant/post and rml,   No results found for this or any previous visit.]    Labs     Recent Labs   Lab 08/24/20  1419   WBC 25.08*   HGB 10.7*   HCT 35.5*   *      Recent Labs   Lab 08/24/20  1419      K 3.5   CL 97   CO2 31*   BUN 29*   CREATININE 1.3   *   CALCIUM 10.0   AST 20   ALT 39   ALKPHOS 81   BILITOT 0.2   PROT 6.7   ALBUMIN 2.5*   PROCAL 1.02*     Recent Labs   Lab 08/24/20  1354   PH 7.502*   PCO2 43.5   PO2 80   HCO3 34.1*     Microbiology Results (last 7 days)     Procedure Component Value Units Date/Time    Culture, Respiratory with Gram Stain [[id number]]  (Abnormal) Collected: 08/18/20 0826    Order Status: Completed Specimen: Respiratory from Bronchial Wash Updated: 08/21/20 1306     Respiratory Culture JAYY ALBICANS  Moderate        JAYY DUBLINIENSIS  Moderate        JAYY GLABRATA  Moderate       Gram Stain (Respiratory) <10 epithelial cells per low power field.     Gram Stain (Respiratory) Rare WBC's     Gram Stain (Respiratory) No organisms seen    AFB Culture & Smear [[id number]] Collected: 08/18/20 0826    Order Status: Completed Specimen: Respiratory from Bronchial Wash Updated: 08/19/20 2127     AFB Culture & Smear Culture in progress     AFB CULTURE STAIN No acid fast bacilli seen.    Blood culture x two cultures. Draw prior to antibiotics. [[id number]] Collected: 08/13/20 1906    Order Status: Completed Specimen: Blood from Antecubital, Right Updated: 08/19/20 1412     Blood Culture, Routine No growth after 5 days.    Narrative:      Aerobic and anaerobic    Blood culture x two cultures. Draw prior to antibiotics. [[id number]] Collected: 08/13/20 1906    Order Status: Completed Specimen: Blood from Antecubital, Left Updated: 08/19/20 1412     Blood Culture, Routine No growth after 5 days.    Narrative:      Aerobic and anaerobic    Culture, Respiratory with Gram Stain [[id number]] Collected: 08/18/20 0825    Order Status: Canceled Specimen: Respiratory from Bronchial Wash           Impression:  Active Hospital Problems    Diagnosis  POA    *Sepsis [A41.9]  Yes    Acute on chronic respiratory failure with hypoxia and  hypercapnia [J96.21, J96.22]  Yes    Small cell lung cancer, right [C34.91]  Yes    Moderate malnutrition [E44.0]  No    Opioid dependence [F11.20]  Yes    Iron deficiency anemia secondary to inadequate dietary iron intake [D50.8]  Yes    Pneumonia due to infectious organism [J18.9]  Yes    Acute cystitis without hematuria [N30.00]  Yes    Chronic obstructive pulmonary disease with acute lower respiratory infection [J44.0]  Yes      Resolved Hospital Problems    Diagnosis Date Resolved POA    NOELLE (acute kidney injury) [N17.9] 08/24/2020 Yes    Hypokalemia [E87.6] 08/24/2020 Yes    Hypocalcemia [E83.51] 08/21/2020 Yes               Plan:     8/17 temp max 99,   Temp better but pt not clearly better, large tumor apparent, need brain scan, will try to bronch in am.    Needs mobilized. Will stop xanax.     8/18 off xanax and still sedate, ct head ok - no contrast.    abg marginal on niv.      Discussed with daughter Larissa, called [place]/home numbers earlier with no answer.      Addendum-pt in icu now, mild sob reported off niv- no headache, some asterixis.  Will check abg, dose steroids, use niv.  2 daughthers - Larissa and Lorelei - are in room. Details of pneumonia and cancer discussed.  Would ask onc eval.  May need consideration xrt- not typically done in pt.  May need intubation but cancer seems to be major underlying dz 5 days into pneumonia rx.         8/19 - t to 99.7 post bronch - not ususual.    Will f/u cbc/cmp/abg  Was anxious when niv removed last pm - got ativan- chronic substance use on suboxone.    No organism on gram stain.     8/20 progressing wrt appearance, bronch bx/cytology in process this am.  No  Growth bronch wash.  Creat 1.6.  Labs stable,need therapy.  onc seeing.  Will resume xanax prn.        8/21 pt's pneumonia responding well.  Most airways right lung occluded - likely causing pneumonia and resp failure.  Debility not too bad and will not recover well if airways not restored.  From  pulmonary perspective need rx to open airways.  Chemo risk with pneumonia seems small, xrt alternative.  Will check renal u/s. Would defer sm cell lung ca rx plans to Dr Acuña.  Not using niv, could go to floor.  Will stop xanax as ppt sleepiness even at low dose.    Discussed with daughter Larissa.  8/24 oncology looking for chemo post recovery,     Wbc 21 k on steroids, u/s kidney with min right abn,  Creat 1.3,     Go to prednisone 10 bid from 40/d solumedrol- but pt developed marked increase sob- will increase steroids to q 6 40.      Pt worsened- will f/u cbc/procal.  Zosyn changed to rocephin on 21st.     Pt seems unstable- may not progress without opening of airways.  Marked distress at bedside now for no apparent reason.  Suggest rx to open airways asap.    8/25 cxr  Opacified rml/rll and part of lower rul, wbc still 25k   procal 1 on 24th, was 0.6 on 13th- rocephin changed to zosyn yesterday..    creat 1.3 now.    Needs niv. Marginal.recommend rx cancer soon.

## 2020-08-25 NOTE — PROGRESS NOTES
[facility] Hospital Medicine  Progress Note    Patient Name: Kendra Lawrence  MRN: [id number]  Patient Class: IP- Inpatient   Admission Date: 8/13/2020  Length of Stay: 11 days  Attending Physician: Glenroy Latham MD  Primary Care Provider: Louie Urena MD        Subjective:     Principal Problem:Sepsis        HPI:  Kendra Lawernce is a 64 y.o. female with a PMHx of COPD, opioid dependence, HTN, CAD, and GERD who presents to the ED with complaints of SOB x10 days. The patient reports SOB that has been constant and became progressively worse over the last 2 days. She reports exerting herself makes it worse and nothing makes it better. She endorses associated fatigue, chills, and right chest wall tightness. The patient denies any fever, CP, cough, congestion, abdominal pain, vomiting, diarrhea, dizziness, weakness, or dysuria. The patient states that she currently smokes e-cigarettes. She denies any known sick contacts. Her ED work up is significant for tachycardia, fever, tachypnea, hypoxia, leukocytosis, nitrite positive UTI, and CXR revealing dense consolidation within the right mid to lower lung consistent with pneumonia. Sepsis protocol was initiated in the ED and the patient received IV fluids, vancomycin, and zosyn. The patient will be admitted under hospital medicine for further work up and evaluation.     Overview/Hospital Course:  Patient monitored closely during hospitalization.  She was was admitted with pneumonia and COPD exacerbation. She was  initiated on COPD pathway including scheduled DuoNebs, IV antibiotics and steroids.  She was noted to have UTI + Abnormal   KLEBSIELLA PNEUMONIAE. Pulmonology consulted and steroids were discontinued.  Patient with oxygen requirement.  She was noted to have worsening of respiratory status with desaturation of her to 97% on oxygen.  CT of the chest ordered and demonstrated Findings in the right middle lobe concerning for neoplasm, invading the  mediastinum.  Masslike pneumonia is included in the differential but less likely.  Probable postobstructive changes in the right upper and lower lobes Enlarged subcarinal lymph node, concerning for a metastatic node. Moderate, loculated-appearing right pleural effusion.  She was recommended for bronchoscopy p.r.n. she is status post bronch per Dr. Brothers on 08/18/2020.  Postprocedure she was noted to have acute respiratory failure requiring BiPAP.  She was transferred to ICU for closer monitoring.    Interval History:  More short of breath today.  More anxiety.  procalcitonin higher.    Review of Systems   Constitutional: Positive for fatigue. Negative for chills and fever.   Respiratory: Positive for shortness of breath. Negative for cough.    Cardiovascular: Negative for chest pain and leg swelling.   Gastrointestinal: Negative for abdominal pain, nausea and vomiting.     Objective:     Vital Signs (Most Recent):  Temp: 98.4 °F (36.9 °C) (08/24/20 2028)  Pulse: 102 (08/24/20 2028)  Resp: 18 (08/24/20 2028)  BP: (!) 179/87 (08/24/20 2028)  SpO2: 96 % (08/24/20 2028) Vital Signs (24h Range):  Temp:  [96.1 °F (35.6 °C)-98.5 °F (36.9 °C)] 98.4 °F (36.9 °C)  Pulse:  [] 102  Resp:  [12-22] 18  SpO2:  [93 %-100 %] 96 %  BP: (140-207)/() 179/87     Weight: 81.4 kg (179 lb 6.4 oz)  Body mass index is 30.79 kg/m².    Intake/Output Summary (Last 24 hours) at 8/24/2020 2228  Last data filed at 8/24/2020 1800  Gross per 24 hour   Intake 477 ml   Output --   Net 477 ml      Physical Exam  Vitals signs reviewed.   Constitutional:       General: She is in acute distress (mild).      Appearance: She is ill-appearing. She is not diaphoretic.   Eyes:      General:         Right eye: No discharge.         Left eye: No discharge.   Neck:      Vascular: No JVD.   Cardiovascular:      Rate and Rhythm: Regular rhythm. Tachycardia present.   Pulmonary:      Effort: Respiratory distress (mild) present. No tachypnea.      Breath  sounds: Examination of the right-upper field reveals decreased breath sounds. Examination of the right-middle field reveals decreased breath sounds. Examination of the right-lower field reveals decreased breath sounds. Decreased breath sounds present. No rhonchi or rales.   Abdominal:      General: Bowel sounds are normal. There is no distension.      Palpations: Abdomen is soft.      Tenderness: There is no abdominal tenderness.   Skin:     General: Skin is warm.      Findings: No rash.   Neurological:      Mental Status: She is alert.         Significant Labs: All pertinent labs within the past 24 hours have been reviewed.    Significant Imaging: I have reviewed all pertinent imaging results/findings within the past 24 hours.      Assessment/Plan:      * Sepsis  This patient does have evidence of infective focus  My overall impression is sepsis.  Antibiotics given-   Antibiotics (From admission, onward)    Start     Stop Route Frequency Ordered    08/24/20 2330  piperacillin-tazobactam 3.375 g in dextrose 5 % 50 mL IVPB (ready to mix system)      -- IV Every 6 hours (non-standard times) 08/24/20 2228        Procalcitonin level higher.  Ceftriaxone changed back to Zosyn today.    Procalcitonin   Date Value Ref Range Status   08/24/2020 1.02 (H) <0.25 ng/mL Final     Comment:     A concentration < 0.25 ng/mL represents a low risk bacterial   infection.  Procalcitonin may not be accurate among patients with localized   infection, recent trauma or major surgery, immunosuppressed state,   invasive fungal infection, renal dysfunction. Decisions regarding   initiation or continuation of antibiotic therapy should not be based   solely on procalcitonin levels.     08/13/2020 0.57 (H) <0.25 ng/mL Final     Comment:     A concentration < 0.25 ng/mL represents a low risk bacterial   infection.  Procalcitonin may not be accurate among patients with localized   infection, recent trauma or major surgery, immunosuppressed state,    invasive fungal infection, renal dysfunction. Decisions regarding   initiation or continuation of antibiotic therapy should not be based   solely on procalcitonin levels.         Acute on chronic respiratory failure with hypoxia and hypercapnia  Today she has more shortness of breath.  Pulmonologist ordered treatments accordingly.  Monitor oximetry readings.  Continue supplementing oxygen.  Monitor ABG's.    ABG  Recent Labs   Lab 08/24/20  1354   PH 7.502*   PO2 80   PCO2 43.5   HCO3 34.1*   BE 11           NOELLE (acute kidney injury)  Resolved.    Small cell lung cancer, right  Proven by biopsy during this admission.  Oncologist and pulmonologist following.  Plan for now is to delay treatment until after she completes physical therapy at [facility].    Moderate malnutrition   Nutrition consulted. Body mass index is 30.79 kg/m².. Encourage maximal PO intake. Diet supplementation ordered per nutrition approval. Will encourage PO and monitor closely for weight changes.    Iron deficiency anemia secondary to inadequate dietary iron intake  Patient's anemia is currently controlled. Has not received any PRBCs to date.. Etiology likely d/t iron def.  Current CBC reviewed-   Lab Results   Component Value Date    HGB 10.7 (L) 08/24/2020    HCT 35.5 (L) 08/24/2020     Monitor serial CBC and transfuse if patient becomes hemodynamically unstable, symptomatic or H/H drops below 7/21.       Opioid dependence  Continue home suboxone.  reviewed.    Hypokalemia   Resolved.    Chronic obstructive pulmonary disease with acute lower respiratory infection  Patient's COPD is uncontrolled due to continued dyspnea currently.  Patient is currently off COPD Pathway. Continue corticosteroid, inhalers, Antibiotics and Supplemental oxygen and monitor respiratory status closely.  Steroid dose increased today.    Acute cystitis without hematuria  Continue Zosyn.  Urine grew Klebsiella.    Pneumonia due to infectious organism  Clinically worse  today.  WBC higher.  Procal higher.  More SOB.  Changed ceftriaxone back to Zosyn today.    Antibiotics (From admission, onward)    Start     Stop Route Frequency Ordered    08/24/20 2330  piperacillin-tazobactam 3.375 g in dextrose 5 % 50 mL IVPB (ready to mix system)      -- IV Every 6 hours (non-standard times) 08/24/20 2228              VTE Risk Mitigation (From admission, onward)         Ordered     enoxaparin injection 40 mg  Every 24 hours      08/13/20 2217     IP VTE HIGH RISK PATIENT  Once      08/13/20 2217     Place sequential compression device  Until discontinued      08/13/20 2217                Discharge Planning   BRANDON: 8/19/2020     Code Status: Full Code   Is the patient medically ready for discharge?: No    Reason for patient still in hospital (select all that apply): Patient unstable, Patient trending condition and Treatment  Discharge Plan A: Home with family, Home Health                  Glenroy Latham MD  Department of Hospital Medicine   [facility]

## 2020-08-25 NOTE — CONSULTS
[facility]     Subjective:      PATIENT:   Kendra Lawrence  :    1955  MRN:    [id number]  Admit Date:    2020  DATE OF VISIT:  2020    Reason for Consult: Small Cell Lung CA or right lung    Patient ID: Kendra Lawrence is a 64 y.o. female PMHx of COPD, opioid dependence, HTN, CAD, and GERD who originally presented to the [facility] ED 2020 with complaints of SOB x10 days. The patient reported SOB that had been constant and became progressively worse over the last 2 days. She reported that exerting herself worsened SOB and there was nothing she could do to help her SOB.  She was noted to have worsening of respiratory status.  CT of the chest ordered and demonstrated findings in the right middle lobe concerning for neoplasm, invading the mediastinum. CT head WO contrast showed asymmetric prominence of the frontal horn/body of the right lateral ventricle with slight leftward bowing of the septum pellucidum. MRI head negative for metastatic disease. Postobstructive changes in the right upper and lower lobes. Enlarged subcarinal lymph node, concerning for a metastatic node. Moderate, loculated-appearing right pleural effusion.  Bronchoscopy performed on 2020 by Dr. Andrew Brothers and pathology coming back as small cell lung cancer. Pt was transferred today to [facility] to start inpatient chemotherapy with  CARBOPLATIN (AUC) + ETOPOSIDE Q3W. Pt states that she has an increase in SOB, wheezing and cough today. She is fatigued and has decreased appetite. She deniesfever/chills, N/V/D, melena, hematochezia, hemoptysis.    Oncology History   Small cell lung cancer, right   2020 Initial Diagnosis    Small cell lung cancer, right     2020 -  Chemotherapy    Treatment Summary   Plan Name: OP CARBOPLATIN (AUC) + ETOPOSIDE Q3W  Treatment Goal: Control  Status: Active  Start Date: 2020 (Planned)  End Date: 12/10/2020 (Planned)  Provider: Berna Acuña MD  Chemotherapy:  CARBOplatin (PARAPLATIN) 480 mg in sodium chloride 0.9% 250 mL chemo infusion, 480 mg (100 % of original dose 480.5 mg), Intravenous, Clinic/HOD 1 time, 0 of 6 cycles  Dose modification:   (original dose 480.5 mg, Cycle 1, Reason: Dose not tolerated)  etoposide (VEPESID) 100 mg/m2 = 192 mg in sodium chloride 0.9% 500 mL chemo infusion, 100 mg/m2 = 192 mg, Intravenous, Clinic/HOD 1 time, 0 of 6 cycles           Review of Systems   Constitutional: Positive for activity change, appetite change and fatigue. Negative for chills, fever and unexpected weight change.   HENT: Positive for dental problem. Negative for mouth sores and trouble swallowing.    Eyes: Negative for photophobia and visual disturbance.   Respiratory: Positive for cough, shortness of breath and wheezing. Negative for chest tightness and stridor.    Cardiovascular: Negative for chest pain and leg swelling.   Gastrointestinal: Negative for abdominal pain, constipation, diarrhea, nausea and vomiting.   Musculoskeletal: Negative for arthralgias, back pain and myalgias.   Skin: Negative for color change, pallor, rash and wound.   Neurological: Negative for syncope, speech difficulty and weakness.   Hematological: Negative for adenopathy. Does not bruise/bleed easily.   Psychiatric/Behavioral: Negative for agitation, behavioral problems, confusion, decreased concentration and dysphoric mood.        Past Medical History:   Diagnosis Date    Asthma     Cardiac angina     Chronic abdominal pain     Claustrophobia     COPD (chronic obstructive pulmonary disease)     Coronary artery disease     GERD (gastroesophageal reflux disease)     History of drug dependence     Hypertension     Hypocalcemia 8/13/2020       Family History   Problem Relation Age of Onset    Breast cancer Sister 55    Breast cancer Sister 50    Cancer Mother     Heart disease Mother     Hypertension Mother     Cancer Father        Past Surgical History:   Procedure Laterality  Date    BREAST BIOPSY Bilateral 20 yrs ago    benign    BRONCHOSCOPY N/A 2020    Procedure: Bronchoscopy- 730 or noon, floro not needed;  Surgeon: Andrew Brothers MD;  Location: [facility] ENDO;  Service: Endoscopy;  Laterality: N/A;     SECTION      CHOLECYSTECTOMY      ENDOSCOPIC ULTRASOUND OF UPPER GASTROINTESTINAL TRACT Left 2018    Procedure: ULTRASOUND, UPPER GI TRACT, ENDOSCOPIC;  Surgeon: Paras Negrete MD;  Location: [facility];  Service: Endoscopy;  Laterality: Left;    ESOPHAGOGASTRODUODENOSCOPY N/A 2018    Procedure: EGD (ESOPHAGOGASTRODUODENOSCOPY);  Surgeon: Paras Negrete MD;  Location: [facility];  Service: Endoscopy;  Laterality: N/A;    ESOPHAGOGASTRODUODENOSCOPY N/A 2018    Procedure: EGD (ESOPHAGOGASTRODUODENOSCOPY);  Surgeon: Paras Negrete MD;  Location: [facility];  Service: Endoscopy;  Laterality: N/A;    HYSTERECTOMY      MAGNETIC RESONANCE IMAGING N/A 2019    Procedure: MRI (Magnetic Resonance Imagine) needs anesthesia;  Surgeon: Raffy Charles MD;  Location: [facility];  Service: Anesthesiology;  Laterality: N/A;    OOPHORECTOMY      TEMPOROMANDIBULAR JOINT SURGERY      TONSILLECTOMY         Social History     Socioeconomic History    Marital status:      Spouse name: Not on file    Number of children: Not on file    Years of education: Not on file    Highest education level: Not on file   Occupational History    Not on file   Social Needs    Financial resource strain: Not on file    Food insecurity     Worry: Not on file     Inability: Not on file    Transportation needs     Medical: Not on file     Non-medical: Not on file   Tobacco Use    Smoking status: Current Every Day Smoker     Years: 52.00     Types: Vaping w/o nicotine    Smokeless tobacco: Never Used   Substance and Sexual Activity    Alcohol use: No     Frequency: Never    Drug use: Yes     Types: Hydrocodone    Sexual activity: Not on file  "  Lifestyle    Physical activity     Days per week: Not on file     Minutes per session: Not on file    Stress: Not on file   Relationships    Social connections     Talks on phone: Not on file     Gets together: Not on file     Attends Tenriism service: Not on file     Active member of club or organization: Not on file     Attends meetings of clubs or organizations: Not on file     Relationship status: Not on file   Other Topics Concern    Not on file   Social History Narrative    Not on file        albuterol-ipratropium  3 mL Nebulization Q4H WAKE    [START ON 8/26/2020] buprenorphine-naloxone  1 each Sublingual Daily    CARBOplatin (PARAPLATIN) chemo infusion (by AUC)  480 mg Intravenous 1 time in Clinic/HOD    [START ON 8/26/2020] DULoxetine  60 mg Oral Daily    enoxparin  40 mg Subcutaneous Q24H    famotidine  20 mg Oral BID    fluticasone furoate-vilanteroL  1 puff Inhalation Daily    [START ON 8/26/2020] hydroCHLOROthiazide  25 mg Oral Daily    [START ON 8/26/2020] losartan  100 mg Oral Daily    [START ON 8/26/2020] nicotine  1 patch Transdermal Daily    piperacillin-tazobactam (ZOSYN) IVPB  4.5 g Intravenous Q8H    [START ON 8/26/2020] predniSONE  40 mg Oral Daily           acetaminophen, amitriptyline, magnesium oxide, magnesium oxide, ondansetron, potassium chloride, potassium chloride    Review of patient's allergies indicates:  No Known Allergies  All medications and past history have been reviewed.    Objective:      Vitals:  BP (!) 158/82 (BP Location: Left leg, Patient Position: Lying)   Pulse 99   Temp 97.9 °F (36.6 °C) (Oral)   Resp 20   Ht 5' 4" (1.626 m)   Wt 74 kg (163 lb 2.3 oz)   LMP  (LMP Unknown)   SpO2 97%   Breastfeeding No   BMI 28.00 kg/m²    Body surface area is 1.83 meters squared.    Last 24 Hours:  No intake or output data in the 24 hours ending 08/25/20 1616  Weight Readings:  Wt Readings from Last 5 Encounters:   08/25/20 74 kg (163 lb 2.3 oz)   08/24/20 " 81.4 kg (179 lb 6.4 oz)   03/06/20 75.8 kg (167 lb 1.7 oz)   02/07/20 77.7 kg (171 lb 4.8 oz)   01/10/20 78.6 kg (173 lb 4.5 oz)     Physical Exam  Constitutional:       General: She is not in acute distress.     Appearance: Normal appearance. She is ill-appearing.   HENT:      Head: Normocephalic and atraumatic.      Right Ear: External ear normal.      Left Ear: External ear normal.      Nose: Nose normal. No congestion or rhinorrhea.   Eyes:      General:         Right eye: No discharge.         Left eye: No discharge.      Extraocular Movements: Extraocular movements intact.      Conjunctiva/sclera: Conjunctivae normal.      Pupils: Pupils are equal, round, and reactive to light.   Neck:      Musculoskeletal: Normal range of motion and neck supple. No neck rigidity.   Cardiovascular:      Rate and Rhythm: Normal rate and regular rhythm.      Heart sounds: Normal heart sounds. No murmur.   Pulmonary:      Breath sounds: No stridor. Wheezing present. No rhonchi or rales.   Abdominal:      General: Bowel sounds are normal. There is distension.      Palpations: Abdomen is soft.      Tenderness: There is no abdominal tenderness. There is no guarding.   Musculoskeletal: Normal range of motion.         General: No deformity.      Right lower leg: No edema.      Left lower leg: No edema.      Comments: Left PICC line in place.   Lymphadenopathy:      Cervical: No cervical adenopathy.   Skin:     General: Skin is warm and dry.      Coloration: Skin is pale.      Findings: No erythema or rash.   Neurological:      General: No focal deficit present.      Mental Status: She is alert and oriented to person, place, and time. Mental status is at baseline.      Cranial Nerves: No cranial nerve deficit.   Psychiatric:         Mood and Affect: Mood normal.         Behavior: Behavior normal.         Thought Content: Thought content normal.         Judgment: Judgment normal.         Labs:  Recent Labs   Lab 08/22/20  [id number]  08/23/20  0520 08/24/20  1419   RBC 3.81* 3.63* 3.91*   HGB 10.5* 9.9* 10.7*   HCT 35.2* 34.1* 35.5*   * 493* 508*   MCV 92 94 91     Recent Labs   Lab 08/23/20  0520 08/24/20  0502 08/24/20  1419    141 140   K 3.6 3.7 3.5    98 97   CO2 29 32* 31*   BUN 28* 27* 29*   CREATININE 1.5* 1.3 1.3   * 113* 181*   CALCIUM 9.5 9.5 10.0   ALKPHOS 76 74 81   PROT 6.1 6.1 6.7   ALBUMIN 2.2* 2.2* 2.5*   BILITOT 0.1 0.2 0.2   AST 16 14 20   ALT 40 36 39     All lab results and imaging results have been reviewed and discussed with the patient.     Assessment and Plan:      Small Cell Lung CA of right lung    Normocytic Anemia    Leukocytosis    -I have spoken with the patient regarding in patient chemotherapy. Chemotherapy education and consent completed on 08/21/2020 for VP16 days 1,2,3 every 21 days with carboplatin day 1 every 3 week cycle x 6 cycles. Medication will be mixed at [facility] and delivered to hospital in the morning. Pt needs to start chemotherapy ASAP.  I have spoken with patient about starting inpatient chemotherapy and answered all questions.   -Leukocytosis secondary to pneumonia.  -Anemia improving. Pt previous iron and TIBC showed low iron levels and pt was placed on ferous sulfate 325mg daily at [facility] and had improvement of anemia with treatment. Orders placed for ferous sulfate 325mg PO daily. Updated iron and tibc and ferritin placed to reassess levels.   -Consult with radiation oncology placed to see if patient would benefit from radiation therapy.  -Will follow along closely.    Sincerely,  Destin Valente PA-C    Note is available for collaborating MD; Dr. Acuña for review.    Electronically signed by: Destin Valente PA-C    POstobstructive pneumonia  SMall cell carcinoma is a chemosensitive malignancy and indicated at this time  Case discussed with Dr Brothers

## 2020-08-25 NOTE — RESPIRATORY THERAPY
08/24/20 1951   Patient Assessment/Suction   Level of Consciousness (AVPU) alert   Respiratory Effort Unlabored   All Lung Fields Breath Sounds wheezes, expiratory;coarse   PRE-TX-O2   O2 Device (Oxygen Therapy) nasal cannula   $ Is the patient on Low Flow Oxygen? Yes   Flow (L/min) 5   SpO2 99 %   Pulse Oximetry Type Intermittent   $ Pulse Oximetry - Multiple Charge Pulse Oximetry - Multiple   Pulse 100   Resp 20   Aerosol Therapy   $ Aerosol Therapy Charges Aerosol Treatment   Respiratory Treatment Status (SVN) given   Treatment Route (SVN) mask   Patient Position (SVN) HOB elevated   Post Treatment Assessment (SVN) breath sounds unchanged   Signs of Intolerance (SVN) none   Breath Sounds Post-Respiratory Treatment   Throughout All Fields Post-Treatment aeration increased   Post-treatment Heart Rate (beats/min) 100   Post-treatment Resp Rate (breaths/min) 20   Preset CPAP/BiPAP Settings   Mode Of Delivery Standby   $ Is patient using? No/refused   Reason patient is not wearing? Other (see comments)  (pt states when she goes to sleep, she will wear')

## 2020-08-25 NOTE — ASSESSMENT & PLAN NOTE
Patient's COPD is uncontrolled due to continued dyspnea currently.  Patient is currently off COPD Pathway. Continue corticosteroid, inhalers, Antibiotics and Supplemental oxygen and monitor respiratory status closely.  Steroid dose increased today.

## 2020-08-26 PROBLEM — C34.90 LUNG CANCER: Status: ACTIVE | Noted: 2020-01-01

## 2020-08-26 NOTE — ASSESSMENT & PLAN NOTE
Patient getting admitted for chemotherapy for small-cell carcinoma of the lung  Continue present treatment  Patient seen by pulmonologist and radiation oncology team

## 2020-08-26 NOTE — CONSULTS
Inpatient consult to Radiation Oncology  Consult performed by: Teodoro Swenson Jr., MD  Consult ordered by: EMA Conrad  [id number]  1955 8/26/2020  No referring provider defined for this encounter.    REASON FOR CONSULTATION: [id number] SCLC [facility]    TREATMENT GOAL: undetermined at present    HISTORY OF PRESENT ILLNESS:   64F with COPD, opioid dependence and multiple comorbidities presented to ED with SOB x 2weeks. CT C noted of 11.8 x 8.7 x 8.7 cm mass in the right middle lobe with invasion of the mediastinum, subcarinal lymphadenopathy, loculated right pleural effusion and trace left pleural effusion.  At that time CT of the head was clear with subsequent MRI of the brain without contrast noting no evidence of metastases.  Dr. Brothers took the patient for bronchoscopic biopsy that revealed small cell lung cancer.  She was transferred from [facility] to [facility] to begin inpatient chemotherapy.  She was seen by Dr. Fermin of pulmonology and remains on supplemental oxygen as well as antibiotics and steroids.      Dr. Acuña has begun carboplatin and etoposide with Q 3 weeks.  I am consulted for the role of radiotherapy.    Patient reports shortness of breath persist.  She denies chest pain.  She denies headache, vision changes, focal numbness or weakness.  She denies abdominal discomfort.  She reports good tolerance to 1st cycle systemic therapy.    Review of systems otherwise negative unless indicated in HPI.    Past Medical History:   Diagnosis Date    Asthma     Cardiac angina     Chronic abdominal pain     Claustrophobia     COPD (chronic obstructive pulmonary disease)     Coronary artery disease     GERD (gastroesophageal reflux disease)     History of drug dependence     Hypertension     Hypocalcemia 8/13/2020     Past Surgical History:   Procedure Laterality Date    BREAST BIOPSY Bilateral 20 yrs ago    benign    BRONCHOSCOPY N/A 8/18/2020    Procedure: Bronchoscopy- 730 or noon,  yashira not needed;  Surgeon: Andrew Brothers MD;  Location: [facility] ENDO;  Service: Endoscopy;  Laterality: N/A;     SECTION      CHOLECYSTECTOMY      ENDOSCOPIC ULTRASOUND OF UPPER GASTROINTESTINAL TRACT Left 2018    Procedure: ULTRASOUND, UPPER GI TRACT, ENDOSCOPIC;  Surgeon: Paras Negrete MD;  Location: [facility];  Service: Endoscopy;  Laterality: Left;    ESOPHAGOGASTRODUODENOSCOPY N/A 2018    Procedure: EGD (ESOPHAGOGASTRODUODENOSCOPY);  Surgeon: Paras Negrete MD;  Location: [facility];  Service: Endoscopy;  Laterality: N/A;    ESOPHAGOGASTRODUODENOSCOPY N/A 2018    Procedure: EGD (ESOPHAGOGASTRODUODENOSCOPY);  Surgeon: Paras Negrete MD;  Location: [facility];  Service: Endoscopy;  Laterality: N/A;    HYSTERECTOMY      MAGNETIC RESONANCE IMAGING N/A 2019    Procedure: MRI (Magnetic Resonance Imagine) needs anesthesia;  Surgeon: Raffy Charles MD;  Location: [facility];  Service: Anesthesiology;  Laterality: N/A;    OOPHORECTOMY      TEMPOROMANDIBULAR JOINT SURGERY      TONSILLECTOMY       Social History     Socioeconomic History    Marital status:      Spouse name: Not on file    Number of children: Not on file    Years of education: Not on file    Highest education level: Not on file   Occupational History    Not on file   Social Needs    Financial resource strain: Not on file    Food insecurity     Worry: Not on file     Inability: Not on file    Transportation needs     Medical: Not on file     Non-medical: Not on file   Tobacco Use    Smoking status: Current Every Day Smoker     Years: 52.00     Types: Vaping w/o nicotine    Smokeless tobacco: Never Used   Substance and Sexual Activity    Alcohol use: No     Frequency: Never    Drug use: Yes     Types: Hydrocodone    Sexual activity: Not on file   Lifestyle    Physical activity     Days per week: Not on file     Minutes per session: Not on file    Stress: Not on file    Relationships    Social connections     Talks on phone: Not on file     Gets together: Not on file     Attends Latter day service: Not on file     Active member of club or organization: Not on file     Attends meetings of clubs or organizations: Not on file     Relationship status: Not on file   Other Topics Concern    Not on file   Social History Narrative    Not on file     Family History   Problem Relation Age of Onset    Breast cancer Sister 55    Breast cancer Sister 50    Cancer Mother     Heart disease Mother     Hypertension Mother     Cancer Father        PRIOR HISTORY OF CHEMOTHERAPY OR RADIOTHERAPY: Please see HPI for patients prior oncologic history.    Current Discharge Medication List      CONTINUE these medications which have NOT CHANGED    Details   albuterol-ipratropium (DUO-NEB) 2.5 mg-0.5 mg/3 mL nebulizer solution Take 3 mLs by nebulization every 4 (four) hours. Rescue  Qty: 1 Box, Refills: 0      alendronate (FOSAMAX) 70 MG tablet TAKE ONE TABLET BY MOUTH ONCE EVERY 7 DAYS  Qty: 12 tablet, Refills: 3    Associated Diagnoses: Osteoporosis, unspecified osteoporosis type, unspecified pathological fracture presence      amitriptyline (ELAVIL) 10 MG tablet TAKE ONE TABLET BY MOUTH AT BEDTIME AS NEEDED FOR INSOMNIA  Qty: 30 tablet, Refills: 1      buprenorphine-naloxone (SUBOXONE) 8-2 mg Film Place 1 packet (1 each total) under the tongue 2 (two) times daily.  Qty: 60 packet, Refills: 0    Associated Diagnoses: Narcotic dependence      DULoxetine (CYMBALTA) 60 MG capsule TAKE ONE CAPSULE BY MOUTH ONCE A DAY  Qty: 30 capsule, Refills: 10    Associated Diagnoses: Anxiety and depression      hydroCHLOROthiazide (HYDRODIURIL) 25 MG tablet TAKE ONE TABLET BY MOUTH EVERY DAY  Qty: 90 tablet, Refills: 1      losartan (COZAAR) 100 MG tablet TAKE ONE TABLET BY MOUTH ONCE A DAY  Qty: 90 tablet, Refills: 0      nitroGLYCERIN (NITROSTAT) 0.4 MG SL tablet Place 0.4 mg under the tongue every 5 (five)  minutes as needed for Chest pain.      omeprazole (PRILOSEC) 40 MG capsule Take 1 capsule by mouth 2 (two) times daily.  Refills: 5      ondansetron (ZOFRAN) 4 MG tablet TAKE ONE TABLET BY MOUTH EVERY 8 HOURS AS NEEDED FOR NAUSEA  Qty: 50 tablet, Refills: 5    Associated Diagnoses: Non-intractable vomiting with nausea, unspecified vomiting type      senna-docusate 8.6-50 mg (SENNA WITH DOCUSATE SODIUM) 8.6-50 mg per tablet Take 2 tablets by mouth 2 (two) times daily.  Qty: 60 tablet, Refills: 5    Associated Diagnoses: Drug-induced constipation      TRELEGY ELLIPTA 100-62.5-25 mcg DsDv INHALE 1 PUFF INTO THE LUNGS NIGHTLY  Qty: 60 each, Refills: 11    Associated Diagnoses: Chronic obstructive pulmonary disease, unspecified COPD type         STOP taking these medications       amoxicillin-clavulanate 875-125mg (AUGMENTIN) 875-125 mg per tablet Comments:   Reason for Stopping:         ferrous sulfate 325 (65 FE) MG EC tablet Comments:   Reason for Stopping:             Review of patient's allergies indicates:  No Known Allergies    QUALITY OF LIFE: 40% (inpatient)    Vitals:    08/25/20 2314 08/26/20 0358 08/26/20 0703 08/26/20 0824   BP: (!) 165/83 (!) 158/91 (!) 145/94    Pulse: 80 80 77 93   Resp: 18 18 17 18   Temp: 98.3 °F (36.8 °C) 97.7 °F (36.5 °C) 97.7 °F (36.5 °C)    TempSrc: Oral Oral Oral    SpO2: 97% 97% 98% 96%   Weight:       Height:         Body mass index is 28 kg/m².    PHYSICAL EXAM:   GENERAL: alert; in no apparent distress.   HEAD: normocephalic, atraumatic.  EYES: pupils are equal, round, reactive to light and accommodation. Sclera anicteric. Conjunctiva not injected.   NOSE/THROAT: no nasal erythema or rhinorrhea. Oropharynx pink, without erythema, ulcerations or thrush. Poor dentition  NECK: no cervical motion rigidity; supple with no masses.  CHEST:  On O2 by nasal cannula with normal work of breathing  CARDIOVASCULAR: regular rate and rhythm  ABDOMEN: soft, nontender, nondistended.    MUSCULOSKELETAL:  Normal range of motion.  NEUROLOGIC: cranial nerves II-XII intact bilaterally. Strength 5/5 in bilateral upper and lower extremities. No sensory deficits appreciated.   EXTREMITIES: + clubbing; no cyanosis, edema.  SKIN:  Senile purpura    REVIEW OF IMAGING/PATHOLOGY/LABS: Please see HPI. All images reviewed personally by dictating physician.     ASSESSMENT: 64 y.o. female with stage [id number] SCLC RML, begun carbo/VP16.  PLAN:  Kendra Lawrence presents with history as above.    1. Inpatient management per hospitalist team/Medical Oncology.  2. Respiratory status stable on supplemental oxygen, antibiotics and systemic therapy.  No emergent inpatient radiotherapy indicated.  3. Recommend completion staging CT abdomen pelvis. May also benefit from sensitivity of outpatient PET-CT scan.  4. Assuming limited stage small cell lung cancer, recommended initiation of radiotherapy at cycle 2 concurrent with carboplatin/ 16. Recommendation: 60-66Gy.  5. If completion staging detect extensive stage small cell lung cancer recommend completion of for 6 cycles of carboplatin/ 16 with re-evaluation imaging and consideration of consolidative radiotherapy.   6. Contact information provided and RN will arrange for outpatient follow-up to discuss RT details further closer to the initiation of cycle 2.     The patient has our contact information and understands that they are free to contact us at any time with questions or concerns regarding radiation therapy.    DISPOSITION: RTC AFTER FURTHER WORKUP    I have personally seen and evaluated this patient. Greater than 50% of this time was spent discussing coordination of care and/or counseling.    COVID-19 precautions discussed. Cancer Center policy for COVID-19 testing described.  Patient will be required to wear a mask when in the Cancer Center.    PHYSICIAN: Teodoro Swenson Jr, MD    Thank you for the opportunity to meet and consult with Kendra Lawrence.   Please feel  free to contact me to discuss the above recommendation further.

## 2020-08-26 NOTE — ASSESSMENT & PLAN NOTE
Acute on chronic respiratory failure with hypoxia and hypercapnia due to COPD/pneumonia/small cell carcinoma the lungs

## 2020-08-26 NOTE — HPI
64 year old patient transferred from [facility] for initiation of chemotherapy  Patient got admitted to [facility] 1 week ago with shortness of breath  Patient is a chronic active smoker and she was diagnosed with acute COPD exacerbation and possible obstructive pneumonia  Imaging studies showed lung mass and she underwent biopsy   Biopsy showed small cell carcinoma of the lung and patient was evaluated by oncologist  Plan was made to start patient on chemotherapy and eventually patient was transferred to this hospital  When seen in the floor today patient is comfortable and she denies any other issues

## 2020-08-26 NOTE — SUBJECTIVE & OBJECTIVE
Interval History:  Is    Review of Systems   Constitutional: Negative for activity change and appetite change.   HENT: Negative for congestion and dental problem.    Eyes: Negative for discharge and itching.   Respiratory: Positive for shortness of breath.    Cardiovascular: Negative for chest pain.   Gastrointestinal: Negative for abdominal distention and abdominal pain.   Endocrine: Negative for cold intolerance.   Genitourinary: Negative for difficulty urinating and dysuria.   Musculoskeletal: Negative for arthralgias and back pain.   Skin: Negative for color change.   Neurological: Negative for dizziness and facial asymmetry.   Hematological: Negative for adenopathy.   Psychiatric/Behavioral: Negative for agitation and behavioral problems.     Objective:     Vital Signs (Most Recent):  Temp: 97.7 °F (36.5 °C) (08/26/20 1140)  Pulse: 103 (08/26/20 1140)  Resp: 20 (08/26/20 1140)  BP: (!) 164/94 (08/26/20 1140)  SpO2: (!) 94 % (08/26/20 1140) Vital Signs (24h Range):  Temp:  [97.7 °F (36.5 °C)-98.5 °F (36.9 °C)] 97.7 °F (36.5 °C)  Pulse:  [] 103  Resp:  [17-20] 20  SpO2:  [94 %-99 %] 94 %  BP: (145-171)/(80-98) 164/94     Weight: 74 kg (163 lb 2.3 oz)  Body mass index is 28 kg/m².  No intake or output data in the 24 hours ending 08/26/20 1303   Physical Exam  Vitals signs and nursing note reviewed.   Constitutional:       General: She is not in acute distress.  HENT:      Head: Normocephalic.      Right Ear: External ear normal.      Left Ear: External ear normal.      Nose: Nose normal.      Mouth/Throat:      Mouth: Mucous membranes are moist.   Eyes:      Conjunctiva/sclera: Conjunctivae normal.   Neck:      Musculoskeletal: Neck supple.   Cardiovascular:      Rate and Rhythm: Normal rate.   Pulmonary:      Effort: Pulmonary effort is normal.   Abdominal:      General: There is no distension.   Musculoskeletal: Normal range of motion.   Skin:     General: Skin is warm.   Neurological:      Mental Status:  She is alert and oriented to person, place, and time.   Psychiatric:         Behavior: Behavior normal.         Significant Labs:   CBC:   Recent Labs   Lab 08/24/20  1419 08/26/20  0345   WBC 25.08* 21.11*   HGB 10.7* 10.1*   HCT 35.5* 33.4*   * 445*     CMP:   Recent Labs   Lab 08/24/20  1419 08/26/20  0345    135*   K 3.5 3.7   CL 97 92*   CO2 31* 32*   * 157*   BUN 29* 35*   CREATININE 1.3 1.3   CALCIUM 10.0 8.5*   PROT 6.7 5.9*   ALBUMIN 2.5* 2.6*   BILITOT 0.2 0.6   ALKPHOS 81 56   AST 20 17   ALT 39 31   ANIONGAP 12 11   EGFRNONAA 43* 42.9*       Significant Imaging: I have reviewed all pertinent imaging results/findings within the past 24 hours.

## 2020-08-26 NOTE — DISCHARGE SUMMARY
[facility] Hospital Medicine  Discharge Summary      Patient Name: Kendra Lawrence  MRN: [id number]  Admission Date: 8/13/2020  Hospital Length of Stay: 12 days  Discharge Date and Time: 8/25/2020  1:39 PM  Attending Physician: Yokasta att. providers found   Discharging Provider: Glenroy Latham MD  Primary Care Provider: Louie Urena MD      HPI:   Kendra Lawrence is a 64 y.o. female with a PMHx of COPD, opioid dependence, HTN, CAD, and GERD who presents to the ED with complaints of SOB x10 days. The patient reports SOB that has been constant and became progressively worse over the last 2 days. She reports exerting herself makes it worse and nothing makes it better. She endorses associated fatigue, chills, and right chest wall tightness. The patient denies any fever, CP, cough, congestion, abdominal pain, vomiting, diarrhea, dizziness, weakness, or dysuria. The patient states that she currently smokes e-cigarettes. She denies any known sick contacts. Her ED work up is significant for tachycardia, fever, tachypnea, hypoxia, leukocytosis, nitrite positive UTI, and CXR revealing dense consolidation within the right mid to lower lung consistent with pneumonia. Sepsis protocol was initiated in the ED and the patient received IV fluids, vancomycin, and zosyn. The patient will be admitted under hospital medicine for further work up and evaluation.     Procedure(s) (LRB):  Bronchoscopy- 730 or noon, floro not needed (N/A)      Hospital Course:   Patient was admitted with diagnosis of sepsis due to acute cystitis and pneumonia.  She was treated with broad-spectrum antibiotics.  She continued to have shortness of breath, and required oxygen.  A CT scan of was done of the chest which showed a lung mass in the right upper lobe anterior and posterior, as well as the right middle lobe.  Also a pleural effusion on the same side.  We consulted with pulmonology.  Bronchoscopy was performed.  Airway washings and biopsies  were taken.  Pulmonologist commented that most of the airways in the right lung were occluded, and were likely causing pneumonia.  After the procedure, patient was very short of breath and was unable to speak due to dyspnea.  She was using accessory muscles and was on 100% non-rebreather.  She was then monitored in the ICU and placed on noninvasive positive-pressure ventilation.  She began to have less dyspnea.  We consulted with Oncology.  The biopsies from the bronch came back positive for small cell lung cancer.  Oncology spoke with patient and educated her about chemotherapy.  They planned on giving  16 every 21 days, in addition to carboplatin, again every 21 days.  Patient understood the risks and the benefits of therapy, and she agreed.  Pulmonology and the hospitalist decided that physical therapy and physical rehabilitation would be most ideal prior to beginning chemotherapy.  Oncology also stated that the patient would need a PET CT as an outpatient for staging.  The pneumonia would have to be fully treated prior to initiating chemotherapy.  We sent referrals to skilled nursing facilities.  At the same time the patient was suffering from acute kidney injury.  She was given intravenous fluids, which helped to bring her creatinine down to 1.3 from a peak of 1.6.  On day one of admission, her creatinine was 0.8.  She was improving from a respiratory standpoint, but then sometime after getting out of ICU, she began having shortness of breath again.  Antibiotics were changed.  BiPAP was used at night while sleeping.  A new chest x-ray was done which showed opacification of the right middle lobe and right lower lobe and part of the right upper lobe.  Procalcitonin level increased.  Oncology decided to start chemotherapy immediately in order to give the patient the best chance of recovery.  She was transferred to [facility] in order to receive the chemo.    Physical Exam  Vitals signs reviewed.    Constitutional:       General: She is in acute distress (mild).      Appearance: She is ill-appearing. She is not diaphoretic.   Eyes:      General:         Right eye: No discharge.         Left eye: No discharge.   Neck:      Vascular: No JVD.   Cardiovascular:      Rate and Rhythm: Regular rhythm. Tachycardia present.   Pulmonary:      Effort: Respiratory distress (mild) present. No tachypnea.      Breath sounds: Examination of the right-upper field reveals decreased breath sounds. Examination of the right-middle field reveals decreased breath sounds. Examination of the right-lower field reveals decreased breath sounds. Decreased breath sounds present. No rhonchi or rales.      Consults:   Consults (From admission, onward)        Status Ordering Provider     Inpatient consult to Oncology  Once     Provider:  Destin Valente PA-C    Completed CHRIS FLOREZ     Inpatient consult to Pulmonology  Once     Provider:  (Not yet assigned)    Completed RAMON KAY     Inpatient consult to Social Work/Case Management  Once     Provider:  (Not yet assigned)    Completed DANGELO NATION          No new Assessment & Plan notes have been filed under this hospital service since the last note was generated.  Service: Hospital Medicine    Final Active Diagnoses:    Diagnosis Date Noted POA    PRINCIPAL PROBLEM:  Sepsis [A41.9] 08/13/2020 Yes    Cancer [C80.1] 08/25/2020 Yes    Malignant neoplasm of upper lobe of right lung [C34.11]  No    Mass of middle lobe of right lung [R91.8]  No    Mediastinal lymphadenopathy [R59.0]  No    Acute on chronic respiratory failure with hypoxia and hypercapnia [J96.21, J96.22] 08/18/2020 Yes    Small cell lung cancer, right [C34.91] 08/17/2020 Yes    Moderate malnutrition [E44.0] 08/15/2020 No    Opioid dependence [F11.20] 08/14/2020 Yes    Iron deficiency anemia secondary to inadequate dietary iron intake [D50.8] 08/14/2020 Yes    Acute cystitis without  hematuria [N30.00] 08/13/2020 Yes    Chronic obstructive pulmonary disease with acute lower respiratory infection [J44.0] 08/13/2020 Yes      Problems Resolved During this Admission:    Diagnosis Date Noted Date Resolved POA    NOELLE (acute kidney injury) [N17.9] 08/17/2020 08/24/2020 Yes    Hypokalemia [E87.6] 08/13/2020 08/24/2020 Yes    Hypocalcemia [E83.51] 08/13/2020 08/21/2020 Yes       Discharged Condition: fair    Disposition: Another Health Care Inst*    Follow Up:  Follow-up Information     [facility] - INPATIENT.    Contact information:  [address] [facility] [id number]               Patient Instructions:      Diet Cardiac     Activity as tolerated       Significant Diagnostic Studies:   Lab Results   Component Value Date    WBC 25.08 (H) 08/24/2020    HGB 10.7 (L) 08/24/2020    HCT 35.5 (L) 08/24/2020    MCV 91 08/24/2020     (H) 08/24/2020       BMP  Lab Results   Component Value Date     08/24/2020    K 3.5 08/24/2020    CL 97 08/24/2020    CO2 31 (H) 08/24/2020    BUN 29 (H) 08/24/2020    CREATININE 1.3 08/24/2020    CALCIUM 10.0 08/24/2020    ANIONGAP 12 08/24/2020    ESTGFRAFRICA 49 (A) 08/24/2020    EGFRNONAA 43 (A) 08/24/2020     Creatinine   Date Value Ref Range Status   08/24/2020 1.3 0.5 - 1.4 mg/dL Final   08/24/2020 1.3 0.5 - 1.4 mg/dL Final   08/23/2020 1.5 (H) 0.5 - 1.4 mg/dL Final   08/22/2020 1.6 (H) 0.5 - 1.4 mg/dL Final   08/21/2020 1.6 (H) 0.5 - 1.4 mg/dL Final   08/20/2020 1.6 (H) 0.5 - 1.4 mg/dL Final   08/19/2020 1.5 (H) 0.5 - 1.4 mg/dL Final       Pending Diagnostic Studies:     Procedure Component Value Units Date/Time    EKG 12-lead [[id number]]     Order Status: Sent Lab Status: No result          Medications:  Reconciled Home Medications:      Medication List      START taking these medications    albuterol-ipratropium 2.5 mg-0.5 mg/3 mL nebulizer solution  Commonly known as: DUO-NEB  Take 3 mLs by nebulization every 4 (four) hours.  Rescue        CHANGE how you take these medications    alendronate 70 MG tablet  Commonly known as: FOSAMAX  TAKE ONE TABLET BY MOUTH ONCE EVERY 7 DAYS  What changed: See the new instructions.     amitriptyline 10 MG tablet  Commonly known as: ELAVIL  TAKE ONE TABLET BY MOUTH AT BEDTIME AS NEEDED FOR INSOMNIA  What changed:   · when to take this  · reasons to take this     ondansetron 4 MG tablet  Commonly known as: ZOFRAN  TAKE ONE TABLET BY MOUTH EVERY 8 HOURS AS NEEDED FOR NAUSEA  What changed:   · reasons to take this  · additional instructions     senna-docusate 8.6-50 mg 8.6-50 mg per tablet  Commonly known as: SENNA WITH DOCUSATE SODIUM  Take 2 tablets by mouth 2 (two) times daily.  What changed: how much to take     TRELEGY ELLIPTA 100-62.5-25 mcg Dsdv  Generic drug: fluticasone-umeclidin-vilanter  INHALE 1 PUFF INTO THE LUNGS NIGHTLY  What changed: See the new instructions.        CONTINUE taking these medications    buprenorphine-naloxone 8-2 mg Film  Commonly known as: SUBOXONE  Place 1 packet (1 each total) under the tongue 2 (two) times daily.     DULoxetine 60 MG capsule  Commonly known as: CYMBALTA  TAKE ONE CAPSULE BY MOUTH ONCE A DAY     hydroCHLOROthiazide 25 MG tablet  Commonly known as: HYDRODIURIL  TAKE ONE TABLET BY MOUTH EVERY DAY     losartan 100 MG tablet  Commonly known as: COZAAR  TAKE ONE TABLET BY MOUTH ONCE A DAY     nitroGLYCERIN 0.4 MG SL tablet  Commonly known as: NITROSTAT  Place 0.4 mg under the tongue every 5 (five) minutes as needed for Chest pain.     omeprazole 40 MG capsule  Commonly known as: PRILOSEC  Take 1 capsule by mouth 2 (two) times daily.            Indwelling Lines/Drains at time of discharge:   Lines/Drains/Airways     None                 Time spent on the discharge of patient: 37 minutes  Patient was seen and examined on the date of discharge and determined to be suitable for discharge.         Glenroy Latham MD  Department of Hospital Medicine  [facility] [place]

## 2020-08-26 NOTE — PLAN OF CARE
Medicare Outpatient Observation Notice was signed, explained and given to patient/caregiver on 08/26/2020 at 9:31am

## 2020-08-26 NOTE — H&P
[facility] Medicine  History & Physical    Patient Name: Kendra Lawrence  MRN: [id number]  Admission Date: 2020  Attending Physician: Nazario Wolfe MD   Primary Care Provider: Louie Urena MD         Patient information was obtained from patient and ER records.     Subjective:     Principal Problem:Small cell lung cancer, right    Chief Complaint: No chief complaint on file.       HPI: 64 year old patient transferred from [facility] for initiation of chemotherapy  Patient got admitted to [place] 1 week ago with shortness of breath  Patient is a chronic active smoker and she was diagnosed with acute COPD exacerbation and possible obstructive pneumonia  Imaging studies showed lung mass and she underwent biopsy   Biopsy showed small cell carcinoma of the lung and patient was evaluated by oncologist  Plan was made to start patient on chemotherapy and eventually patient was transferred to this hospital  When seen in the floor today patient is comfortable and she denies any other issues    Past Medical History:   Diagnosis Date    Asthma     Cardiac angina     Chronic abdominal pain     Claustrophobia     COPD (chronic obstructive pulmonary disease)     Coronary artery disease     GERD (gastroesophageal reflux disease)     History of drug dependence     Hypertension     Hypocalcemia 2020       Past Surgical History:   Procedure Laterality Date    BREAST BIOPSY Bilateral 20 yrs ago    benign    BRONCHOSCOPY N/A 2020    Procedure: Bronchoscopy- 730 or noon, floro not needed;  Surgeon: Andrew Brothers MD;  Location: [facility];  Service: Endoscopy;  Laterality: N/A;     SECTION      CHOLECYSTECTOMY      ENDOSCOPIC ULTRASOUND OF UPPER GASTROINTESTINAL TRACT Left 2018    Procedure: ULTRASOUND, UPPER GI TRACT, ENDOSCOPIC;  Surgeon: Paras Negrete MD;  Location: [facility];  Service: Endoscopy;  Laterality: Left;    ESOPHAGOGASTRODUODENOSCOPY N/A  12/18/2018    Procedure: EGD (ESOPHAGOGASTRODUODENOSCOPY);  Surgeon: Paras Negrete MD;  Location: [facility] ENDO;  Service: Endoscopy;  Laterality: N/A;    ESOPHAGOGASTRODUODENOSCOPY N/A 12/21/2018    Procedure: EGD (ESOPHAGOGASTRODUODENOSCOPY);  Surgeon: Paras Negrete MD;  Location: [facility] ENDO;  Service: Endoscopy;  Laterality: N/A;    HYSTERECTOMY      MAGNETIC RESONANCE IMAGING N/A 2/19/2019    Procedure: MRI (Magnetic Resonance Imagine) needs anesthesia;  Surgeon: Raffy Charles MD;  Location: [facility];  Service: Anesthesiology;  Laterality: N/A;    OOPHORECTOMY      TEMPOROMANDIBULAR JOINT SURGERY      TONSILLECTOMY         Review of patient's allergies indicates:  No Known Allergies    Current Facility-Administered Medications on File Prior to Encounter   Medication    [DISCONTINUED] acetaminophen tablet 650 mg    [DISCONTINUED] albuterol-ipratropium 2.5 mg-0.5 mg/3 mL nebulizer solution 3 mL    [DISCONTINUED] albuterol-ipratropium 2.5 mg-0.5 mg/3 mL nebulizer solution 3 mL    [DISCONTINUED] ALPRAZolam tablet 0.25 mg    [DISCONTINUED] buprenorphine-naloxone 8-2 mg Film 1 each    [DISCONTINUED] cefTRIAXone (ROCEPHIN) 1 g/50 mL D5W IVPB    [DISCONTINUED] dextrose 50% injection 12.5 g    [DISCONTINUED] dextrose 50% injection 25 g    [DISCONTINUED] diphenoxylate-atropine 2.5-0.025 mg per tablet 1 tablet    [DISCONTINUED] DULoxetine DR capsule 60 mg    [DISCONTINUED] enoxaparin injection 40 mg    [DISCONTINUED] ferrous sulfate EC tablet 325 mg    [DISCONTINUED] glucagon (human recombinant) injection 1 mg    [DISCONTINUED] glucose chewable tablet 16 g    [DISCONTINUED] glucose chewable tablet 24 g    [DISCONTINUED] hydrALAZINE injection 10 mg    [DISCONTINUED] hydroCHLOROthiazide tablet 25 mg    [DISCONTINUED] ketamine (KETALAR) 100 mg/mL injection    [DISCONTINUED] lidocaine (PF) 10 mg/ml (1%) 10 mg/mL (1 %) injection    [DISCONTINUED] lidocaine (PF) 20 mg/mL (2%) 20 mg/mL  (2 %) injection    [DISCONTINUED] lidocaine (PF) 40 mg/mL (4 %) injection    [DISCONTINUED] lidocaine HCl 2% (XYLOCAINE) 2 % urojet    [DISCONTINUED] lidocaine-EPINEPHrine (PF) 1%-1:200,000 1 %-1:200,000 injection    [DISCONTINUED] losartan tablet 100 mg    [DISCONTINUED] magnesium oxide tablet 800 mg    [DISCONTINUED] magnesium oxide tablet 800 mg    [DISCONTINUED] methylPREDNISolone sodium succinate injection 40 mg    [DISCONTINUED] multivitamin tablet    [DISCONTINUED] nicotine 14 mg/24 hr 1 patch    [DISCONTINUED] ondansetron injection 8 mg    [DISCONTINUED] oxymetazoline (AFRIN) 0.05 % nasal spray    [DISCONTINUED] pantoprazole EC tablet 40 mg    [DISCONTINUED] piperacillin-tazobactam 3.375 g in dextrose 5 % 50 mL IVPB (ready to mix system)    [DISCONTINUED] potassium chloride packet 40 mEq    [DISCONTINUED] potassium chloride packet 40 mEq    [DISCONTINUED] potassium chloride packet 60 mEq    [DISCONTINUED] promethazine (PHENERGAN) 25 mg in dextrose 5 % 50 mL IVPB    [DISCONTINUED] senna-docusate 8.6-50 mg per tablet 1 tablet    [DISCONTINUED] sodium chloride 0.9% flush 10 mL     Current Outpatient Medications on File Prior to Encounter   Medication Sig    albuterol-ipratropium (DUO-NEB) 2.5 mg-0.5 mg/3 mL nebulizer solution Take 3 mLs by nebulization every 4 (four) hours. Rescue    alendronate (FOSAMAX) 70 MG tablet TAKE ONE TABLET BY MOUTH ONCE EVERY 7 DAYS (Patient taking differently: Take 70 mg by mouth every 7 days. Sunday)    amitriptyline (ELAVIL) 10 MG tablet TAKE ONE TABLET BY MOUTH AT BEDTIME AS NEEDED FOR INSOMNIA (Patient taking differently: Take 10 mg by mouth nightly as needed for Insomnia. )    buprenorphine-naloxone (SUBOXONE) 8-2 mg Film Place 1 packet (1 each total) under the tongue 2 (two) times daily.    DULoxetine (CYMBALTA) 60 MG capsule TAKE ONE CAPSULE BY MOUTH ONCE A DAY (Patient taking differently: Take 60 mg by mouth once daily. )    hydroCHLOROthiazide  (HYDRODIURIL) 25 MG tablet TAKE ONE TABLET BY MOUTH EVERY DAY (Patient taking differently: Take 25 mg by mouth once daily. )    losartan (COZAAR) 100 MG tablet TAKE ONE TABLET BY MOUTH ONCE A DAY (Patient taking differently: Take 100 mg by mouth once daily. )    nitroGLYCERIN (NITROSTAT) 0.4 MG SL tablet Place 0.4 mg under the tongue every 5 (five) minutes as needed for Chest pain.    omeprazole (PRILOSEC) 40 MG capsule Take 1 capsule by mouth 2 (two) times daily.    ondansetron (ZOFRAN) 4 MG tablet TAKE ONE TABLET BY MOUTH EVERY 8 HOURS AS NEEDED FOR NAUSEA (Patient taking differently: Take 4 mg by mouth every 8 (eight) hours as needed for Nausea. )    senna-docusate 8.6-50 mg (SENNA WITH DOCUSATE SODIUM) 8.6-50 mg per tablet Take 2 tablets by mouth 2 (two) times daily. (Patient taking differently: Take 1 tablet by mouth 2 (two) times daily. )    TRELEGY ELLIPTA 100-62.5-25 mcg DsDv INHALE 1 PUFF INTO THE LUNGS NIGHTLY (Patient taking differently: Inhale 1 puff into the lungs every evening. )    [DISCONTINUED] amoxicillin-clavulanate 875-125mg (AUGMENTIN) 875-125 mg per tablet Take 1 tablet by mouth every 12 (twelve) hours. for 7 days    [DISCONTINUED] ferrous sulfate 325 (65 FE) MG EC tablet Take 1 tablet (325 mg total) by mouth once daily.     Family History     Problem Relation (Age of Onset)    Breast cancer Sister (55), Sister (50)    Cancer Mother, Father    Heart disease Mother    Hypertension Mother        Tobacco Use    Smoking status: Current Every Day Smoker     Years: 52.00     Types: Vaping w/o nicotine    Smokeless tobacco: Never Used   Substance and Sexual Activity    Alcohol use: No     Frequency: Never    Drug use: Yes     Types: Hydrocodone    Sexual activity: Not on file     Review of Systems   Constitutional: Negative for activity change and appetite change.   HENT: Negative for congestion and dental problem.    Eyes: Negative for discharge and itching.   Respiratory: Positive for  shortness of breath.    Cardiovascular: Negative for chest pain.   Gastrointestinal: Negative for abdominal distention and abdominal pain.   Endocrine: Negative for cold intolerance.   Genitourinary: Negative for difficulty urinating and dysuria.   Musculoskeletal: Negative for arthralgias and back pain.   Skin: Negative for color change.   Neurological: Negative for dizziness and facial asymmetry.   Hematological: Negative for adenopathy.   Psychiatric/Behavioral: Negative for agitation and behavioral problems.     Objective:     Vital Signs (Most Recent):  Temp: 98.4 °F (36.9 °C) (08/25/20 2017)  Pulse: 90 (08/25/20 2017)  Resp: 20 (08/25/20 1934)  BP: (!) 154/85 (08/25/20 2017)  SpO2: 97 % (08/25/20 2017) Vital Signs (24h Range):  Temp:  [96 °F (35.6 °C)-98.5 °F (36.9 °C)] 98.4 °F (36.9 °C)  Pulse:  [] 90  Resp:  [16-20] 20  SpO2:  [93 %-99 %] 97 %  BP: (133-180)/(82-98) 154/85     Weight: 74 kg (163 lb 2.3 oz)  Body mass index is 28 kg/m².    Physical Exam  Vitals signs and nursing note reviewed.   Constitutional:       General: She is not in acute distress.  HENT:      Right Ear: External ear normal.      Left Ear: External ear normal.      Nose: Nose normal.      Mouth/Throat:      Mouth: Mucous membranes are moist.   Eyes:      Pupils: Pupils are equal, round, and reactive to light.   Neck:      Musculoskeletal: Neck supple.   Cardiovascular:      Rate and Rhythm: Normal rate.      Heart sounds: No murmur.   Pulmonary:      Effort: Pulmonary effort is normal.      Breath sounds: Normal breath sounds.   Abdominal:      General: Bowel sounds are normal.      Palpations: Abdomen is soft.   Musculoskeletal: Normal range of motion.   Skin:     General: Skin is warm.   Neurological:      Mental Status: She is alert and oriented to person, place, and time.   Psychiatric:         Mood and Affect: Mood normal.           CRANIAL NERVES     CN III, IV, VI   Pupils are equal, round, and reactive to light.        Significant Labs:   CBC:   Recent Labs   Lab 08/24/20  1419   WBC 25.08*   HGB 10.7*   HCT 35.5*   *     CMP:   Recent Labs   Lab 08/24/20  0502 08/24/20  1419    140   K 3.7 3.5   CL 98 97   CO2 32* 31*   * 181*   BUN 27* 29*   CREATININE 1.3 1.3   CALCIUM 9.5 10.0   PROT 6.1 6.7   ALBUMIN 2.2* 2.5*   BILITOT 0.2 0.2   ALKPHOS 74 81   AST 14 20   ALT 36 39   ANIONGAP 11 12   EGFRNONAA 43* 43*       Significant Imaging: I have reviewed all pertinent imaging results/findings within the past 24 hours.    Assessment/Plan:     * Small cell lung cancer, right  Patient getting admitted for chemotherapy for small-cell carcinoma of the lung  Consult oncology and chemo regimen per oncology team      Chronic obstructive pulmonary disease with acute lower respiratory infection  Continue DuoNebs and prednisone  Stable issue      Opioid dependence  Patient is on Suboxone twice daily  Continue      Acute on chronic respiratory failure with hypoxia and hypercapnia  Acute on chronic respiratory failure with hypoxia and hypercapnia due to COPD/pneumonia/small cell carcinoma the lungs      Obstructive pneumonia  Continue IV Zosyn  High white cell count mostly due to steroids      Leukocytosis  Mostly due to steroids with pneumonia      Malignant pleural effusion  Stable issue      Anemia  Monitor        VTE Risk Mitigation (From admission, onward)         Ordered     enoxaparin injection 40 mg  Every 24 hours (non-standard times)      08/25/20 1424                   Nazario Wolfe MD  Department of Hospital Medicine   [facility]

## 2020-08-26 NOTE — PLAN OF CARE
08/26/20 1327   Discharge Assessment   Assessment Type Discharge Planning Assessment   Confirmed/corrected address and phone number on facesheet? Yes   Assessment information obtained from? Patient;Caregiver;Medical Record   Communicated expected length of stay with patient/caregiver no   Prior to hospitilization cognitive status: Alert/Oriented   Prior to hospitalization functional status: Independent   Current cognitive status: Alert/Oriented   Current Functional Status: Independent   Facility Arrived From: [facility]   Lives With child(ramesh), adult   Able to Return to Prior Arrangements yes   Is patient able to care for self after discharge? Yes   Patient's perception of discharge disposition home or selfcare   Readmission Within the Last 30 Days unable to assess   Patient currently being followed by outpatient case management? No   Patient currently receives any other outside agency services? No   Equipment Currently Used at Home none   Do you have any problems affording any of your prescribed medications? No   Is the patient taking medications as prescribed? yes   Does the patient have transportation home? Yes   Transportation Anticipated family or friend will provide   Dialysis Name and Scheduled days n/a   Does the patient receive services at the [facility]? No   Discharge Plan A Home with family   Discharge Plan B Home with family   DME Needed Upon Discharge  oxygen   Patient/Family in Agreement with Plan yes   DTR at bedside during interview and stated she was not sending mom to nursing home.

## 2020-08-26 NOTE — ASSESSMENT & PLAN NOTE
Patient getting admitted for chemotherapy for small-cell carcinoma of the lung  Consult oncology and chemo regimen per oncology team

## 2020-08-26 NOTE — ASSESSMENT & PLAN NOTE
Acute on chronic respiratory failure with hypoxia and hypercapnia due to COPD/pneumonia/small cell carcinoma the lung

## 2020-08-26 NOTE — PROGRESS NOTES
[facility]  Pulmonology  Progress Note    Subjective     8/26/2020:  No major issues overnight.  Subjectively unchanged over the past 24 hours.  No new complaints.     Review of Systems   Unable to perform ROS  Constitutional: Positive for weight loss, activity change, appetite change, fatigue and weakness.   Respiratory: Positive for cough, sputum production, chest tightness, shortness of breath, wheezing and dyspnea on extertion. Negative for hemoptysis, orthopnea and pleurisy.    Cardiovascular: Negative for chest pain, palpitations and leg swelling.   Gastrointestinal: Negative for nausea, vomiting and abdominal pain.   Psychiatric/Behavioral: The patient is nervous/anxious.    All other systems reviewed and are negative.     I have personally reviewed the following during today's evaluation:  past medical history, ROS, family history, social history, surgical history, current inpatient medications,drug allergies, vital signs over the past 24 hours, results of relevant diagnostic studies and nursing/provider documentation from the past 24 hours.     Objective     VS Temp:  [97.7 °F (36.5 °C)-98.5 °F (36.9 °C)]   Pulse:  []   Resp:  [17-20]   BP: (133-171)/(80-98)   SpO2:  [95 %-99 %]   Ideal body weight: 54.7 kg (120 lb 9.5 oz)  Adjusted ideal body weight: 62.4 kg (137 lb 9.8 oz)   I/O No intake or output data in the 24 hours ending 08/26/20 1131     Vent SpO2 98% on 3L NC   PE Physical Exam   Constitutional: She is oriented to person, place, and time. She appears well-developed and well-nourished. She is cooperative.  Non-toxic appearance. No distress. Nasal cannula in place.   HENT:   Head: Normocephalic.   Right Ear: External ear normal.   Left Ear: External ear normal.   Nose: Nose normal.   Mouth/Throat: Oropharynx is clear and moist. Abnormal dentition. No oropharyngeal exudate. Mallampati Score: II.   Neck: Normal range of motion. Neck supple. No JVD present. No thyromegaly present.    Cardiovascular: Normal rate, regular rhythm, normal heart sounds and intact distal pulses. Exam reveals no gallop and no friction rub.   No murmur heard.  Pulmonary/Chest: Normal expansion, hyperinflation and effort normal. She has decreased breath sounds (over the right base). She has wheezes. She has no rhonchi. She has no rales.   Abdominal: Soft. Bowel sounds are normal. She exhibits no distension and no mass. There is no hepatosplenomegaly. There is no abdominal tenderness.   Musculoskeletal: Normal range of motion.         General: No tenderness, deformity or edema.   Lymphadenopathy: No supraclavicular adenopathy is present.     She has no cervical adenopathy.     She has no axillary adenopathy.   Neurological: She is alert and oriented to person, place, and time. No cranial nerve deficit.   Skin: Skin is warm and dry. No rash noted. No cyanosis. Nails show no clubbing.   Psychiatric: She has a normal mood and affect. Her behavior is normal. Thought content normal.   Nursing note and vitals reviewed.        Labs I have personally reviewed and interpreted all labs / diagnostic studies obtained over the past 24 hours, and relevant results are as follows:  Recent Labs   Lab 08/26/20  0345   WBC 21.11*   RBC 3.73*   HGB 10.1*   HCT 33.4*   *   MCV 90   MCH 27.1   MCHC 30.2*   *   K 3.7   CL 92*   CO2 32*   BUN 35*   CREATININE 1.3   MG 1.8   ALT 31   AST 17   ALKPHOS 56   BILITOT 0.6   PROT 5.9*   ALBUMIN 2.6*      Imaging I have personally reviewed and interpreted the following images and reviewed the associated Radiology report.  I have reviewed and interpreted all pertinent imaging results/findings within the past 24 hours.  CXR:  8/25/2020:  · Left PICC line with the tip overlying the atrial caval junction.  There is no pneumothorax.   · Persistent right pleural effusion with right middle and lower lobe airspace disease.     Micro I have personally reviewed and interpreted the available culture  data.  Relevant results are as follows.  Blood Culture   Lab Results   Component Value Date    LABBLOO No growth after 5 days. 08/13/2020    LABBLOO No growth after 5 days. 08/13/2020   , Sputum Culture   Lab Results   Component Value Date    GSRESP <10 epithelial cells per low power field. 08/18/2020    GSRESP Rare WBC's 08/18/2020    GSRESP No organisms seen 08/18/2020    RESPIRATORYC JAYY ALBICANS  Moderate   (A) 08/18/2020    RESPIRATORYC JAYY DUBLINIENSIS  Moderate   (A) 08/18/2020    RESPIRATORYC JAYY GLABRATA  Moderate   (A) 08/18/2020    and Urine Culture    Lab Results   Component Value Date    LABURIN KLEBSIELLA PNEUMONIAE  >100,000 cfu/ml   (A) 08/13/2020      Medications Scheduled    albuterol-ipratropium  3 mL Nebulization Q4H WAKE    buprenorphine-naloxone  1 each Sublingual BID    DULoxetine  60 mg Oral Daily    enoxparin  40 mg Subcutaneous Q24H    famotidine  20 mg Oral BID    ferrous sulfate  325 mg Oral Daily    fluticasone furoate-vilanteroL  1 puff Inhalation Daily    hydroCHLOROthiazide  25 mg Oral Daily    losartan  100 mg Oral Daily    nicotine  1 patch Transdermal Daily    piperacillin-tazobactam (ZOSYN) IVPB  4.5 g Intravenous Q8H    predniSONE  40 mg Oral Daily      Continuous Infusions:      PRN   acetaminophen, amitriptyline, magnesium oxide, magnesium oxide, ondansetron, potassium chloride, potassium chloride        Assessment       Active Hospital Problems    Diagnosis    *Small cell lung cancer, right    Obstructive pneumonia    Malignant pleural effusion    Anemia    Leukocytosis    Acute on chronic respiratory failure with hypoxia and hypercapnia    Opioid dependence    Chronic obstructive pulmonary disease with acute lower respiratory infection      My Impression:  SCLC with a questionable component of post-obstructive pneumonia here for urgent initiation of chemotherapy.    Plan     Pulmonary  · Continue pip/tazo, aerosolized BDs and prednisone for  now.  · Heme-onc managing chemotherapy.  · Rad-Onc following.  Recommendations noted.  · Titrate supplemental oxygen to maintain SpO2 of 88-92%.  · Nightly/prn NIPPV.         Marcos Fermin MD  Pulmonary / Critical Care Medicine  [facility]

## 2020-08-26 NOTE — PROGRESS NOTES
[facility]     Subjective:      PATIENT:   Kendra Lawrence  :    1955  MRN:    [id number]  Admit Date:    2020  DATE OF VISIT:  2020    Reason for Consult: Small Cell Lung CA or right lung    Patient ID: Kendra Lawrence is a 64 y.o. female PMHx of COPD, opioid dependence, HTN, CAD, and GERD who originally presented to the [facility] ED 2020 with complaints of SOB x10 days. The patient reported SOB that had been constant and became progressively worse over the last 2 days. She reported that exerting herself worsened SOB and there was nothing she could do to help her SOB.  She was noted to have worsening of respiratory status.  CT of the chest ordered and demonstrated findings in the right middle lobe concerning for neoplasm, invading the mediastinum. CT head WO contrast showed asymmetric prominence of the frontal horn/body of the right lateral ventricle with slight leftward bowing of the septum pellucidum. MRI head negative for metastatic disease. Postobstructive changes in the right upper and lower lobes. Enlarged subcarinal lymph node, concerning for a metastatic node. Moderate, loculated-appearing right pleural effusion.  Bronchoscopy performed on 2020 by Dr. Andrew Brothers and pathology coming back as small cell lung cancer. Pt was transferred today to [facility] to start inpatient chemotherapy with  CARBOPLATIN (AUC) + ETOPOSIDE Q3W. Pt states that she has an increase in SOB, wheezing and cough today. She is fatigued and has decreased appetite. She deniesfever/chills, N/V/D, melena, hematochezia, hemoptysis.    Interval history:   2020: Pt stable on supplemental O2. Pt initiated on VP16 days 1,2,3 every 21 days with carboplatin day 1 every 3 week cycle x 6 cycles last night. Pt evaluated this morning by Dr. Teodoro Swenson rad/onc regarding possibility of radiation therapy for treatment of small cell carcinoma. She is seen at bedside and states that her SOB is  improving. Pt states that appetite is improving. She denies any new or increased symptoms of nausea, diarrhea, constipation or fatigue since initiating chemotherapy. She has no new complaints.     Oncology History   Small cell lung cancer, right   8/17/2020 Initial Diagnosis    Small cell lung cancer, right     8/25/2020 -  Chemotherapy    Treatment Summary   Plan Name: OP CARBOPLATIN (AUC) + ETOPOSIDE Q3W  Treatment Goal: Control  Status: Active  Start Date: 8/25/2020 (Planned)  End Date: 12/10/2020 (Planned)  Provider: Berna Acuña MD  Chemotherapy: CARBOplatin (PARAPLATIN) 480 mg in sodium chloride 0.9% 250 mL chemo infusion, 480 mg (100 % of original dose 480.5 mg), Intravenous, Clinic/HOD 1 time, 0 of 6 cycles  Dose modification:   (original dose 480.5 mg, Cycle 1, Reason: Dose not tolerated)  etoposide (VEPESID) 100 mg/m2 = 192 mg in sodium chloride 0.9% 500 mL chemo infusion, 100 mg/m2 = 192 mg, Intravenous, Clinic/HOD 1 time, 0 of 6 cycles           Review of Systems   Constitutional: Positive for activity change, appetite change and fatigue. Negative for chills, fever and unexpected weight change.   HENT: Positive for dental problem. Negative for mouth sores and trouble swallowing.    Eyes: Negative for photophobia and visual disturbance.   Respiratory: Positive for cough, shortness of breath and wheezing. Negative for chest tightness and stridor.    Cardiovascular: Negative for chest pain and leg swelling.   Gastrointestinal: Negative for abdominal pain, constipation, diarrhea, nausea and vomiting.   Musculoskeletal: Negative for arthralgias, back pain and myalgias.   Skin: Negative for color change, pallor, rash and wound.   Neurological: Negative for syncope, speech difficulty and weakness.   Hematological: Negative for adenopathy. Does not bruise/bleed easily.   Psychiatric/Behavioral: Negative for agitation, behavioral problems, confusion, decreased concentration and dysphoric mood.        Past  Medical History:   Diagnosis Date    Asthma     Cardiac angina     Chronic abdominal pain     Claustrophobia     COPD (chronic obstructive pulmonary disease)     Coronary artery disease     GERD (gastroesophageal reflux disease)     History of drug dependence     Hypertension     Hypocalcemia 2020       Family History   Problem Relation Age of Onset    Breast cancer Sister 55    Breast cancer Sister 50    Cancer Mother     Heart disease Mother     Hypertension Mother     Cancer Father        Past Surgical History:   Procedure Laterality Date    BREAST BIOPSY Bilateral 20 yrs ago    benign    BRONCHOSCOPY N/A 2020    Procedure: Bronchoscopy- 730 or noon, floro not needed;  Surgeon: Andrew Brothers MD;  Location: [facility] ENDO;  Service: Endoscopy;  Laterality: N/A;     SECTION      CHOLECYSTECTOMY      ENDOSCOPIC ULTRASOUND OF UPPER GASTROINTESTINAL TRACT Left 2018    Procedure: ULTRASOUND, UPPER GI TRACT, ENDOSCOPIC;  Surgeon: Paras Negrete MD;  Location: [facility];  Service: Endoscopy;  Laterality: Left;    ESOPHAGOGASTRODUODENOSCOPY N/A 2018    Procedure: EGD (ESOPHAGOGASTRODUODENOSCOPY);  Surgeon: Paras Negrete MD;  Location: [facility];  Service: Endoscopy;  Laterality: N/A;    ESOPHAGOGASTRODUODENOSCOPY N/A 2018    Procedure: EGD (ESOPHAGOGASTRODUODENOSCOPY);  Surgeon: Paras Negrete MD;  Location: [facility];  Service: Endoscopy;  Laterality: N/A;    HYSTERECTOMY      MAGNETIC RESONANCE IMAGING N/A 2019    Procedure: MRI (Magnetic Resonance Imagine) needs anesthesia;  Surgeon: Raffy Charles MD;  Location: [facility];  Service: Anesthesiology;  Laterality: N/A;    OOPHORECTOMY      TEMPOROMANDIBULAR JOINT SURGERY      TONSILLECTOMY         Social History     Socioeconomic History    Marital status:      Spouse name: Not on file    Number of children: Not on file    Years of education: Not on file    Highest  education level: Not on file   Occupational History    Not on file   Social Needs    Financial resource strain: Not on file    Food insecurity     Worry: Not on file     Inability: Not on file    Transportation needs     Medical: Not on file     Non-medical: Not on file   Tobacco Use    Smoking status: Current Every Day Smoker     Years: 52.00     Types: Vaping w/o nicotine    Smokeless tobacco: Never Used   Substance and Sexual Activity    Alcohol use: No     Frequency: Never    Drug use: Yes     Types: Hydrocodone    Sexual activity: Not on file   Lifestyle    Physical activity     Days per week: Not on file     Minutes per session: Not on file    Stress: Not on file   Relationships    Social connections     Talks on phone: Not on file     Gets together: Not on file     Attends Orthodox service: Not on file     Active member of club or organization: Not on file     Attends meetings of clubs or organizations: Not on file     Relationship status: Not on file   Other Topics Concern    Not on file   Social History Narrative    Not on file        albuterol-ipratropium  3 mL Nebulization Q4H WAKE    buprenorphine-naloxone  1 each Sublingual BID    DULoxetine  60 mg Oral Daily    enoxparin  40 mg Subcutaneous Q24H    famotidine  20 mg Oral BID    ferrous sulfate  325 mg Oral Daily    fluticasone furoate-vilanteroL  1 puff Inhalation Daily    hydroCHLOROthiazide  25 mg Oral Daily    Lactobacillus acidoph-L.bulgar  4 tablet Oral TID WM    losartan  100 mg Oral Daily    nicotine  1 patch Transdermal Daily    piperacillin-tazobactam (ZOSYN) IVPB  4.5 g Intravenous Q8H    predniSONE  40 mg Oral Daily           acetaminophen, ALPRAZolam, amitriptyline, loperamide, magnesium oxide, magnesium oxide, ondansetron, potassium chloride, potassium chloride    Review of patient's allergies indicates:  No Known Allergies  All medications and past history have been reviewed.    Objective:      Vitals:  BP (!)  "149/86   Pulse 93   Temp 98.3 °F (36.8 °C) (Oral)   Resp 18   Ht 5' 4" (1.626 m)   Wt 74 kg (163 lb 2.3 oz)   LMP  (LMP Unknown)   SpO2 (!) 94%   Breastfeeding No   BMI 28.00 kg/m²    Body surface area is 1.83 meters squared.    Last 24 Hours:  No intake or output data in the 24 hours ending 08/26/20 1748  Weight Readings:  Wt Readings from Last 5 Encounters:   08/25/20 74 kg (163 lb 2.3 oz)   08/24/20 81.4 kg (179 lb 6.4 oz)   03/06/20 75.8 kg (167 lb 1.7 oz)   02/07/20 77.7 kg (171 lb 4.8 oz)   01/10/20 78.6 kg (173 lb 4.5 oz)     Physical Exam  Constitutional:       General: She is not in acute distress.     Appearance: Normal appearance. She is ill-appearing.   HENT:      Head: Normocephalic and atraumatic.      Right Ear: External ear normal.      Left Ear: External ear normal.      Nose: Nose normal. No congestion or rhinorrhea.   Eyes:      General:         Right eye: No discharge.         Left eye: No discharge.      Extraocular Movements: Extraocular movements intact.      Conjunctiva/sclera: Conjunctivae normal.      Pupils: Pupils are equal, round, and reactive to light.   Neck:      Musculoskeletal: Normal range of motion and neck supple. No neck rigidity.   Cardiovascular:      Rate and Rhythm: Normal rate and regular rhythm.      Heart sounds: Normal heart sounds. No murmur.   Pulmonary:      Breath sounds: No stridor. Wheezing present. No rhonchi or rales.   Abdominal:      General: Bowel sounds are normal. There is distension.      Palpations: Abdomen is soft.      Tenderness: There is no abdominal tenderness. There is no guarding.   Musculoskeletal: Normal range of motion.         General: No deformity.      Right lower leg: No edema.      Left lower leg: No edema.      Comments: Left PICC line in place.   Lymphadenopathy:      Cervical: No cervical adenopathy.   Skin:     General: Skin is warm and dry.      Coloration: Skin is pale.      Findings: No erythema or rash.   Neurological:      " General: No focal deficit present.      Mental Status: She is alert and oriented to person, place, and time. Mental status is at baseline.      Cranial Nerves: No cranial nerve deficit.   Psychiatric:         Mood and Affect: Mood normal.         Behavior: Behavior normal.         Thought Content: Thought content normal.         Judgment: Judgment normal.         Labs:  Recent Labs   Lab 08/23/20  0520 08/24/20  1419 08/26/20  0345   RBC 3.63* 3.91* 3.73*   HGB 9.9* 10.7* 10.1*   HCT 34.1* 35.5* 33.4*   * 508* 445*   MCV 94 91 90     Recent Labs   Lab 08/24/20  0502 08/24/20  1419 08/26/20  0345    140 135*   K 3.7 3.5 3.7   CL 98 97 92*   CO2 32* 31* 32*   BUN 27* 29* 35*   CREATININE 1.3 1.3 1.3   * 181* 157*   CALCIUM 9.5 10.0 8.5*   MG  --   --  1.8   ALKPHOS 74 81 56   PROT 6.1 6.7 5.9*   ALBUMIN 2.2* 2.5* 2.6*   BILITOT 0.2 0.2 0.6   AST 14 20 17   ALT 36 39 31     All lab results and imaging results have been reviewed and discussed with the patient.     Assessment and Plan:      Small Cell Lung CA of right lung    Normocytic Anemia    Leukocytosis    Post-Obstructive pneumonia    -Continue with VP16 days 1,2,3 every 21 days with carboplatin day 1 every 3 week cycle x 6 cycles. Pt states she is tolerating chemotherapy.  -Leukocytosis is improving.  -Anemia stable. Pt previous iron and TIBC showed low iron levels and pt was placed on ferous sulfate 325mg daily at [facility] and had improvement of anemia with treatment. Continue ferous sulfate 325mg PO daily. Ferritin WNL. Awaiting results of iron and tibc.  -CT abdomen and pelvis recommended by Dr. Swenson. Orders for Ct abdomen and pelvis W contrast placed. If it is deemed that pt's kidney function is too low to proceed with contrast, then pt can proceed with CT abdomen and pelvis WO contrast.   -Will follow along closely.    Sincerely,  Destin Valente PA-C    Note is available for collaborating MD; Dr. Acuña for  review.    Electronically signed by: Destin Valente PA-C

## 2020-08-26 NOTE — CARE UPDATE
08/25/20 1934   Patient Assessment/Suction   Level of Consciousness (AVPU) alert   Respiratory Effort Unlabored   Expansion/Accessory Muscles/Retractions no use of accessory muscles   All Lung Fields Breath Sounds wheezes, expiratory   Rhythm/Pattern, Respiratory no shortness of breath reported   Cough Frequency no cough   PRE-TX-O2   O2 Device (Oxygen Therapy) nasal cannula   $ Is the patient on Low Flow Oxygen? Yes   Flow (L/min) 3   SpO2 99 %   Pulse Oximetry Type Intermittent   $ Pulse Oximetry - Multiple Charge Pulse Oximetry - Multiple   Pulse 95   Resp 20   Positioning   Head of Bed (HOB) HOB elevated   Aerosol Therapy   $ Aerosol Therapy Charges Aerosol Treatment   Daily Review of Necessity (SVN) completed   Respiratory Treatment Status (SVN) given   Treatment Route (SVN) mouthpiece   Patient Position (SVN) semi-Hall's   Post Treatment Assessment (SVN) breath sounds improved   Signs of Intolerance (SVN) none   Breath Sounds Post-Respiratory Treatment   Throughout All Fields Post-Treatment All Fields   Throughout All Fields Post-Treatment aeration increased   SUDHEER Post-Treatment aeration increased   Post-treatment Heart Rate (beats/min) 96   Post-treatment Resp Rate (breaths/min) 17   Respiratory Evaluation   $ Care Plan Tech Time 15 min   Current Surgeries 0

## 2020-08-26 NOTE — PROGRESS NOTES
[facility] Medicine  Progress Note    Patient Name: Kendra Lawrence  MRN: [id number]  Patient Class: OP- Observation   Admission Date: 8/25/2020  Length of Stay: 0 days  Attending Physician: Nazario Wolfe MD  Primary Care Provider: Louie Urena MD        Subjective:     Principal Problem:Small cell lung cancer, right        HPI:  64 year old patient transferred from [facility] for initiation of chemotherapy  Patient got admitted to [facility] 1 week ago with shortness of breath  Patient is a chronic active smoker and she was diagnosed with acute COPD exacerbation and possible obstructive pneumonia  Imaging studies showed lung mass and she underwent biopsy   Biopsy showed small cell carcinoma of the lung and patient was evaluated by oncologist  Plan was made to start patient on chemotherapy and eventually patient was transferred to this hospital  When seen in the floor today patient is comfortable and she denies any other issues    Overview/Hospital Course:  08/25  No new issues  Patient very stable      Interval History:  Is    Review of Systems   Constitutional: Negative for activity change and appetite change.   HENT: Negative for congestion and dental problem.    Eyes: Negative for discharge and itching.   Respiratory: Positive for shortness of breath.    Cardiovascular: Negative for chest pain.   Gastrointestinal: Negative for abdominal distention and abdominal pain.   Endocrine: Negative for cold intolerance.   Genitourinary: Negative for difficulty urinating and dysuria.   Musculoskeletal: Negative for arthralgias and back pain.   Skin: Negative for color change.   Neurological: Negative for dizziness and facial asymmetry.   Hematological: Negative for adenopathy.   Psychiatric/Behavioral: Negative for agitation and behavioral problems.     Objective:     Vital Signs (Most Recent):  Temp: 97.7 °F (36.5 °C) (08/26/20 1140)  Pulse: 103 (08/26/20 1140)  Resp: 20 (08/26/20 1140)  BP:  (!) 164/94 (08/26/20 1140)  SpO2: (!) 94 % (08/26/20 1140) Vital Signs (24h Range):  Temp:  [97.7 °F (36.5 °C)-98.5 °F (36.9 °C)] 97.7 °F (36.5 °C)  Pulse:  [] 103  Resp:  [17-20] 20  SpO2:  [94 %-99 %] 94 %  BP: (145-171)/(80-98) 164/94     Weight: 74 kg (163 lb 2.3 oz)  Body mass index is 28 kg/m².  No intake or output data in the 24 hours ending 08/26/20 1303   Physical Exam  Vitals signs and nursing note reviewed.   Constitutional:       General: She is not in acute distress.  HENT:      Head: Normocephalic.      Right Ear: External ear normal.      Left Ear: External ear normal.      Nose: Nose normal.      Mouth/Throat:      Mouth: Mucous membranes are moist.   Eyes:      Conjunctiva/sclera: Conjunctivae normal.   Neck:      Musculoskeletal: Neck supple.   Cardiovascular:      Rate and Rhythm: Normal rate.   Pulmonary:      Effort: Pulmonary effort is normal.   Abdominal:      General: There is no distension.   Musculoskeletal: Normal range of motion.   Skin:     General: Skin is warm.   Neurological:      Mental Status: She is alert and oriented to person, place, and time.   Psychiatric:         Behavior: Behavior normal.         Significant Labs:   CBC:   Recent Labs   Lab 08/24/20  1419 08/26/20  0345   WBC 25.08* 21.11*   HGB 10.7* 10.1*   HCT 35.5* 33.4*   * 445*     CMP:   Recent Labs   Lab 08/24/20  1419 08/26/20  0345    135*   K 3.5 3.7   CL 97 92*   CO2 31* 32*   * 157*   BUN 29* 35*   CREATININE 1.3 1.3   CALCIUM 10.0 8.5*   PROT 6.7 5.9*   ALBUMIN 2.5* 2.6*   BILITOT 0.2 0.6   ALKPHOS 81 56   AST 20 17   ALT 39 31   ANIONGAP 12 11   EGFRNONAA 43* 42.9*       Significant Imaging: I have reviewed all pertinent imaging results/findings within the past 24 hours.      Assessment/Plan:      * Small cell lung cancer, right  Patient getting admitted for chemotherapy for small-cell carcinoma of the lung  Continue present treatment  Patient seen by pulmonologist and radiation  oncology team      Chronic obstructive pulmonary disease with acute lower respiratory infection  Continue DuoNebs and prednisone  Stable issue      Opioid dependence  Patient is on Suboxone twice daily  Continue      Acute on chronic respiratory failure with hypoxia and hypercapnia  Acute on chronic respiratory failure with hypoxia and hypercapnia due to COPD/pneumonia/small cell carcinoma the lung      Obstructive pneumonia  Continue IV Zosyn  High white cell count mostly due to steroids and Pneumonia      Leukocytosis  Trending  down    Malignant pleural effusion  Stable issue      Anemia  Monitor        VTE Risk Mitigation (From admission, onward)         Ordered     enoxaparin injection 40 mg  Every 24 hours (non-standard times)      08/25/20 1424                Discharge Planning   BRANDON:      Code Status: Full Code   Is the patient medically ready for discharge?:     Reason for patient still in hospital (select all that apply): Treatment                     Nazario Wolfe MD  Department of Hospital Medicine   Critical access hospital

## 2020-08-26 NOTE — SUBJECTIVE & OBJECTIVE
Past Medical History:   Diagnosis Date    Asthma     Cardiac angina     Chronic abdominal pain     Claustrophobia     COPD (chronic obstructive pulmonary disease)     Coronary artery disease     GERD (gastroesophageal reflux disease)     History of drug dependence     Hypertension     Hypocalcemia 2020       Past Surgical History:   Procedure Laterality Date    BREAST BIOPSY Bilateral 20 yrs ago    benign    BRONCHOSCOPY N/A 2020    Procedure: Bronchoscopy- 730 or noon, floro not needed;  Surgeon: Andrew Brothers MD;  Location: [facility] ENDO;  Service: Endoscopy;  Laterality: N/A;     SECTION      CHOLECYSTECTOMY      ENDOSCOPIC ULTRASOUND OF UPPER GASTROINTESTINAL TRACT Left 2018    Procedure: ULTRASOUND, UPPER GI TRACT, ENDOSCOPIC;  Surgeon: Paras Negrete MD;  Location: [facility];  Service: Endoscopy;  Laterality: Left;    ESOPHAGOGASTRODUODENOSCOPY N/A 2018    Procedure: EGD (ESOPHAGOGASTRODUODENOSCOPY);  Surgeon: Paras Negrete MD;  Location: [facility];  Service: Endoscopy;  Laterality: N/A;    ESOPHAGOGASTRODUODENOSCOPY N/A 2018    Procedure: EGD (ESOPHAGOGASTRODUODENOSCOPY);  Surgeon: Paras Negrete MD;  Location: [facility];  Service: Endoscopy;  Laterality: N/A;    HYSTERECTOMY      MAGNETIC RESONANCE IMAGING N/A 2019    Procedure: MRI (Magnetic Resonance Imagine) needs anesthesia;  Surgeon: Raffy Charles MD;  Location: [facility];  Service: Anesthesiology;  Laterality: N/A;    OOPHORECTOMY      TEMPOROMANDIBULAR JOINT SURGERY      TONSILLECTOMY         Review of patient's allergies indicates:  No Known Allergies    Current Facility-Administered Medications on File Prior to Encounter   Medication    [DISCONTINUED] acetaminophen tablet 650 mg    [DISCONTINUED] albuterol-ipratropium 2.5 mg-0.5 mg/3 mL nebulizer solution 3 mL    [DISCONTINUED] albuterol-ipratropium 2.5 mg-0.5 mg/3 mL nebulizer solution 3 mL    [DISCONTINUED]  ALPRAZolam tablet 0.25 mg    [DISCONTINUED] buprenorphine-naloxone 8-2 mg Film 1 each    [DISCONTINUED] cefTRIAXone (ROCEPHIN) 1 g/50 mL D5W IVPB    [DISCONTINUED] dextrose 50% injection 12.5 g    [DISCONTINUED] dextrose 50% injection 25 g    [DISCONTINUED] diphenoxylate-atropine 2.5-0.025 mg per tablet 1 tablet    [DISCONTINUED] DULoxetine DR capsule 60 mg    [DISCONTINUED] enoxaparin injection 40 mg    [DISCONTINUED] ferrous sulfate EC tablet 325 mg    [DISCONTINUED] glucagon (human recombinant) injection 1 mg    [DISCONTINUED] glucose chewable tablet 16 g    [DISCONTINUED] glucose chewable tablet 24 g    [DISCONTINUED] hydrALAZINE injection 10 mg    [DISCONTINUED] hydroCHLOROthiazide tablet 25 mg    [DISCONTINUED] ketamine (KETALAR) 100 mg/mL injection    [DISCONTINUED] lidocaine (PF) 10 mg/ml (1%) 10 mg/mL (1 %) injection    [DISCONTINUED] lidocaine (PF) 20 mg/mL (2%) 20 mg/mL (2 %) injection    [DISCONTINUED] lidocaine (PF) 40 mg/mL (4 %) injection    [DISCONTINUED] lidocaine HCl 2% (XYLOCAINE) 2 % urojet    [DISCONTINUED] lidocaine-EPINEPHrine (PF) 1%-1:200,000 1 %-1:200,000 injection    [DISCONTINUED] losartan tablet 100 mg    [DISCONTINUED] magnesium oxide tablet 800 mg    [DISCONTINUED] magnesium oxide tablet 800 mg    [DISCONTINUED] methylPREDNISolone sodium succinate injection 40 mg    [DISCONTINUED] multivitamin tablet    [DISCONTINUED] nicotine 14 mg/24 hr 1 patch    [DISCONTINUED] ondansetron injection 8 mg    [DISCONTINUED] oxymetazoline (AFRIN) 0.05 % nasal spray    [DISCONTINUED] pantoprazole EC tablet 40 mg    [DISCONTINUED] piperacillin-tazobactam 3.375 g in dextrose 5 % 50 mL IVPB (ready to mix system)    [DISCONTINUED] potassium chloride packet 40 mEq    [DISCONTINUED] potassium chloride packet 40 mEq    [DISCONTINUED] potassium chloride packet 60 mEq    [DISCONTINUED] promethazine (PHENERGAN) 25 mg in dextrose 5 % 50 mL IVPB    [DISCONTINUED] senna-docusate  8.6-50 mg per tablet 1 tablet    [DISCONTINUED] sodium chloride 0.9% flush 10 mL     Current Outpatient Medications on File Prior to Encounter   Medication Sig    albuterol-ipratropium (DUO-NEB) 2.5 mg-0.5 mg/3 mL nebulizer solution Take 3 mLs by nebulization every 4 (four) hours. Rescue    alendronate (FOSAMAX) 70 MG tablet TAKE ONE TABLET BY MOUTH ONCE EVERY 7 DAYS (Patient taking differently: Take 70 mg by mouth every 7 days. Sunday)    amitriptyline (ELAVIL) 10 MG tablet TAKE ONE TABLET BY MOUTH AT BEDTIME AS NEEDED FOR INSOMNIA (Patient taking differently: Take 10 mg by mouth nightly as needed for Insomnia. )    buprenorphine-naloxone (SUBOXONE) 8-2 mg Film Place 1 packet (1 each total) under the tongue 2 (two) times daily.    DULoxetine (CYMBALTA) 60 MG capsule TAKE ONE CAPSULE BY MOUTH ONCE A DAY (Patient taking differently: Take 60 mg by mouth once daily. )    hydroCHLOROthiazide (HYDRODIURIL) 25 MG tablet TAKE ONE TABLET BY MOUTH EVERY DAY (Patient taking differently: Take 25 mg by mouth once daily. )    losartan (COZAAR) 100 MG tablet TAKE ONE TABLET BY MOUTH ONCE A DAY (Patient taking differently: Take 100 mg by mouth once daily. )    nitroGLYCERIN (NITROSTAT) 0.4 MG SL tablet Place 0.4 mg under the tongue every 5 (five) minutes as needed for Chest pain.    omeprazole (PRILOSEC) 40 MG capsule Take 1 capsule by mouth 2 (two) times daily.    ondansetron (ZOFRAN) 4 MG tablet TAKE ONE TABLET BY MOUTH EVERY 8 HOURS AS NEEDED FOR NAUSEA (Patient taking differently: Take 4 mg by mouth every 8 (eight) hours as needed for Nausea. )    senna-docusate 8.6-50 mg (SENNA WITH DOCUSATE SODIUM) 8.6-50 mg per tablet Take 2 tablets by mouth 2 (two) times daily. (Patient taking differently: Take 1 tablet by mouth 2 (two) times daily. )    TRELEGY ELLIPTA 100-62.5-25 mcg DsDv INHALE 1 PUFF INTO THE LUNGS NIGHTLY (Patient taking differently: Inhale 1 puff into the lungs every evening. )    [DISCONTINUED]  amoxicillin-clavulanate 875-125mg (AUGMENTIN) 875-125 mg per tablet Take 1 tablet by mouth every 12 (twelve) hours. for 7 days    [DISCONTINUED] ferrous sulfate 325 (65 FE) MG EC tablet Take 1 tablet (325 mg total) by mouth once daily.     Family History     Problem Relation (Age of Onset)    Breast cancer Sister (55), Sister (50)    Cancer Mother, Father    Heart disease Mother    Hypertension Mother        Tobacco Use    Smoking status: Current Every Day Smoker     Years: 52.00     Types: Vaping w/o nicotine    Smokeless tobacco: Never Used   Substance and Sexual Activity    Alcohol use: No     Frequency: Never    Drug use: Yes     Types: Hydrocodone    Sexual activity: Not on file     Review of Systems   Constitutional: Negative for activity change and appetite change.   HENT: Negative for congestion and dental problem.    Eyes: Negative for discharge and itching.   Respiratory: Positive for shortness of breath.    Cardiovascular: Negative for chest pain.   Gastrointestinal: Negative for abdominal distention and abdominal pain.   Endocrine: Negative for cold intolerance.   Genitourinary: Negative for difficulty urinating and dysuria.   Musculoskeletal: Negative for arthralgias and back pain.   Skin: Negative for color change.   Neurological: Negative for dizziness and facial asymmetry.   Hematological: Negative for adenopathy.   Psychiatric/Behavioral: Negative for agitation and behavioral problems.     Objective:     Vital Signs (Most Recent):  Temp: 98.4 °F (36.9 °C) (08/25/20 2017)  Pulse: 90 (08/25/20 2017)  Resp: 20 (08/25/20 1934)  BP: (!) 154/85 (08/25/20 2017)  SpO2: 97 % (08/25/20 2017) Vital Signs (24h Range):  Temp:  [96 °F (35.6 °C)-98.5 °F (36.9 °C)] 98.4 °F (36.9 °C)  Pulse:  [] 90  Resp:  [16-20] 20  SpO2:  [93 %-99 %] 97 %  BP: (133-180)/(82-98) 154/85     Weight: 74 kg (163 lb 2.3 oz)  Body mass index is 28 kg/m².    Physical Exam  Vitals signs and nursing note reviewed.    Constitutional:       General: She is not in acute distress.  HENT:      Right Ear: External ear normal.      Left Ear: External ear normal.      Nose: Nose normal.      Mouth/Throat:      Mouth: Mucous membranes are moist.   Eyes:      Pupils: Pupils are equal, round, and reactive to light.   Neck:      Musculoskeletal: Neck supple.   Cardiovascular:      Rate and Rhythm: Normal rate.      Heart sounds: No murmur.   Pulmonary:      Effort: Pulmonary effort is normal.      Breath sounds: Normal breath sounds.   Abdominal:      General: Bowel sounds are normal.      Palpations: Abdomen is soft.   Musculoskeletal: Normal range of motion.   Skin:     General: Skin is warm.   Neurological:      Mental Status: She is alert and oriented to person, place, and time.   Psychiatric:         Mood and Affect: Mood normal.           CRANIAL NERVES     CN III, IV, VI   Pupils are equal, round, and reactive to light.       Significant Labs:   CBC:   Recent Labs   Lab 08/24/20  1419   WBC 25.08*   HGB 10.7*   HCT 35.5*   *     CMP:   Recent Labs   Lab 08/24/20  0502 08/24/20  1419    140   K 3.7 3.5   CL 98 97   CO2 32* 31*   * 181*   BUN 27* 29*   CREATININE 1.3 1.3   CALCIUM 9.5 10.0   PROT 6.1 6.7   ALBUMIN 2.2* 2.5*   BILITOT 0.2 0.2   ALKPHOS 74 81   AST 14 20   ALT 36 39   ANIONGAP 11 12   EGFRNONAA 43* 43*       Significant Imaging: I have reviewed all pertinent imaging results/findings within the past 24 hours.

## 2020-08-26 NOTE — PLAN OF CARE
Problem: Adult Inpatient Plan of Care  Goal: Plan of Care Review  Outcome: Ongoing, Progressing  Goal: Patient-Specific Goal (Individualization)  Outcome: Ongoing, Progressing  Goal: Absence of Hospital-Acquired Illness or Injury  Outcome: Ongoing, Progressing  Goal: Optimal Comfort and Wellbeing  Outcome: Ongoing, Progressing  Goal: Readiness for Transition of Care  Outcome: Ongoing, Progressing  Goal: Rounds/Family Conference  Outcome: Ongoing, Progressing     Problem: Adjustment to Illness (Sepsis/Septic Shock)  Goal: Optimal Coping  Outcome: Ongoing, Progressing     Problem: Bleeding (Sepsis/Septic Shock)  Goal: Absence of Bleeding  Outcome: Ongoing, Progressing     Problem: Glycemic Control Impaired (Sepsis/Septic Shock)  Goal: Blood Glucose Level Within Desired Range  Outcome: Ongoing, Progressing     Problem: Hemodynamic Instability (Sepsis/Septic Shock)  Goal: Effective Tissue Perfusion  Outcome: Ongoing, Progressing     Problem: Infection (Sepsis/Septic Shock)  Goal: Absence of Infection Signs/Symptoms  Outcome: Ongoing, Progressing     Problem: Nutrition Impaired (Sepsis/Septic Shock)  Goal: Optimal Nutrition Intake  Outcome: Ongoing, Progressing     Problem: Respiratory Compromise (Sepsis/Septic Shock)  Goal: Effective Oxygenation and Ventilation  Outcome: Ongoing, Progressing     Problem: Fluid Imbalance (Pneumonia)  Goal: Fluid Balance  Outcome: Ongoing, Progressing     Problem: Infection (Pneumonia)  Goal: Resolution of Infection Signs/Symptoms  Outcome: Ongoing, Progressing     Problem: Respiratory Compromise (Pneumonia)  Goal: Effective Oxygenation and Ventilation  Outcome: Ongoing, Progressing     Problem: Skin Injury Risk Increased  Goal: Skin Health and Integrity  Outcome: Ongoing, Progressing     Problem: Fall Injury Risk  Goal: Absence of Fall and Fall-Related Injury  Outcome: Ongoing, Progressing     Problem: Infection  Goal: Infection Symptom Resolution  Outcome: Ongoing, Progressing

## 2020-08-26 NOTE — PLAN OF CARE
08/26/20 0824   Patient Assessment/Suction   Level of Consciousness (AVPU) alert   Respiratory Effort Normal;Unlabored   All Lung Fields Breath Sounds wheezes, expiratory   PRE-TX-O2   O2 Device (Oxygen Therapy) nasal cannula   $ Is the patient on Low Flow Oxygen? Yes   Flow (L/min) 3   SpO2 96 %   Pulse Oximetry Type Intermittent   $ Pulse Oximetry - Multiple Charge Pulse Oximetry - Multiple   Pulse 93   Resp 18   Aerosol Therapy   $ Aerosol Therapy Charges Aerosol Treatment   Daily Review of Necessity (SVN) completed   Respiratory Treatment Status (SVN) given   Treatment Route (SVN) mouthpiece;oxygen   Patient Position (SVN) HOB elevated   Post Treatment Assessment (SVN) breath sounds improved   Signs of Intolerance (SVN) none   Inhaler   $ Inhaler Charges MDI (Metered Dose Inahler) Treatment   Daily Review of Necessity (Inhaler) completed   Respiratory Treatment Status (Inhaler) given;mouth rinsed post treatment   Treatment Route (Inhaler) mouthpiece   Patient Position (Inhaler) Hall's;HOB elevated   Post Treatment Assessment (Inhaler) breath sounds improved   Signs of Intolerance (Inhaler) none   Respiratory Evaluation   $ Care Plan Tech Time 15 min

## 2020-08-26 NOTE — HOSPITAL COURSE
Patient Transferred from [facility] for initiation of Chemo Rx  Patient initially presented to [facility] with acute respiratory failure  She was found to have COPD exacerbation/Obstructive pneumonia/Lung mass  Lung Biopsy Confirmed SCC  Patient had First cycle of Chemo Regime  Later she was discharged to Home with Home O2  Patient will follow up with Oncologist as an OP

## 2020-08-27 NOTE — PROGRESS NOTES
On license of [facility]  Hematology/Oncology  Progress Note    Patient Name: Kendra Lawrence  Admission Date: 8/25/2020  Hospital Length of Stay: 1 days  Code Status: Full Code     Subjective:     Interval History:   Day 3 chemo for obstructive PNA with small cell carcinoma   ashley levaquin      Oncology Treatment Plan:   OP CARBOPLATIN (AUC) + ETOPOSIDE Q3W    Medications:  Continuous Infusions:  Scheduled Meds:   albuterol-ipratropium  3 mL Nebulization Q4H WAKE    buprenorphine-naloxone  1 each Sublingual BID    DULoxetine  60 mg Oral Daily    enoxparin  40 mg Subcutaneous Q24H    famotidine  20 mg Oral BID    ferrous sulfate  325 mg Oral Daily    fluticasone furoate-vilanteroL  1 puff Inhalation Daily    hydroCHLOROthiazide  25 mg Oral Daily    Lactobacillus acidoph-L.bulgar  4 tablet Oral TID WM    levoFLOXacin  500 mg Intravenous Once    Followed by    [START ON 8/28/2020] levoFLOXacin  250 mg Intravenous Q24H    losartan  100 mg Oral Daily    nicotine  1 patch Transdermal Daily     PRN Meds:acetaminophen, ALPRAZolam, amitriptyline, loperamide, magnesium oxide, magnesium oxide, ondansetron, potassium chloride, potassium chloride     Review of Systems   Constitutional: Positive for activity change, appetite change and fatigue. Negative for fever and unexpected weight change.   HENT: Negative for rhinorrhea and sore throat.    Eyes: Negative for photophobia.   Respiratory: Positive for cough and shortness of breath.    Cardiovascular: Negative for chest pain and leg swelling.   Gastrointestinal: Positive for nausea. Negative for abdominal pain, constipation, diarrhea and vomiting.   Endocrine: Positive for cold intolerance. Negative for heat intolerance.   Genitourinary: Negative for dysuria, frequency, hematuria and urgency.   Musculoskeletal: Negative for arthralgias, back pain and neck pain.   Skin: Negative for pallor and rash.   Neurological: Positive for weakness and numbness. Negative for  headaches.   Hematological: Negative for adenopathy. Does not bruise/bleed easily.   Psychiatric/Behavioral: Negative for agitation.   All other systems reviewed and are negative.    Objective:     Vital Signs (Most Recent):  Temp: 97.8 °F (36.6 °C) (08/27/20 1105)  Pulse: 97 (08/27/20 1105)  Resp: 16 (08/27/20 1105)  BP: (!) 165/81 (08/27/20 1105)  SpO2: 95 % (08/27/20 1105) Vital Signs (24h Range):  Temp:  [97.5 °F (36.4 °C)-98.3 °F (36.8 °C)] 97.8 °F (36.6 °C)  Pulse:  [80-97] 97  Resp:  [16-18] 16  SpO2:  [93 %-100 %] 95 %  BP: (146-165)/(64-88) 165/81     Weight: 74 kg (163 lb 2.3 oz)  Body mass index is 28 kg/m².  Body surface area is 1.83 meters squared.    No intake or output data in the 24 hours ending 08/27/20 1246    Physical Exam  Vitals signs and nursing note reviewed.   Constitutional:       Appearance: She is well-developed. She is ill-appearing.   HENT:      Head: Normocephalic and atraumatic.      Right Ear: External ear normal.      Left Ear: External ear normal.      Mouth/Throat:      Mouth: Mucous membranes are dry.      Pharynx: No oropharyngeal exudate.   Eyes:      General: No scleral icterus.     Conjunctiva/sclera: Conjunctivae normal.   Neck:      Musculoskeletal: Normal range of motion and neck supple.      Vascular: No JVD.      Trachea: No tracheal deviation.   Cardiovascular:      Rate and Rhythm: Normal rate and regular rhythm.      Heart sounds: Normal heart sounds. No murmur (2= capillary refill).   Pulmonary:      Effort: No respiratory distress.      Breath sounds: Wheezing and rhonchi present.   Abdominal:      General: Bowel sounds are normal. There is no distension.      Palpations: Abdomen is soft.   Musculoskeletal: Normal range of motion.   Lymphadenopathy:      Cervical: No cervical adenopathy.   Skin:     General: Skin is warm and dry.      Findings: No erythema or rash.   Neurological:      Mental Status: She is oriented to person, place, and time.      Cranial Nerves: No  cranial nerve deficit.      Sensory: No sensory deficit.      Comments: Steady gait   Psychiatric:         Thought Content: Thought content normal.         Significant Labs:     Lab Results   Component Value Date    WBC 21.11 (H) 08/26/2020    RBC 3.73 (L) 08/26/2020    HGB 10.1 (L) 08/26/2020    HCT 33.4 (L) 08/26/2020    MCV 90 08/26/2020    MCH 27.1 08/26/2020    MCHC 30.2 (L) 08/26/2020    RDW 14.4 08/26/2020     (H) 08/26/2020    MPV 10.0 08/26/2020    GRAN 19.7 (H) 08/26/2020    GRAN 93.2 (H) 08/26/2020    LYMPH 0.5 (L) 08/26/2020    LYMPH 2.6 (L) 08/26/2020    MONO 0.5 08/26/2020    MONO 2.3 (L) 08/26/2020    EOS 0.0 08/26/2020    BASO 0.02 08/26/2020    EOSINOPHIL 0.0 08/26/2020    BASOPHIL 0.1 08/26/2020     CMP  Sodium   Date Value Ref Range Status   08/26/2020 135 (L) 136 - 145 mmol/L Final     Potassium   Date Value Ref Range Status   08/26/2020 3.7 3.5 - 5.1 mmol/L Final     Chloride   Date Value Ref Range Status   08/26/2020 92 (L) 95 - 110 mmol/L Final     CO2   Date Value Ref Range Status   08/26/2020 32 (H) 23 - 29 mmol/L Final     Glucose   Date Value Ref Range Status   08/26/2020 157 (H) 70 - 110 mg/dL Final     BUN, Bld   Date Value Ref Range Status   08/26/2020 35 (H) 8 - 23 mg/dL Final     Creatinine   Date Value Ref Range Status   08/26/2020 1.3 0.5 - 1.4 mg/dL Final     Calcium   Date Value Ref Range Status   08/26/2020 8.5 (L) 8.7 - 10.5 mg/dL Final     Total Protein   Date Value Ref Range Status   08/26/2020 5.9 (L) 6.0 - 8.4 g/dL Final     Albumin   Date Value Ref Range Status   08/26/2020 2.6 (L) 3.5 - 5.2 g/dL Final     Total Bilirubin   Date Value Ref Range Status   08/26/2020 0.6 0.1 - 1.0 mg/dL Final     Comment:     For infants and newborns, interpretation of results should be based  on gestational age, weight and in agreement with clinical  observations.  Premature Infant recommended reference ranges:  Up to 24 hours.............<8.0 mg/dL  Up to 48 hours............<12.0  mg/dL  3-5 days..................<15.0 mg/dL  6-29 days.................<15.0 mg/dL       Alkaline Phosphatase   Date Value Ref Range Status   08/26/2020 56 55 - 135 U/L Final     AST   Date Value Ref Range Status   08/26/2020 17 10 - 40 U/L Final     ALT   Date Value Ref Range Status   08/26/2020 31 10 - 44 U/L Final     Anion Gap   Date Value Ref Range Status   08/26/2020 11 8 - 16 mmol/L Final     eGFR if    Date Value Ref Range Status   08/26/2020 49.4 (A) >60 mL/min/1.73 m^2 Final     eGFR if non    Date Value Ref Range Status   08/26/2020 42.9 (A) >60 mL/min/1.73 m^2 Final     Comment:     Calculation used to obtain the estimated glomerular filtration  rate (eGFR) is the CKD-EPI equation.              Assessment/Plan:     Active Diagnoses:    Diagnosis Date Noted POA    PRINCIPAL PROBLEM:  Small cell lung cancer, right [C34.91] 08/17/2020 Yes    Lung cancer [C34.90] 08/26/2020 Yes    Obstructive pneumonia [J18.9] 08/25/2020 Yes    Malignant pleural effusion [J91.0] 08/25/2020 Unknown    Anemia [D64.9]  Unknown    Leukocytosis [D72.829]  Unknown    Acute on chronic respiratory failure with hypoxia and hypercapnia [J96.21, J96.22] 08/18/2020 Yes    Opioid dependence [F11.20] 08/14/2020 Yes    Chronic obstructive pulmonary disease with acute lower respiratory infection [J44.0] 08/13/2020 Yes      Problems Resolved During this Admission:       Cycle 1 day 3 chemo SCLC with PNA  Sepsis  Tolerating levaquin  Anemia stable no need for intervention at this time  Renal insufficiency   Respiratory failure   Cont supportive measures   Imaging in am     Berna Acuña MD  Hematology/Oncology  [facility]

## 2020-08-27 NOTE — ASSESSMENT & PLAN NOTE
Patient  admitted for chemotherapy for small-cell carcinoma of the lung  Continue present treatment  Patient seen by pulmonologist /Oncology and radiation oncology team  Possible discharge within 1-2 days with Home O2

## 2020-08-27 NOTE — PT/OT/SLP EVAL
Physical Therapy Evaluation    Patient Name:  Kendra Lawrence   MRN:  [id number]    Recommendations:     Discharge Recommendations:  home health PT, home   Discharge Equipment Recommendations: none   Barriers to discharge: None    Assessment:     Kendra Lawrence is a 64 y.o. female admitted with a medical diagnosis of Small cell lung cancer, right.  She presents with the following impairments/functional limitations:  weakness, impaired endurance, impaired functional mobilty, gait instability, impaired balance, decreased lower extremity function .  Patient readily agreeable to PT evaluation to include gait training.  Patient presented supine in bed and required min assist to transfer to sitting and CGA to stand.  Patient then ambulated x 200 feet with RW with CGA and O2 at 2L. O2 sats taken as follows:  pregait 100%, post gait 92% after 2 minutes rest 96%.    Rehab Prognosis: Good; patient would benefit from acute skilled PT services to address these deficits and reach maximum level of function.    Recent Surgery: * No surgery found *      Plan:     During this hospitalization, patient to be seen 6 x/week to address the identified rehab impairments via gait training, therapeutic activities, therapeutic exercises and progress toward the following goals:    · Plan of Care Expires:  10/01/20    Subjective     Chief Complaint: fatigue  Patient/Family Comments/goals: go home  Pain/Comfort:  ·      Patients cultural, spiritual, Amish conflicts given the current situation:      Living Environment:  Currently lives with family in 1 story home with 3 step entry.  Prior to admission, patients level of function was modified independent.  Equipment used at home: rollator.  DME owned (not currently used): none.  Upon discharge, patient will have assistance from family.    Objective:     Communicated with nurse prior to session.  Patient found supine with bed alarm, oxygen, peripheral IV, pulse ox (continuous), telemetry  upon PT  entry to room.    General Precautions: Standard, fall   Orthopedic Precautions:N/A   Braces:       Exams:  · RLE ROM: WFL  · RLE Strength: WNL  · LLE ROM: WFL  · LLE Strength: Deficits: 4+/5 overall    Functional Mobility:  · Bed Mobility:     · Supine to Sit: minimum assistance  · Sit to Supine: stand by assistance  · Transfers:     · Sit to Stand:  contact guard assistance with rolling walker  · Gait: x 200 feet with RW with CGA and O2 at 2L    Therapeutic Activities and Exercises:   gait training x 200 feet with RW with CGA and O2 at 2L with O2 sats pregait 100%,post gait 92% and after 3 minutes rest 96%.    AM-PAC 6 CLICK MOBILITY  Total Score:18     Patient left supine with call button in reach, bed alarm on and daughter present.    GOALS:   Multidisciplinary Problems     Physical Therapy Goals        Problem: Physical Therapy Goal    Goal Priority Disciplines Outcome Goal Variances Interventions   Physical Therapy Goal     PT, PT/OT Ongoing, Progressing     Description: Goals to be met by: 09/10/2020    Patient will increase functional independence with mobility by performin. Supine to sit with [place]  2. Sit to supine with [place]  3. Sit to stand transfer with Supervision  4. Bed to chair transfer with Supervision using Rolling Walker  5. Gait  x 150 feet with Supervision using Rolling Walker.                      History:     Past Medical History:   Diagnosis Date    Asthma     Cardiac angina     Chronic abdominal pain     Claustrophobia     COPD (chronic obstructive pulmonary disease)     Coronary artery disease     GERD (gastroesophageal reflux disease)     History of drug dependence     Hypertension     Hypocalcemia 2020       Past Surgical History:   Procedure Laterality Date    BREAST BIOPSY Bilateral 20 yrs ago    benign    BRONCHOSCOPY N/A 2020    Procedure: Bronchoscopy- 730 or noon, floro not needed;  Surgeon: Andrew Brothers MD;  Location: [facility];   Service: Endoscopy;  Laterality: N/A;     SECTION      CHOLECYSTECTOMY      ENDOSCOPIC ULTRASOUND OF UPPER GASTROINTESTINAL TRACT Left 2018    Procedure: ULTRASOUND, UPPER GI TRACT, ENDOSCOPIC;  Surgeon: Paras Negrete MD;  Location: [facility];  Service: Endoscopy;  Laterality: Left;    ESOPHAGOGASTRODUODENOSCOPY N/A 2018    Procedure: EGD (ESOPHAGOGASTRODUODENOSCOPY);  Surgeon: Paras Negrete MD;  Location: [facility];  Service: Endoscopy;  Laterality: N/A;    ESOPHAGOGASTRODUODENOSCOPY N/A 2018    Procedure: EGD (ESOPHAGOGASTRODUODENOSCOPY);  Surgeon: Paras Negrete MD;  Location: [facility];  Service: Endoscopy;  Laterality: N/A;    HYSTERECTOMY      MAGNETIC RESONANCE IMAGING N/A 2019    Procedure: MRI (Magnetic Resonance Imagine) needs anesthesia;  Surgeon: Raffy Charles MD;  Location: [facility];  Service: Anesthesiology;  Laterality: N/A;    OOPHORECTOMY      TEMPOROMANDIBULAR JOINT SURGERY      TONSILLECTOMY         Time Tracking:     PT Received On: 20  PT Start Time: 1441     PT Stop Time: 1506  PT Total Time (min): 25 min     Billable Minutes: Evaluation 15 and Gait Training 10      Chris MeGilligan, PT  2020

## 2020-08-27 NOTE — PHYSICIAN QUERY
PT Name: Kendra Lawrence  MR #: [id number]     Documentation Clarification      CDS/: Suad Hannah RN, CCDS               Contact information:  [phone]    This form is a permanent document in the medical record.     Query Date: 2020    By submitting this query, we are merely seeking further clarification of documentation. Please utilize your independent clinical judgment when addressing the question(s) below.    The Medical Record reflects the following:    Clinical Findings Location in Medical Record   AB.502 / 43.5 / 80 / 35  Titrate supplemental oxygen to maintain SpO2 of 88-92%.  Nightly/prn NIPPV.    Acute on chronic respiratory failure with hypoxia and hypercapnia            Acute on chronic respiratory failure with hypoxia and hypercapnia due to COPD/pneumonia/small cell carcinoma the lungs 2020 Pulmonology consult        2020 History and Physical                                                                                      Provider, please clarify the diagnosis of Acute respiratory failure with hypercapnia:      [  x ] Diagnosis is confirmed and additional clinical support/decision-making indicators for the diagnosis include (please specify):________________   [   ] Above stated diagnosis is not confirmed and/or it has been ruled out   [   ] Above stated diagnosis is not confirmed and/or it has been ruled out, other diagnosis ruled in (please specify):_______________   [   ] Other clarification (please specify): ___________________   [  ] Clinically undetermined

## 2020-08-27 NOTE — PROGRESS NOTES
[facility] Medicine  Progress Note    Patient Name: Kendra Lawrence  MRN: [id number]  Patient Class: IP- Inpatient   Admission Date: 8/25/2020  Length of Stay: 1 days  Attending Physician: Nazario Wolfe MD  Primary Care Provider: Louie Urena MD        Subjective:     Principal Problem:Small cell lung cancer, right        HPI:  64 year old patient transferred from [facility] for initiation of chemotherapy  Patient got admitted to [facility] 1 week ago with shortness of breath  Patient is a chronic active smoker and she was diagnosed with acute COPD exacerbation and possible obstructive pneumonia  Imaging studies showed lung mass and she underwent biopsy   Biopsy showed small cell carcinoma of the lung and patient was evaluated by oncologist  Plan was made to start patient on chemotherapy and eventually patient was transferred to this hospital  When seen in the floor today patient is comfortable and she denies any other issues    Overview/Hospital Course:  08/25  No new issues  Patient very stable    08/26  Still in need of O2  No Other issues    Interval History:     Review of Systems   Constitutional: Negative for activity change and appetite change.   HENT: Negative for congestion and dental problem.    Eyes: Negative for discharge and itching.   Respiratory: Positive for shortness of breath.    Cardiovascular: Negative for chest pain.   Gastrointestinal: Negative for abdominal distention and abdominal pain.   Endocrine: Negative for cold intolerance.   Genitourinary: Negative for difficulty urinating and dysuria.   Musculoskeletal: Negative for arthralgias and back pain.   Skin: Negative for color change.   Neurological: Negative for dizziness and facial asymmetry.   Hematological: Negative for adenopathy.   Psychiatric/Behavioral: Negative for agitation and behavioral problems.     Objective:     Vital Signs (Most Recent):  Temp: 98.1 °F (36.7 °C) (08/27/20 1604)  Pulse: 90 (08/27/20  1645)  Resp: 18 (08/27/20 1645)  BP: (!) 149/78 (08/27/20 1604)  SpO2: 96 % (08/27/20 1645) Vital Signs (24h Range):  Temp:  [97.5 °F (36.4 °C)-98.2 °F (36.8 °C)] 98.1 °F (36.7 °C)  Pulse:  [80-97] 90  Resp:  [16-18] 18  SpO2:  [93 %-100 %] 96 %  BP: (146-165)/(64-88) 149/78     Weight: 74 kg (163 lb 2.3 oz)  Body mass index is 28 kg/m².    Intake/Output Summary (Last 24 hours) at 8/27/2020 1855  Last data filed at 8/27/2020 1739  Gross per 24 hour   Intake 240 ml   Output --   Net 240 ml      Physical Exam  Vitals signs and nursing note reviewed.   Constitutional:       General: She is not in acute distress.  HENT:      Head: Atraumatic.      Right Ear: External ear normal.      Left Ear: External ear normal.      Nose: Nose normal.      Mouth/Throat:      Mouth: Mucous membranes are moist.   Eyes:      Pupils: Pupils are equal, round, and reactive to light.   Neck:      Musculoskeletal: Neck supple.   Cardiovascular:      Rate and Rhythm: Normal rate.   Pulmonary:      Effort: Pulmonary effort is normal.      Breath sounds: Normal breath sounds.   Abdominal:      General: Bowel sounds are normal.      Palpations: Abdomen is soft.   Musculoskeletal: Normal range of motion.   Skin:     General: Skin is warm.   Neurological:      Mental Status: She is alert and oriented to person, place, and time.         Significant Labs:   CBC:   Recent Labs   Lab 08/26/20  0345   WBC 21.11*   HGB 10.1*   HCT 33.4*   *     CMP:   Recent Labs   Lab 08/26/20  0345   *   K 3.7   CL 92*   CO2 32*   *   BUN 35*   CREATININE 1.3   CALCIUM 8.5*   PROT 5.9*   ALBUMIN 2.6*   BILITOT 0.6   ALKPHOS 56   AST 17   ALT 31   ANIONGAP 11   EGFRNONAA 42.9*       Significant Imaging: I have reviewed all pertinent imaging results/findings within the past 24 hours.      Assessment/Plan:      * Small cell lung cancer, right  Patient  admitted for chemotherapy for small-cell carcinoma of the lung  Continue present treatment  Patient  seen by pulmonologist /Oncology and radiation oncology team  Possible discharge within 1-2 days with Home O2      Chronic obstructive pulmonary disease with acute lower respiratory infection  Continue DuoNebs and prednisone  Stable issue      Opioid dependence  Patient is on Suboxone twice daily  Continue      Acute on chronic respiratory failure with hypoxia and hypercapnia  Acute on chronic respiratory failure with hypoxia and hypercapnia due to COPD/pneumonia/small cell carcinoma the lung      Obstructive pneumonia  Continue IV levaquin  High white cell count mostly due to steroids and Pneumonia      Leukocytosis  Trending  down    Malignant pleural effusion  Stable issue      Anemia  Monitor        VTE Risk Mitigation (From admission, onward)         Ordered     enoxaparin injection 40 mg  Every 24 hours (non-standard times)      08/25/20 1424                Discharge Planning   BRANDON:      Code Status: Full Code   Is the patient medically ready for discharge?:     Reason for patient still in hospital (select all that apply): Treatment  Discharge Plan A: Home with family                  Nazario Wolfe MD  Department of Hospital Medicine   [facility]

## 2020-08-27 NOTE — PLAN OF CARE
08/26/20 2040   Patient Assessment/Suction   Level of Consciousness (AVPU) alert   Respiratory Effort Unlabored   Expansion/Accessory Muscles/Retractions no use of accessory muscles   All Lung Fields Breath Sounds wheezes, expiratory   Rhythm/Pattern, Respiratory unlabored   Cough Frequency no cough   PRE-TX-O2   O2 Device (Oxygen Therapy) nasal cannula   Flow (L/min) 2   SpO2 (!) 93 %   Pulse 91   Resp 18   Aerosol Therapy   $ Aerosol Therapy Charges Aerosol Treatment   Daily Review of Necessity (SVN) completed   Respiratory Treatment Status (SVN) given   Treatment Route (SVN) mask   Patient Position (SVN) HOB elevated   Post Treatment Assessment (SVN) breath sounds improved   Signs of Intolerance (SVN) none   Breath Sounds Post-Respiratory Treatment   Throughout All Fields Post-Treatment All Fields   Throughout All Fields Post-Treatment aeration increased   Post-treatment Heart Rate (beats/min) 92   Post-treatment Resp Rate (breaths/min) 18   Respiratory Evaluation   $ Care Plan Tech Time 15 min   Evaluation For   (CARE PLAN)

## 2020-08-27 NOTE — PROGRESS NOTES
"Pharmacist Renal Dose Adjustment Note    Kendra Lawrence is a 64 y.o. female being treated with the medication Levofloxacin     Patient Data:    Vital Signs (Most Recent):  Temp: 97.8 °F (36.6 °C) (08/27/20 1105)  Pulse: 97 (08/27/20 1105)  Resp: 16 (08/27/20 1105)  BP: (!) 165/81 (08/27/20 1105)  SpO2: 95 % (08/27/20 1105)   Vital Signs (72h Range):  Temp:  [96 °F (35.6 °C)-98.5 °F (36.9 °C)]   Pulse:  []   Resp:  [12-22]   BP: (133-207)/()   SpO2:  [93 %-100 %]      Recent Labs   Lab 08/24/20  0502 08/24/20  1419 08/26/20  0345   CREATININE 1.3 1.3 1.3     Ht: 5' 4" (1.626 m)  Wt: 74 kg (163 lb 2.3 oz)  Estimated Creatinine Clearance: 41.9 mL/min (based on SCr of 1.3 mg/dL).  Body mass index is 28 kg/m².    Levofloxacin 500 mg IV Q 24 hrs will be changed to Levofloxacin 500 mg IV x 1 dose then 250 mg IV Q 24 hrs    Pharmacist's Name: René Herzog  Pharmacist's Extension: [phone]    "

## 2020-08-27 NOTE — PLAN OF CARE
Problem: Physical Therapy Goal  Goal: Physical Therapy Goal  Description: Goals to be met by: 09/10/2020    Patient will increase functional independence with mobility by performin. Supine to sit with [place]  2. Sit to supine with [place]  3. Sit to stand transfer with Supervision  4. Bed to chair transfer with Supervision using Rolling Walker  5. Gait  x 150 feet with Supervision using Rolling Walker.     Outcome: Ongoing, Progressing

## 2020-08-27 NOTE — SUBJECTIVE & OBJECTIVE
Interval History:     Review of Systems   Constitutional: Negative for activity change and appetite change.   HENT: Negative for congestion and dental problem.    Eyes: Negative for discharge and itching.   Respiratory: Positive for shortness of breath.    Cardiovascular: Negative for chest pain.   Gastrointestinal: Negative for abdominal distention and abdominal pain.   Endocrine: Negative for cold intolerance.   Genitourinary: Negative for difficulty urinating and dysuria.   Musculoskeletal: Negative for arthralgias and back pain.   Skin: Negative for color change.   Neurological: Negative for dizziness and facial asymmetry.   Hematological: Negative for adenopathy.   Psychiatric/Behavioral: Negative for agitation and behavioral problems.     Objective:     Vital Signs (Most Recent):  Temp: 98.1 °F (36.7 °C) (08/27/20 1604)  Pulse: 90 (08/27/20 1645)  Resp: 18 (08/27/20 1645)  BP: (!) 149/78 (08/27/20 1604)  SpO2: 96 % (08/27/20 1645) Vital Signs (24h Range):  Temp:  [97.5 °F (36.4 °C)-98.2 °F (36.8 °C)] 98.1 °F (36.7 °C)  Pulse:  [80-97] 90  Resp:  [16-18] 18  SpO2:  [93 %-100 %] 96 %  BP: (146-165)/(64-88) 149/78     Weight: 74 kg (163 lb 2.3 oz)  Body mass index is 28 kg/m².    Intake/Output Summary (Last 24 hours) at 8/27/2020 1855  Last data filed at 8/27/2020 1739  Gross per 24 hour   Intake 240 ml   Output --   Net 240 ml      Physical Exam  Vitals signs and nursing note reviewed.   Constitutional:       General: She is not in acute distress.  HENT:      Head: Atraumatic.      Right Ear: External ear normal.      Left Ear: External ear normal.      Nose: Nose normal.      Mouth/Throat:      Mouth: Mucous membranes are moist.   Eyes:      Pupils: Pupils are equal, round, and reactive to light.   Neck:      Musculoskeletal: Neck supple.   Cardiovascular:      Rate and Rhythm: Normal rate.   Pulmonary:      Effort: Pulmonary effort is normal.      Breath sounds: Normal breath sounds.   Abdominal:      General:  Bowel sounds are normal.      Palpations: Abdomen is soft.   Musculoskeletal: Normal range of motion.   Skin:     General: Skin is warm.   Neurological:      Mental Status: She is alert and oriented to person, place, and time.         Significant Labs:   CBC:   Recent Labs   Lab 08/26/20  0345   WBC 21.11*   HGB 10.1*   HCT 33.4*   *     CMP:   Recent Labs   Lab 08/26/20  0345   *   K 3.7   CL 92*   CO2 32*   *   BUN 35*   CREATININE 1.3   CALCIUM 8.5*   PROT 5.9*   ALBUMIN 2.6*   BILITOT 0.6   ALKPHOS 56   AST 17   ALT 31   ANIONGAP 11   EGFRNONAA 42.9*       Significant Imaging: I have reviewed all pertinent imaging results/findings within the past 24 hours.

## 2020-08-27 NOTE — PLAN OF CARE
08/27/20 0943   Patient Assessment/Suction   Level of Consciousness (AVPU) alert   Respiratory Effort Unlabored   Expansion/Accessory Muscles/Retractions no retractions;no use of accessory muscles   All Lung Fields Breath Sounds wheezes, expiratory;coarse   PRE-TX-O2   O2 Device (Oxygen Therapy) nasal cannula   $ Is the patient on Low Flow Oxygen? Yes   Flow (L/min) 2   SpO2 100 %   Pulse Oximetry Type Intermittent   $ Pulse Oximetry - Multiple Charge Pulse Oximetry - Multiple   Pulse 80   Resp 16   Aerosol Therapy   $ Aerosol Therapy Charges Aerosol Treatment   Respiratory Treatment Status (SVN) given   Treatment Route (SVN) mask;oxygen   Patient Position (SVN) HOB elevated   Post Treatment Assessment (SVN) increased aeration;patient reports breathing improved;vital signs unchanged   Signs of Intolerance (SVN) none

## 2020-08-27 NOTE — PROGRESS NOTES
[facility]  Pulmonology  Progress Note    Subjective     8/26/2020:  No major issues overnight.  Subjectively unchanged over the past 24 hours.  No new complaints.  8/27/2020:  No major issues over the past 24 hours.  Breathing better.  Has developed some nausea after starting chemo.     Review of Systems   Constitutional: Positive for fever.   Respiratory: Positive for shortness of breath, wheezing and dyspnea on extertion. Negative for cough, hemoptysis, sputum production, chest tightness, orthopnea and pleurisy.    Cardiovascular: Negative for chest pain, palpitations and leg swelling.   Gastrointestinal: Positive for nausea. Negative for vomiting and abdominal pain.   Psychiatric/Behavioral: The patient is nervous/anxious.    All other systems reviewed and are negative.     I have personally reviewed the following during today's evaluation:  past medical history, ROS, family history, social history, surgical history, current inpatient medications,drug allergies, vital signs over the past 24 hours, results of relevant diagnostic studies and nursing/provider documentation from the past 24 hours.     Objective     VS Temp:  [97.5 °F (36.4 °C)-98.2 °F (36.8 °C)]   Pulse:  [80-97]   Resp:  [16-18]   BP: (146-165)/(64-88)   SpO2:  [93 %-100 %]   Ideal body weight: 54.7 kg (120 lb 9.5 oz)  Adjusted ideal body weight: 62.4 kg (137 lb 9.8 oz)   I/O   Intake/Output Summary (Last 24 hours) at 8/27/2020 1832  Last data filed at 8/27/2020 1739  Gross per 24 hour   Intake 240 ml   Output --   Net 240 ml        Vent SpO2 96% on 3L NC   PE Physical Exam   Constitutional: She is oriented to person, place, and time. She appears well-developed and well-nourished. She is cooperative.  Non-toxic appearance. No distress. Nasal cannula in place.   HENT:   Head: Normocephalic.   Right Ear: External ear normal.   Left Ear: External ear normal.   Nose: Nose normal.   Mouth/Throat: Oropharynx is clear and moist. Abnormal  dentition. No oropharyngeal exudate. Mallampati Score: II.   Neck: Normal range of motion. Neck supple. No JVD present. No thyromegaly present.   Cardiovascular: Normal rate, regular rhythm, normal heart sounds and intact distal pulses. Exam reveals no gallop and no friction rub.   No murmur heard.  Pulmonary/Chest: Normal expansion, hyperinflation and effort normal. She has decreased breath sounds (over the right base). She has wheezes. She has no rhonchi. She has no rales.   Abdominal: Soft. Bowel sounds are normal. She exhibits no distension and no mass. There is no hepatosplenomegaly. There is no abdominal tenderness.   Musculoskeletal: Normal range of motion.         General: No tenderness, deformity or edema.   Lymphadenopathy: No supraclavicular adenopathy is present.     She has no cervical adenopathy.     She has no axillary adenopathy.   Neurological: She is alert and oriented to person, place, and time. No cranial nerve deficit.   Skin: Skin is warm and dry. No rash noted. No cyanosis. Nails show no clubbing.   Psychiatric: She has a normal mood and affect. Her behavior is normal. Thought content normal.   Nursing note and vitals reviewed.        Labs I have personally reviewed and interpreted all labs / diagnostic studies obtained over the past 24 hours, and relevant results are as follows:  No results for input(s): WBC, RBC, HGB, HCT, PLT, MCV, MCH, MCHC, GLUCOSE, NA, K, CL, CO2, BUN, CREATININE, MG, ALT, AST, ALKPHOS, BILITOT, PROT, ALBUMIN, PH, PCO2, PO2, HCO3, POCSATURATED, BE, PT, INR, APTT, CPK, CPKMB, TROPONINI, MB in the last 24 hours.    Invalid input(s):  CALCIUM   Imaging I have personally reviewed and interpreted the following images and reviewed the associated Radiology report.  I have reviewed and interpreted all pertinent imaging results/findings within the past 24 hours.  CXR:  8/25/2020:  · Left PICC line with the tip overlying the atrial caval junction.  There is no pneumothorax.    · Persistent right pleural effusion with right middle and lower lobe airspace disease.    CT Abdomen/Pelvis:  8/26/2020:  1. Confluent heterogeneous masslike consolidation of right middle lobe, most likely correlating with reportedly known malignancy.  2. Centrally necrotic subcarinal lymph node which is enlarged is characteristic of a lymph node metastasis.   3. Small right pleural effusion.   4. Coronary artery calcifications.   5. Mild diffuse intrahepatic and extrahepatic bile duct dilation could be related to postcholecystectomy state. No obstructing etiology identified.  6. Diverticulosis.   7. No evidence of metastatic disease throughout the abdomen or pelvis.      Micro I have personally reviewed and interpreted the available culture data.  Relevant results are as follows.  Blood Culture   Lab Results   Component Value Date    LABBLOO No growth after 5 days. 08/13/2020    LABBLOO No growth after 5 days. 08/13/2020   , Sputum Culture   Lab Results   Component Value Date    GSRESP <10 epithelial cells per low power field. 08/18/2020    GSRESP Rare WBC's 08/18/2020    GSRESP No organisms seen 08/18/2020    RESPIRATORYC JAYY ALBICANS  Moderate   (A) 08/18/2020    RESPIRATORYC JAYY DUBLINIENSIS  Moderate   (A) 08/18/2020    RESPIRATORYC JAYY GLABRATA  Moderate   (A) 08/18/2020    and Urine Culture    Lab Results   Component Value Date    LABURIN KLEBSIELLA PNEUMONIAE  >100,000 cfu/ml   (A) 08/13/2020      Medications Scheduled    albuterol-ipratropium  3 mL Nebulization Q4H WAKE    buprenorphine-naloxone  1 each Sublingual BID    DULoxetine  60 mg Oral Daily    enoxparin  40 mg Subcutaneous Q24H    famotidine  20 mg Oral BID    ferrous sulfate  325 mg Oral Daily    fluticasone furoate-vilanteroL  1 puff Inhalation Daily    hydroCHLOROthiazide  25 mg Oral Daily    Lactobacillus acidoph-L.bulgar  4 tablet Oral TID WM    [START ON 8/28/2020] levoFLOXacin  250 mg Intravenous Q24H    losartan   100 mg Oral Daily    nicotine  1 patch Transdermal Daily      Continuous Infusions:      PRN   acetaminophen, ALPRAZolam, amitriptyline, loperamide, magnesium oxide, magnesium oxide, ondansetron, potassium chloride, potassium chloride        Assessment       Active Hospital Problems    Diagnosis    *Small cell lung cancer, right    Lung cancer    Obstructive pneumonia    Malignant pleural effusion    Anemia    Leukocytosis    Acute on chronic respiratory failure with hypoxia and hypercapnia    Opioid dependence    Chronic obstructive pulmonary disease with acute lower respiratory infection      My Impression:  SCLC with a questionable component of post-obstructive pneumonia here for urgent initiation of chemotherapy.    Plan     Pulmonary  · Transition to FQ monotherapy.  · Stop CS.  · Continue aerosolized BDs.  · Heme-onc managing chemotherapy.  · Rad-Onc following.  Recommendations noted.  · Titrate supplemental oxygen to maintain SpO2 of 88-92%.  · Nightly/prn NIPPV.  · Anticipate discharge in the next day or two.     Marcos Fermin MD  Pulmonary / Critical Care Medicine  [facility]

## 2020-08-28 PROBLEM — J91.0 MALIGNANT PLEURAL EFFUSION: Status: RESOLVED | Noted: 2020-01-01 | Resolved: 2020-01-01

## 2020-08-28 PROBLEM — J96.21 ACUTE ON CHRONIC RESPIRATORY FAILURE WITH HYPOXIA AND HYPERCAPNIA: Status: RESOLVED | Noted: 2020-01-01 | Resolved: 2020-01-01

## 2020-08-28 PROBLEM — J44.0 CHRONIC OBSTRUCTIVE PULMONARY DISEASE WITH ACUTE LOWER RESPIRATORY INFECTION: Status: RESOLVED | Noted: 2020-01-01 | Resolved: 2020-01-01

## 2020-08-28 PROBLEM — J96.22 ACUTE ON CHRONIC RESPIRATORY FAILURE WITH HYPOXIA AND HYPERCAPNIA: Status: RESOLVED | Noted: 2020-01-01 | Resolved: 2020-01-01

## 2020-08-28 NOTE — PLAN OF CARE
08/28/20 0652   Patient Assessment/Suction   Level of Consciousness (AVPU) alert   Respiratory Effort Unlabored   Expansion/Accessory Muscles/Retractions no use of accessory muscles   All Lung Fields Breath Sounds wheezes, expiratory   Rhythm/Pattern, Respiratory no shortness of breath reported   PRE-TX-O2   O2 Device (Oxygen Therapy) nasal cannula   $ Is the patient on Low Flow Oxygen? Yes   Flow (L/min) 2   SpO2 100 %   Pulse Oximetry Type Intermittent   $ Pulse Oximetry - Multiple Charge Pulse Oximetry - Multiple   Pulse 78   Resp 16   Aerosol Therapy   $ Aerosol Therapy Charges Aerosol Treatment   Respiratory Treatment Status (SVN) given   Treatment Route (SVN) mask;oxygen   Patient Position (SVN) HOB elevated   Post Treatment Assessment (SVN) increased aeration;patient reports breathing improved   Signs of Intolerance (SVN) none   Respiratory Evaluation   $ Care Plan Tech Time 15 min

## 2020-08-28 NOTE — RESPIRATORY THERAPY
08/27/20 1930   Patient Assessment/Suction   Level of Consciousness (AVPU) alert   Respiratory Effort Unlabored   All Lung Fields Breath Sounds coarse;wheezes, inspiratory   PRE-TX-O2   O2 Device (Oxygen Therapy) nasal cannula   $ Is the patient on Low Flow Oxygen? Yes   Flow (L/min) 2   SpO2 (!) 90 %   Pulse Oximetry Type Intermittent   $ Pulse Oximetry - Multiple Charge Pulse Oximetry - Multiple   Pulse 90   Resp 20   Aerosol Therapy   $ Aerosol Therapy Charges Aerosol Treatment   Respiratory Treatment Status (SVN) given   Treatment Route (SVN) mask   Patient Position (SVN) HOB elevated   Post Treatment Assessment (SVN) breath sounds unchanged   Signs of Intolerance (SVN) none   Breath Sounds Post-Respiratory Treatment   Throughout All Fields Post-Treatment All Fields   Throughout All Fields Post-Treatment aeration increased   Post-treatment Heart Rate (beats/min) 90   Post-treatment Resp Rate (breaths/min) 20   Preset CPAP/BiPAP Settings   Mode Of Delivery other (see comments)  (no bipap in room)

## 2020-08-28 NOTE — PLAN OF CARE
08/28/20 1434   Post-Acute Status   Post-Acute Authorization HME   Post-Acute Placement Status Set-up Complete   O2 delivered to pt from [facility]

## 2020-08-28 NOTE — PLAN OF CARE
08/28/20 1044   Patient Assessment/Suction   Level of Consciousness (AVPU) alert   Respiratory Effort Unlabored   Home Oxygen Qualification   Room Air SpO2 At Rest (!) 88 %   SpO2 on Recovery 97 %   Recovery Heart Rate 89 bpm   Recovery O2 LPM 2 LPM   Home O2 Eval Comments qualifies for home o2

## 2020-08-28 NOTE — PLAN OF CARE
08/28/20 1237   Post-Acute Status   Post-Acute Authorization HME;Home Health   Post-Acute Placement Status Discharge Plan Changed   HME Status Referrals Sent   Home Health Status Referrals Sent   Patient choice form signed by patient/caregiver List with quality metrics by geographic area provided   Discharge Plan   Discharge Plan A Home Health   Discharge Plan B Home Health   Referral sent to [facility] home health and HME order sent to [facility]. P_t choice scanned into media.  Cm to follow.

## 2020-08-28 NOTE — PROGRESS NOTES
[facility]  Hematology/Oncology  Progress Note    Patient Name: Kendra Lawrence  Admission Date: 8/25/2020  Hospital Length of Stay: 2 days  Code Status: Full Code     Subjective:     Interval History:      Pt s/p carbo etoposide for small cell lung carcinoma causing a bronchial obstruction and post obstructive pneumonia, SHe is breathing much better today, audible wheezing, hemoglobin has decreased. No fever, strength is improving.     Oncology Treatment Plan:   OP CARBOPLATIN (AUC) + ETOPOSIDE Q3W    Medications:  Continuous Infusions:  Scheduled Meds:   albuterol-ipratropium  3 mL Nebulization Q4H WAKE    buprenorphine-naloxone  1 each Sublingual BID    DULoxetine  60 mg Oral Daily    enoxparin  40 mg Subcutaneous Q24H    famotidine  20 mg Oral BID    ferrous sulfate  325 mg Oral Daily    fluticasone furoate-vilanteroL  1 puff Inhalation Daily    hydroCHLOROthiazide  25 mg Oral Daily    Lactobacillus acidoph-L.bulgar  4 tablet Oral TID WM    levoFLOXacin  250 mg Intravenous Q24H    losartan  100 mg Oral Daily    nicotine  1 patch Transdermal Daily     PRN Meds:acetaminophen, ALPRAZolam, amitriptyline, loperamide, magnesium oxide, magnesium oxide, ondansetron, potassium chloride, potassium chloride     Review of Systems   Constitutional: Positive for fatigue. Negative for fever and unexpected weight change.   HENT: Negative for rhinorrhea and sore throat.    Eyes: Negative for photophobia.   Respiratory: Positive for shortness of breath and wheezing. Negative for cough, choking and chest tightness.    Cardiovascular: Negative for chest pain and leg swelling.   Gastrointestinal: Negative for abdominal pain, constipation, diarrhea, nausea and vomiting.   Endocrine: Negative for cold intolerance and heat intolerance.   Genitourinary: Negative for dysuria, frequency, hematuria and urgency.   Musculoskeletal: Negative for arthralgias, back pain and neck pain.   Skin: Negative for pallor and  rash.   Neurological: Positive for weakness. Negative for numbness and headaches.   Hematological: Negative for adenopathy. Does not bruise/bleed easily.   Psychiatric/Behavioral: Negative for agitation.   All other systems reviewed and are negative.    Objective:     Vital Signs (Most Recent):  Temp: 97.7 °F (36.5 °C) (08/28/20 0300)  Pulse: 78 (08/28/20 0653)  Resp: 16 (08/28/20 0653)  BP: (!) 140/71 (08/28/20 0300)  SpO2: 100 % (08/28/20 0653) Vital Signs (24h Range):  Temp:  [97.7 °F (36.5 °C)-98.1 °F (36.7 °C)] 97.7 °F (36.5 °C)  Pulse:  [78-97] 78  Resp:  [16-20] 16  SpO2:  [90 %-100 %] 100 %  BP: (136-165)/(71-81) 140/71     Weight: 74 kg (163 lb 2.3 oz)  Body mass index is 28 kg/m².  Body surface area is 1.83 meters squared.      Intake/Output Summary (Last 24 hours) at 8/28/2020 0749  Last data filed at 8/27/2020 2204  Gross per 24 hour   Intake 740 ml   Output --   Net 740 ml       Physical Exam  Constitutional:       General: She is not in acute distress.     Appearance: She is well-developed. She is not diaphoretic.   HENT:      Head: Normocephalic and atraumatic.      Mouth/Throat:      Pharynx: No oropharyngeal exudate.   Eyes:      General: No scleral icterus.     Conjunctiva/sclera: Conjunctivae normal.   Neck:      Musculoskeletal: Normal range of motion and neck supple.      Vascular: No JVD.      Trachea: No tracheal deviation.   Cardiovascular:      Rate and Rhythm: Normal rate and regular rhythm.      Heart sounds: Normal heart sounds. No murmur (2= capillary refill).   Pulmonary:      Effort: Pulmonary effort is normal. No respiratory distress.      Breath sounds: No stridor. Wheezing present. No rhonchi or rales.   Chest:      Chest wall: No tenderness.   Abdominal:      General: Bowel sounds are normal. There is no distension.      Palpations: Abdomen is soft.   Musculoskeletal: Normal range of motion.   Skin:     General: Skin is warm and dry.      Findings: No erythema or rash.    Neurological:      Mental Status: She is alert and oriented to person, place, and time.      Cranial Nerves: No cranial nerve deficit.      Comments:     Psychiatric:         Thought Content: Thought content normal.         Significant Labs:     Lab Results   Component Value Date    WBC 11.08 08/28/2020    HGB 8.9 (L) 08/28/2020    HCT 29.3 (L) 08/28/2020    MCV 89 08/28/2020     08/28/2020       CMP  Sodium   Date Value Ref Range Status   08/28/2020 135 (L) 136 - 145 mmol/L Final     Potassium   Date Value Ref Range Status   08/28/2020 3.4 (L) 3.5 - 5.1 mmol/L Final     Chloride   Date Value Ref Range Status   08/28/2020 93 (L) 95 - 110 mmol/L Final     CO2   Date Value Ref Range Status   08/28/2020 33 (H) 23 - 29 mmol/L Final     Glucose   Date Value Ref Range Status   08/28/2020 162 (H) 70 - 110 mg/dL Final     BUN, Bld   Date Value Ref Range Status   08/28/2020 37 (H) 8 - 23 mg/dL Final     Creatinine   Date Value Ref Range Status   08/28/2020 1.3 0.5 - 1.4 mg/dL Final     Calcium   Date Value Ref Range Status   08/28/2020 8.2 (L) 8.7 - 10.5 mg/dL Final     Total Protein   Date Value Ref Range Status   08/28/2020 5.2 (L) 6.0 - 8.4 g/dL Final     Albumin   Date Value Ref Range Status   08/28/2020 2.5 (L) 3.5 - 5.2 g/dL Final     Total Bilirubin   Date Value Ref Range Status   08/28/2020 0.6 0.1 - 1.0 mg/dL Final     Comment:     For infants and newborns, interpretation of results should be based  on gestational age, weight and in agreement with clinical  observations.  Premature Infant recommended reference ranges:  Up to 24 hours.............<8.0 mg/dL  Up to 48 hours............<12.0 mg/dL  3-5 days..................<15.0 mg/dL  6-29 days.................<15.0 mg/dL       Alkaline Phosphatase   Date Value Ref Range Status   08/28/2020 43 (L) 55 - 135 U/L Final     AST   Date Value Ref Range Status   08/28/2020 19 10 - 40 U/L Final     ALT   Date Value Ref Range Status   08/28/2020 33 10 - 44 U/L Final      Anion Gap   Date Value Ref Range Status   08/28/2020 9 8 - 16 mmol/L Final     eGFR if    Date Value Ref Range Status   08/28/2020 49.4 (A) >60 mL/min/1.73 m^2 Final     eGFR if non    Date Value Ref Range Status   08/28/2020 42.9 (A) >60 mL/min/1.73 m^2 Final     Comment:     Calculation used to obtain the estimated glomerular filtration  rate (eGFR) is the CKD-EPI equation.            Assessment/Plan:     Active Diagnoses:    Diagnosis Date Noted POA    PRINCIPAL PROBLEM:  Small cell lung cancer, right [C34.91] 08/17/2020 Yes    Lung cancer [C34.90] 08/26/2020 Yes    Obstructive pneumonia [J18.9] 08/25/2020 Yes    Malignant pleural effusion [J91.0] 08/25/2020 Unknown    Anemia [D64.9]  Unknown    Leukocytosis [D72.829]  Unknown    Acute on chronic respiratory failure with hypoxia and hypercapnia [J96.21, J96.22] 08/18/2020 Yes    Opioid dependence [F11.20] 08/14/2020 Yes    Chronic obstructive pulmonary disease with acute lower respiratory infection [J44.0] 08/13/2020 Yes      Problems Resolved During this Admission:     Hypoxia improving  REspiratory status much improved post chemo  Cont breathing treatments  Boost immune system to fight pneumonia with IVIG   Increase ambulation   Needs strength training  Home likely tomorrow   Cycle 2 due in 3 weeks for small cell carcinoma of lung  PET CT as outpatient        Berna Acuña MD  Hematology/Oncology  [facility]

## 2020-08-28 NOTE — PLAN OF CARE
08/28/20 1435   Final Note   Assessment Type Final Discharge Note   Anticipated Discharge Disposition Home-Health   Post-Acute Status   Post-Acute Authorization HME   Post-Acute Placement Status Set-up Complete   HME Status Set-up Complete   Discharge Delays None known at this time   [facility] health

## 2020-08-28 NOTE — PT/OT/SLP EVAL
"Occupational Therapy   Evaluation and Discharge     Name: Kendra Lawrence  MRN: [id number]  Admitting Diagnosis:  Small cell lung cancer, right      Recommendations:     Discharge Recommendations: home with home health, home health PT, home health OT  Discharge Equipment Recommendations:  bedside commode, walker, rolling  Barriers to discharge:  None    Assessment:     Kendra Lawrence is a 64 y.o. female with a medical diagnosis of Small cell lung cancer, right.  She presents with agreeable attitude for OT eval and treat as long as "I don't have to walk because I have diarrhea and can't walk far away from the [facility]".  Performance deficits affecting function: impaired endurance, impaired self care skills, impaired functional mobilty, impaired cardiopulmonary response to activity.      Rehab Prognosis: Good; patient would benefit from acute skilled OT services to address these deficits and reach maximum level of function.              Subjective     Chief Complaint: "I have diarrhea"  Patient/Family Comments/goals: home     Occupational Profile:  Living Environment: pt lives in a trailor with 3 steps to enter, tub/shower combination, walk in shower, standard toilet  Previous level of function: independent, grocery shops, drives  Roles and Routines: lives with her family, (, daughter, KYREE, grandson who is 16 years)   Equipment Used at Home:  none  Assistance upon Discharge: family     Pain/Comfort:  · Pain Rating 1: 0/10  · Pain Rating Post-Intervention 1: 0/10    Patients cultural, spiritual, [employer]alism conflicts given the current situation: no    Objective:     Communicated with: Nursing, PT, RT prior to session.  Patient found HOB elevated with telemetry, oxygen, PICC line upon OT entry to room.    General Precautions: Standard, fall   Orthopedic Precautions:N/A   Braces: N/A     Occupational Performance:    Bed Mobility:  O2 sats on 2 LNC in bed 95%  · Patient completed Rolling/Turning to Right with " independence  · Patient completed Scooting/Bridging with independence  · Patient completed Supine to Sit with independence  · Patient completed Sit to Supine with independence    Functional Mobility/Transfers: O2 sats on 2 LNC sitting on side of the bed 90%  · Patient completed Sit <> Stand Transfer with stand by assistance  with  rolling walker   · Patient completed Bed <> Chair Transfer refusal due to diarrhea   · Functional Mobility: sitting on the side of the bed x 1 min reached 92%; static standing tolerance testing with 2 LNC with OT not be able to achieve an O2 reading on pulse ox; pt does report SOB throughout the session    Activities of Daily Living:  · Feeding:  independence    · Grooming: set up in the bed for oral care, face and hands     · Lower Body Dressing: pt was able to parker her flip flops next to the bed without assist     · Toileting: pt is having frequent diarrhea and states that she begins hygiene component but needs assist from nursing at times; MOD A overall       Cognitive/Visual Perceptual:  Cognitive/Psychosocial Skills:     -       Oriented to: Person, Place, Time and Situation   -       Follows Commands/attention:Follows two-step commands  -       Communication: clear/fluent  -       Memory: No Deficits noted  -       Safety awareness/insight to disability: intact   -       Mood/Affect/Coping skills/emotional control: Appropriate to situation, Cooperative and Pleasant  Visual/Perceptual:      -Intact      Physical Exam:  Balance: -       static standing good -    UE AROM B WFL    AMPAC 6 Click ADL:  AMPAC Total Score: 18    Treatment & Education: role of OT, call don't fall; Self care training via education in regards to purpose and benefits of DME use of BSC, shower chair/tubbench, and RW for energy conservation, falls prevention via use of pictures and verbal education. Purpose of set up of activities as needed for energy conservation depending on her O2 sats with ambulation; the pt  states her home is small and the kitchen is next to her bedroom making getting to the kitchen to eat very easy.   Education:    Patient left HOB elevated with all lines intact, call button in reach and  nurse  notified and case management regarding difficulty obtaining a pulse ox reading in standing however, RT is expected to complete a 6 min test which will assist the patient with information regarding the implementation of the energy conservation techniques taught to the patient.       History:     Past Medical History:   Diagnosis Date    Asthma     Cardiac angina     Chronic abdominal pain     Claustrophobia     COPD (chronic obstructive pulmonary disease)     Coronary artery disease     GERD (gastroesophageal reflux disease)     History of drug dependence     Hypertension     Hypocalcemia 2020       Past Surgical History:   Procedure Laterality Date    BREAST BIOPSY Bilateral 20 yrs ago    benign    BRONCHOSCOPY N/A 2020    Procedure: Bronchoscopy- 730 or noon, floro not needed;  Surgeon: Andrew Brothers MD;  Location[facility];  Service: Endoscopy;  Laterality: N/A;     SECTION      CHOLECYSTECTOMY      ENDOSCOPIC ULTRASOUND OF UPPER GASTROINTESTINAL TRACT Left 2018    Procedure: ULTRASOUND, UPPER GI TRACT, ENDOSCOPIC;  Surgeon: Paras Negrete MD;  Location: [facility];  Service: Endoscopy;  Laterality: Left;    ESOPHAGOGASTRODUODENOSCOPY N/A 2018    Procedure: EGD (ESOPHAGOGASTRODUODENOSCOPY);  Surgeon: Paras Negrete MD;  Location: [facility];  Service: Endoscopy;  Laterality: N/A;    ESOPHAGOGASTRODUODENOSCOPY N/A 2018    Procedure: EGD (ESOPHAGOGASTRODUODENOSCOPY);  Surgeon: Paras Negrete MD;  Location: [facility];  Service: Endoscopy;  Laterality: N/A;    HYSTERECTOMY      MAGNETIC RESONANCE IMAGING N/A 2019    Procedure: MRI (Magnetic Resonance Imagine) needs anesthesia;  Surgeon: Raffy Charles MD;  Location: [facility];   Service: Anesthesiology;  Laterality: N/A;    OOPHORECTOMY      TEMPOROMANDIBULAR JOINT SURGERY      TONSILLECTOMY         Time Tracking:     OT Date of Treatment: 08/28/20  OT Start Time: 0935  OT Stop Time: 1012  OT Total Time (min): 37 min    Billable Minutes:Evaluation 10  Self Care/Home Management 27    Berna Corral OT  8/28/2020

## 2020-08-28 NOTE — PT/OT/SLP PROGRESS
Physical Therapy      Patient Name:  Kendra Lawrence   MRN:  [id number]    Patient not seen today secondary to Patient unwilling to participate due to having diarrhea. Will follow-up 08/29/2020    Chris MeGilligan, PT

## 2020-08-28 NOTE — PROGRESS NOTES
[facility]  Pulmonology  Progress Note    Subjective     8/26/2020:  No major issues overnight.  Subjectively unchanged over the past 24 hours.  No new complaints.  8/27/2020:  No major issues over the past 24 hours.  Breathing better.  Has developed some nausea after starting chemo.  8/28/2020:  No major issues overnight.  Breathing is better.  Having some diarrhea after receiving chemotherapy.     Review of Systems   Constitutional: Negative for fever.   Respiratory: Positive for dyspnea on extertion. Negative for cough, hemoptysis, sputum production, chest tightness, shortness of breath, wheezing, orthopnea and pleurisy.    Cardiovascular: Negative for chest pain, palpitations and leg swelling.   Gastrointestinal: Positive for nausea. Negative for vomiting and abdominal pain.   All other systems reviewed and are negative.     I have personally reviewed the following during today's evaluation:  past medical history, ROS, family history, social history, surgical history, current inpatient medications,drug allergies, vital signs over the past 24 hours, results of relevant diagnostic studies and nursing/provider documentation from the past 24 hours.     Objective     VS Temp:  [97.7 °F (36.5 °C)-98.1 °F (36.7 °C)]   Pulse:  [78-97]   Resp:  [16-20]   BP: (134-165)/(71-91)   SpO2:  [90 %-100 %]   Ideal body weight: 54.7 kg (120 lb 9.5 oz)  Adjusted ideal body weight: 62.4 kg (137 lb 9.8 oz)   I/O   Intake/Output Summary (Last 24 hours) at 8/28/2020 1009  Last data filed at 8/27/2020 2204  Gross per 24 hour   Intake 740 ml   Output --   Net 740 ml        Vent SpO2 98% on 2L NC   PE Physical Exam   Constitutional: She is oriented to person, place, and time. She appears well-developed and well-nourished. She is cooperative.  Non-toxic appearance. No distress. Nasal cannula in place.   HENT:   Head: Normocephalic.   Right Ear: External ear normal.   Left Ear: External ear normal.   Nose: Nose normal.    Mouth/Throat: Oropharynx is clear and moist. Abnormal dentition. No oropharyngeal exudate. Mallampati Score: II.   Neck: Normal range of motion. Neck supple. No JVD present. No thyromegaly present.   Cardiovascular: Normal rate, regular rhythm, normal heart sounds and intact distal pulses. Exam reveals no gallop and no friction rub.   No murmur heard.  Pulmonary/Chest: Normal expansion, hyperinflation and effort normal. She has decreased breath sounds (over the right base). She has wheezes. She has no rhonchi. She has no rales.   Abdominal: Soft. Bowel sounds are normal. She exhibits no distension and no mass. There is no hepatosplenomegaly. There is no abdominal tenderness.   Musculoskeletal: Normal range of motion.         General: No tenderness, deformity or edema.   Lymphadenopathy: No supraclavicular adenopathy is present.     She has no cervical adenopathy.     She has no axillary adenopathy.   Neurological: She is alert and oriented to person, place, and time. No cranial nerve deficit.   Skin: Skin is warm and dry. No rash noted. No cyanosis. Nails show no clubbing.   Psychiatric: She has a normal mood and affect. Her behavior is normal. Thought content normal.   Nursing note and vitals reviewed.        Labs I have personally reviewed and interpreted all labs / diagnostic studies obtained over the past 24 hours, and relevant results are as follows:  Recent Labs   Lab 08/28/20  0500   WBC 11.08   RBC 3.29*   HGB 8.9*   HCT 29.3*      MCV 89   MCH 27.1   MCHC 30.4*   *   K 3.4*   CL 93*   CO2 33*   BUN 37*   CREATININE 1.3   MG 1.7   ALT 33   AST 19   ALKPHOS 43*   BILITOT 0.6   PROT 5.2*   ALBUMIN 2.5*      Imaging I have personally reviewed and interpreted the following images and reviewed the associated Radiology report.  I have reviewed and interpreted all pertinent imaging results/findings within the past 24 hours.    CXR:  8/25/2020:  · Left PICC line with the tip overlying the atrial caval  junction.  There is no pneumothorax.    · Persistent right pleural effusion with right middle and lower lobe airspace disease.    CT Abdomen/Pelvis:  8/26/2020:  1. Confluent heterogeneous masslike consolidation of right middle lobe, most likely correlating with reportedly known malignancy.  2. Centrally necrotic subcarinal lymph node which is enlarged is characteristic of a lymph node metastasis.   3. Small right pleural effusion.   4. Coronary artery calcifications.   5. Mild diffuse intrahepatic and extrahepatic bile duct dilation could be related to postcholecystectomy state. No obstructing etiology identified.  6. Diverticulosis.   7. No evidence of metastatic disease throughout the abdomen or pelvis.      Micro I have personally reviewed and interpreted the available culture data.  Relevant results are as follows.  Blood Culture   Lab Results   Component Value Date    LABBLOO No growth after 5 days. 08/13/2020    LABBLOO No growth after 5 days. 08/13/2020   , Sputum Culture   Lab Results   Component Value Date    GSRESP <10 epithelial cells per low power field. 08/18/2020    GSRESP Rare WBC's 08/18/2020    GSRESP No organisms seen 08/18/2020    RESPIRATORYC JAYY ALBICANS  Moderate   (A) 08/18/2020    RESPIRATORYC JAYY DUBLINIENSIS  Moderate   (A) 08/18/2020    RESPIRATORYC JAYY GLABRATA  Moderate   (A) 08/18/2020    and Urine Culture    Lab Results   Component Value Date    LABURIN KLEBSIELLA PNEUMONIAE  >100,000 cfu/ml   (A) 08/13/2020      Medications Scheduled    albuterol-ipratropium  3 mL Nebulization Q4H WAKE    buprenorphine-naloxone  1 each Sublingual BID    DULoxetine  60 mg Oral Daily    enoxparin  40 mg Subcutaneous Q24H    famotidine  20 mg Oral BID    ferric gluconate (FERRLECIT) IVPB  125 mg Intravenous 1 time in Clinic/HOD    ferrous sulfate  325 mg Oral Daily    fluticasone furoate-vilanteroL  1 puff Inhalation Daily    hydroCHLOROthiazide  25 mg Oral Daily    Immune Globulin  G (IGG)-PRO-IGA 10 % injection (Privigen)  20 g Intravenous Once    Followed by    Immune Globulin G (IGG)-PRO-IGA 10 % injection (Privigen)  10 g Intravenous Once    Lactobacillus acidoph-L.bulgar  4 tablet Oral TID WM    levoFLOXacin  250 mg Intravenous Q24H    losartan  100 mg Oral Daily    nicotine  1 patch Transdermal Daily      Continuous Infusions:      PRN   acetaminophen, ALPRAZolam, amitriptyline, loperamide, magnesium oxide, magnesium oxide, ondansetron, potassium chloride, potassium chloride        Assessment       Active Hospital Problems    Diagnosis    *Small cell lung cancer, right    Lung cancer    Obstructive pneumonia    Malignant pleural effusion    Anemia    Leukocytosis    Acute on chronic respiratory failure with hypoxia and hypercapnia    Opioid dependence    Chronic obstructive pulmonary disease with acute lower respiratory infection      My Impression:  SCLC with a questionable component of treated post-obstructive pneumonia here for urgent initiation of chemotherapy.    Plan     Pulmonary  · Stop ABx  · Completed a sufficient course of systemic CS.  · Continue aerosolized BDs.  · Heme-onc managing chemotherapy.  · Rad-Onc following.  Recommendations noted.  · Titrate supplemental oxygen to maintain SpO2 of 88-92%.  · Anticipate discharge in the next day or two.     Marcos Fermin MD  Pulmonary / Critical Care Medicine  [facility]

## 2020-08-28 NOTE — PLAN OF CARE
Important Message from Medicare was sign, explained and given to patient/caregiver on 08/28/2020 at 1:55pm

## 2020-08-28 NOTE — PLAN OF CARE
08/28/20 1323   Post-Acute Status   Post-Acute Authorization Home Health   Post-Acute Placement Status Set-up Complete   Home health set up with [facility] home health per [facility].

## 2020-08-28 NOTE — RESPIRATORY THERAPY
08/28/20 1122   Patient Assessment/Suction   Level of Consciousness (AVPU) alert   Respiratory Effort Unlabored   All Lung Fields Breath Sounds coarse;wheezes, expiratory   PRE-TX-O2   O2 Device (Oxygen Therapy) nasal cannula   SpO2 (!) 93 %   Pulse 83   Resp 14   Positioning   Body Position positioned/repositioned independently   Head of Bed (HOB) HOB at 45 degrees   Aerosol Therapy   $ Aerosol Therapy Charges Aerosol Treatment   Respiratory Treatment Status (SVN) given   Treatment Route (SVN) mask;oxygen   Patient Position (SVN) semi-Hall's   Post Treatment Assessment (SVN) breath sounds unchanged   Signs of Intolerance (SVN) none   Breath Sounds Post-Respiratory Treatment   Throughout All Fields Post-Treatment no change   Post-treatment Heart Rate (beats/min) 86   Post-treatment Resp Rate (breaths/min) 16

## 2020-08-29 NOTE — DISCHARGE SUMMARY
[facility] Medicine  Discharge Summary      Patient Name: Kendra Lawrence  MRN: [id number]  Admission Date: 8/25/2020  Hospital Length of Stay: 2 days  Discharge Date and Time:  08/28/2020 8:19 PM  Attending Physician: No att. providers found   Discharging Provider: Nazario Wolfe MD  Primary Care Provider: Louie Urena MD      HPI:   64 year old patient transferred from [facility] for initiation of chemotherapy  Patient got admitted to [facility] 1 week ago with shortness of breath  Patient is a chronic active smoker and she was diagnosed with acute COPD exacerbation and possible obstructive pneumonia  Imaging studies showed lung mass and she underwent biopsy   Biopsy showed small cell carcinoma of the lung and patient was evaluated by oncologist  Plan was made to start patient on chemotherapy and eventually patient was transferred to this hospital  When seen in the floor today patient is comfortable and she denies any other issues    * No surgery found *      Hospital Course:   Patient Transferred from [facility] for initiation of Chemo Rx  Patient initially presented to [facility] with acute respiratory failure  She was found to have COPD exacerbation/Obstructive pneumonia/Lung mass  Lung Biopsy Confirmed SCC  Patient had First cycle of Chemo Regime  Later she was discharged to Home with Home O2  Patient will follow up with Oncologist as an OP      Consults:   Consults (From admission, onward)        Status Ordering Provider     Inpatient consult to Hematology Oncology  Once     Provider:  Berna Acuña MD    Completed NAZARIO WOLFE     Inpatient consult to Pulmonology  Once     Provider:  Andrew Brothers MD    Completed NAZARIO WOLFE     Inpatient consult to Radiation Oncology  Once     Provider:  Teodoro Swenson Jr., MD    Completed SUMAN HUANG          No new Assessment & Plan notes have been filed under this hospital service since the last note was  "generated.  Service: Hospital Medicine    Final Active Diagnoses:    Diagnosis Date Noted POA    PRINCIPAL PROBLEM:  Small cell lung cancer, right [C34.91] 08/17/2020 Yes    Opioid dependence [F11.20] 08/14/2020 Yes    Obstructive pneumonia [J18.9] 08/25/2020 Yes    Lung cancer [C34.90] 08/26/2020 Yes      Problems Resolved During this Admission:    Diagnosis Date Noted Date Resolved POA    Chronic obstructive pulmonary disease with acute lower respiratory infection [J44.0] 08/13/2020 08/28/2020 Yes    Acute on chronic respiratory failure with hypoxia and hypercapnia [J96.21, J96.22] 08/18/2020 08/28/2020 Yes    Leukocytosis [D72.829]  08/28/2020 Yes    Malignant pleural effusion [J91.0] 08/25/2020 08/28/2020 Yes    Anemia [D64.9]  08/28/2020 Yes       Discharged Condition: good    Disposition: Home-Health Care Svc    Follow Up:  Follow-up Information     Berna Acuña MD In 1 week.    Specialty: Hematology and Oncology  Contact information:  [address] [place] [id number]  [phone]                 Patient Instructions:      OXYGEN FOR HOME USE     Order Specific Question Answer Comments   Liter Flow 2    Duration Continuous    Qualifying SpO2: 88    Testing done at: [facility]    Route nasal cannula    Device home concentrator with portable unit    Length of need (in months): 99 mos    Patient condition with qualifying saturation COPD LUNG CANCER   Height: 5' 4" (1.626 m)    Weight: 74 kg (163 lb 2.3 oz)    Does patient have medical equipment at home? rollator    Alternative treatment measures have been tried or considered and deemed clinically ineffective. Yes      Ambulatory referral/consult to Home Health   Standing Status: Future   Referral Priority: Routine Referral Type: Home Health Care   Referral Reason: Specialty Services Required   Requested Specialty: Home Health Services   Number of Visits Requested: 1     Diet Cardiac     Activity as tolerated       Significant Diagnostic Studies: " Labs:   CMP   Recent Labs   Lab 08/28/20  0500   *   K 3.4*   CL 93*   CO2 33*   *   BUN 37*   CREATININE 1.3   CALCIUM 8.2*   PROT 5.2*   ALBUMIN 2.5*   BILITOT 0.6   ALKPHOS 43*   AST 19   ALT 33   ANIONGAP 9   ESTGFRAFRICA 49.4*   EGFRNONAA 42.9*    and CBC   Recent Labs   Lab 08/28/20  0500   WBC 11.08   HGB 8.9*   HCT 29.3*          Pending Diagnostic Studies:     None         Medications:  Reconciled Home Medications:      Medication List      START taking these medications    ALPRAZolam 0.5 MG tablet  Commonly known as: XANAX  Take 1 tablet (0.5 mg total) by mouth 2 (two) times daily as needed for Anxiety.     fluticasone furoate-vilanteroL 200-25 mcg/dose Dsdv diskus inhaler  Commonly known as: BREO  Inhale 1 puff into the lungs once daily. Controller  Start taking on: August 29, 2020     Lactobacillus acidoph-L.bulgar 1 million cell Chew  Take 2 tablets by mouth 3 (three) times daily with meals. for 5 days     loperamide 2 mg capsule  Commonly known as: IMODIUM  Take 1 capsule (2 mg total) by mouth 4 (four) times daily as needed for Diarrhea.        CHANGE how you take these medications    alendronate 70 MG tablet  Commonly known as: FOSAMAX  TAKE ONE TABLET BY MOUTH ONCE EVERY 7 DAYS  What changed: See the new instructions.     amitriptyline 10 MG tablet  Commonly known as: ELAVIL  TAKE ONE TABLET BY MOUTH AT BEDTIME AS NEEDED FOR INSOMNIA  What changed:   · when to take this  · reasons to take this     ondansetron 4 MG tablet  Commonly known as: ZOFRAN  TAKE ONE TABLET BY MOUTH EVERY 8 HOURS AS NEEDED FOR NAUSEA  What changed:   · reasons to take this  · additional instructions     TRELEGY ELLIPTA 100-62.5-25 mcg Dsdv  Generic drug: fluticasone-umeclidin-vilanter  INHALE 1 PUFF INTO THE LUNGS NIGHTLY  What changed: See the new instructions.        CONTINUE taking these medications    albuterol-ipratropium 2.5 mg-0.5 mg/3 mL nebulizer solution  Commonly known as: DUO-NEB  Take 3 mLs by  nebulization every 4 (four) hours. Rescue     buprenorphine-naloxone 8-2 mg Film  Commonly known as: SUBOXONE  Place 1 packet (1 each total) under the tongue 2 (two) times daily.     DULoxetine 60 MG capsule  Commonly known as: CYMBALTA  TAKE ONE CAPSULE BY MOUTH ONCE A DAY     ferrous sulfate 325 (65 FE) MG EC tablet  Take 1 tablet (325 mg total) by mouth once daily.     hydroCHLOROthiazide 25 MG tablet  Commonly known as: HYDRODIURIL  TAKE ONE TABLET BY MOUTH EVERY DAY     losartan 100 MG tablet  Commonly known as: COZAAR  TAKE ONE TABLET BY MOUTH ONCE A DAY     nitroGLYCERIN 0.4 MG SL tablet  Commonly known as: NITROSTAT  Place 0.4 mg under the tongue every 5 (five) minutes as needed for Chest pain.     omeprazole 40 MG capsule  Commonly known as: PRILOSEC  Take 1 capsule by mouth 2 (two) times daily.        STOP taking these medications    amoxicillin-clavulanate 875-125mg 875-125 mg per tablet  Commonly known as: AUGMENTIN     senna-docusate 8.6-50 mg 8.6-50 mg per tablet  Commonly known as: SENNA WITH DOCUSATE SODIUM            Indwelling Lines/Drains at time of discharge:   Lines/Drains/Airways     Peripherally Inserted Central Catheter Line            PICC Double Lumen 08/25/20 1510 left brachial 3 days                Time spent on the discharge of patient: 40  minutes  Patient was seen and examined on the date of discharge and determined to be suitable for discharge.         Nazario Wolfe MD  Department of Hospital Medicine  [facility]

## 2020-08-31 NOTE — TELEPHONE ENCOUNTER
Spoke to Eldon at [facility]. Informed him that Dr Acuña wants pt PICC line maintained per protocol. Orders placed. Eldon and/or Mary will reach out to pt and Infusion company to set up flushes.     ----- Message from Kya Mcdonnell sent at 8/31/2020  2:10 PM CDT -----  Regarding: [facility]/IV flushes  Contact: mary/homehealth  Type: Needs Medical Advice  Who Called:  Mary  Symptoms (please be specific):  na  How long has patient had these symptoms:  na  Pharmacy name and phone #:  [facility]  Best Call Back Number: [phone]  Additional Information: Mary states patient has no IV flush orders and gives no other info except speaking to nurse and IV flush.  Thanks!

## 2020-08-31 NOTE — PATIENT INSTRUCTIONS
Stop xanax   An order for a urine drug screen with buprenorphine was given.  The patient is to use the order when called, on a random day.       Thank you for choosing [facility].     Please fill out the patient experience survey.

## 2020-08-31 NOTE — PROGRESS NOTES
Subjective:       Patient ID: Kendra Lawrence is a 64 y.o. female.    Chief Complaint: No chief complaint on file.  Video visit:    The patient location is: [place]  The chief complaint leading to consultation is: narcotic dependence   Visit type: Virtual visit with synchronous audio and video  Total time spent with patient: 10 mins  Each patient to whom he or she provides medical services by telemedicine is:  (1) informed of the relationship between the physician and patient and the respective role of any other health care provider with respect to management of the patient; and (2) notified that he or she may decline to receive medical services by telemedicine and may withdraw from such care at any time.    Notes:       HPI       Pt diagnosed with lung cancer, pneumonia.  Is anxious and someone put her on xanax.       The patient presents for medical management of opioid dependency. she is receiving maintenance therapy with buprenorphine.      CHIEF COMPLAINT: opoid dependence  HPI:     ONSET/TIMING:     DURATION: . Continuous(+).    QUALITY/COURSE:  unchanged.     INTENSITY/SEVERITY:  controlled.    CONTEXT/WHEN:     MODIFIERS/TREATMENTS:  Taking medications(+) Suboxone  8/2 # 60,   last rx given 7.24.20    SYMPTOMS/RELATED: no withdrawal symptoms. no cravings . No substance abuse.  No alcohol use     pnp checked: last month:  Yes        Review of Systems   Constitutional: Negative for diaphoresis, fatigue and unexpected weight change.   Gastrointestinal: Positive for nausea. Negative for constipation, diarrhea and vomiting.   Neurological: Negative for dizziness, light-headedness and headaches.   Psychiatric/Behavioral: Negative for dysphoric mood and sleep disturbance. The patient is nervous/anxious.          Objective:      There were no vitals filed for this visit.  No drug screen done because we do not have test strips           Assessment:       1. Narcotic dependence    2. Small cell lung cancer    3. Anxiety and  depression          Plan:       (+) pt taking medication as prescribed.    (+) dose is approproate.    (-) urine drug screen done.   (+) discussed risks of buprenorphine, including to others.      (+) assessed if benefits outweigh risks of buprenorphine. .     (+) reviewed safe storage of medication.     (+) discussed proper use of buprenorphine, including missed doses.      Narcotic dependence  -     buprenorphine-naloxone (SUBOXONE) 8-2 mg Film; Place 1 packet (1 each total) under the tongue 2 (two) times daily.  Dispense: 60 packet; Refill: 0    Small cell lung cancer    Anxiety and depression  -     busPIRone (BUSPAR) 10 MG tablet; Take 1 tablet (10 mg total) by mouth 2 (two) times daily.  Dispense: 60 tablet; Refill: 11      Follow up in about 1 month (around 9/30/2020).      Physical Exam

## 2020-08-31 NOTE — PROGRESS NOTES
[facility]     Subjective:      PATIENT:   Kendra Lawrence  :    1955  MR#:    [id number]  DATE OF VISIT:  2020    Chief Complaint:  I feel better out of the hospital  Kendra was seen today for follow-up.    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 for 14 days       Patient ID: Kendra Lawrence is a 64 y.o. female   This is a 64-year-old female who was admitted to [facility] with progressive shortness of breath and postobstructive pneumonia.     CT of chest on  revealed right middle lobe dense masslike opacification worrisome for neoplasm invading the mediastinum with another area of consolidation in the inferior right upper lobe.  The abnormality in the anterior right mid hemithorax and the mediastinum measures 11.8 x 8.7 cm.  An enlarged subcarinal lymph node was also noted concerning for metastatic node and moderate loculated appearing right pleural effusion was present.  She received antimicrobial therapy for postobstructive pneumonia.  MRI brain was performed without contrast revealing no evidence of metastatic disease.  CT of abdomen and pelvis was also performed revealing mild diffuse intrahepatic and extrahepatic bile duct dilatation possibly related to post cholecystectomy state.  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  The tumor cells are positive for cytokeratin synaptophysin and chromogranin.  TTF1 CD 45  and P 63 were negative.  The Ki-67 proliferation index was greater than 90%.    She was transferred to [facility] and began carboplatin plus etoposide.  Day 1 of cycle 1 was performed on August 25, 2020.  She was discharged last Friday with a PICC line in her left arm.    Today she reports she is breathing easier others she remains weak.     Patient information was obtained from patient, past medical records and ER records , chart review revealing       Review of Systems   10 point review of systems all negative except pertinents above  CONSTITUTIONAL: No fevers, chills, night sweats, positive wt. loss, appetite changes  SKIN: no rashes or itching  ENT: No headaches, head trauma, vision changes, or eye pain  LYMPH NODES: None noticed   CV: No chest pain, palpitations.   Endocrinology positive cold intolerance no excessive thirst or sweating  RESP:  Positive shortness of breath and dyspnea on exertion, no current cough, wheezing, or hemoptysis  GI: No nausea, emesis, positive diarrhea, and intermittent constipation, no melena, hematochezia, pain.   : No dysuria, hematuria, positive urgency, no frequency   HEME:  Positive easy bruising, no bleeding problems  PSYCHIATRIC: No depression, anxiety, psychosis, hallucinations.  NEURO: No seizures, memory loss, dizziness or difficulty sleeping  MSK: No arthralgias or joint swelling    Complete review of systems otherwise negative other than the above  Past Medical History:   Diagnosis Date    Asthma     Cardiac angina     Chronic abdominal pain     Claustrophobia     COPD (chronic obstructive pulmonary disease)     Coronary artery disease     GERD (gastroesophageal reflux disease)     History of drug dependence     Hypertension     Hypocalcemia 8/13/2020       Family History   Problem Relation Age of Onset    Breast cancer Sister 55    Breast cancer Sister 50    Cancer Mother     Heart disease Mother     Hypertension Mother     Cancer Father         Past Surgical History:   Procedure Laterality Date    BREAST BIOPSY Bilateral 20 yrs ago    benign    BRONCHOSCOPY N/A 2020    Procedure: Bronchoscopy- 730 or noon, floro not needed;  Surgeon: Andrew Brothers MD;  Location: [facility] ENDO;  Service: Endoscopy;  Laterality: N/A;     SECTION      CHOLECYSTECTOMY      ENDOSCOPIC ULTRASOUND OF UPPER GASTROINTESTINAL TRACT Left 2018    Procedure: ULTRASOUND, UPPER GI TRACT, ENDOSCOPIC;  Surgeon: Paras Negrete MD;  Location: [facility];  Service: Endoscopy;  Laterality: Left;    ESOPHAGOGASTRODUODENOSCOPY N/A 2018    Procedure: EGD (ESOPHAGOGASTRODUODENOSCOPY);  Surgeon: Paras Negrete MD;  Location: [facility];  Service: Endoscopy;  Laterality: N/A;    ESOPHAGOGASTRODUODENOSCOPY N/A 2018    Procedure: EGD (ESOPHAGOGASTRODUODENOSCOPY);  Surgeon: Paras Negrete MD;  Location: [facility];  Service: Endoscopy;  Laterality: N/A;    HYSTERECTOMY      MAGNETIC RESONANCE IMAGING N/A 2019    Procedure: MRI (Magnetic Resonance Imagine) needs anesthesia;  Surgeon: Raffy Charles MD;  Location: [facility];  Service: Anesthesiology;  Laterality: N/A;    OOPHORECTOMY      TEMPOROMANDIBULAR JOINT SURGERY      TONSILLECTOMY         Social History     Socioeconomic History    Marital status:      Spouse name: Not on file    Number of children: Not on file    Years of education: Not on file    Highest education level: Not on file   Occupational History    Not on file   Social Needs    Financial resource strain: Not on file    Food insecurity     Worry: Not on file     Inability: Not on file    Transportation needs     Medical: Not on file     Non-medical: Not on file   Tobacco Use    Smoking status: Current Every Day Smoker     Years: 52.00     Types: Vaping w/o nicotine    Smokeless tobacco: Never Used   Substance and Sexual Activity    Alcohol use: No     Frequency: Never    Drug use: Yes     Types:  Hydrocodone    Sexual activity: Not on file   Lifestyle    Physical activity     Days per week: Not on file     Minutes per session: Not on file    Stress: Not on file   Relationships    Social connections     Talks on phone: Not on file     Gets together: Not on file     Attends Scientologist service: Not on file     Active member of club or organization: Not on file     Attends meetings of clubs or organizations: Not on file     Relationship status: Not on file   Other Topics Concern    Not on file   Social History Narrative    Not on file         Current Outpatient Medications:     albuterol-ipratropium (DUO-NEB) 2.5 mg-0.5 mg/3 mL nebulizer solution, Take 3 mLs by nebulization every 4 (four) hours. Rescue, Disp: 1 Box, Rfl: 0    alendronate (FOSAMAX) 70 MG tablet, TAKE ONE TABLET BY MOUTH ONCE EVERY 7 DAYS (Patient taking differently: Take 70 mg by mouth every 7 days. Sunday), Disp: 12 tablet, Rfl: 3    buprenorphine-naloxone (SUBOXONE) 8-2 mg Film, Place 1 packet (1 each total) under the tongue 2 (two) times daily., Disp: 60 packet, Rfl: 0    DULoxetine (CYMBALTA) 60 MG capsule, TAKE ONE CAPSULE BY MOUTH ONCE A DAY (Patient taking differently: Take 60 mg by mouth once daily. ), Disp: 30 capsule, Rfl: 10    ferrous sulfate 325 (65 FE) MG EC tablet, Take 1 tablet (325 mg total) by mouth once daily., Disp: 30 tablet, Rfl: 0    fluticasone furoate-vilanteroL (BREO) 200-25 mcg/dose DsDv diskus inhaler, Inhale 1 puff into the lungs once daily. Controller, Disp: 1 each, Rfl: 0    hydroCHLOROthiazide (HYDRODIURIL) 25 MG tablet, TAKE ONE TABLET BY MOUTH EVERY DAY, Disp: 90 tablet, Rfl: 1    loperamide (IMODIUM) 2 mg capsule, Take 1 capsule (2 mg total) by mouth 4 (four) times daily as needed for Diarrhea., Disp: 25 capsule, Rfl: 0    losartan (COZAAR) 100 MG tablet, TAKE ONE TABLET BY MOUTH ONCE A DAY (Patient taking differently: Take 100 mg by mouth once daily. ), Disp: 90 tablet, Rfl: 0    amitriptyline  "(ELAVIL) 10 MG tablet, TAKE ONE TABLET BY MOUTH AT BEDTIME AS NEEDED FOR INSOMNIA (Patient not taking: No sig reported), Disp: 30 tablet, Rfl: 1    busPIRone (BUSPAR) 10 MG tablet, Take 1 tablet (10 mg total) by mouth 2 (two) times daily. (Patient not taking: Reported on 8/31/2020), Disp: 60 tablet, Rfl: 11    Lactobacillus acidoph-L.bulgar 1 million cell Chew, Take 2 tablets by mouth 3 (three) times daily with meals. for 5 days (Patient not taking: Reported on 8/31/2020), Disp: 30 tablet, Rfl: 0    metroNIDAZOLE (FLAGYL) 500 MG tablet, Take 1 tablet (500 mg total) by mouth every 12 (twelve) hours. for 14 days, Disp: 20 tablet, Rfl: 0    nitroGLYCERIN (NITROSTAT) 0.4 MG SL tablet, Place 0.4 mg under the tongue every 5 (five) minutes as needed for Chest pain., Disp: , Rfl:     omeprazole (PRILOSEC) 40 MG capsule, Take 1 capsule by mouth 2 (two) times daily., Disp: , Rfl: 5    ondansetron (ZOFRAN) 4 MG tablet, TAKE ONE TABLET BY MOUTH EVERY 8 HOURS AS NEEDED FOR NAUSEA (Patient not taking: No sig reported), Disp: 50 tablet, Rfl: 5    TRELEGY ELLIPTA 100-62.5-25 mcg DsDv, INHALE 1 PUFF INTO THE LUNGS NIGHTLY (Patient not taking: No sig reported), Disp: 60 each, Rfl: 11    Review of patient's allergies indicates:  No Known Allergies       Objective:     Vitals:  /73 (BP Location: Right arm, Patient Position: Sitting, BP Method: Medium (Automatic))   Pulse 104   Temp 98.3 °F (36.8 °C) (Temporal)   Resp 18   Ht 5' 4" (1.626 m)   Wt 68.6 kg (151 lb 3.8 oz)   LMP  (LMP Unknown)   SpO2 99%   BMI 25.96 kg/m²    Body surface area is 1.76 meters squared.    Weight Readings:  Wt Readings from Last 5 Encounters:   08/31/20 68.6 kg (151 lb 3.8 oz)   08/25/20 74 kg (163 lb 2.3 oz)   08/24/20 81.4 kg (179 lb 6.4 oz)   03/06/20 75.8 kg (167 lb 1.7 oz)   02/07/20 77.7 kg (171 lb 4.8 oz)        Physical Exam  Height / weight / VS reviewed  GENERAL.:  Does not appear to feel well today in no acute distress  PSYCH:  " Flat affect, no anxiety, no depression  HEENT: Normocephalic, lids intact, conjunctiva pink, sinuses nontender to palpation TMs intact, non-boggy turbinates,Normal auricula, nasal septum, dentition, gums and mucosa ,OP clear,no palatal pallor  NECK: Supple,trachea midline, no palpable abnormalities  LYMPHATICS: No cervical or axillary adenopathy  RESPIRATORY:  Decreased breath sounds right lung no rubs or rhonchi  Good respiratory effort without any retractions or diaphragmatic movement  CARDIOVASCULAR: no JVD, S1 / S2 with RR tachycardia, no murmur, no rub,2+ capillary refill  ABDOMEN: NT distended, normal bowel sounds, no palpable HSM or mass  EXTREMITIES: No cyanosis, no clubbing, no joint effusion  NEUROLOGICAL: alert and oriented x 3   SKIN: Warm, dry, positive ecchymoses and excoriations noted no tenting, no petechiae or ecchymosis    Labs:  Lab Results   Component Value Date    WBC 11.08 08/28/2020    HGB 8.9 (L) 08/28/2020    HCT 29.3 (L) 08/28/2020    MCV 89 08/28/2020     08/28/2020         CMP  Sodium   Date Value Ref Range Status   08/28/2020 135 (L) 136 - 145 mmol/L Final     Potassium   Date Value Ref Range Status   08/28/2020 3.4 (L) 3.5 - 5.1 mmol/L Final     Chloride   Date Value Ref Range Status   08/28/2020 93 (L) 95 - 110 mmol/L Final     CO2   Date Value Ref Range Status   08/28/2020 33 (H) 23 - 29 mmol/L Final     Glucose   Date Value Ref Range Status   08/28/2020 162 (H) 70 - 110 mg/dL Final     BUN, Bld   Date Value Ref Range Status   08/28/2020 37 (H) 8 - 23 mg/dL Final     Creatinine   Date Value Ref Range Status   08/28/2020 1.3 0.5 - 1.4 mg/dL Final     Calcium   Date Value Ref Range Status   08/28/2020 8.2 (L) 8.7 - 10.5 mg/dL Final     Total Protein   Date Value Ref Range Status   08/28/2020 5.2 (L) 6.0 - 8.4 g/dL Final     Albumin   Date Value Ref Range Status   08/28/2020 2.5 (L) 3.5 - 5.2 g/dL Final     Total Bilirubin   Date Value Ref Range Status   08/28/2020 0.6 0.1 - 1.0  mg/dL Final     Comment:     For infants and newborns, interpretation of results should be based  on gestational age, weight and in agreement with clinical  observations.  Premature Infant recommended reference ranges:  Up to 24 hours.............<8.0 mg/dL  Up to 48 hours............<12.0 mg/dL  3-5 days..................<15.0 mg/dL  6-29 days.................<15.0 mg/dL       Alkaline Phosphatase   Date Value Ref Range Status   08/28/2020 43 (L) 55 - 135 U/L Final     AST   Date Value Ref Range Status   08/28/2020 19 10 - 40 U/L Final     ALT   Date Value Ref Range Status   08/28/2020 33 10 - 44 U/L Final     Anion Gap   Date Value Ref Range Status   08/28/2020 9 8 - 16 mmol/L Final     eGFR if    Date Value Ref Range Status   08/28/2020 49.4 (A) >60 mL/min/1.73 m^2 Final     eGFR if non    Date Value Ref Range Status   08/28/2020 42.9 (A) >60 mL/min/1.73 m^2 Final     Comment:     Calculation used to obtain the estimated glomerular filtration  rate (eGFR) is the CKD-EPI equation.        All lab results and imaging results have been reviewed and discussed with the patient.     X-ray Chest 1 View    Result Date: 8/24/2020  EXAMINATION: XR CHEST 1 VIEW CLINICAL HISTORY: resp distress; TECHNIQUE: Single frontal view of the chest was performed. COMPARISON: 08/18/2020 FINDINGS: There remains confluent opacification the right lung base at least in part consistent with the patient's known large mass.  Slight clearing right perihilar which could be due to decrease of pleural effusion or peripheral infiltrate.  Left lung clear of confluent infiltrate and no left pleural effusion.     Continued masslike opacification right lung base.  Slight clearing right perihilar region. Electronically signed by: Rosana Fernandez MD Date:    08/24/2020 Time:    14:49    X-ray Chest 1 View    Result Date: 8/18/2020  EXAMINATION: XR CHEST 1 VIEW CLINICAL HISTORY: bronch; TECHNIQUE: Single frontal view of the  chest was performed. COMPARISON: Chest of August 13, 2020 and CT chest of August 15, 2020 FINDINGS: There is dense opacification of the lower 2/3 of the right hemithorax now, consistent with mass in the right middle lobe and right pleural effusion as noted on the prior CT chest.  No pneumothorax is seen.  The left lung is clear.  The cardiac size is obscured.     Dense opacification of the lower 2/3 of the right chest secondary to right middle lobe mass and right pleural effusion noted on prior CT.  This is an increase in density since the prior chest x-ray Electronically signed by: Bryan Bey MD Date:    08/18/2020 Time:    08:33    Ct Head Without Contrast    Result Date: 8/17/2020  EXAMINATION: CT HEAD WITHOUT CONTRAST CLINICAL HISTORY: Altered mental status;Metastatic disease evaluation; TECHNIQUE: Low dose axial images were obtained through the head.  Coronal and sagittal reformations were also performed. Contrast was not administered. COMPARISON: None. FINDINGS: There is no acute intracranial hemorrhage.  Asymmetric prominence of the frontal horn/body of the right lateral ventricle with slight leftward bowing of the septum pellucidum.  No evidence of hydrocephalus.  No mass effect or midline shift is present.  The gray-white matter differentiation is within normal limits. Mild opacification of the right mastoid air cells.  Partially visualized postsurgical changes of bilateral maxillary antrostomies.  Mild mucosal thickening within the ethmoid and frontal sinuses.  The visualized portions of the orbits are normal.  No fractures are identified.     1. No evidence of an acute intracranial abnormality.  Clinical correlation and consider further evaluation with MRI of the brain. 2. Asymmetric prominence of the frontal horn/body of the right lateral ventricle with slight bowing of the septum pellucidum, which may reflect a developmental variant or possible intraventricular simple cyst. Electronically signed  by: Daljit Ponce Date:    08/17/2020 Time:    16:57    Ct Chest Without Contrast    Result Date: 8/15/2020  EXAMINATION: CT CHEST WITHOUT CONTRAST CLINICAL HISTORY: COPD exacerbation;Pneumonia; shortness of breath TECHNIQUE: Low dose axial images, sagittal and coronal reformations were obtained from the thoracic inlet to the lung bases. Contrast was not administered. COMPARISON: Chest x-ray 08/13/2020, CT of the abdomen 11/27/2018 FINDINGS: There is an enlarged subcarinal lymph node with short axis diameter of 1.8 cm series 3, image 46.  No other enlarged mediastinal or axillary lymph nodes are present. The aorta is normal in caliber with scattered calcific plaque.  Coronary artery calcifications are present.  There is a trace left pleural effusion.  There is a moderate loculated appearing right pleural effusion.  . There is ill-defined soft tissue thickening surrounding the bronchus intermedius, right lower and middle lobe bronchi.  There is volume loss, atelectasis, consolidation in the right lower lobe.  There is consolidation in the inferior right upper lobe.  There is very dense, masslike opacification of the right middle lobe.  Findings are concerning for neoplasm, rather than pneumonia.  There is no fat plane between the abnormality in the anterior right mid hemithorax and the mediastinum, as demonstrated on series 3, image 48.  This is difficult to measure accurately, as it may represent a combination of neoplasm and collapsed lung.  However, an estimate of 11.8 x 8.7 x 8.7 cm is obtained on series 3, image 50 and series 603, image 107. There is dependent atelectasis at the left lung base.  No left lung nodules are present. No suspicious findings are noted in the bones. Intra and extrahepatic biliary ductal dilatation appears unchanged compared to the 2018 CT of the abdomen.  No other abnormalities are noted in the visualized portions of the upper abdomen.     Findings in the right middle lobe concerning for  neoplasm, invading the mediastinum.  Masslike pneumonia is included in the differential but less likely.  Probable postobstructive changes in the right upper and lower lobes. Enlarged subcarinal lymph node, concerning for a metastatic node. Moderate, loculated-appearing right pleural effusion. Trace left pleural effusion, dependent atelectasis left lung. This report was flagged in Epic as abnormal. Electronically signed by: Suad Coughlin Zackery Date:    08/15/2020 Time:    15:01    Mri Brain Without Contrast    Result Date: 8/20/2020  EXAM: MRI BRAIN WITHOUT CONTRAST CLINICAL HISTORY: Brain/CNS neoplasm, surveillance; . COMPARISON: Head CT dated 08/17/2020 FINDINGS: Intracranial contents:The study was performed without contrast due to low GFR (GFR=33).  The study is motion degraded.  There is, however, no acute abnormality or definite change in the appearance of the brain compared to the recent unenhanced head CT.  There are no regions of restricted diffusion to suggest acute infarction.  Asymmetry to the bodies of the lateral ventricles is likely developmental.  There is no midline shift.  There is no intracranial hemorrhage.  There is no mass or mass effect.  There are no definite regions of abnormal signal intensity in the brain.  There is no abnormal extra-axial fluid collection.  The basilar cisterns are open.  The cerebellar tonsils are normal position.  Flow voids indicating patency are present in the major vessels at the base of the brain.  Pituitary volume is decreased.  This is nonspecific.  The orbits are grossly normal. Extracranial contents, calvarium, soft tissues:Baseline marrow signal intensity is grossly normal.  There are postsurgical changes of previous sinus surgery.  There is only minimal scattered mucosal thickening in the ethmoid air cells.  There is partial opacification of several right mastoid air cells likely representing a small effusion.     1. The study is motion degraded.  Also, the  study was performed without contrast due to low GFR.  There is, however, no acute abnormality or change compared to recent head CT.  There is no hemorrhage, mass effect or acute infarction.  There are no definite regions of abnormal signal intensity in the brain to suggest possible edema. Electronically signed by: Arvin Molina MD Date:    08/20/2020 Time:    11:20    Us Retroperitoneal Complete    Result Date: 8/21/2020  EXAMINATION: US RETROPERITONEAL COMPLETE CLINICAL HISTORY: renal failure; TECHNIQUE: Ultrasound of the kidneys and urinary bladder was performed including color flow and Doppler evaluation of the kidneys. COMPARISON: None. FINDINGS: Right kidney: The right kidney measures 11.5 cm. No cortical thinning. No loss of corticomedullary distinction. Resistive index measures 0.71.  No mass. No renal stone. No hydronephrosis. Left kidney: The left kidney measures 9.9 cm. No cortical thinning. No loss of corticomedullary distinction. Resistive index measures 0.68.  No mass. No renal stone. No hydronephrosis. The bladder is partially distended at the time of scanning questionable debris in the posterior dependent portion the bladder versus artifact Resistive index of the spleen 0.73     Elevated resistive index right kidney suggesting intrinsic renal disease.  No hydronephrosis.  Small amount of debris suggested in the posterior dependent portion of the bladder versus artifact Electronically signed by: Rosana Fernandez MD Date:    08/21/2020 Time:    14:44    Ct Abdomen Pelvis With Contrast    Result Date: 8/27/2020  CMS MANDATED QUALITY DATA - CT RADIATION - 436 All CT scans at this facility utilize dose modulation, iterative reconstruction, and/or weight based dosing when appropriate to reduce radiation dose to as low as reasonably achievable. Reason: Metastatic disease evaluation small cell lung cancer TECHNIQUE: CT abdomen and pelvis with 100 mL Omnipaque 350. COMPARISON: CT thorax 8/15/2020 CT ABDOMEN:  Confluent consolidative masslike opacification of the right middle lobe has prominent areas of low density suggesting necrosis. Centrally necrotic subcarinal lymph node measuring 16 mm is present. Confluent opacification of basilar right lower lobe is also evident, more suggestive of atelectasis. Small right pleural effusion is present and partially visualized. Linear opacities affect the left lower lobe suggesting atelectasis. Coronary artery calcifications are present. Gallbladder has been removed. Intrahepatic and extrahepatic bile duct dilation is mild, possibly related to postcholecystectomy state and main pancreatic duct is of normal caliber, and no abnormality of the pancreas identified. No focal abnormal enhancement occurs throughout hepatic parenchyma. Spleen, adrenals, and kidneys are normal. Retroaortic left renal vein incidentally noted. Diffuse aortoiliac vascular calcifications are mild. Few diverticula arise from the colon. No other intestinal abnormality. Enteric contrast courses throughout small intestines without obstruction. No free intraperitoneal gas or fluid. Very mild subcutaneous edema occurs within the flanks bilaterally. Diffuse degenerative changes affect the thoracolumbar spine. Grade 1 anterolisthesis of L4 and L5 are due to bilateral L4-L5 facet joint osteoarthrosis. No suspicious osseous abnormality. CT PELVIS: Bladder is normal. No adnexal mass or free pelvic fluid. No enlarged lymph nodes. IMPRESSION: 1. Confluent heterogeneous masslike consolidation of right middle lobe, most likely correlating with reportedly known malignancy. Correlation with suspected recent biopsy results requested. Conceivably, necrotic pneumonia could give a similar appearance. 2. Centrally necrotic subcarinal lymph node which is enlarged is characteristic of a lymph node metastasis. 3. Small right pleural effusion. 4. Coronary artery calcifications. 5. Mild diffuse intrahepatic and extrahepatic bile duct  dilation could be related to postcholecystectomy state. No obstructing etiology identified. Correlation with bilirubin level is requested, if bilirubin is abnormal, further evaluation with MRCP can be considered. 6. Diverticulosis. 7. No evidence of metastatic disease throughout the abdomen or pelvis. Electronically Signed by Wale Lopez M.D. on 8/27/2020 9:39 AM    X-ray Chest Ap Portable    Result Date: 8/25/2020  EXAMINATION: XR CHEST AP PORTABLE CLINICAL HISTORY: picc; FINDINGS: Portable chest at 15:51 hours is compared to a prior study dated 08/24/2020 shows a left PICC line with the tip at the atrial caval junction.  There is no pneumothorax. There is persistent right pleural effusion with right middle and lower lobe airspace disease the right upper lobe and left lung are clear.  The heart is normal in size..     Left PICC line with the tip overlying the atrial caval junction.  There is no pneumothorax. Persistent right pleural effusion with right middle and lower lobe airspace disease Electronically signed by: Christy Olvera MD Date:    08/25/2020 Time:    16:03    X-ray Chest Ap Portable    Result Date: 8/13/2020  EXAMINATION: XR CHEST AP PORTABLE CLINICAL HISTORY: Sepsis. TECHNIQUE: AP chest radiograph. COMPARISON: Prior chest radiograph from 03/17/2017. FINDINGS: The lungs are well expanded.  There is a dense focal consolidation within the right mid to lower lung with silhouetting of the right heart border, consistent with lobar consolidation.  The left lung is clear.  The pleural spaces are clear.  The cardiac silhouette is unremarkable.  There are calcifications of the aortic arch.  The visualized osseous structures are unremarkable.     Dense consolidation within the right mid to lower lung consistent with pneumonia. 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    -  Primary    Relevant Orders    NM PET CT Routine Skull to Mid Thigh    CBC auto differential    CMP    CEA    Chromogranin A    Somatostatin    Chemotherapy follow-up examination        Relevant Orders    NM PET CT Routine Skull to Mid Thigh    Colitis        Relevant Medications    metroNIDAZOLE (FLAGYL) 500 MG tablet    Hyponatremia        Anemia, unspecified type        Hyperglycemia        Hypoalbuminemia            Hospital discharge clinic follow-up  Full staging workup in place for small cell lung carcinoma with bronchial obstruction and postobstructive pneumonia  PET-CT ordered post cycle 1 carbo  16 and a microbial therapy for pneumonia  Primary site of disease is right middle lobe of the lung with invasion of mediastinum  Treat colitis and hopefully her anemia will improve with better absorption of nutrients.  She does not want start taking anything for appetite  she could start Periactin.  If she loses any further weight I will add this medication to help stimulate her appetite  Start flagyl for colitis   Checking cea and chromogranin somatostatin he attained baseline tumor markers  Home health will be ordered for PICC line care referral sent to surgery for possible chest port placement  After PET-CT she will likely begin chemo radiation for unresectable small-cell carcinoma of the lung  Should keep her follow-up appoint with pulmonology  Watch anemia closely she may benefit from IV iron in the near future for now continue observation alone    Time spent with patient face-to-face more than 50 min with more than 50% counseling     Recommend healthy living: no nicotine,  should avoid alcohol, healthy diet and regular exercise aiming for fitness, and weight control  1. Discussed healthy alcohol daily limit of 0.5 oz of pure alcohol in any 24 hours (roughly one 12-oz beers, 4 oz of wine (8%-12% alcohol), or 1.25 oz (half a shot) of liquor  (80 proof)), can not save up.  2.   Discussed good exercise program, 4 key elements: 1. Aerobic (walking, swimming, dancing, jogging, biking, etc, 2. Muscle strengthening / resistance exercise, need to do 2-3 times  weekly, 3. Stretching daily, good stretch takes a whole  total minute. 4. Balance exercise daily.  3.   Discussed healthy diet for over 15 minutes with handouts given to the patient   Discussed COVID-19 and social distancing in great detail, avoid all non-essential visits out of the home if possible and avoid sick contacts.     Sincerely,  Berna Acuña MD    Electronically signed by: Berna Acuña MD

## 2020-09-01 NOTE — NURSING
"Discussed continuation of chemotherapy in outpatient setting with Dr. Acuña.  Patient due for C2 carboplatin + Etoposide 9/15.  Lisy Bond [facility] infusion  aware.    Patient having PET scan 9/4 and will return to clinic 9/8/20 after PET scan per directive of Dr. Acuña (scheduled by Coni Ellnigton RN).    Patient has already had chemo school and chemotherapy consent is scanned into patient's chart under "" tab.  "

## 2020-09-01 NOTE — TELEPHONE ENCOUNTER
Spoke to Erin and Eldon with [facility] who were both seeking clarification on what they needed to order for PICC line flushes:    Instructed Eldon patient is to have PICC line flushed with sodium chloride 0.9 % flush 10 mL and heparin, procine (PF) 100 units/mL injection flush 500 units weekly.  I also gave order for weekly PICC line dressing changes.  Eldon read back and verbalized understanding of the above and reports he will place order with [employer] for supply delivery. No further questions/concerns at this time.    ----- Message from Perlita Sherman sent at 9/1/2020  1:26 PM CDT -----  Type: Patient Call Back    Who called: Erin- [facility]     What is the request in detail: patient need orders for supplies for IV flush . The HH agency cannot provide them. Please call     Can the clinic reply by [facility]? No     Would the patient rather a call back or a response via [facility]? Call     Best call back number: [phone]

## 2020-09-02 NOTE — TELEPHONE ENCOUNTER
Spoke with Dr Acuña yesterday to obtain orders for HH to flush and maintain pt PICC line:    10mL NS flush   2-3mL Heparin (100units/1mL)  Flush once daily or after every use  Dressings to be changed weekly or as needed if disturbed or visibly soiled.      Clarified orders with Eldon. Denies further needs now.

## 2020-09-08 NOTE — TELEPHONE ENCOUNTER
LM to r/s PET scan that was canceled on 9/4.     ----- Message from Berna Acuña MD sent at 8/31/2020  4:19 PM CDT -----  See chart please for chemo cycle 2 due in SeptemberReturn to clinic after PET-CT

## 2020-09-10 NOTE — PROGRESS NOTES
[facility]  Pulmonology  Initial Clinic Visit    Subjective   Reason for Referral:    Kendra Lawrence is a 64 y.o. female here for hospital follow up after recently being diagnosed with SCLC and starting urgent chemotherapy.    Chief Complaint   Patient presents with    Cancer     hospital follow up       Initial HPI:  Ms. Kendra Lawrence is a 64 year old lady with a history of COPD an heavy smoking who was in her USOH until a couple of months ago when she developed a progressive functional decline, dyspnea and more recently fevers.  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  She was treated for post-obstructive pneumonia and possible COPD and transferred to [facility] this afternoon for urgent initiation of chemotherapy.  Pulmonology was consulted for the above stated reasons.  When I examined Ms. Lawrence this evening, she was resting comfortably without ongoing respiratory symptoms at that time.  She reports an overall improving trajectory over the past 2 weeks, but over the weekend did have a couple of episodes where she required NIPPV and benzodiazepines for anxiety.  These have not recurred today.     9/10/2020:  No major changes since she left the hospital. Breathing has been fine.   Has good days and bad days.   Insomnia has been persistent and her PCP told to stop taking xanax.  Pain is controlled with suboxone.  Only using KAYLA once daily, but hasn't required any rescue doses.  Starting XRT next week and 2nd round of chemotherapy next week.  Appetite is low but stable.  She has been avoiding eating because of frequent diarrhea.     Past Medical History:   Diagnosis Date    Asthma     Cardiac angina     Chronic abdominal pain     Claustrophobia     COPD (chronic obstructive pulmonary disease)     Coronary artery disease     GERD (gastroesophageal reflux disease)     History of drug dependence     Hypertension      Hypocalcemia 2020     Past Surgical History:   Procedure Laterality Date    BREAST BIOPSY Bilateral 20 yrs ago    benign    BRONCHOSCOPY N/A 2020    Procedure: Bronchoscopy- 730 or noon, floro not needed;  Surgeon: Andrew Brothers MD;  Location: [facility] ENDO;  Service: Endoscopy;  Laterality: N/A;     SECTION      CHOLECYSTECTOMY      ENDOSCOPIC ULTRASOUND OF UPPER GASTROINTESTINAL TRACT Left 2018    Procedure: ULTRASOUND, UPPER GI TRACT, ENDOSCOPIC;  Surgeon: Paras Negrete MD;  Location: [facility] ENDO;  Service: Endoscopy;  Laterality: Left;    ESOPHAGOGASTRODUODENOSCOPY N/A 2018    Procedure: EGD (ESOPHAGOGASTRODUODENOSCOPY);  Surgeon: Paras Negrete MD;  Location: [facility] ENDO;  Service: Endoscopy;  Laterality: N/A;    ESOPHAGOGASTRODUODENOSCOPY N/A 2018    Procedure: EGD (ESOPHAGOGASTRODUODENOSCOPY);  Surgeon: Paras Negrete MD;  Location: [facility];  Service: Endoscopy;  Laterality: N/A;    HYSTERECTOMY      MAGNETIC RESONANCE IMAGING N/A 2019    Procedure: MRI (Magnetic Resonance Imagine) needs anesthesia;  Surgeon: Raffy Charles MD;  [facility]: [facility];  Service: Anesthesiology;  Laterality: N/A;    OOPHORECTOMY      TEMPOROMANDIBULAR JOINT SURGERY      TONSILLECTOMY       Family History   Problem Relation Age of Onset    Breast cancer Sister 55    Breast cancer Sister 50    Cancer Mother     Heart disease Mother     Hypertension Mother     Cancer Father       Social History     Tobacco Use    Smoking status: Current Every Day Smoker     Years: 52.00     Types: Vaping w/o nicotine    Smokeless tobacco: Never Used   Substance and Sexual Activity    Alcohol use: No     Frequency: Never    Drug use: Yes     Types: Hydrocodone    Sexual activity: Not on file        Allergies:  Patient has no known allergies.     Outpatient Medications as of 9/10/2020   Medication    albuterol-ipratropium (DUO-NEB) 2.5 mg-0.5 mg/3 mL nebulizer  solution    alendronate (FOSAMAX) 70 MG tablet    buprenorphine-naloxone (SUBOXONE) 8-2 mg Film    DULoxetine (CYMBALTA) 60 MG capsule    ferrous sulfate 325 (65 FE) MG EC tablet    fluticasone furoate-vilanteroL (BREO) 200-25 mcg/dose DsDv diskus inhaler    hydroCHLOROthiazide (HYDRODIURIL) 25 MG tablet    loperamide (IMODIUM) 2 mg capsule    losartan (COZAAR) 100 MG tablet    nitroGLYCERIN (NITROSTAT) 0.4 MG SL tablet    omeprazole (PRILOSEC) 40 MG capsule    ondansetron (ZOFRAN) 4 MG tablet    TRELEGY ELLIPTA 100-62.5-25 mcg DsDv    amitriptyline (ELAVIL) 10 MG tablet    busPIRone (BUSPAR) 10 MG tablet    metroNIDAZOLE (FLAGYL) 500 MG tablet     No current facility-administered medications on file as of 9/10/2020.       Review of Systems   Unable to perform ROS  Constitutional: Positive for weight loss, activity change, appetite change, fatigue and weakness.   HENT: Negative for postnasal drip, trouble swallowing, voice change and congestion.    Eyes: Negative for redness and itching.   Respiratory: Positive for dyspnea on extertion. Negative for cough, hemoptysis, sputum production, chest tightness, shortness of breath, wheezing, orthopnea and pleurisy.    Cardiovascular: Negative for chest pain, palpitations and leg swelling.   Genitourinary: Negative for difficulty urinating and hematuria.   Endocrine: Negative for polydipsia, polyphagia and polyuria.    Musculoskeletal: Negative for back pain, gait problem and joint swelling.   Skin: Negative for rash.   Gastrointestinal: Negative for nausea, vomiting and abdominal pain.        Diarrhea   Neurological: Negative for syncope, weakness and headaches.   Hematological: Negative for adenopathy. Does not bruise/bleed easily and no excessive bruising.   Psychiatric/Behavioral: Positive for sleep disturbance. Negative for confusion. The patient is nervous/anxious.    All other systems reviewed and are negative.     Previous Reports Reviewed:   ER  records, historical medical records, lab reports, nursing home notes, office notes, operative reports, radiology reports, referral letter/letters and x-ray reports   The following portions of the patient's history were reviewed and updated as appropriate: allergies, current medications, past family history, past medical history, past social history, past surgical history and problem list.    Objective:      /70 (BP Location: Right arm, Patient Position: Sitting)   Pulse 79   Temp 97.7 °F (36.5 °C)   Wt 72.6 kg (160 lb)   LMP  (LMP Unknown)   SpO2 97% Comment: 2.0 oxygen level  BMI 27.46 kg/m²   Body mass index is 27.46 kg/m².     Physical Exam   Constitutional: She is oriented to person, place, and time. She appears well-developed and well-nourished. She is cooperative.  Non-toxic appearance. No distress. Nasal cannula in place.   HENT:   Head: Normocephalic.   Right Ear: External ear normal.   Left Ear: External ear normal.   Nose: Nose normal.   Mouth/Throat: Oropharynx is clear and moist. Abnormal dentition. No oropharyngeal exudate. Mallampati Score: II.   Neck: Normal range of motion. Neck supple. No JVD present. No thyromegaly present.   Cardiovascular: Normal rate, regular rhythm, normal heart sounds and intact distal pulses. Exam reveals no gallop and no friction rub.   No murmur heard.  Pulmonary/Chest: Normal expansion, hyperinflation and effort normal. She has no decreased breath sounds. She has no wheezes. She has no rhonchi. She has no rales.   Abdominal: Soft. Bowel sounds are normal. She exhibits no distension and no mass. There is no hepatosplenomegaly. There is no abdominal tenderness.   Musculoskeletal: Normal range of motion.         General: No tenderness, deformity or edema.   Lymphadenopathy: No supraclavicular adenopathy is present.     She has no cervical adenopathy.     She has no axillary adenopathy.   Neurological: She is alert and oriented to person, place, and time. No  cranial nerve deficit.   Skin: Skin is warm and dry. No rash noted. No cyanosis. Nails show no clubbing.   Psychiatric: She has a normal mood and affect. Her behavior is normal. Thought content normal.   Nursing note and vitals reviewed.     Personal Review of Relevant Diagnostic Studies:  I have personally reviewed and interpreted the following labs/studies/images.  CT Chest:  8/15/2020:  Findings in the right middle lobe concerning for neoplasm, invading the mediastinum.  Masslike pneumonia is included in the differential but less likely.  Probable postobstructive changes in the right upper and lower lobes.   Enlarged subcarinal lymph node, concerning for a metastatic node.   Moderate, loculated-appearing right pleural effusion.   Trace left pleural effusion, dependent atelectasis left lung.     CT Head:  8/17/2020:  1. No evidence of an acute intracranial abnormality.  Clinical correlation and consider further evaluation with MRI of the brain.  2. Asymmetric prominence of the frontal horn/body of the right lateral ventricle with slight bowing of the septum pellucidum, which may reflect a developmental variant or possible intraventricular simple cyst.     MRI Brain  8/20/2020:  1. The study is motion degraded.  Also, the study was performed without contrast due to low GFR.  There is, however, no acute abnormality or change compared to recent head CT.  There is no hemorrhage, mass effect or acute infarction.  There are no definite regions of abnormal signal intensity in the brain to suggest possible edema.     CXR:  8/25/2020:  Left PICC line with the tip overlying the atrial caval junction.  There is no pneumothorax.   Persistent right pleural effusion with right middle and lower lobe airspace disease    LUNG MASS, ENDOBRONCHIAL BIOPSY:   ? Small cell carcinoma.   § Comment:  The biopsy reveals poorly preserved crushed small round blue cell tumor with abundant necrosis.  There is marked nuclear molding, high N/C  ratio and abundant mitotic activity.  Tumor cells are positive for AE1/AE3  cytokeratin, synaptophysin and chromogranin.  TTF-1, CD45 and P63 are negative.  A Ki-67 proliferation index is greater than 90%.  The  immunoprofile supports neuroendocrine differentiation and taken together with morphology findings are consistent with small cell carcinoma.   §   Assessment:       1. Small cell lung cancer, right    2. Obstructive pneumonia    3. Chronic obstructive pulmonary disease, unspecified COPD type    4. Chronic hypoxemic respiratory failure    5. Insomnia, unspecified type    6. Chemotherapy induced diarrhea        Impression:  SCLC with underlying COPD GOLD D by symptoms with chronic hypoxemic respiratory failrue    Plan:     · Continue ICS/LABA/LAMA and prn KAYLA.  · Provided prescription for prednisone to take in the event of an exacerbation.  · Continue to use oxygen at night and with activity.  Probably doesn't need it at rest.  · Trial of scheduled imodium for diarrhea.  · Trial of melatonin for insomnia.  If that doesn't work we can consider low dose clonazepam.  · Palliative care referral.  · [facility]    I informed the patient of my working diagnosis, it's etiology, risk factors, expected symptoms, diagnostic work up, treatment options and prognosis., I personally reviewed the results of relevant imaging/labs/studies with the patient, and discussed their clinical significance., I spent >10 minutes counseling the patient about their condition., Plan discussed with the patient, who is in agreement., Opportunity provided for the the patient to voice any additional questions or concerns., All questions were answered to the patient's satisfaction., Educational material provided and Patient given date for next visit.    Follow up in about 4 weeks (around 10/8/2020).    Orders Placed This Visit:  Orders Placed This Encounter   Procedures    Ambulatory referral/consult to  Palliative Care     Standing Status:   Future     Standing Expiration Date:   10/10/2021     Referral Priority:   Routine     Referral Type:   Consultation     Requested Specialty:   Hospice and Palliative Medicine     Number of Visits Requested:   1       Marcos Fermin MD  Pulmonary / Critical Care Medicine  [facility]

## 2020-09-10 NOTE — PATIENT INSTRUCTIONS
Diagnosing and Staging Lung Cancer     A bronchoscope provides a direct view of the windpipe and bronchial tubes.     If your healthcare provider thinks you may have lung cancer, he or she will most likely order a number of tests. These tests can diagnose lung cancer and reveal the type of cancer, where its located, and if, or how much, it has spread. Test results may also help your healthcare provider plan treatment.  What do the tests show?  Each test result for lung cancer offers a new piece of information. Taken as a whole, the results reveal precise details about your cancer and how advanced it is. For instance, do you have non-small-cell or small-cell lung cancer? How large is the tumor? Where is the tumor located? Are the lymph nodes involved? Has the cancer spread? With these details, you and your healthcare provider can start to plan treatment.  Biopsy  In a biopsy, a small sample of tissue is removed. It can be taken from a tumor in the lung or from other parts of your body. That sample is then studied under a microscope to learn more about your cancer. The following tests can be done to obtain a biopsy.  · During bronchoscopy, a thin, lighted, flexible tube (bronchoscope) goes into the nose and down the windpipe. The healthcare provider then has a direct view of the windpipe and bronchial tubes. Tissue samples may be taken. The walls of the windpipe and bronchial tubes may also be brushed and rinsed. This loosens cells. The cells can then be removed and studied in a lab.  · For an endobronchial ultrasound (EBUS), a bronchoscope is used with ultrasound (image made from sound waves) to look at the lymph nodes and other structures between the lungs. In a similar test known as endoscopic esophageal ultrasound, a scope is passed down the esophagus to look at these structures.   · In a fine-needle aspiration (FNA), a very thin needle is used to remove cells from a tumor. This test is done under local  anesthesia to help prevent pain. During the FNA, you may have a CT scan. This helps the healthcare provider position the needle exactly where it needs to be.  Imaging tests  Pictures from different imaging tests can provide details about your lungs and any tumors they may have. These pictures can also show a tumors location and size. A chest X-ray is one of the most common imaging tests.  Other imaging tests will likely be done. A CT scan is a computer-enhanced X-ray image. A PET scan (positron emission tomography) uses a slightly radioactive liquid (tracer) to find areas where cancer cells are in the body. A PET scan is often done along with a CT scan. It can detect a tumor that may not appear on a chest X-ray. Less often, an MRI may be done. This test uses strong magnets and computers to help form a highly detailed image. You may also have a bone scan or other imaging tests to learn whether the cancer has spread. Your healthcare provider will tell you how to prepare for any tests.  Staging of lung cancer  Staging is a process to measure how advanced the cancer is. Stage 1 is the least advanced. Stage 4 is the most advanced. The following three factors are considered:  · The tumor. How large is it? Has it reached other nearby structures?  · The nearby lymph nodes. Have they been affected? If so, which lymph nodes--the lymph nodes near the tumor, or the lymph nodes in the center of the chest (mediastinal lymph nodes)? If the mediastinal lymph nodes are affected, are both sides affected, or are they only affected on the same side as the tumor?   · Metastasis. Has the cancer spread to other parts of the body?  When planning treatment for small cell lung cancer, healthcare providers are usually more concerned about whether radiation therapy can be used to treat the cancer. These cancers are typically just divided into limited stage disease (which can be treated with radiation) and extensive stage disease (which has  spread too far to be treated with radiation).    Date Last Reviewed: 1/3/2016  © 3552-2337 [employer]. [address] [place] [address], [place] [address]. All rights reserved. This information is not intended as a substitute for professional medical care. Always follow your healthcare professional's instructions.

## 2020-09-13 NOTE — TELEPHONE ENCOUNTER
Spoke to pt, confirmed appts had been moved to 9/21, 9/22 due to weather. Pt confirmed. Secure Chat message sent to Lisy Dean to r/s chemo appts.

## 2020-09-16 NOTE — TELEPHONE ENCOUNTER
Called pt, pt was unaware that she needed port, but will call back once she speaks with daughter to scheduled consult appt.

## 2020-09-18 NOTE — PROGRESS NOTES
Chart review completed 2020.  Care Everywhere updates requested and reviewed.  Immunizations reconciled. Media reports reviewed.  Duplicate HM overrides and  orders removed.  Overdue HM topic chart audit and/or requested.  Overdue lab testing linked to upcoming lab appointments if applies.    Lab clair, and [employer] reviewed    Pt. Currently having Chemotherapy.    Health Maintenance Due   Topic Date Due    TETANUS VACCINE  1971    Shingles Vaccine (1 of 2) 2003    Colorectal Cancer Screening  2019    Influenza Vaccine (1) 2020    Mammogram  10/31/2020

## 2020-09-21 NOTE — TELEPHONE ENCOUNTER
Informed Dr. Acuña of critical potassium level = 2.9.  Dr. Acuña states she will call in potassium to patient's pharmacy.      LM for patient informing of the above and to follow instructions on medication label, as well as to call office to confirm receipt of message.

## 2020-09-22 NOTE — PROGRESS NOTES
[facility]     Subjective:      PATIENT:   Kendra Lawrence  :    1955  MR#:    [id number]  DATE OF VISIT:  2020    Chief Complaint:  I feel tired     Patient ID: Kendra Lawrence is a 64 y.o. female   This is a 64-year-old female who was admitted to [facility] with progressive shortness of breath and postobstructive pneumonia.     CT of chest on  revealed right middle lobe dense masslike opacification worrisome for neoplasm invading the mediastinum with another area of consolidation in the inferior right upper lobe.  The abnormality in the anterior right mid hemithorax and the mediastinum measures 11.8 x 8.7 cm.  An enlarged subcarinal lymph node was also noted concerning for metastatic node and moderate loculated appearing right pleural effusion was present.  She received antimicrobial therapy for postobstructive pneumonia.  MRI brain was performed without contrast revealing no evidence of metastatic disease.  CT of abdomen and pelvis was also performed revealing mild diffuse intrahepatic and extrahepatic bile duct dilatation possibly related to post cholecystectomy state.  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  The tumor cells are positive for cytokeratin synaptophysin and chromogranin.  TTF1 CD 45 and P 63 were negative.  The Ki-67 proliferation index was greater than 90%.    [facility] and began carboplatin plus etoposide.  Day 1 of cycle 1 was performed on 2020.  She was discharged last Friday with a PICC line in her left arm.  Due for cycle 2 chemo and to review pet ct findings from 2020  Pt is weak , started radiation  A few days ago , in  a wheelchair with decreased appetite and nausea unrelieved with zofran 4 mg   Patient information was obtained from patient, past medical records and ER records , chart review revealing     Interval History: Patient presents for follow up of Lung cancer .  The patient has a history of significant SOB . Hypertension has been well controlled on current medications, but the patient has noticed worsening  weakness over the past few weeks . The patient also complains of  Swelling in her legs over the past few weeks, worse with ambulation , better with rest  . GERD has been stable on a PPI. The patient states compliance with all prescribed medications.   .      Review of Systems   14 point review of systems all negative except pertinents above  CONSTITUTIONAL: No fevers, chills, night sweats, positive wt. loss, appetite changes  SKIN: no rashes or itching  ENT: No headaches, head trauma, vision changes, or eye pain  LYMPH NODES: None noticed   CV: No chest pain, palpitations.   Endocrinology positive cold intolerance no excessive thirst or sweating  RESP:  Positive shortness of breath and dyspnea on exertion, no current cough, wheezing, or hemoptysis  GI: No  , emesis, positive diarrhea, and intermittent constipation, no melena, hematochezia, pain.   : No dysuria, hematuria, positive urgency, no frequency   HEME:  Positive easy bruising, no bleeding problems  PSYCHIATRIC: No depression, anxiety, psychosis   NEURO: No seizures, memory loss, dizziness   MSK: No arthralgias     Complete review of systems otherwise negative other than the above  Past Medical History:   Diagnosis Date    Asthma     Cardiac angina     Chronic abdominal pain     Claustrophobia     COPD (chronic obstructive pulmonary disease)     Coronary artery disease     GERD (gastroesophageal reflux disease)     History of drug dependence     Hypertension     Hypocalcemia 8/13/2020       Family History   Problem Relation Age of Onset    Breast  cancer Sister 55    Breast cancer Sister 50    Cancer Mother     Heart disease Mother     Hypertension Mother     Cancer Father        Past Surgical History:   Procedure Laterality Date    BREAST BIOPSY Bilateral 20 yrs ago    benign    BRONCHOSCOPY N/A 2020    Procedure: Bronchoscopy- 730 or noon, floro not needed;  Surgeon: Andrew Brothers MD;  Location: [facility] ENDO;  Service: Endoscopy;  Laterality: N/A;     SECTION      CHOLECYSTECTOMY      ENDOSCOPIC ULTRASOUND OF UPPER GASTROINTESTINAL TRACT Left 2018    Procedure: ULTRASOUND, UPPER GI TRACT, ENDOSCOPIC;  Surgeon: Paras Negrete MD;  Location: [facility];  Service: Endoscopy;  Laterality: Left;    ESOPHAGOGASTRODUODENOSCOPY N/A 2018    Procedure: EGD (ESOPHAGOGASTRODUODENOSCOPY);  Surgeon: Paras Negrete MD;  Location: [facility];  Service: Endoscopy;  Laterality: N/A;    ESOPHAGOGASTRODUODENOSCOPY N/A 2018    Procedure: EGD (ESOPHAGOGASTRODUODENOSCOPY);  Surgeon: Paras Negrete MD;  Location: [facility];  Service: Endoscopy;  Laterality: N/A;    HYSTERECTOMY      MAGNETIC RESONANCE IMAGING N/A 2019    Procedure: MRI (Magnetic Resonance Imagine) needs anesthesia;  Surgeon: Raffy Charles MD;  Location: [facility];  Service: Anesthesiology;  Laterality: N/A;    OOPHORECTOMY      TEMPOROMANDIBULAR JOINT SURGERY      TONSILLECTOMY         Social History     Socioeconomic History    Marital status:      Spouse name: Not on file    Number of children: Not on file    Years of education: Not on file    Highest education level: Not on file   Occupational History    Not on file   Social Needs    Financial resource strain: Not on file    Food insecurity     Worry: Not on file     Inability: Not on file    Transportation needs     Medical: Not on file     Non-medical: Not on file   Tobacco Use    Smoking status: Current Every Day Smoker     Years: 52.00     Types: Vaping w/o nicotine     Smokeless tobacco: Never Used   Substance and Sexual Activity    Alcohol use: No     Frequency: Never    Drug use: Yes     Types: Hydrocodone    Sexual activity: Not on file   Lifestyle    Physical activity     Days per week: Not on file     Minutes per session: Not on file    Stress: Not on file   Relationships    Social connections     Talks on phone: Not on file     Gets together: Not on file     Attends Sikhism service: Not on file     Active member of club or organization: Not on file     Attends meetings of clubs or organizations: Not on file     Relationship status: Not on file   Other Topics Concern    Not on file   Social History Narrative    Not on file         Current Outpatient Medications:     albuterol-ipratropium (DUO-NEB) 2.5 mg-0.5 mg/3 mL nebulizer solution, Take 3 mLs by nebulization every 4 (four) hours. Rescue, Disp: 1 Box, Rfl: 0    alendronate (FOSAMAX) 70 MG tablet, TAKE ONE TABLET BY MOUTH ONCE EVERY 7 DAYS (Patient taking differently: Take 70 mg by mouth every 7 days. Sunday), Disp: 12 tablet, Rfl: 3    amitriptyline (ELAVIL) 10 MG tablet, TAKE ONE TABLET BY MOUTH AT BEDTIME AS NEEDED FOR INSOMNIA (Patient not taking: No sig reported), Disp: 30 tablet, Rfl: 1    buprenorphine-naloxone (SUBOXONE) 8-2 mg Film, Place 1 packet (1 each total) under the tongue 2 (two) times daily., Disp: 60 packet, Rfl: 0    busPIRone (BUSPAR) 10 MG tablet, Take 1 tablet (10 mg total) by mouth 2 (two) times daily. (Patient not taking: Reported on 8/31/2020), Disp: 60 tablet, Rfl: 11    DULoxetine (CYMBALTA) 60 MG capsule, TAKE ONE CAPSULE BY MOUTH ONCE A DAY (Patient taking differently: Take 60 mg by mouth once daily. ), Disp: 30 capsule, Rfl: 10    ferrous sulfate 325 (65 FE) MG EC tablet, Take 1 tablet (325 mg total) by mouth once daily., Disp: 30 tablet, Rfl: 0    fluticasone furoate-vilanteroL (BREO) 200-25 mcg/dose DsDv diskus inhaler, Inhale 1 puff into the lungs once daily.  Controller, Disp: 1 each, Rfl: 0    hydroCHLOROthiazide (HYDRODIURIL) 25 MG tablet, TAKE ONE TABLET BY MOUTH EVERY DAY, Disp: 90 tablet, Rfl: 1    losartan (COZAAR) 100 MG tablet, TAKE ONE TABLET BY MOUTH ONCE A DAY (Patient taking differently: Take 100 mg by mouth once daily. ), Disp: 90 tablet, Rfl: 0    nitroGLYCERIN (NITROSTAT) 0.4 MG SL tablet, Place 0.4 mg under the tongue every 5 (five) minutes as needed for Chest pain., Disp: , Rfl:     omeprazole (PRILOSEC) 40 MG capsule, Take 1 capsule by mouth 2 (two) times daily., Disp: , Rfl: 5    ondansetron (ZOFRAN) 4 MG tablet, TAKE ONE TABLET BY MOUTH EVERY 8 HOURS AS NEEDED FOR NAUSEA, Disp: 50 tablet, Rfl: 5    predniSONE (DELTASONE) 10 MG tablet, TAKE 4 TABS BY MOUTH DAILY FOR 2 DAYS THEN TAKE 3 TABS DAILY FOR 2 DAYS THEN 2 TABS DAILY FOR 2 DAYS THEN 1 TAB DAILY FOR 2 DAYS., Disp: 20 tablet, Rfl: 0    TRELEGY ELLIPTA 100-62.5-25 mcg DsDv, INHALE 1 PUFF INTO THE LUNGS NIGHTLY, Disp: 60 each, Rfl: 11    Review of patient's allergies indicates:  No Known Allergies       Objective:     Vitals:  LMP  (LMP Unknown)  Body surface area is 1.73 meters squared.    Weight Readings:  Wt Readings from Last 5 Encounters:   09/17/20 69.9 kg (154 lb)   09/10/20 72.6 kg (160 lb)   08/31/20 68.6 kg (151 lb 3.8 oz)   08/25/20 74 kg (163 lb 2.3 oz)   08/24/20 81.4 kg (179 lb 6.4 oz)        Physical Exam  Height / weight / VS reviewed  GENERAL.:  Does not appear to feel well today in no acute distress  PSYCH:  Flat affect, no anxiety, no depression  HEENT: Normocephalic, lids intact, conjunctiva pink, sinuses nontender to palpation  non-boggy turbinates,Normal auricula, nasal septum, dentition, gums and mucosa ,OP clear,no palatal pallor  NECK: Supple,trachea midline, no palpable abnormalities  LYMPHATICS: No cervical or axillary adenopathy  RESPIRATORY:  Decreased breath sounds right lung no rubs or rhonchi  Good respiratory effort without any retractions or diaphragmatic  movement  CARDIOVASCULAR: no JVD, S1 / S2 with RR tachycardia, no murmur, no rub,2+ capillary refill  ABDOMEN: NT distended, normal bowel sounds, no palpable HSM or mass  EXTREMITIES: No cyanosis, no clubbing, no joint effusion  NEUROLOGICAL: alert and oriented x 3   SKIN: Warm, dry, positive ecchymoses and excoriations noted no tenting, no petechiae or ecchymosis    Labs:  Lab Results   Component Value Date    WBC 19.36 (H) 09/21/2020    HGB 8.7 (L) 09/21/2020    HCT 29.6 (L) 09/21/2020    MCV 88 09/21/2020     (H) 09/21/2020         CMP  Sodium   Date Value Ref Range Status   09/21/2020 138 136 - 145 mmol/L Final     Potassium   Date Value Ref Range Status   09/21/2020 2.9 (LL) 3.5 - 5.1 mmol/L Final     Comment:     Potassium critical result(s) repeated. Called and verbal readback   obtained from Tammie Valerio RN/Dr. Acuña's office by DARYL   09/21/2020 12:38       Chloride   Date Value Ref Range Status   09/21/2020 91 (L) 95 - 110 mmol/L Final     CO2   Date Value Ref Range Status   09/21/2020 32 (H) 23 - 29 mmol/L Final     Glucose   Date Value Ref Range Status   09/21/2020 118 (H) 70 - 110 mg/dL Final     BUN, Bld   Date Value Ref Range Status   09/21/2020 16 8 - 23 mg/dL Final     Creatinine   Date Value Ref Range Status   09/21/2020 0.9 0.5 - 1.4 mg/dL Final     Calcium   Date Value Ref Range Status   09/21/2020 8.8 8.7 - 10.5 mg/dL Final     Total Protein   Date Value Ref Range Status   09/21/2020 6.7 6.0 - 8.4 g/dL Final     Albumin   Date Value Ref Range Status   09/21/2020 2.3 (L) 3.5 - 5.2 g/dL Final     Total Bilirubin   Date Value Ref Range Status   09/21/2020 0.6 0.1 - 1.0 mg/dL Final     Comment:     For infants and newborns, interpretation of results should be based  on gestational age, weight and in agreement with clinical  observations.  Premature Infant recommended reference ranges:  Up to 24 hours.............<8.0 mg/dL  Up to 48 hours............<12.0 mg/dL  3-5  days..................<15.0 mg/dL  6-29 days.................<15.0 mg/dL       Alkaline Phosphatase   Date Value Ref Range Status   09/21/2020 81 55 - 135 U/L Final     AST   Date Value Ref Range Status   09/21/2020 18 10 - 40 U/L Final     ALT   Date Value Ref Range Status   09/21/2020 13 10 - 44 U/L Final     Anion Gap   Date Value Ref Range Status   09/21/2020 15 8 - 16 mmol/L Final     eGFR if    Date Value Ref Range Status   09/21/2020 >60.0 >60 mL/min/1.73 m^2 Final     eGFR if non    Date Value Ref Range Status   09/21/2020 >60.0 >60 mL/min/1.73 m^2 Final     Comment:     Calculation used to obtain the estimated glomerular filtration  rate (eGFR) is the CKD-EPI equation.        All lab results and imaging results have been reviewed and discussed with the patient.     X-ray Chest 1 View    Result Date: 8/24/2020  EXAMINATION: XR CHEST 1 VIEW CLINICAL HISTORY: resp distress; TECHNIQUE: Single frontal view of the chest was performed. COMPARISON: 08/18/2020 FINDINGS: There remains confluent opacification the right lung base at least in part consistent with the patient's known large mass.  Slight clearing right perihilar which could be due to decrease of pleural effusion or peripheral infiltrate.  Left lung clear of confluent infiltrate and no left pleural effusion.     Continued masslike opacification right lung base.  Slight clearing right perihilar region. Electronically signed by: Rosana Fernandez MD Date:    08/24/2020 Time:    14:49    X-ray Chest 1 View    X-ray Chest 1 View    Result Date: 8/24/2020  EXAMINATION: XR CHEST 1 VIEW CLINICAL HISTORY: resp distress; TECHNIQUE: Single frontal view of the chest was performed. COMPARISON: 08/18/2020 FINDINGS: There remains confluent opacification the right lung base at least in part consistent with the patient's known large mass.  Slight clearing right perihilar which could be due to decrease of pleural effusion or peripheral  infiltrate.  Left lung clear of confluent infiltrate and no left pleural effusion.     Continued masslike opacification right lung base.  Slight clearing right perihilar region. Electronically signed by: Rosana Fernandez MD Date:    08/24/2020 Time:    14:49    Ct Abdomen Pelvis With Contrast    Result Date: 8/27/2020  CMS MANDATED QUALITY DATA - CT RADIATION - 436 All CT scans at this facility utilize dose modulation, iterative reconstruction, and/or weight based dosing when appropriate to reduce radiation dose to as low as reasonably achievable. Reason: Metastatic disease evaluation small cell lung cancer TECHNIQUE: CT abdomen and pelvis with 100 mL Omnipaque 350. COMPARISON: CT thorax 8/15/2020 CT ABDOMEN: Confluent consolidative masslike opacification of the right middle lobe has prominent areas of low density suggesting necrosis. Centrally necrotic subcarinal lymph node measuring 16 mm is present. Confluent opacification of basilar right lower lobe is also evident, more suggestive of atelectasis. Small right pleural effusion is present and partially visualized. Linear opacities affect the left lower lobe suggesting atelectasis. Coronary artery calcifications are present. Gallbladder has been removed. Intrahepatic and extrahepatic bile duct dilation is mild, possibly related to postcholecystectomy state and main pancreatic duct is of normal caliber, and no abnormality of the pancreas identified. No focal abnormal enhancement occurs throughout hepatic parenchyma. Spleen, adrenals, and kidneys are normal. Retroaortic left renal vein incidentally noted. Diffuse aortoiliac vascular calcifications are mild. Few diverticula arise from the colon. No other intestinal abnormality. Enteric contrast courses throughout small intestines without obstruction. No free intraperitoneal gas or fluid. Very mild subcutaneous edema occurs within the flanks bilaterally. Diffuse degenerative changes affect the thoracolumbar spine. Grade  1 anterolisthesis of L4 and L5 are due to bilateral L4-L5 facet joint osteoarthrosis. No suspicious osseous abnormality. CT PELVIS: Bladder is normal. No adnexal mass or free pelvic fluid. No enlarged lymph nodes. IMPRESSION: 1. Confluent heterogeneous masslike consolidation of right middle lobe, most likely correlating with reportedly known malignancy. Correlation with suspected recent biopsy results requested. Conceivably, necrotic pneumonia could give a similar appearance. 2. Centrally necrotic subcarinal lymph node which is enlarged is characteristic of a lymph node metastasis. 3. Small right pleural effusion. 4. Coronary artery calcifications. 5. Mild diffuse intrahepatic and extrahepatic bile duct dilation could be related to postcholecystectomy state. No obstructing etiology identified. Correlation with bilirubin level is requested, if bilirubin is abnormal, further evaluation with MRCP can be considered. 6. Diverticulosis. 7. No evidence of metastatic disease throughout the abdomen or pelvis. Electronically Signed by Wale Lopez M.D. on 8/27/2020 9:39 AM    X-ray Chest Ap Portable    Result Date: 8/25/2020  EXAMINATION: XR CHEST AP PORTABLE CLINICAL HISTORY: picc; FINDINGS: Portable chest at 15:51 hours is compared to a prior study dated 08/24/2020 shows a left PICC line with the tip at the atrial caval junction.  There is no pneumothorax. There is persistent right pleural effusion with right middle and lower lobe airspace disease the right upper lobe and left lung are clear.  The heart is normal in size..     Left PICC line with the tip overlying the atrial caval junction.  There is no pneumothorax. Persistent right pleural effusion with right middle and lower lobe airspace disease Electronically signed by: Christy Olvera MD Date:    08/25/2020 Time:    16:03    Nm Pet Ct Routine Skull To Mid Thigh    Result Date: 9/17/2020  CLINICAL INFORMATION: Female patient, 64 years old, with a history of right-sided  small cell lung cancer diagnosed 1 month ago  . The patient is currently on chemotherapy. Patient presents for staging. small cell lung cancer COMPARISON: CT the abdomen and pelvis from August 27, 2020, MRI the brain from August 20, 2020, CT of the chest August 15, 2020 TECHNIQUE: The blood glucose prior to injection was 121 mg/dl. Images were acquired from the vertex of the skull through the mid thighs. approximately 45-60 minutes post injection of 12.2 mCi F-18 FDG into the right wrist. Dilute oral contrast was given. Axial, coronal and sagittal PET reconstructions with and without attenuation correction were interpreted.  Corresponding CT images were acquired and reviewed alongside the PET images. The low dose unenhanced CT images were used for attenuation correction and anatomic correlation only. FINDINGS: There is diffusely increased FDG uptake throughout the visualized osseous structures suggestive of chemotherapy and in keeping with reactive marrow.  However, this pattern may mask underlying hypermetabolic osseous foci. There is complete opacification of the right middle lobe covering an area of approximately 8.1 x 6.6 cm with increased SUV max 8.8 in the right middle lobe, centered at the right hilum (axial image 125). In the central right middle lobe there is a 2.9 x 3.5 cm rounded hypoattenuating lesion with decreased FDG activity with SUV max 3.2 (image 126) suggestive of a combination of necrosis and/or partial treatment response. 4 mm pulmonary nodule in the central left upper lobe with SUV max 0.8 (image 97) (likely below the threshold for PET CT evaluation Moderate right pleural effusion and adjacent atelectasis. Moderate central intrahepatic and extrahepatic biliary ductal dilation, with soft tissue attenuation intraluminal filling defects suggestive of stones. The gallbladder is surgically absent. Tracer distribution is otherwise physiologic. Additional findings: -Left-sided subclavian medical  infusion port with tip at the level of the SVC.. - Likely small superficial sebaceous versus epidermal inclusion cyst in the left side of the mandible. -Degenerative change seen in the spine with no aggressive appearing lytic or blastic lesion identified as above. -Scattered atheromatous calcifications in the aorta with no aneurysm. -Coronary arterial calcifications (three-vessel disease). -Colonic diverticula without acute diverticulitis. -Prior hysterectomy. IMPRESSION: 1. FDG avid right middle lobe atelectasis, consolidation and masslike opacity in keeping with a combination of the patient's known malignancy, and postobstructive atelectasis/consolidation. 2. Central decreased FDG activity low-attenuation suggesting necrosis. 3. Small moderate right pleural effusion and adjacent atelectasis. 4. Pulmonary nodule measuring 4 mm in the left upper lobe, new from the previous exam, likely too small for evaluation by PET/CT. Consider short-term follow-up CT of the chest in 3 months to document resolution. 5. Moderate central intra and extrahepatic biliary ductal dilation, with possible small stones seen, consider correlation with liver function tests and possible MRCP as warranted. 6. Additional and incidental findings as noted above. . Electronically Signed by KINJAL Alvarez on 9/17/2020 2:22 PM        Assessment/Plan:   Diagnosis and Management:  Problem List Items Addressed This Visit        Oncology    Malignant neoplasm of upper lobe of right lung - Primary    Relevant Medications    ondansetron (ZOFRAN-ODT) 8 MG TbDL    Small cell lung cancer, right    Relevant Medications    ondansetron (ZOFRAN-ODT) 8 MG TbDL       GI    Gastroparesis       Orthopedic    Age-related osteoporosis without current pathological fracture       Other    Mediastinal lymphadenopathy      Other Visit Diagnoses     Anemia, unspecified type        Thrombocytosis        Leukocytosis, unspecified type        Hypokalemia         Symptomatic anemia        Relevant Orders    Type & Screen - Lab Collect    Prepare RBC 1 Unit    Encounter for chemotherapy management              Primary site of disease is right middle lobe of the lung with invasion of mediastinum, new left lung nodule noted on pet ct   Limited stage disease: not a surgical candidate   Add probiotics to help with colitis ; may need flagyl  Cleared for chemo cycle 2   Home health will be ordered for PICC line care referral sent to surgery for possible chest port placement  Should keep her follow-up appoint with pulmonology: will ask Dr Fermin to help get her portable oxygen   Watch anemia closely she may benefit from IV iron in the near future for now continue observation alone  Proceed with one unit of prbcs to help with anemia and reactive thrombocytosis  RTC 3 weeks with labs    Recommend healthy living: no nicotine,  should avoid alcohol, healthy diet and regular exercise aiming for fitness, and weight control  1. Discussed healthy alcohol daily limit of 0.5 oz of pure alcohol in any 24 hours (roughly one 12-oz beers, 4 oz of wine (8%-12% alcohol), or 1.25 oz (half a shot) of liquor (80 proof)), can not save up.  2.   Discussed good exercise program, 4 key elements: 1. Aerobic (walking, swimming, dancing, jogging, biking, etc, 2. Muscle strengthening / resistance exercise, need to do 2-3 times  weekly, 3. Stretching daily, good stretch takes a whole  total minute. 4. Balance exercise daily.  3.   Discussed healthy diet for over 15 minutes with handouts given to the patient   Discussed COVID-19 and social distancing in great detail, avoid all non-essential visits out of the home if possible and avoid sick contacts.     Sincerely,  Berna Acuña MD    Electronically signed by: Berna Acuña MD

## 2020-09-22 NOTE — PLAN OF CARE
Problem: Fatigue (Oncology Care)  Goal: Improved Activity Tolerance  Outcome: Ongoing, Progressing  Intervention: Promote Energy Conservation  Flowsheets (Taken 9/22/2020 1202)  Fatigue Management:   fatigue-related activity identified   paced activity encouraged   frequent rest breaks encouraged  Sleep/Rest Enhancement:   regular sleep/rest pattern promoted   relaxation techniques promoted

## 2020-09-23 NOTE — PLAN OF CARE
Problem: Coping Ineffective (Oncology Care)  Goal: Effective Coping  Outcome: Ongoing, Progressing  Intervention: Support and Enhance Coping Strategies  Flowsheets (Taken 9/23/2020 1012)  Supportive Measures:   self-care encouraged   self-reflection promoted   problem solving facilitated   self-responsibility promoted   verbalization of feelings encouraged   decision-making supported   goal setting facilitated  Family/Support System Care:   family care conference arranged   presence promoted   caregiver stress acknowledged  Environmental Support:   personal routine supported   environmental consistency promoted   rest periods encouraged   calm environment promoted

## 2020-09-24 NOTE — PLAN OF CARE
Problem: Fatigue (Oncology Care)  Goal: Improved Activity Tolerance  Outcome: Ongoing, Progressing  Intervention: Promote Energy Conservation  Flowsheets (Taken 9/24/2020 1441)  Fatigue Management:   fatigue-related activity identified   frequent rest breaks encouraged   paced activity encouraged  Sleep/Rest Enhancement:   consistent schedule promoted   family presence promoted   regular sleep/rest pattern promoted   relaxation techniques promoted

## 2020-09-28 NOTE — TELEPHONE ENCOUNTER
----- Message from Berna Acuña MD sent at 9/26/2020  6:44 AM CDT -----  RTC SEpt 31 to review labs cbc and cmp

## 2020-10-01 NOTE — PROGRESS NOTES
[facility]     Subjective:      PATIENT:   Kendra Lawrence  :    1955  MR#:    [id number]  DATE OF VISIT:  10/01/2020    Chief Complaint:  I fee exhausted and I have been vomiting      Patient ID: Kendra Lawrence is a 65 y.o. female   This is a 64-year-old female who was admitted to [facility] with progressive shortness of breath and postobstructive pneumonia.     CT of chest on  revealed right middle lobe dense masslike opacification worrisome for neoplasm invading the mediastinum with another area of consolidation in the inferior right upper lobe.  The abnormality in the anterior right mid hemithorax and the mediastinum measures 11.8 x 8.7 cm.  An enlarged subcarinal lymph node was also noted concerning for metastatic node and moderate loculated appearing right pleural effusion was present.  She received antimicrobial therapy for postobstructive pneumonia.  MRI brain was performed without contrast revealing no evidence of metastatic disease.  CT of abdomen and pelvis was also performed revealing mild diffuse intrahepatic and extrahepatic bile duct dilatation possibly related to post cholecystectomy state.  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  The tumor cells are positive for cytokeratin synaptophysin and chromogranin.  TTF1 CD 45 and P 63 were negative.  The Ki-67 proliferation index was greater than 90%.    She was transferred to [facility] and began carboplatin plus etoposide.  Day 1 of cycle 1 was performed on 2020.  She was discharged last Friday with a PICC line in her left arm.  Due for cycle 2 chemo and to review pet ct findings from 2020  Pt is weak , started  radiation  A few days ago , in a wheelchair with decreased appetite and nausea unrelieved with zofran 4 mg   Patient information was obtained from patient, past medical records and ER records , chart review revealing     Interval History: Patient presents for follow up of Lung cancer .  The patient has a history of significant SOB . Hypertension has been well controlled on current medications, but the patient has noticed worsening  weakness over the past few weeks . The patient also complains of  Swelling in her legs over the past few weeks, worse with ambulation , better with rest  . GERD has been stable on a PPI. The patient states compliance with all prescribed medications.   .  Here to check pet ct scan and labs  Her blood transfusion was never carried out     SCan revelas decreased mass in lung on right from almost 12 cm to 8 cm, moderate right pleural effusion and > biliary ductal dilatation     Review of Systems   14 point review of systems all negative except pertinents above  CONSTITUTIONAL: No fevers, chills, night sweats, positive wt. loss, appetite changes  SKIN: no rashes or itching  ENT: No headaches, head trauma, vision changes, or eye pain  LYMPH NODES: None noticed   CV: No chest pain, palpitations.   Endocrinology positive cold intolerance no excessive thirst or sweating  RESP:  Positive shortness of breath and dyspnea on exertion, no current cough, wheezing, or hemoptysis  GI: No  , emesis, positive diarrhea, and intermittent constipation, no melena, hematochezia, pain.   : No dysuria, hematuria, positive urgency, no frequency   HEME:  Positive easy bruising, no bleeding problems  PSYCHIATRIC: No depression, anxiety, psychosis   NEURO: No seizures, memory loss, dizziness   MSK: No arthralgias     Complete review of systems otherwise negative other than the above  Past Medical History:   Diagnosis Date    Asthma     Cardiac angina     Chronic abdominal pain     Claustrophobia     COPD  (chronic obstructive pulmonary disease)     Coronary artery disease     GERD (gastroesophageal reflux disease)     History of drug dependence     Hypertension     Hypocalcemia 2020       Family History   Problem Relation Age of Onset    Breast cancer Sister 55    Breast cancer Sister 50    Cancer Mother     Heart disease Mother     Hypertension Mother     Cancer Father        Past Surgical History:   Procedure Laterality Date    BREAST BIOPSY Bilateral 20 yrs ago    benign    BRONCHOSCOPY N/A 2020    Procedure: Bronchoscopy- 730 or noon, floro not needed;  Surgeon: Andrew Brothers MD;  Location: [facility];  Service: Endoscopy;  Laterality: N/A;     SECTION      CHOLECYSTECTOMY      ENDOSCOPIC ULTRASOUND OF UPPER GASTROINTESTINAL TRACT Left 2018    Procedure: ULTRASOUND, UPPER GI TRACT, ENDOSCOPIC;  Surgeon: Paras Negrete MD;  Location: [facility];  Service: Endoscopy;  Laterality: Left;    ESOPHAGOGASTRODUODENOSCOPY N/A 2018    Procedure: EGD (ESOPHAGOGASTRODUODENOSCOPY);  Surgeon: Paras Negrete MD;  Location: [facility];  Service: Endoscopy;  Laterality: N/A;    ESOPHAGOGASTRODUODENOSCOPY N/A 2018    Procedure: EGD (ESOPHAGOGASTRODUODENOSCOPY);  Surgeon: Paras Negrete MD;  Location: [facility];  Service: Endoscopy;  Laterality: N/A;    HYSTERECTOMY      MAGNETIC RESONANCE IMAGING N/A 2019    Procedure: MRI (Magnetic Resonance Imagine) needs anesthesia;  Surgeon: Raffy Charles MD;  Location: [facility];  Service: Anesthesiology;  Laterality: N/A;    OOPHORECTOMY      TEMPOROMANDIBULAR JOINT SURGERY      TONSILLECTOMY         Social History     Socioeconomic History    Marital status:      Spouse name: Not on file    Number of children: Not on file    Years of education: Not on file    Highest education level: Not on file   Occupational History    Not on file   Social Needs    Financial resource strain: Not on file     Food insecurity     Worry: Not on file     Inability: Not on file    Transportation needs     Medical: Not on file     Non-medical: Not on file   Tobacco Use    Smoking status: Former Smoker     Years: 52.00     Types: Vaping w/o nicotine    Smokeless tobacco: Never Used   Substance and Sexual Activity    Alcohol use: No     Frequency: Never    Drug use: Yes     Types: Hydrocodone    Sexual activity: Not on file   Lifestyle    Physical activity     Days per week: Not on file     Minutes per session: Not on file    Stress: Not on file   Relationships    Social connections     Talks on phone: Not on file     Gets together: Not on file     Attends Latter day service: Not on file     Active member of club or organization: Not on file     Attends meetings of clubs or organizations: Not on file     Relationship status: Not on file   Other Topics Concern    Not on file   Social History Narrative    Not on file         Current Outpatient Medications:     albuterol-ipratropium (DUO-NEB) 2.5 mg-0.5 mg/3 mL nebulizer solution, Take 3 mLs by nebulization every 4 (four) hours. Rescue, Disp: 1 Box, Rfl: 0    alendronate (FOSAMAX) 70 MG tablet, TAKE ONE TABLET BY MOUTH ONCE EVERY 7 DAYS (Patient taking differently: Take 70 mg by mouth every 7 days. Sunday), Disp: 12 tablet, Rfl: 3    buprenorphine-naloxone (SUBOXONE) 8-2 mg Film, Place 1 packet (1 each total) under the tongue 2 (two) times daily., Disp: 60 packet, Rfl: 0    busPIRone (BUSPAR) 10 MG tablet, Take 1 tablet (10 mg total) by mouth 2 (two) times daily., Disp: 60 tablet, Rfl: 11    duke's soln (benadryl 30 mL, mylanta 30 mL, lidocaine 30 mL, nystatin 30 mL) 120mL, Take 10 mLs by mouth 4 (four) times daily., Disp: 120 mL, Rfl: 0    DULoxetine (CYMBALTA) 60 MG capsule, TAKE ONE CAPSULE BY MOUTH ONCE A DAY (Patient taking differently: Take 60 mg by mouth once daily. ), Disp: 30 capsule, Rfl: 10    fluticasone furoate-vilanteroL (BREO) 200-25 mcg/dose DsDv  diskus inhaler, Inhale 1 puff into the lungs once daily. Controller, Disp: 1 each, Rfl: 0    hydroCHLOROthiazide (HYDRODIURIL) 25 MG tablet, TAKE ONE TABLET BY MOUTH EVERY DAY, Disp: 90 tablet, Rfl: 1    nitroGLYCERIN (NITROSTAT) 0.4 MG SL tablet, Place 0.4 mg under the tongue every 5 (five) minutes as needed for Chest pain., Disp: , Rfl:     omeprazole (PRILOSEC) 40 MG capsule, Take 1 capsule by mouth 2 (two) times daily., Disp: , Rfl: 5    ondansetron (ZOFRAN-ODT) 8 MG TbDL, Take 1 tablet (8 mg total) by mouth every 6 (six) hours as needed., Disp: 60 tablet, Rfl: 0    TRELEGY ELLIPTA 100-62.5-25 mcg DsDv, INHALE 1 PUFF INTO THE LUNGS NIGHTLY, Disp: 60 each, Rfl: 11    amitriptyline (ELAVIL) 10 MG tablet, TAKE ONE TABLET BY MOUTH AT BEDTIME AS NEEDED FOR INSOMNIA (Patient not taking: No sig reported), Disp: 30 tablet, Rfl: 1    ondansetron (ZOFRAN) 4 MG tablet, TAKE ONE TABLET BY MOUTH EVERY 8 HOURS AS NEEDED FOR NAUSEA (Patient not taking: Reported on 10/1/2020), Disp: 50 tablet, Rfl: 5    predniSONE (DELTASONE) 10 MG tablet, TAKE 4 TABS BY MOUTH DAILY FOR 2 DAYS THEN TAKE 3 TABS DAILY FOR 2 DAYS THEN 2 TABS DAILY FOR 2 DAYS THEN 1 TAB DAILY FOR 2 DAYS. (Patient not taking: Reported on 10/1/2020), Disp: 20 tablet, Rfl: 0    Review of patient's allergies indicates:  No Known Allergies       Objective:     Vitals:  /73 (BP Location: Right arm, Patient Position: Sitting, BP Method: Medium (Automatic))   Pulse 106   Temp 97.4 °F (36.3 °C) (Temporal)   Wt 66.6 kg (146 lb 13.2 oz)   LMP  (LMP Unknown)   BMI 25.20 kg/m²  Body surface area is 1.73 meters squared.    Weight Readings:  Wt Readings from Last 5 Encounters:   10/01/20 66.6 kg (146 lb 13.2 oz)   09/24/20 66 kg (145 lb 8.1 oz)   09/23/20 65 kg (143 lb 4.8 oz)   09/22/20 65.8 kg (145 lb)   09/22/20 66.2 kg (145 lb 15.1 oz)        Physical Exam  Height / weight / VS reviewed  GENERAL.:  Does not appear to feel well today in no acute  distress  PSYCH:  Flat affect, no anxiety, no depression  HEENT: Normocephalic, lids intact, conjunctiva pink, sinuses nontender to palpation  non-boggy turbinates,Normal auricula, nasal septum, dentition, gums and mucosa ,OP clear,no palatal pallor  NECK: Supple,trachea midline, no palpable abnormalities  LYMPHATICS: No cervical or axillary adenopathy  RESPIRATORY:  Decreased breath sounds right lung no rubs or rhonchi  Good respiratory effort without any retractions or diaphragmatic movement  CARDIOVASCULAR: no JVD, S1 / S2 with RR tachycardia, no murmur, no rub,2+ capillary refill  ABDOMEN: NT distended, normal bowel sounds, no palpable HSM or mass  EXTREMITIES: No cyanosis, no clubbing, no joint effusion  NEUROLOGICAL: alert and oriented x 3   SKIN: Warm, dry, positive ecchymoses and excoriations noted no tenting, no petechiae or ecchymosis    Labs:  Lab Results   Component Value Date    WBC 3.44 (L) 09/29/2020    HGB 7.1 (L) 09/29/2020    HCT 24.7 (L) 09/29/2020    MCV 92 09/29/2020     09/29/2020           CMP  Sodium   Date Value Ref Range Status   09/29/2020 141 136 - 145 mmol/L Final     Potassium   Date Value Ref Range Status   09/29/2020 4.2 3.5 - 5.1 mmol/L Final     Chloride   Date Value Ref Range Status   09/29/2020 100 95 - 110 mmol/L Final     CO2   Date Value Ref Range Status   09/29/2020 26 23 - 29 mmol/L Final     Glucose   Date Value Ref Range Status   09/29/2020 101 70 - 110 mg/dL Final     BUN, Bld   Date Value Ref Range Status   09/29/2020 14 8 - 23 mg/dL Final     Creatinine   Date Value Ref Range Status   09/29/2020 0.8 0.5 - 1.4 mg/dL Final     Calcium   Date Value Ref Range Status   09/29/2020 8.5 (L) 8.7 - 10.5 mg/dL Final     Total Protein   Date Value Ref Range Status   09/29/2020 6.3 6.0 - 8.4 g/dL Final     Albumin   Date Value Ref Range Status   09/29/2020 2.8 (L) 3.5 - 5.2 g/dL Final     Total Bilirubin   Date Value Ref Range Status   09/29/2020 0.5 0.1 - 1.0 mg/dL Final      Comment:     For infants and newborns, interpretation of results should be based  on gestational age, weight and in agreement with clinical  observations.  Premature Infant recommended reference ranges:  Up to 24 hours.............<8.0 mg/dL  Up to 48 hours............<12.0 mg/dL  3-5 days..................<15.0 mg/dL  6-29 days.................<15.0 mg/dL       Alkaline Phosphatase   Date Value Ref Range Status   09/29/2020 57 55 - 135 U/L Final     AST   Date Value Ref Range Status   09/29/2020 19 10 - 40 U/L Final     ALT   Date Value Ref Range Status   09/29/2020 17 10 - 44 U/L Final     Anion Gap   Date Value Ref Range Status   09/29/2020 15 8 - 16 mmol/L Final     eGFR if    Date Value Ref Range Status   09/29/2020 >60.0 >60 mL/min/1.73 m^2 Final     eGFR if non    Date Value Ref Range Status   09/29/2020 >60.0 >60 mL/min/1.73 m^2 Final     Comment:     Calculation used to obtain the estimated glomerular filtration  rate (eGFR) is the CKD-EPI equation.        All lab results and imaging results have been reviewed and discussed with the patient.       Nm Pet Ct Routine Skull To Mid Thigh    Result Date: 9/17/2020  CLINICAL INFORMATION: Female patient, 64 years old, with a history of right-sided small cell lung cancer diagnosed 1 month ago  . The patient is currently on chemotherapy. Patient presents for staging. small cell lung cancer COMPARISON: CT the abdomen and pelvis from August 27, 2020, MRI the brain from August 20, 2020, CT of the chest August 15, 2020 TECHNIQUE: The blood glucose prior to injection was 121 mg/dl. Images were acquired from the vertex of the skull through the mid thighs. approximately 45-60 minutes post injection of 12.2 mCi F-18 FDG into the right wrist. Dilute oral contrast was given. Axial, coronal and sagittal PET reconstructions with and without attenuation correction were interpreted.  Corresponding CT images were acquired and reviewed alongside the  PET images. The low dose unenhanced CT images were used for attenuation correction and anatomic correlation only. FINDINGS: There is diffusely increased FDG uptake throughout the visualized osseous structures suggestive of chemotherapy and in keeping with reactive marrow.  However, this pattern may mask underlying hypermetabolic osseous foci. There is complete opacification of the right middle lobe covering an area of approximately 8.1 x 6.6 cm with increased SUV max 8.8 in the right middle lobe, centered at the right hilum (axial image 125). In the central right middle lobe there is a 2.9 x 3.5 cm rounded hypoattenuating lesion with decreased FDG activity with SUV max 3.2 (image 126) suggestive of a combination of necrosis and/or partial treatment response. 4 mm pulmonary nodule in the central left upper lobe with SUV max 0.8 (image 97) (likely below the threshold for PET CT evaluation Moderate right pleural effusion and adjacent atelectasis. Moderate central intrahepatic and extrahepatic biliary ductal dilation, with soft tissue attenuation intraluminal filling defects suggestive of stones. The gallbladder is surgically absent. Tracer distribution is otherwise physiologic. Additional findings: -Left-sided subclavian medical infusion port with tip at the level of the SVC.. - Likely small superficial sebaceous versus epidermal inclusion cyst in the left side of the mandible. -Degenerative change seen in the spine with no aggressive appearing lytic or blastic lesion identified as above. -Scattered atheromatous calcifications in the aorta with no aneurysm. -Coronary arterial calcifications (three-vessel disease). -Colonic diverticula without acute diverticulitis. -Prior hysterectomy. IMPRESSION: 1. FDG avid right middle lobe atelectasis, consolidation and masslike opacity in keeping with a combination of the patient's known malignancy, and postobstructive atelectasis/consolidation. 2. Central decreased FDG activity  low-attenuation suggesting necrosis. 3. Small moderate right pleural effusion and adjacent atelectasis. 4. Pulmonary nodule measuring 4 mm in the left upper lobe, new from the previous exam, likely too small for evaluation by PET/CT. Consider short-term follow-up CT of the chest in 3 months to document resolution. 5. Moderate central intra and extrahepatic biliary ductal dilation, with possible small stones seen, consider correlation with liver function tests and possible MRCP as warranted. 6. Additional and incidental findings as noted above. . Electronically Signed by KINJAL Alvarez on 9/17/2020 2:22 PM    X-ray Chest 1 View    Result Date: 8/24/2020  EXAMINATION: XR CHEST 1 VIEW CLINICAL HISTORY: resp distress; TECHNIQUE: Single frontal view of the chest was performed. COMPARISON: 08/18/2020 FINDINGS: There remains confluent opacification the right lung base at least in part consistent with the patient's known large mass.  Slight clearing right perihilar which could be due to decrease of pleural effusion or peripheral infiltrate.  Left lung clear of confluent infiltrate and no left pleural effusion.     Continued masslike opacification right lung base.  Slight clearing right perihilar region. Electronically signed by: Rosana Fernandez MD Date:    08/24/2020 Time:    14:49    X-ray Chest 1 View    X-ray Chest 1 View    Result Date: 8/24/2020  EXAMINATION: XR CHEST 1 VIEW CLINICAL HISTORY: resp distress; TECHNIQUE: Single frontal view of the chest was performed. COMPARISON: 08/18/2020 FINDINGS: There remains confluent opacification the right lung base at least in part consistent with the patient's known large mass.  Slight clearing right perihilar which could be due to decrease of pleural effusion or peripheral infiltrate.  Left lung clear of confluent infiltrate and no left pleural effusion.     Continued masslike opacification right lung base.  Slight clearing right perihilar region. Electronically signed  by: Rosana Fernandez MD Date:    08/24/2020 Time:    14:49    Ct Abdomen Pelvis With Contrast    Result Date: 8/27/2020  CMS MANDATED QUALITY DATA - CT RADIATION - 436 All CT scans at this facility utilize dose modulation, iterative reconstruction, and/or weight based dosing when appropriate to reduce radiation dose to as low as reasonably achievable. Reason: Metastatic disease evaluation small cell lung cancer TECHNIQUE: CT abdomen and pelvis with 100 mL Omnipaque 350. COMPARISON: CT thorax 8/15/2020 CT ABDOMEN: Confluent consolidative masslike opacification of the right middle lobe has prominent areas of low density suggesting necrosis. Centrally necrotic subcarinal lymph node measuring 16 mm is present. Confluent opacification of basilar right lower lobe is also evident, more suggestive of atelectasis. Small right pleural effusion is present and partially visualized. Linear opacities affect the left lower lobe suggesting atelectasis. Coronary artery calcifications are present. Gallbladder has been removed. Intrahepatic and extrahepatic bile duct dilation is mild, possibly related to postcholecystectomy state and main pancreatic duct is of normal caliber, and no abnormality of the pancreas identified. No focal abnormal enhancement occurs throughout hepatic parenchyma. Spleen, adrenals, and kidneys are normal. Retroaortic left renal vein incidentally noted. Diffuse aortoiliac vascular calcifications are mild. Few diverticula arise from the colon. No other intestinal abnormality. Enteric contrast courses throughout small intestines without obstruction. No free intraperitoneal gas or fluid. Very mild subcutaneous edema occurs within the flanks bilaterally. Diffuse degenerative changes affect the thoracolumbar spine. Grade 1 anterolisthesis of L4 and L5 are due to bilateral L4-L5 facet joint osteoarthrosis. No suspicious osseous abnormality. CT PELVIS: Bladder is normal. No adnexal mass or free pelvic fluid. No enlarged  lymph nodes. IMPRESSION: 1. Confluent heterogeneous masslike consolidation of right middle lobe, most likely correlating with reportedly known malignancy. Correlation with suspected recent biopsy results requested. Conceivably, necrotic pneumonia could give a similar appearance. 2. Centrally necrotic subcarinal lymph node which is enlarged is characteristic of a lymph node metastasis. 3. Small right pleural effusion. 4. Coronary artery calcifications. 5. Mild diffuse intrahepatic and extrahepatic bile duct dilation could be related to postcholecystectomy state. No obstructing etiology identified. Correlation with bilirubin level is requested, if bilirubin is abnormal, further evaluation with MRCP can be considered. 6. Diverticulosis. 7. No evidence of metastatic disease throughout the abdomen or pelvis. Electronically Signed by Wale Lopez M.D. on 8/27/2020 9:39 AM    X-ray Chest Ap Portable    Result Date: 8/25/2020  EXAMINATION: XR CHEST AP PORTABLE CLINICAL HISTORY: picc; FINDINGS: Portable chest at 15:51 hours is compared to a prior study dated 08/24/2020 shows a left PICC line with the tip at the atrial caval junction.  There is no pneumothorax. There is persistent right pleural effusion with right middle and lower lobe airspace disease the right upper lobe and left lung are clear.  The heart is normal in size..     Left PICC line with the tip overlying the atrial caval junction.  There is no pneumothorax. Persistent right pleural effusion with right middle and lower lobe airspace disease Electronically signed by: Christy Olvera MD Date:    08/25/2020 Time:    16:03    Nm Pet Ct Routine Skull To Mid Thigh    Result Date: 9/17/2020  CLINICAL INFORMATION: Female patient, 64 years old, with a history of right-sided small cell lung cancer diagnosed 1 month ago  . The patient is currently on chemotherapy. Patient presents for staging. small cell lung cancer COMPARISON: CT the abdomen and pelvis from August 27,  2020, MRI the brain from August 20, 2020, CT of the chest August 15, 2020 TECHNIQUE: The blood glucose prior to injection was 121 mg/dl. Images were acquired from the vertex of the skull through the mid thighs. approximately 45-60 minutes post injection of 12.2 mCi F-18 FDG into the right wrist. Dilute oral contrast was given. Axial, coronal and sagittal PET reconstructions with and without attenuation correction were interpreted.  Corresponding CT images were acquired and reviewed alongside the PET images. The low dose unenhanced CT images were used for attenuation correction and anatomic correlation only. FINDINGS: There is diffusely increased FDG uptake throughout the visualized osseous structures suggestive of chemotherapy and in keeping with reactive marrow.  However, this pattern may mask underlying hypermetabolic osseous foci. There is complete opacification of the right middle lobe covering an area of approximately 8.1 x 6.6 cm with increased SUV max 8.8 in the right middle lobe, centered at the right hilum (axial image 125). In the central right middle lobe there is a 2.9 x 3.5 cm rounded hypoattenuating lesion with decreased FDG activity with SUV max 3.2 (image 126) suggestive of a combination of necrosis and/or partial treatment response. 4 mm pulmonary nodule in the central left upper lobe with SUV max 0.8 (image 97) (likely below the threshold for PET CT evaluation Moderate right pleural effusion and adjacent atelectasis. Moderate central intrahepatic and extrahepatic biliary ductal dilation, with soft tissue attenuation intraluminal filling defects suggestive of stones. The gallbladder is surgically absent. Tracer distribution is otherwise physiologic. Additional findings: -Left-sided subclavian medical infusion port with tip at the level of the SVC.. - Likely small superficial sebaceous versus epidermal inclusion cyst in the left side of the mandible. -Degenerative change seen in the spine with no  aggressive appearing lytic or blastic lesion identified as above. -Scattered atheromatous calcifications in the aorta with no aneurysm. -Coronary arterial calcifications (three-vessel disease). -Colonic diverticula without acute diverticulitis. -Prior hysterectomy. IMPRESSION: 1. FDG avid right middle lobe atelectasis, consolidation and masslike opacity in keeping with a combination of the patient's known malignancy, and postobstructive atelectasis/consolidation. 2. Central decreased FDG activity low-attenuation suggesting necrosis. 3. Small moderate right pleural effusion and adjacent atelectasis. 4. Pulmonary nodule measuring 4 mm in the left upper lobe, new from the previous exam, likely too small for evaluation by PET/CT. Consider short-term follow-up CT of the chest in 3 months to document resolution. 5. Moderate central intra and extrahepatic biliary ductal dilation, with possible small stones seen, consider correlation with liver function tests and possible MRCP as warranted. 6. Additional and incidental findings as noted above. . Electronically Signed by KINJAL Alvarez on 9/17/2020 2:22 PM        Assessment/Plan:   Diagnosis and Management:  Problem List Items Addressed This Visit        Oncology    Small cell lung cancer, right - Primary    Relevant Orders    TSH    T3    T4      Other Visit Diagnoses     Tachycardia        Relevant Orders    TSH    T3    T4    Weight gain        Relevant Orders    TSH    T3    T4    Vomiting, intractability of vomiting not specified, presence of nausea not specified, unspecified vomiting type        Nausea        Symptomatic anemia        Relevant Orders    Ambulatory referral/consult to Chemotherapy Infusion    Type & Screen - Lab Collect    Prepare RBC 2 Units; sx    Calculus of bile duct without cholecystitis and without obstruction        Hypoalbuminemia            [phone] asking staff to get her a portable oxygen tank from [facility]  Primary site of  disease is right middle lobe of the lung with invasion of mediastinum, new left lung nodule noted on pet ct   Limited stage disease: not a surgical candidate   Add probiotics to help with colitis ; may need flagyl  Post cycle 2  Cycle 3 due on October 13  Proceed with Blood transfusion 2 units   Home health will be ordered for PICC line care referral sent to surgery for possible chest port placement  Should keep her follow-up appoint with pulmonology: will ask Dr Fermin to help get her portable oxygen ; [facility]   Watch anemia closely she may benefit from IV iron in the near future for now continue observation alone  RTC 3 weeks with labs     PLeural effusion is moderate   Pt is asymptomatic check thyroid tests next OV    Recommend healthy living: no nicotine,  should avoid alcohol, healthy diet and regular exercise aiming for fitness, and weight control  1. Discussed healthy alcohol daily limit of 0.5 oz of pure alcohol in any 24 hours (roughly one 12-oz beers, 4 oz of wine (8%-12% alcohol), or 1.25 oz (half a shot) of liquor (80 proof)), can not save up.  2.   Discussed good exercise program, 4 key elements: 1. Aerobic (walking, swimming, dancing, jogging, biking, etc, 2. Muscle strengthening / resistance exercise, need to do 2-3 times  weekly, 3. Stretching daily, good stretch takes a whole  total minute. 4. Balance exercise daily.  3.   Discussed healthy diet for over 15 minutes with handouts given to the patient   Discussed COVID-19 and social distancing in great detail, avoid all non-essential visits out of the home if possible and avoid sick contacts.     Sincerely,  Berna Acuña MD    Electronically signed by: Berna Acuña MD

## 2020-10-01 NOTE — Clinical Note
Urgent needs 2 units PRBCs and call [phone] home health please for portable oxygen tank:) RTC October 13 with cbc and cmp for next chemo

## 2020-10-02 NOTE — PROGRESS NOTES
Subjective:       Patient ID: Kendar Lawrence is a 65 y.o. female.    Chief Complaint: No chief complaint on file.  Video visit:    The patient location is: [place]  The chief complaint leading to consultation is: narcotic dependence   Visit type: Virtual visit with synchronous audio and video  Total time spent with patient: 10 mins  Each patient to whom he or she provides medical services by telemedicine is:  (1) informed of the relationship between the physician and patient and the respective role of any other health care provider with respect to management of the patient; and (2) notified that he or she may decline to receive medical services by telemedicine and may withdraw from such care at any time.    Notes:       HPI       Pt diagnosed with lung cancer, pneumonia.  Is anxious and someone put her on xanax.       The patient presents for medical management of opioid dependency. she is receiving maintenance therapy with buprenorphine.      CHIEF COMPLAINT: opoid dependence  HPI:     ONSET/TIMING:     DURATION: . Continuous(+).    QUALITY/COURSE:  unchanged.     INTENSITY/SEVERITY:  controlled.    CONTEXT/WHEN:     MODIFIERS/TREATMENTS:  Taking medications(+) Suboxone  8/2 # 60,   last rx given 8.31.20    SYMPTOMS/RELATED: no withdrawal symptoms. no cravings . No substance abuse.  No alcohol use     pnp checked: last month:  Yes        Review of Systems   Constitutional: Positive for fatigue. Negative for diaphoresis and unexpected weight change.   Gastrointestinal: Positive for nausea. Negative for constipation, diarrhea and vomiting.   Neurological: Negative for dizziness, light-headedness and headaches.   Psychiatric/Behavioral: Negative for dysphoric mood and sleep disturbance. The patient is nervous/anxious.          Objective:      There were no vitals filed for this visit.  No drug screen done because we do not have test strips           Assessment:       1. Narcotic dependence          Plan:       (+) pt taking  medication as prescribed.    (+) dose is approproate.    (-) urine drug screen done.   (+) discussed risks of buprenorphine, including to others.      (+) assessed if benefits outweigh risks of buprenorphine. .     (+) reviewed safe storage of medication.     (+) discussed proper use of buprenorphine, including missed doses.      Narcotic dependence  -     buprenorphine-naloxone (SUBOXONE) 8-2 mg Film; Place 1 packet (1 each total) under the tongue 2 (two) times daily.  Dispense: 60 packet; Refill: 0      Follow up in about 1 month (around 11/2/2020).      Physical Exam

## 2020-10-02 NOTE — PROGRESS NOTES
1045 first unit prbc's completed, pt tolerated well. Pt discharged with family for radiation appointment, will return for second unit of prbc's.   1200  Pt returned for transfusion.  1530  Second unit prbc's completed, pt tolerated well, no changes in assessment.  1600  Discharged home with family, reinforced to patient to go to ER for any cp/sob or any other new symptoms, pt verbalized understanding

## 2020-10-06 NOTE — TELEPHONE ENCOUNTER
Spoke to Adamaris at [facility] and she states pt will be able to get a tank. She will speak to pt to get that started. Also LM on pt VM re: confirming f/up appts. Encouraged call back to confirm.    ----- Message from Berna Acuña MD sent at 10/1/2020 10:59 AM CDT -----  Urgent needs 2 units PRBCs and call [phone] home health please for portable oxygen tank:) RTC October 13 with cbc and cmp for next chemo

## 2020-10-12 NOTE — TELEPHONE ENCOUNTER
Was contacted by [facility] regarding Potassium 2.7--entered in Flowsheets--contacted Dr Acuña via phone as she had left for the day--Dr Acuña wanted me to contact patient to see if she was experiencing diarrhea? Dr Acuña verbally recommends that patient take 40mg Potassium Orally tonight--If patient doesn't have Potassium tablets, she requested that [facility] send it in for the patient    LM with call back number for patient to contact us regarding above--

## 2020-10-12 NOTE — TELEPHONE ENCOUNTER
Attempted to contact patient again regarding Potassium levels & verbal orders per Dr Acuña--LM with call back number for patient to contact us regarding this matter

## 2020-10-13 PROBLEM — D64.81 ANEMIA FOLLOWING USE OF CHEMOTHERAPEUTIC DRUG: Status: ACTIVE | Noted: 2020-01-01

## 2020-10-13 PROBLEM — R11.10 EMESIS: Status: ACTIVE | Noted: 2020-01-01

## 2020-10-13 PROBLEM — E87.6 HYPOKALEMIA: Status: ACTIVE | Noted: 2020-01-01

## 2020-10-13 NOTE — PATIENT INSTRUCTIONS
Clear Liquid Diet    Clear liquids are any liquid that you can see through. They are also very easy to digest. You may be put on a clear liquid diet if you are recovering from irritation or infection of the stomach or intestinal tract. This diet may also be used before surgery or special procedures such as a colonoscopy. You should not be on this diet for more than 3 days. Below are some clear liquids you can have on this diet.  Adults and children over 2 years old  Adults should drink a total of 2 to 3 quarts of liquid per day. It may be easier to drink small frequent servings rather than a few large ones. Liquids can include:  · Fruit juices. Strained orange juice or lemonade (no pulp); apple, grape, and cranberry juice; clear fruit drinks  · Beverages. Sport drinks, sodas, mineral water (plain or flavored), tea, black coffee, liquid gelatin (add twice the recommended amount of water)  · Soups. Clear broth  · Desserts. Plain gelatin, popsicles, fruit juice bars  Children under 2 years old  Oral rehydration fluids are available at drug stores and most grocery stores. You dont need a prescription.  Date Last Reviewed: 8/1/2016  © 9391-0204 [employer]. [address] [place] [address], [place] [address]. All rights reserved. This information is not intended as a substitute for professional medical care. Always follow your healthcare professional's instructions.        Diet for Vomiting or Diarrhea (Adult)    Your symptoms may return or get worse after eating certain foods listed below. If this happens, stop eating these foods until your symptoms ease and you feel better.  Once the vomiting stops, follow the steps below.   During the first 12 to 24 hours  During the first 12 to 24 hours, follow this diet:  · Drinks. Plain water, sport drinks like electrolyte solutions, soft drinks without caffeine, mineral water (plain or flavored), clear fruit juices, and decaffeinated tea and coffee.  · Soups. Clear  broth.  · Desserts. Plain gelatin, popsicles, and fruit juice bars. As you feel better, you may add 6 to 8 ounces of yogurt per day. If you have diarrhea, don't have foods or drinks that contain sugar, high-fructose corn syrup, or sugar alcohols.  During the next 24 hours  During the next 24 hours you may add the following to the above:  · Hot cereal, plain toast, bread, rolls, and crackers  · Plain noodles, rice, mashed potatoes, and chicken noodle or rice soup  · Unsweetened canned fruit (but not pineapple) and bananas  Don't eat more than 15 grams of fat a day. Do this by staying away from margarine, butter, oils, mayonnaise, sauces, gravies, fried foods, peanut butter, meat, poultry, and fish.  Don't eat much fiber. Stay away from raw or cooked vegetables, fresh fruits (except bananas), and bran cereals.  Limit how much caffeine and chocolate you have. Do not use any spices or seasonings except salt.  During the next 24 hours  Slowly go back to your normal diet, as you feel better and your symptoms ease.  Date Last Reviewed: 8/1/2016  © 7901-1204 [employer]. [address] [place] [address], [place] [address]. All rights reserved. This information is not intended as a substitute for professional medical care. Always follow your healthcare professional's instructions.

## 2020-10-13 NOTE — PROGRESS NOTES
[facility]     Subjective:      PATIENT:   Kendra Lawrence  :    1955  MR#:    [id number]  DATE OF VISIT:  10/13/2020    Chief Complaint: I am vomiting  SHe is actively vomiting in the room       Patient ID: Kendra Lawrence is a 65 y.o. female   This is a 65-year-old female who was admitted to [facility] with progressive shortness of breath and postobstructive pneumonia.     CT of chest on  revealed right middle lobe dense masslike opacification worrisome for neoplasm invading the mediastinum with another area of consolidation in the inferior right upper lobe.  The abnormality in the anterior right mid hemithorax and the mediastinum measures 11.8 x 8.7 cm.  An enlarged subcarinal lymph node was also noted concerning for metastatic node and moderate loculated appearing right pleural effusion was present.  She received antimicrobial therapy for postobstructive pneumonia.  MRI brain was performed without contrast revealing no evidence of metastatic disease.  CT of abdomen and pelvis was also performed revealing mild diffuse intrahepatic and extrahepatic bile duct dilatation possibly related to post cholecystectomy state.  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  The tumor cells are positive for cytokeratin synaptophysin and chromogranin.  TTF1 CD 45 and P 63 were negative.  The Ki-67 proliferation index was greater than 90%.    She was transferred to [facility] and began carboplatin plus etoposide.  Day 1 of cycle 1 was performed on 2020.  She was discharged last Friday with a PICC line in her left arm.  Due for cycle 2 chemo and to review pet ct findings from 2020  Pt is weak ,  started radiation  A few days ago , in a wheelchair with decreased appetite and nausea unrelieved with zofran 4 mg   Patient information was obtained from patient, past medical records and ER records , chart review revealing     Interval History: Patient presents for follow up of Lung cancer small cell type on Carbo etoposide .  The patient has a history of significant SOB . New onset of continual emesis  Hypertension has been well controlled on current medications, but the patient has noticed worsening  Emesis over the past 48 hours with no diarrhea . The patient also complains of  swelling in her legs over the past few weeks, worse with ambulation , better with rest  . GERD has NOT  been stable on a PPI. The patient states compliance with all prescribed medications.   .    Scan revelas decreased mass in lung on right from almost 12 cm to 8 cm, moderate right pleural effusion and > biliary ductal dilatation       Review of Systems   14 point review of systems all negative except pertinents above  CONSTITUTIONAL: No fevers, chills, night sweats, positive wt. loss, appetite changes  SKIN: no rashes or itching  ENT: No headaches, head trauma, vision changes, or eye pain  LYMPH NODES: None noticed   CV: No chest pain, palpitations.   Endocrinology positive cold intolerance no excessive thirst or sweating  RESP:  Positive shortness of breath and dyspnea on exertion, no current cough, wheezing, or hemoptysis  GI: + emesis, no diarrhea, and intermittent constipation, no melena, hematochezia, pain.   : No dysuria, hematuria, positive urgency, no frequency   HEME:  Positive easy bruising, no bleeding problems  PSYCHIATRIC:+ depression no , anxiety, psychosis   NEURO: No seizures, memory loss, dizziness   MSK: No arthralgias     Complete review of systems otherwise negative other than the above  Past Medical History:   Diagnosis Date    Asthma     Cardiac angina     Chronic abdominal pain     Claustrophobia     COPD  (chronic obstructive pulmonary disease)     Coronary artery disease     GERD (gastroesophageal reflux disease)     History of drug dependence     Hypertension     Hypocalcemia 2020       Family History   Problem Relation Age of Onset    Breast cancer Sister 55    Breast cancer Sister 50    Cancer Mother     Heart disease Mother     Hypertension Mother     Cancer Father        Past Surgical History:   Procedure Laterality Date    BREAST BIOPSY Bilateral 20 yrs ago    benign    BRONCHOSCOPY N/A 2020    Procedure: Bronchoscopy- 730 or noon, floro not needed;  Surgeon: Andrew Brothers MD;  Location: [facility];  Service: Endoscopy;  Laterality: N/A;     SECTION      CHOLECYSTECTOMY      ENDOSCOPIC ULTRASOUND OF UPPER GASTROINTESTINAL TRACT Left 2018    Procedure: ULTRASOUND, UPPER GI TRACT, ENDOSCOPIC;  Surgeon: Paras Negrete MD;  Location: [facility];  Service: Endoscopy;  Laterality: Left;    ESOPHAGOGASTRODUODENOSCOPY N/A 2018    Procedure: EGD (ESOPHAGOGASTRODUODENOSCOPY);  Surgeon: Paras Negrete MD;  Location: [facility];  Service: Endoscopy;  Laterality: N/A;    ESOPHAGOGASTRODUODENOSCOPY N/A 2018    Procedure: EGD (ESOPHAGOGASTRODUODENOSCOPY);  Surgeon: Paras Negrete MD;  Location: [facility];  Service: Endoscopy;  Laterality: N/A;    HYSTERECTOMY      MAGNETIC RESONANCE IMAGING N/A 2019    Procedure: MRI (Magnetic Resonance Imagine) needs anesthesia;  Surgeon: Raffy Charles MD;  Location: [facility];  Service: Anesthesiology;  Laterality: N/A;    OOPHORECTOMY      TEMPOROMANDIBULAR JOINT SURGERY      TONSILLECTOMY         Social History     Socioeconomic History    Marital status:      Spouse name: Not on file    Number of children: Not on file    Years of education: Not on file    Highest education level: Not on file   Occupational History    Not on file   Social Needs    Financial resource strain: Not on file     Food insecurity     Worry: Not on file     Inability: Not on file    Transportation needs     Medical: Not on file     Non-medical: Not on file   Tobacco Use    Smoking status: Former Smoker     Years: 52.00     Types: Vaping w/o nicotine    Smokeless tobacco: Never Used   Substance and Sexual Activity    Alcohol use: No     Frequency: Never    Drug use: Yes     Types: Hydrocodone    Sexual activity: Not on file   Lifestyle    Physical activity     Days per week: Not on file     Minutes per session: Not on file    Stress: Not on file   Relationships    Social connections     Talks on phone: Not on file     Gets together: Not on file     Attends Christian service: Not on file     Active member of club or organization: Not on file     Attends meetings of clubs or organizations: Not on file     Relationship status: Not on file   Other Topics Concern    Not on file   Social History Narrative    Not on file         Current Outpatient Medications:     albuterol-ipratropium (DUO-NEB) 2.5 mg-0.5 mg/3 mL nebulizer solution, Take 3 mLs by nebulization every 4 (four) hours. Rescue, Disp: 1 Box, Rfl: 0    alendronate (FOSAMAX) 70 MG tablet, TAKE ONE TABLET BY MOUTH ONCE EVERY 7 DAYS (Patient taking differently: Take 70 mg by mouth every 7 days. Sunday), Disp: 12 tablet, Rfl: 3    buprenorphine-naloxone (SUBOXONE) 8-2 mg Film, Place 1 packet (1 each total) under the tongue 2 (two) times daily., Disp: 60 packet, Rfl: 0    busPIRone (BUSPAR) 10 MG tablet, Take 1 tablet (10 mg total) by mouth 2 (two) times daily., Disp: 60 tablet, Rfl: 11    duke's soln (benadryl 30 mL, mylanta 30 mL, lidocaine 30 mL, nystatin 30 mL) 120mL, Take 10 mLs by mouth 4 (four) times daily., Disp: 120 mL, Rfl: 0    DULoxetine (CYMBALTA) 60 MG capsule, TAKE ONE CAPSULE BY MOUTH ONCE A DAY (Patient taking differently: Take 60 mg by mouth once daily. ), Disp: 30 capsule, Rfl: 10    fluticasone furoate-vilanteroL (BREO) 200-25 mcg/dose DsDv  diskus inhaler, Inhale 1 puff into the lungs once daily. Controller, Disp: 1 each, Rfl: 0    hydroCHLOROthiazide (HYDRODIURIL) 25 MG tablet, TAKE ONE TABLET BY MOUTH EVERY DAY, Disp: 90 tablet, Rfl: 1    nitroGLYCERIN (NITROSTAT) 0.4 MG SL tablet, Place 0.4 mg under the tongue every 5 (five) minutes as needed for Chest pain., Disp: , Rfl:     omeprazole (PRILOSEC) 40 MG capsule, Take 1 capsule by mouth 2 (two) times daily., Disp: , Rfl: 5    ondansetron (ZOFRAN-ODT) 8 MG TbDL, Take 1 tablet (8 mg total) by mouth every 6 (six) hours as needed., Disp: 60 tablet, Rfl: 0    TRELEGY ELLIPTA 100-62.5-25 mcg DsDv, INHALE 1 PUFF INTO THE LUNGS NIGHTLY, Disp: 60 each, Rfl: 11    amitriptyline (ELAVIL) 10 MG tablet, TAKE ONE TABLET BY MOUTH AT BEDTIME AS NEEDED FOR INSOMNIA (Patient not taking: No sig reported), Disp: 30 tablet, Rfl: 1    ondansetron (ZOFRAN) 4 MG tablet, TAKE ONE TABLET BY MOUTH EVERY 8 HOURS AS NEEDED FOR NAUSEA (Patient not taking: Reported on 10/1/2020), Disp: 50 tablet, Rfl: 5    predniSONE (DELTASONE) 10 MG tablet, TAKE 4 TABS BY MOUTH DAILY FOR 2 DAYS THEN TAKE 3 TABS DAILY FOR 2 DAYS THEN 2 TABS DAILY FOR 2 DAYS THEN 1 TAB DAILY FOR 2 DAYS. (Patient not taking: Reported on 10/1/2020), Disp: 20 tablet, Rfl: 0    Current Facility-Administered Medications:     0.9%  NaCl infusion (for blood administration), , Intravenous, Once, Berna Acuña MD    heparin, porcine (PF) 100 unit/mL injection flush 300 Units, 300 Units, Intravenous, PRN, Berna Acuña MD, 300 Units at 10/02/20 1544    Review of patient's allergies indicates:  No Known Allergies       Objective:     Vitals:  /79 (BP Location: Left arm, Patient Position: Sitting, BP Method: Medium (Automatic))   Pulse 109   Temp 96.8 °F (36 °C) (Temporal)   Resp 18   Wt 65.1 kg (143 lb 8.3 oz)   LMP  (LMP Unknown)   SpO2 99%   BMI 24.64 kg/m²  Body surface area is 1.71 meters squared.    Weight Readings:  Wt Readings from Last 5  Encounters:   10/13/20 65.1 kg (143 lb 8.3 oz)   10/01/20 66.6 kg (146 lb 13.2 oz)   09/24/20 66 kg (145 lb 8.1 oz)   09/23/20 65 kg (143 lb 4.8 oz)   09/22/20 65.8 kg (145 lb)        Physical Exam  Height / weight / VS reviewed  GENERAL.:  Does not appear to feel well today    PSYCH:  Flat affect, no anxiety, no depression  HEENT: Normocephalic, lids intact, conjunctiva pink, sinuses nontender to palpation Normal auricula, nasal septum, dentition, gums and mucosa ,OP clear,no palatal pallor  NECK: Supple,trachea midline, no palpable abnormalities  LYMPHATICS: No cervical or axillary adenopathy  RESPIRATORY:  Decreased breath sounds right lung no rubs or rhonchi  Good respiratory effort without any retractions or diaphragmatic movement  CARDIOVASCULAR: no JVD, S1 / S2 with RR tachycardia, no murmur, no rub,2+ capillary refill  ABDOMEN: NT distended, normal bowel sounds, no palpable HSM or mass  EXTREMITIES: No cyanosis, no clubbing   NEUROLOGICAL: alert and oriented x 3   SKIN: Warm, dry, positive ecchymoses and excoriations noted no tenting, no petechiae or ecchymosis  Strength decreased   Positive tenting   Labs:  Lab Results   Component Value Date    WBC 10.67 10/12/2020    HGB 9.2 (L) 10/12/2020    HCT 28.5 (L) 10/12/2020    MCV 84 10/12/2020     (H) 10/12/2020     CMP  Sodium   Date Value Ref Range Status   10/12/2020 140 136 - 145 mmol/L Final     Potassium   Date Value Ref Range Status   10/12/2020 2.7 (LL) 3.5 - 5.1 mmol/L Final     Comment:     K critical result(s) repeated. Called and verbal readback obtained   from Jennifer Mello MA/Dr santos Acuña's office by S 10/12/2020 15:23       Chloride   Date Value Ref Range Status   10/12/2020 99 95 - 110 mmol/L Final     CO2   Date Value Ref Range Status   10/12/2020 29 23 - 29 mmol/L Final     Glucose   Date Value Ref Range Status   10/12/2020 125 (H) 70 - 110 mg/dL Final     BUN, Bld   Date Value Ref Range Status   10/12/2020 10 8 - 23 mg/dL Final      Creatinine   Date Value Ref Range Status   10/12/2020 0.8 0.5 - 1.4 mg/dL Final     Calcium   Date Value Ref Range Status   10/12/2020 9.0 8.7 - 10.5 mg/dL Final     Total Protein   Date Value Ref Range Status   10/12/2020 6.6 6.0 - 8.4 g/dL Final     Albumin   Date Value Ref Range Status   10/12/2020 2.7 (L) 3.5 - 5.2 g/dL Final     Total Bilirubin   Date Value Ref Range Status   10/12/2020 0.6 0.1 - 1.0 mg/dL Final     Comment:     For infants and newborns, interpretation of results should be based  on gestational age, weight and in agreement with clinical  observations.  Premature Infant recommended reference ranges:  Up to 24 hours.............<8.0 mg/dL  Up to 48 hours............<12.0 mg/dL  3-5 days..................<15.0 mg/dL  6-29 days.................<15.0 mg/dL       Alkaline Phosphatase   Date Value Ref Range Status   10/12/2020 74 55 - 135 U/L Final     AST   Date Value Ref Range Status   10/12/2020 16 10 - 40 U/L Final     ALT   Date Value Ref Range Status   10/12/2020 13 10 - 44 U/L Final     Anion Gap   Date Value Ref Range Status   10/12/2020 12 8 - 16 mmol/L Final     eGFR if    Date Value Ref Range Status   10/12/2020 >60.0 >60 mL/min/1.73 m^2 Final     eGFR if non    Date Value Ref Range Status   10/12/2020 >60.0 >60 mL/min/1.73 m^2 Final     Comment:     Calculation used to obtain the estimated glomerular filtration  rate (eGFR) is the CKD-EPI equation.        All lab results and imaging results have been reviewed and discussed with the patient.       Nm Pet Ct Routine Skull To Mid Thigh    Result Date: 9/17/2020  CLINICAL INFORMATION: Female patient, 64 years old, with a history of right-sided small cell lung cancer diagnosed 1 month ago  . The patient is currently on chemotherapy. Patient presents for staging. small cell lung cancer COMPARISON: CT the abdomen and pelvis from August 27, 2020, MRI the brain from August 20, 2020, CT of the chest August 15,  2020 TECHNIQUE: The blood glucose prior to injection was 121 mg/dl. Images were acquired from the vertex of the skull through the mid thighs. approximately 45-60 minutes post injection of 12.2 mCi F-18 FDG into the right wrist. Dilute oral contrast was given. Axial, coronal and sagittal PET reconstructions with and without attenuation correction were interpreted.  Corresponding CT images were acquired and reviewed alongside the PET images. The low dose unenhanced CT images were used for attenuation correction and anatomic correlation only. FINDINGS: There is diffusely increased FDG uptake throughout the visualized osseous structures suggestive of chemotherapy and in keeping with reactive marrow.  However, this pattern may mask underlying hypermetabolic osseous foci. There is complete opacification of the right middle lobe covering an area of approximately 8.1 x 6.6 cm with increased SUV max 8.8 in the right middle lobe, centered at the right hilum (axial image 125). In the central right middle lobe there is a 2.9 x 3.5 cm rounded hypoattenuating lesion with decreased FDG activity with SUV max 3.2 (image 126) suggestive of a combination of necrosis and/or partial treatment response. 4 mm pulmonary nodule in the central left upper lobe with SUV max 0.8 (image 97) (likely below the threshold for PET CT evaluation Moderate right pleural effusion and adjacent atelectasis. Moderate central intrahepatic and extrahepatic biliary ductal dilation, with soft tissue attenuation intraluminal filling defects suggestive of stones. The gallbladder is surgically absent. Tracer distribution is otherwise physiologic. Additional findings: -Left-sided subclavian medical infusion port with tip at the level of the SVC.. - Likely small superficial sebaceous versus epidermal inclusion cyst in the left side of the mandible. -Degenerative change seen in the spine with no aggressive appearing lytic or blastic lesion identified as above.  -Scattered atheromatous calcifications in the aorta with no aneurysm. -Coronary arterial calcifications (three-vessel disease). -Colonic diverticula without acute diverticulitis. -Prior hysterectomy. IMPRESSION: 1. FDG avid right middle lobe atelectasis, consolidation and masslike opacity in keeping with a combination of the patient's known malignancy, and postobstructive atelectasis/consolidation. 2. Central decreased FDG activity low-attenuation suggesting necrosis. 3. Small moderate right pleural effusion and adjacent atelectasis. 4. Pulmonary nodule measuring 4 mm in the left upper lobe, new from the previous exam, likely too small for evaluation by PET/CT. Consider short-term follow-up CT of the chest in 3 months to document resolution. 5. Moderate central intra and extrahepatic biliary ductal dilation, with possible small stones seen, consider correlation with liver function tests and possible MRCP as warranted. 6. Additional and incidental findings as noted above. . Electronically Signed by KINJAL Alvarez on 9/17/2020 2:22 PM    X-ray Chest 1 View    Result Date: 8/24/2020  EXAMINATION: XR CHEST 1 VIEW CLINICAL HISTORY: resp distress; TECHNIQUE: Single frontal view of the chest was performed. COMPARISON: 08/18/2020 FINDINGS: There remains confluent opacification the right lung base at least in part consistent with the patient's known large mass.  Slight clearing right perihilar which could be due to decrease of pleural effusion or peripheral infiltrate.  Left lung clear of confluent infiltrate and no left pleural effusion.     Continued masslike opacification right lung base.  Slight clearing right perihilar region. Electronically signed by: Rosana Fernandez MD Date:    08/24/2020 Time:    14:49    X-ray Chest 1 View    X-ray Chest 1 View    Result Date: 8/24/2020  EXAMINATION: XR CHEST 1 VIEW CLINICAL HISTORY: resp distress; TECHNIQUE: Single frontal view of the chest was performed. COMPARISON:  08/18/2020 FINDINGS: There remains confluent opacification the right lung base at least in part consistent with the patient's known large mass.  Slight clearing right perihilar which could be due to decrease of pleural effusion or peripheral infiltrate.  Left lung clear of confluent infiltrate and no left pleural effusion.     Continued masslike opacification right lung base.  Slight clearing right perihilar region. Electronically signed by: Rosana Fernandez MD Date:    08/24/2020 Time:    14:49    Ct Abdomen Pelvis With Contrast    Result Date: 8/27/2020  CMS MANDATED QUALITY DATA - CT RADIATION - 436 All CT scans at this facility utilize dose modulation, iterative reconstruction, and/or weight based dosing when appropriate to reduce radiation dose to as low as reasonably achievable. Reason: Metastatic disease evaluation small cell lung cancer TECHNIQUE: CT abdomen and pelvis with 100 mL Omnipaque 350. COMPARISON: CT thorax 8/15/2020 CT ABDOMEN: Confluent consolidative masslike opacification of the right middle lobe has prominent areas of low density suggesting necrosis. Centrally necrotic subcarinal lymph node measuring 16 mm is present. Confluent opacification of basilar right lower lobe is also evident, more suggestive of atelectasis. Small right pleural effusion is present and partially visualized. Linear opacities affect the left lower lobe suggesting atelectasis. Coronary artery calcifications are present. Gallbladder has been removed. Intrahepatic and extrahepatic bile duct dilation is mild, possibly related to postcholecystectomy state and main pancreatic duct is of normal caliber, and no abnormality of the pancreas identified. No focal abnormal enhancement occurs throughout hepatic parenchyma. Spleen, adrenals, and kidneys are normal. Retroaortic left renal vein incidentally noted. Diffuse aortoiliac vascular calcifications are mild. Few diverticula arise from the colon. No other intestinal abnormality.  Enteric contrast courses throughout small intestines without obstruction. No free intraperitoneal gas or fluid. Very mild subcutaneous edema occurs within the flanks bilaterally. Diffuse degenerative changes affect the thoracolumbar spine. Grade 1 anterolisthesis of L4 and L5 are due to bilateral L4-L5 facet joint osteoarthrosis. No suspicious osseous abnormality. CT PELVIS: Bladder is normal. No adnexal mass or free pelvic fluid. No enlarged lymph nodes. IMPRESSION: 1. Confluent heterogeneous masslike consolidation of right middle lobe, most likely correlating with reportedly known malignancy. Correlation with suspected recent biopsy results requested. Conceivably, necrotic pneumonia could give a similar appearance. 2. Centrally necrotic subcarinal lymph node which is enlarged is characteristic of a lymph node metastasis. 3. Small right pleural effusion. 4. Coronary artery calcifications. 5. Mild diffuse intrahepatic and extrahepatic bile duct dilation could be related to postcholecystectomy state. No obstructing etiology identified. Correlation with bilirubin level is requested, if bilirubin is abnormal, further evaluation with MRCP can be considered. 6. Diverticulosis. 7. No evidence of metastatic disease throughout the abdomen or pelvis. Electronically Signed by Wale Lopez M.D. on 8/27/2020 9:39 AM    X-ray Chest Ap Portable    Result Date: 8/25/2020  EXAMINATION: XR CHEST AP PORTABLE CLINICAL HISTORY: picc; FINDINGS: Portable chest at 15:51 hours is compared to a prior study dated 08/24/2020 shows a left PICC line with the tip at the atrial caval junction.  There is no pneumothorax. There is persistent right pleural effusion with right middle and lower lobe airspace disease the right upper lobe and left lung are clear.  The heart is normal in size..     Left PICC line with the tip overlying the atrial caval junction.  There is no pneumothorax. Persistent right pleural effusion with right middle and lower lobe  airspace disease Electronically signed by: Christy Olvera MD Date:    08/25/2020 Time:    16:03    Nm Pet Ct Routine Skull To Mid Thigh    Result Date: 9/17/2020  CLINICAL INFORMATION: Female patient, 64 years old, with a history of right-sided small cell lung cancer diagnosed 1 month ago  . The patient is currently on chemotherapy. Patient presents for staging. small cell lung cancer COMPARISON: CT the abdomen and pelvis from August 27, 2020, MRI the brain from August 20, 2020, CT of the chest August 15, 2020 TECHNIQUE: The blood glucose prior to injection was 121 mg/dl. Images were acquired from the vertex of the skull through the mid thighs. approximately 45-60 minutes post injection of 12.2 mCi F-18 FDG into the right wrist. Dilute oral contrast was given. Axial, coronal and sagittal PET reconstructions with and without attenuation correction were interpreted.  Corresponding CT images were acquired and reviewed alongside the PET images. The low dose unenhanced CT images were used for attenuation correction and anatomic correlation only. FINDINGS: There is diffusely increased FDG uptake throughout the visualized osseous structures suggestive of chemotherapy and in keeping with reactive marrow.  However, this pattern may mask underlying hypermetabolic osseous foci. There is complete opacification of the right middle lobe covering an area of approximately 8.1 x 6.6 cm with increased SUV max 8.8 in the right middle lobe, centered at the right hilum (axial image 125). In the central right middle lobe there is a 2.9 x 3.5 cm rounded hypoattenuating lesion with decreased FDG activity with SUV max 3.2 (image 126) suggestive of a combination of necrosis and/or partial treatment response. 4 mm pulmonary nodule in the central left upper lobe with SUV max 0.8 (image 97) (likely below the threshold for PET CT evaluation Moderate right pleural effusion and adjacent atelectasis. Moderate central intrahepatic and extrahepatic  biliary ductal dilation, with soft tissue attenuation intraluminal filling defects suggestive of stones. The gallbladder is surgically absent. Tracer distribution is otherwise physiologic. Additional findings: -Left-sided subclavian medical infusion port with tip at the level of the SVC.. - Likely small superficial sebaceous versus epidermal inclusion cyst in the left side of the mandible. -Degenerative change seen in the spine with no aggressive appearing lytic or blastic lesion identified as above. -Scattered atheromatous calcifications in the aorta with no aneurysm. -Coronary arterial calcifications (three-vessel disease). -Colonic diverticula without acute diverticulitis. -Prior hysterectomy. IMPRESSION: 1. FDG avid right middle lobe atelectasis, consolidation and masslike opacity in keeping with a combination of the patient's known malignancy, and postobstructive atelectasis/consolidation. 2. Central decreased FDG activity low-attenuation suggesting necrosis. 3. Small moderate right pleural effusion and adjacent atelectasis. 4. Pulmonary nodule measuring 4 mm in the left upper lobe, new from the previous exam, likely too small for evaluation by PET/CT. Consider short-term follow-up CT of the chest in 3 months to document resolution. 5. Moderate central intra and extrahepatic biliary ductal dilation, with possible small stones seen, consider correlation with liver function tests and possible MRCP as warranted. 6. Additional and incidental findings as noted above. . Electronically Signed by KINJAL Alvarez on 9/17/2020 2:22 PM        Assessment/Plan:   Diagnosis and Management:  Problem List Items Addressed This Visit        Renal/    Hypokalemia       Oncology    Anemia following use of chemotherapeutic drug    Small cell lung cancer, right - Primary    Relevant Orders    MRI Brain With Contrast    CBC auto differential    CMP    Magnesium    Zinc      Other Visit Diagnoses     Vomiting, intractability  of vomiting not specified, presence of nausea not specified, unspecified vomiting type        Relevant Medications    promethazine (PHENERGAN) 25 MG suppository    sucralfate (CARAFATE) 100 mg/mL suspension    Other Relevant Orders    MRI Brain With Contrast    CBC auto differential    CMP    Magnesium    Zinc    Gastritis, presence of bleeding unspecified, unspecified chronicity, unspecified gastritis type        Relevant Medications    sucralfate (CARAFATE) 100 mg/mL suspension        [phone] asking staff to get her a portable oxygen tank from home health  Primary site of disease is right middle lobe of the lung with invasion of mediastinum, new left lung nodule noted on pet ct   Limited stage disease: not a surgical candidate   Add probiotics to help with colitis ; may need flagyl  Post cycle 2 She is to delay chemo for another week  MRI brain with contrast   Start carafate  Start phenergan suppositories  Clear liquids for 48 hours then soft foods  Hydration today with potassium replacement and antiemetics  Cycle 3 delayed     Home health will be ordered for PICC line care referral sent to surgery for possible chest port placement  Watch anemia closely start retacrit   NO need for iron studies as SHE JUST GOT BLOOD   RTC  1 weeks with labs     PLeural effusion is moderate : monitoring      CONT RADIATION FOR 3 MORE WEEKS :)  Pt is asymptomatic check thyroid tests next OV    Recommend healthy living: no nicotine,  should avoid alcohol, healthy diet and regular exercise aiming for fitness, and weight control  1. Discussed healthy alcohol daily limit of 0.5 oz of pure alcohol in any 24 hours (roughly one 12-oz beers, 4 oz of wine (8%-12% alcohol), or 1.25 oz (half a shot) of liquor (80 proof)), can not save up.  2.   Discussed good exercise program, 4 key elements: 1. Aerobic (walking, swimming, dancing, jogging, biking, etc, 2. Muscle strengthening / resistance exercise, need to do 2-3 times  weekly, 3.  Stretching daily, good stretch takes a whole  total minute. 4. Balance exercise daily.  3.   Discussed healthy diet for over 15 minutes with handouts given to the patient   Discussed COVID-19 and social distancing in great detail, avoid all non-essential visits out of the home if possible and avoid sick contacts.     Sincerely,  Berna Acuña MD    Electronically signed by: Berna Acuña MD

## 2020-10-13 NOTE — Clinical Note
Send for fluids please  delay chemo one week, check labs next week, stat MRI brain and call with recs and reusuts

## 2020-10-19 NOTE — PROGRESS NOTES
Subjective:       Patient ID: Kendra Lawrence is a 65 y.o. female.    Chief Complaint: No chief complaint on file.  Video visit:    The patient location is: [place]  The chief complaint leading to consultation is: narcotic dependence   Visit type: Virtual visit with synchronous audio and video  Total time spent with patient: 10 mins  Each patient to whom he or she provides medical services by telemedicine is:  (1) informed of the relationship between the physician and patient and the respective role of any other health care provider with respect to management of the patient; and (2) notified that he or she may decline to receive medical services by telemedicine and may withdraw from such care at any time.    Notes:       HPI       Pt diagnosed with lung cancer, pneumonia.      The patient presents for medical management of opioid dependency. she is receiving maintenance therapy with buprenorphine.      CHIEF COMPLAINT: opoid dependence  HPI:  She is undergoing chemotherapy    ONSET/TIMING:     DURATION: . Continuous(+).    QUALITY/COURSE:  unchanged.     INTENSITY/SEVERITY:  controlled.    CONTEXT/WHEN:     MODIFIERS/TREATMENTS:  Taking medications(+) Suboxone  8/2 # 60,   last rx given 10.2.20 early due to Dr. Vacation.     SYMPTOMS/RELATED: no withdrawal symptoms. no cravings . No substance abuse.  No alcohol use     pnp checked: last month:  Yes        Review of Systems   Constitutional: Positive for fatigue. Negative for diaphoresis and unexpected weight change.   Gastrointestinal: Positive for nausea. Negative for constipation, diarrhea and vomiting.   Neurological: Negative for dizziness, light-headedness and headaches.   Psychiatric/Behavioral: Negative for dysphoric mood and sleep disturbance. The patient is nervous/anxious.          Objective:      There were no vitals filed for this visit.  No drug screen done because we do not have test strips           Assessment:       1. Narcotic dependence          Plan:        (+) pt taking medication as prescribed.    (+) dose is approproate.    (-) urine drug screen done.   (+) discussed risks of buprenorphine, including to others.      (+) assessed if benefits outweigh risks of buprenorphine. .     (+) reviewed safe storage of medication.     (+) discussed proper use of buprenorphine, including missed doses.      Narcotic dependence  -     buprenorphine-naloxone (SUBOXONE) 8-2 mg Film; Place 1 packet (1 each total) under the tongue 2 (two) times daily.  Dispense: 60 packet; Refill: 0      Follow up in about 1 month (around 11/19/2020).      Physical Exam

## 2020-10-19 NOTE — PROGRESS NOTES
[facility]     Subjective:      Patient: Kendra Lawrence Patient PCP: Louie Urena MD         :  1955     Sex:  female      MRN:  [id number]          Date of Visit: 10/20/2020      Chief Complaint: Lung CA    Patient ID: Kendra Lawrence is a 65 y.o. female with small cell lung CA of right lung, COPD, opioid dependence, HTN, CAD, and GERD who is present today in clinic in a wheelchair on supplemental O2 nasal cannula for clearance of Cycle 3 CARBOPLATIN (AUC) + ETOPOSIDE Q3W.  Pt originally presented to the [facility] ED 2020 with complaints of SOB x10 days. The patient reported SOB that had been constant and became progressively worse over the last 2 days. She reported that exerting herself worsened SOB and there was nothing she could do to help her SOB.  She was noted to have worsening of respiratory status.  CT of the chest ordered and demonstrated findings in the right middle lobe concerning for neoplasm, invading the mediastinum. CT head WO contrast showed asymmetric prominence of the frontal horn/body of the right lateral ventricle with slight leftward bowing of the septum pellucidum. MRI head negative for metastatic disease. Postobstructive changes in the right upper and lower lobes. Enlarged subcarinal lymph node, concerning for a metastatic node. Moderate, loculated-appearing right pleural effusion.  Bronchoscopy performed on 2020 by Dr. Andrew Brothers and pathology coming back as small cell lung cancer. Pt was started on CARBOPLATIN (AUC) + ETOPOSIDE at [facility] for inpatient chemotherapy 2020. Pt states today in office that she has had chronic nausea. Pt states her insurance was not covering her phenergan suppository and she has not picked up her zofran from her pharmacy. Pt had hypokalemia with potassium of 2.8 yesterday. She states she has eaten a banana and is taking OTC potassium ([place] Potassium 99mg 2 pills PO in the morning). Pt states that she has  fatigue and nausea and has had episodes of vomiting after chemotherapy infusion, but has not had an episode of vomiting since Saturday. Pt states SOB is baseline. Pt denies fever/chills, diarrhea constipation, night sweats, chest pain/tightness, palpitations, leg cramps.    Review of Systems   Constitutional: Positive for activity change, appetite change and fatigue. Negative for chills and fever.   HENT: Negative for mouth sores and trouble swallowing.    Eyes: Negative for photophobia and visual disturbance.   Respiratory: Positive for shortness of breath. Negative for cough and chest tightness.    Cardiovascular: Negative for chest pain and leg swelling.   Gastrointestinal: Positive for nausea and vomiting. Negative for abdominal pain, constipation and diarrhea.   Musculoskeletal: Negative for arthralgias, back pain and myalgias.   Skin: Negative for color change, pallor, rash and wound.   Neurological: Positive for weakness. Negative for syncope and speech difficulty.   Hematological: Negative for adenopathy. Does not bruise/bleed easily.   Psychiatric/Behavioral: Negative for agitation, behavioral problems, confusion, decreased concentration and dysphoric mood.      Oncology History   Small cell lung cancer, right   8/17/2020 Initial Diagnosis    Small cell lung cancer, right     8/25/2020 -  Chemotherapy    Treatment Summary   Plan Name: OP CARBOPLATIN (AUC) + ETOPOSIDE Q3W  Treatment Goal: Control  Status: Active  Start Date: 8/25/2020 (Planned)  End Date: 12/10/2020 (Planned)  Provider: Berna Acuña MD  Chemotherapy: CARBOplatin (PARAPLATIN) 480 mg in sodium chloride 0.9% 250 mL chemo infusion, 480 mg (100 % of original dose 480.5 mg), Intravenous, Clinic/HOD 1 time, 0 of 6 cycles  Dose modification:   (original dose 480.5 mg, Cycle 1, Reason: Dose not tolerated)  etoposide (VEPESID) 100 mg/m2 = 192 mg in sodium chloride 0.9% 500 mL chemo infusion, 100 mg/m2 = 192 mg, Intravenous, Clinic/HOD 1 time, 0 of  6 cycles         Lifetime Dose Tracking   No doses have been documented on this patient for the following tracked chemicals: doxorubicin, epirubicin, idarubicin, daunorubicin, mitoxantrone, bleomycin, mitomycin, busulfan     Past Medical History:   Diagnosis Date    Asthma     Cardiac angina     Chronic abdominal pain     Claustrophobia     COPD (chronic obstructive pulmonary disease)     Coronary artery disease     GERD (gastroesophageal reflux disease)     History of drug dependence     Hypertension     Hypocalcemia 2020     Family History   Problem Relation Age of Onset    Breast cancer Sister 55    Breast cancer Sister 50    Cancer Mother     Heart disease Mother     Hypertension Mother     Cancer Father      Past Surgical History:   Procedure Laterality Date    BREAST BIOPSY Bilateral 20 yrs ago    benign    BRONCHOSCOPY N/A 2020    Procedure: Bronchoscopy- 730 or noon, floro not needed;  Surgeon: Andrew Brothers MD;  Location: [facility] ENDO;  Service: Endoscopy;  Laterality: N/A;     SECTION      CHOLECYSTECTOMY      ENDOSCOPIC ULTRASOUND OF UPPER GASTROINTESTINAL TRACT Left 2018    Procedure: ULTRASOUND, UPPER GI TRACT, ENDOSCOPIC;  Surgeon: Paras Negrete MD;  Location: [facility];  Service: Endoscopy;  Laterality: Left;    ESOPHAGOGASTRODUODENOSCOPY N/A 2018    Procedure: EGD (ESOPHAGOGASTRODUODENOSCOPY);  Surgeon: Paras Negrete MD;  Location: [facility];  Service: Endoscopy;  Laterality: N/A;    ESOPHAGOGASTRODUODENOSCOPY N/A 2018    Procedure: EGD (ESOPHAGOGASTRODUODENOSCOPY);  Surgeon: Paras Negrete MD;  Location: [facility] ENDO;  Service: Endoscopy;  Laterality: N/A;    HYSTERECTOMY      MAGNETIC RESONANCE IMAGING N/A 2019    Procedure: MRI (Magnetic Resonance Imagine) needs anesthesia;  Surgeon: Raffy Charles MD;  Location: [facility];  Service: Anesthesiology;  Laterality: N/A;    OOPHORECTOMY      TEMPOROMANDIBULAR  JOINT SURGERY      TONSILLECTOMY       Social History     Socioeconomic History    Marital status:      Spouse name: Not on file    Number of children: Not on file    Years of education: Not on file    Highest education level: Not on file   Occupational History    Not on file   Social Needs    Financial resource strain: Not on file    Food insecurity     Worry: Not on file     Inability: Not on file    Transportation needs     Medical: Not on file     Non-medical: Not on file   Tobacco Use    Smoking status: Former Smoker     Years: 52.00     Types: Vaping w/o nicotine    Smokeless tobacco: Never Used   Substance and Sexual Activity    Alcohol use: No     Frequency: Never    Drug use: Yes     Types: Hydrocodone    Sexual activity: Not on file   Lifestyle    Physical activity     Days per week: Not on file     Minutes per session: Not on file    Stress: Not on file   Relationships    Social connections     Talks on phone: Not on file     Gets together: Not on file     Attends Hindu service: Not on file     Active member of club or organization: Not on file     Attends meetings of clubs or organizations: Not on file     Relationship status: Not on file   Other Topics Concern    Not on file   Social History Narrative    Not on file       Current Outpatient Medications:     albuterol-ipratropium (DUO-NEB) 2.5 mg-0.5 mg/3 mL nebulizer solution, Take 3 mLs by nebulization every 4 (four) hours. Rescue, Disp: 1 Box, Rfl: 0    alendronate (FOSAMAX) 70 MG tablet, TAKE ONE TABLET BY MOUTH ONCE EVERY 7 DAYS (Patient taking differently: Take 70 mg by mouth every 7 days. Sunday), Disp: 12 tablet, Rfl: 3    [START ON 11/1/2020] buprenorphine-naloxone (SUBOXONE) 8-2 mg Film, Place 1 packet (1 each total) under the tongue 2 (two) times daily., Disp: 60 packet, Rfl: 0    busPIRone (BUSPAR) 10 MG tablet, Take 1 tablet (10 mg total) by mouth 2 (two) times daily., Disp: 60 tablet, Rfl: 11    DULoxetine  (CYMBALTA) 60 MG capsule, TAKE ONE CAPSULE BY MOUTH ONCE A DAY (Patient taking differently: Take 60 mg by mouth once daily. ), Disp: 30 capsule, Rfl: 10    fluticasone furoate-vilanteroL (BREO) 200-25 mcg/dose DsDv diskus inhaler, Inhale 1 puff into the lungs once daily. Controller, Disp: 1 each, Rfl: 0    hydroCHLOROthiazide (HYDRODIURIL) 25 MG tablet, TAKE ONE TABLET BY MOUTH EVERY DAY, Disp: 90 tablet, Rfl: 1    nitroGLYCERIN (NITROSTAT) 0.4 MG SL tablet, Place 0.4 mg under the tongue every 5 (five) minutes as needed for Chest pain., Disp: , Rfl:     ondansetron (ZOFRAN) 4 MG tablet, , Disp: , Rfl:     ondansetron (ZOFRAN-ODT) 8 MG TbDL, DISSOLVE 1 TABLET BY MOUTH EVERY 6 HOURS AS NEEDED, Disp: 60 tablet, Rfl: 0    promethazine (PHENERGAN) 25 MG suppository, Place 1 suppository (25 mg total) rectally every 6 (six) hours as needed for Nausea., Disp: 12 suppository, Rfl: 1    sucralfate (CARAFATE) 100 mg/mL suspension, Take 10 mLs (1 g total) by mouth 4 (four) times daily., Disp: 420 mL, Rfl: 1    TRELEGY ELLIPTA 100-62.5-25 mcg DsDv, INHALE 1 PUFF INTO THE LUNGS NIGHTLY, Disp: 60 each, Rfl: 11    amitriptyline (ELAVIL) 10 MG tablet, TAKE ONE TABLET BY MOUTH AT BEDTIME AS NEEDED FOR INSOMNIA (Patient not taking: No sig reported), Disp: 30 tablet, Rfl: 1    Current Facility-Administered Medications:     0.9%  NaCl infusion (for blood administration), , Intravenous, Once, Berna Acuña MD    heparin, porcine (PF) 100 unit/mL injection flush 300 Units, 300 Units, Intravenous, PRN, Berna Acuña MD, 300 Units at 10/02/20 3507    Review of patient's allergies indicates:  No Known Allergies  All medications, allergies, and past history have been reviewed.  Objective:      Vitals:  Vitals - 1 value per visit 10/13/2020 10/13/2020 10/20/2020   SYSTOLIC 138 133 131   DIASTOLIC 83 79 78   PULSE 98 109 104   TEMPERATURE 97.9 96.8 97.2   RESPIRATIONS 18 18 18   SPO2 - 99 98   Weight (lb) - 143.52 144.18   Weight  (kg) - 65.1 65.4   HEIGHT - - -   BODY MASS INDEX - 24.64 24.75   VISIT REPORT - - -   Pain Score  0 0 0   Some recent data might be hidden       Body surface area is 1.72 meters squared.  Weight Readings:  Wt Readings from Last 5 Encounters:   10/20/20 65.4 kg (144 lb 2.9 oz)   10/13/20 65.1 kg (143 lb 8.3 oz)   10/01/20 66.6 kg (146 lb 13.2 oz)   09/24/20 66 kg (145 lb 8.1 oz)   09/23/20 65 kg (143 lb 4.8 oz)      Blood Type:  A POS     Physical Exam  Constitutional:       General: She is not in acute distress.     Appearance: Normal appearance. She is not ill-appearing.   HENT:      Head: Normocephalic and atraumatic.      Right Ear: External ear normal.      Left Ear: External ear normal.      Nose: Nose normal. No congestion or rhinorrhea.   Eyes:      General:         Right eye: No discharge.         Left eye: No discharge.      Extraocular Movements: Extraocular movements intact.      Conjunctiva/sclera: Conjunctivae normal.      Pupils: Pupils are equal, round, and reactive to light.   Neck:      Musculoskeletal: Normal range of motion and neck supple. No neck rigidity.   Cardiovascular:      Rate and Rhythm: Normal rate and regular rhythm.      Heart sounds: Normal heart sounds. No murmur.   Pulmonary:      Breath sounds: No stridor. No wheezing.      Comments: On O2 nasal cannula. Decreased breath sounds throughout lung fields bilaterally.  Abdominal:      General: Bowel sounds are normal. There is no distension.      Palpations: Abdomen is soft.      Tenderness: There is no abdominal tenderness. There is no guarding.   Musculoskeletal: Normal range of motion.         General: No deformity.      Right lower leg: No edema.      Left lower leg: No edema.   Lymphadenopathy:      Cervical: No cervical adenopathy.   Skin:     General: Skin is warm and dry.      Coloration: Skin is not pale.      Findings: No erythema or rash.   Neurological:      General: No focal deficit present.      Mental Status: She is  alert and oriented to person, place, and time. Mental status is at baseline.      Cranial Nerves: No cranial nerve deficit.   Psychiatric:         Mood and Affect: Mood normal.         Behavior: Behavior normal.         Thought Content: Thought content normal.         Judgment: Judgment normal.       Labs:    CBC  WBC   Date Value Ref Range Status   10/19/2020 8.66 3.90 - 12.70 K/uL Final   10/12/2020 10.67 3.90 - 12.70 K/uL Final   09/29/2020 3.44 (L) 3.90 - 12.70 K/uL Final     RBC   Date Value Ref Range Status   10/19/2020 3.59 (L) 4.00 - 5.40 M/uL Final   10/12/2020 3.38 (L) 4.00 - 5.40 M/uL Final   09/29/2020 2.70 (L) 4.00 - 5.40 M/uL Final     Hemoglobin   Date Value Ref Range Status   10/19/2020 9.9 (L) 12.0 - 16.0 g/dL Final   10/12/2020 9.2 (L) 12.0 - 16.0 g/dL Final   09/29/2020 7.1 (L) 12.0 - 16.0 g/dL Final     Hematocrit   Date Value Ref Range Status   10/19/2020 31.6 (L) 37.0 - 48.5 % Final   10/12/2020 28.5 (L) 37.0 - 48.5 % Final   09/29/2020 24.7 (L) 37.0 - 48.5 % Final     Mean Corpuscular Volume   Date Value Ref Range Status   10/19/2020 88 82 - 98 fL Final   10/12/2020 84 82 - 98 fL Final   09/29/2020 92 82 - 98 fL Final     Platelets   Date Value Ref Range Status   10/19/2020 427 (H) 150 - 350 K/uL Final   10/12/2020 470 (H) 150 - 350 K/uL Final   09/29/2020 307 150 - 350 K/uL Final     Mean Corpuscular Hemoglobin   Date Value Ref Range Status   10/19/2020 27.6 27.0 - 31.0 pg Final   10/12/2020 27.2 27.0 - 31.0 pg Final   09/29/2020 26.3 (L) 27.0 - 31.0 pg Final     Mean Corpuscular Hemoglobin Conc   Date Value Ref Range Status   10/19/2020 31.3 (L) 32.0 - 36.0 g/dL Final   10/12/2020 32.3 32.0 - 36.0 g/dL Final   09/29/2020 28.7 (L) 32.0 - 36.0 g/dL Final     RDW   Date Value Ref Range Status   10/19/2020 15.3 (H) 11.5 - 14.5 % Final   10/12/2020 14.0 11.5 - 14.5 % Final   09/29/2020 15.2 (H) 11.5 - 14.5 % Final       CMP  Sodium   Date Value Ref Range Status   10/19/2020 138 136 - 145 mmol/L  Final   10/12/2020 140 136 - 145 mmol/L Final   09/29/2020 141 136 - 145 mmol/L Final     Potassium   Date Value Ref Range Status   10/19/2020 2.8 (LL) 3.5 - 5.1 mmol/L Final     Comment:     Potassium critical result(s) repeated. Called and verbal readback   obtained from Constanza Ellington RN/Dr. Acuña's office by JB8 10/19/2020   13:19     10/12/2020 2.7 (LL) 3.5 - 5.1 mmol/L Final     Comment:     K critical result(s) repeated. Called and verbal readback obtained   from Jennifer Mello MA/Dr santos Acuña's office by [facility] 10/12/2020 15:23     09/29/2020 4.2 3.5 - 5.1 mmol/L Final     Chloride   Date Value Ref Range Status   10/19/2020 96 95 - 110 mmol/L Final   10/12/2020 99 95 - 110 mmol/L Final   09/29/2020 100 95 - 110 mmol/L Final     CO2   Date Value Ref Range Status   10/19/2020 32 (H) 23 - 29 mmol/L Final   10/12/2020 29 23 - 29 mmol/L Final   09/29/2020 26 23 - 29 mmol/L Final     BUN, Bld   Date Value Ref Range Status   10/19/2020 12 8 - 23 mg/dL Final   10/12/2020 10 8 - 23 mg/dL Final   09/29/2020 14 8 - 23 mg/dL Final     Creatinine   Date Value Ref Range Status   10/19/2020 0.8 0.5 - 1.4 mg/dL Final   10/12/2020 0.8 0.5 - 1.4 mg/dL Final   09/29/2020 0.8 0.5 - 1.4 mg/dL Final     Glucose   Date Value Ref Range Status   10/19/2020 103 70 - 110 mg/dL Final   10/12/2020 125 (H) 70 - 110 mg/dL Final   09/29/2020 101 70 - 110 mg/dL Final     Calcium   Date Value Ref Range Status   10/19/2020 8.9 8.7 - 10.5 mg/dL Final   10/12/2020 9.0 8.7 - 10.5 mg/dL Final   09/29/2020 8.5 (L) 8.7 - 10.5 mg/dL Final     Magnesium   Date Value Ref Range Status   10/19/2020 1.5 (L) 1.6 - 2.6 mg/dL Final   08/28/2020 1.7 1.6 - 2.6 mg/dL Final   08/26/2020 1.8 1.6 - 2.6 mg/dL Final     Phosphorus   Date Value Ref Range Status   08/18/2020 2.6 (L) 2.7 - 4.5 mg/dL Final   08/17/2020 3.0 2.7 - 4.5 mg/dL Final   08/16/2020 2.4 (L) 2.7 - 4.5 mg/dL Final     Alkaline Phosphatase   Date Value Ref Range Status   10/19/2020 89 55 - 135 U/L Final    10/12/2020 74 55 - 135 U/L Final   09/29/2020 57 55 - 135 U/L Final     Total Protein   Date Value Ref Range Status   10/19/2020 7.1 6.0 - 8.4 g/dL Final   10/12/2020 6.6 6.0 - 8.4 g/dL Final   09/29/2020 6.3 6.0 - 8.4 g/dL Final     Albumin   Date Value Ref Range Status   10/19/2020 2.7 (L) 3.5 - 5.2 g/dL Final   10/12/2020 2.7 (L) 3.5 - 5.2 g/dL Final   09/29/2020 2.8 (L) 3.5 - 5.2 g/dL Final     Total Bilirubin   Date Value Ref Range Status   10/19/2020 0.5 0.1 - 1.0 mg/dL Final     Comment:     For infants and newborns, interpretation of results should be based  on gestational age, weight and in agreement with clinical  observations.  Premature Infant recommended reference ranges:  Up to 24 hours.............<8.0 mg/dL  Up to 48 hours............<12.0 mg/dL  3-5 days..................<15.0 mg/dL  6-29 days.................<15.0 mg/dL     10/12/2020 0.6 0.1 - 1.0 mg/dL Final     Comment:     For infants and newborns, interpretation of results should be based  on gestational age, weight and in agreement with clinical  observations.  Premature Infant recommended reference ranges:  Up to 24 hours.............<8.0 mg/dL  Up to 48 hours............<12.0 mg/dL  3-5 days..................<15.0 mg/dL  6-29 days.................<15.0 mg/dL     09/29/2020 0.5 0.1 - 1.0 mg/dL Final     Comment:     For infants and newborns, interpretation of results should be based  on gestational age, weight and in agreement with clinical  observations.  Premature Infant recommended reference ranges:  Up to 24 hours.............<8.0 mg/dL  Up to 48 hours............<12.0 mg/dL  3-5 days..................<15.0 mg/dL  6-29 days.................<15.0 mg/dL       AST   Date Value Ref Range Status   10/19/2020 14 10 - 40 U/L Final   10/12/2020 16 10 - 40 U/L Final   09/29/2020 19 10 - 40 U/L Final     ALT   Date Value Ref Range Status   10/19/2020 10 10 - 44 U/L Final   10/12/2020 13 10 - 44 U/L Final   09/29/2020 17 10 - 44 U/L Final        Anemia Labs  Iron   Date Value Ref Range Status   09/29/2020 92 30 - 160 ug/dL Final   08/15/2020 <10 (L) 30 - 160 ug/dL Final     Transferrin   Date Value Ref Range Status   09/29/2020 135 (L) 200 - 375 mg/dL Final   08/15/2020 155 (L) 200 - 375 mg/dL Final     TIBC   Date Value Ref Range Status   09/29/2020 189 (L) 250 - 450 ug/dL Final   08/15/2020 229 (L) 250 - 450 ug/dL Final     Ferritin   Date Value Ref Range Status   08/19/2020 203 20.0 - 300.0 ng/mL Final     Folate   Date Value Ref Range Status   08/20/2020 8.9 4.0 - 24.0 ng/mL Final     Vitamin B-12   Date Value Ref Range Status   08/20/2020 1890 (H) 210 - 950 pg/mL Final     TSH   Date Value Ref Range Status   10/01/2020 1.580 0.340 - 5.600 uIU/mL Final   08/14/2020 0.515 0.400 - 4.000 uIU/mL Final       Markers  CEA   Date Value Ref Range Status   09/21/2020 2.4 0.0 - 5.0 ng/mL Final     Comment:     CEA Normal Range:  Non-Smokers: 0-3.0 ng/mL  Smokers:     0-5.0 ng/mL       LD   Date Value Ref Range Status   09/29/2020 177 110 - 260 U/L Final     Comment:     Results are increased in hemolyzed samples.     Pathology:  Pathology Results  (Last 10 years)               08/18/20 0826  Specimen to Pathology, Surgery Pulmonary and Thoracic (Abnormal) Final result    Narrative:  Pre-op Diagnosis: Lung mass [R91.8]   Procedure(s):   Bronchoscopy- 730 or noon, floro not needed   Number of specimens: 1   Name of specimens: endobronchial biopsy.   Specimen total (fresh, frozen, permanent):->1             Imaging:   Results for orders placed or performed during the hospital encounter of 08/13/20 (from the past 2160 hour(s))   CT Chest Without Contrast    Impression    Findings in the right middle lobe concerning for neoplasm, invading the mediastinum.  Masslike pneumonia is included in the differential but less likely.  Probable postobstructive changes in the right upper and lower lobes.    Enlarged subcarinal lymph node, concerning for a metastatic  node.    Moderate, loculated-appearing right pleural effusion.    Trace left pleural effusion, dependent atelectasis left lung.    This report was flagged in Epic as abnormal.      Electronically signed by: Camila Lobrano  Date:    08/15/2020  Time:    15:01   CT Head Without Contrast    Impression    1. No evidence of an acute intracranial abnormality.  Clinical correlation and consider further evaluation with MRI of the brain.  2. Asymmetric prominence of the frontal horn/body of the right lateral ventricle with slight bowing of the septum pellucidum, which may reflect a developmental variant or possible intraventricular simple cyst.      Electronically signed by: Daljit Ponce  Date:    08/17/2020  Time:    16:57     Results for orders placed or performed during the hospital encounter of 08/13/20 (from the past 2160 hour(s))   MRI Brain Without Contrast    Impression    1. The study is motion degraded.  Also, the study was performed without contrast due to low GFR.  There is, however, no acute abnormality or change compared to recent head CT.  There is no hemorrhage, mass effect or acute infarction.  There are no definite regions of abnormal signal intensity in the brain to suggest possible edema.      Electronically signed by: Arvin Molina MD  Date:    08/20/2020  Time:    11:20     All lab results and imaging results have been reviewed.    Assessment and Plan:        ICD-10-CM ICD-9-CM   1. Small cell lung cancer, right   -Pt cleared Cycle 3 q3w Carboplatin etoposide. Do potassium lab stat prior to infusion as pt may need a potassium rider to boost potassium levels.   -Pt is to follow up with Dr. Acuña in 3 weeks with a CBC, CMP, and magnesium for Cycle 4 clearance CARBOPLATIN (AUC) + ETOPOSIDE Q3W    C34.91 162.9   2. Nausea   -Informed pt she has zofran refill waiting at her pharmacy and I have sent in oral phenergan 25mg PO q6h and informed her she can alternate between phenergan and zofran for N/V  and have told her low potassium is likely a result from vomiting. She knows she can call the office if she has any questions or concerns.   R11.0 787.02   3. Hypokalemia   -Orders placed for stat potassium to be drawn this AM and I will supplement her with IV potassium if needed.   E87.6 276.8   4. Anemia following use of chemotherapeutic drug   -Continue to monitor with repeat CBCs. Anemia improving.   D64.81 285.3     Sincerely,  Destin Valente PA-C    Note is available for collaborating MD; Dr. Berna Acuña for review.  Electronically signed by: Destin Valente PA-C    Patient Contact Information: [phone]  Patient Address: [address] [place] [address]  Extended Emergency Contact Information  Primary Emergency Contact: Lorelei Mattson   [place]  Mobile Phone: [phone]  Relation: Daughter  Secondary Emergency Contact: Larissa Agustin  Mobile Phone: [phone]  Relation: Daughter  Pt Insurance: Payor: MEDICARE / Plan: MEDICARE [employer] / Product Type: Government /

## 2020-10-19 NOTE — TELEPHONE ENCOUNTER
Per Dr. Acuña patient is to take 40meq of Potassium PO x 1 today. Patient stated she is out of potassium but she will get some over the counter. Patient also reports she is not having diarrhea. Encouraged patient to add bananas to diet. Confirmed appointment with DENNY Valente scheduled for tomorrow.

## 2020-10-20 NOTE — Clinical Note
Pt is to follow up with Dr. Acuña in 3 weeks with a CBC, CMP, and magnesium for Cycle 4 clearance CARBOPLATIN (AUC) + ETOPOSIDE Q3W

## 2020-10-22 NOTE — PLAN OF CARE
Problem: Fatigue  Goal: Improved Activity Tolerance  Outcome: Ongoing, Progressing  Intervention: Promote Energy Conservation  Flowsheets (Taken 10/22/2020 8791)  Fatigue Management:   frequent rest breaks encouraged   fatigue-related activity identified   paced activity encouraged  Sleep/Rest Enhancement:   relaxation techniques promoted   regular sleep/rest pattern promoted   reading promoted

## 2020-10-30 PROBLEM — R50.9 FEVER: Status: ACTIVE | Noted: 2020-01-01

## 2020-10-30 PROBLEM — E83.42 HYPOMAGNESEMIA: Status: ACTIVE | Noted: 2020-01-01

## 2020-10-30 NOTE — CONSULTS
Pulmonary/Critical Care Consult      PATIENT NAME: Kendra Lawrence  MRN: [id number]  TODAY'S DATE: 10/30/2020  6:19 PM  ADMIT DATE: 10/30/2020  AGE: 65 y.o. : 1955    CONSULT REQUESTED BY: Charisse Osullivan MD    REASON FOR CONSULT:   Neutropenic pneumonia and small cell carcinoma    HPI:  Patient is a 63-year-old female who completed her 3rd round of chemotherapy and presents to the hospital with nausea and vomiting and fever.  She is found to have significant pancytopenia and a new left lower lobe pneumonia.  Her right middle lobe is completely filled with tumor and postobstructive pneumonia which is PET hot.    REVIEW OF SYSTEMS  GENERAL: Feeling ill. She has been running fever.  EYES: Vision is good.  ENT: No sinusitis or pharyngitis.   HEART: No chest pain or palpitations.  LUNGS: She is coughing, she is producing pink sputum.  GI: She has had intractable nausea and vomiting.  : No dysuria, hesitancy, or nocturia.  SKIN: No lesions or rashes.  MUSCULOSKELETAL: No joint pain or myalgias.  NEURO: No headaches or neuropathy.  LYMPH: No edema or adenopathy.  PSYCH: No anxiety or depression.  ENDO: No weight change.    ALLERGIES  Review of patient's allergies indicates:  No Known Allergies    INPATIENT SCHEDULED MEDICATIONS   sodium chloride   Intravenous Once    azithromycin  500 mg Intravenous Q24H    ceFEPime (MAXIPIME) IVPB  2 g Intravenous Q12H    lorazepam  1 mg Intravenous ED 1 Time    potassium chloride  40 mEq Oral Q4H    vancomycin (VANCOCIN) IVPB  1,000 mg Intravenous Once         MEDICAL AND SURGICAL HISTORY  Past Medical History:   Diagnosis Date    Asthma     Cardiac angina     Chronic abdominal pain     Claustrophobia     COPD (chronic obstructive pulmonary disease)     Coronary artery disease     GERD (gastroesophageal reflux disease)     History of drug dependence     Hypertension     Hypocalcemia 2020     Past Surgical History:   Procedure Laterality Date    BREAST  BIOPSY Bilateral 20 yrs ago    benign    BRONCHOSCOPY N/A 2020    Procedure: Bronchoscopy- 730 or noon, floro not needed;  Surgeon: Andrew Brothers MD;  Location: [facility] ENDO;  Service: Endoscopy;  Laterality: N/A;     SECTION      CHOLECYSTECTOMY      ENDOSCOPIC ULTRASOUND OF UPPER GASTROINTESTINAL TRACT Left 2018    Procedure: ULTRASOUND, UPPER GI TRACT, ENDOSCOPIC;  Surgeon: Paras Negrete MD;  Location: [facility] ENDO;  Service: Endoscopy;  Laterality: Left;    ESOPHAGOGASTRODUODENOSCOPY N/A 2018    Procedure: EGD (ESOPHAGOGASTRODUODENOSCOPY);  Surgeon: Paras Negrete MD;  Location: [facility] ENDO;  Service: Endoscopy;  Laterality: N/A;    ESOPHAGOGASTRODUODENOSCOPY N/A 2018    Procedure: EGD (ESOPHAGOGASTRODUODENOSCOPY);  Surgeon: Paras Negrete MD;  Location: [facility];  Service: Endoscopy;  Laterality: N/A;    HYSTERECTOMY      MAGNETIC RESONANCE IMAGING N/A 2019    Procedure: MRI (Magnetic Resonance Imagine) needs anesthesia;  Surgeon: Raffy Charles MD;  Location: [facility];  Service: Anesthesiology;  Laterality: N/A;    OOPHORECTOMY      TEMPOROMANDIBULAR JOINT SURGERY      TONSILLECTOMY         ALCOHOL, TOBACCO AND DRUG USE  Social History     Tobacco Use   Smoking Status Former Smoker    Years: 52.00    Types: Vaping w/o nicotine   Smokeless Tobacco Never Used     Social History     Substance and Sexual Activity   Alcohol Use No    Frequency: Never     Social History     Substance and Sexual Activity   Drug Use Yes    Types: Hydrocodone       FAMILY HISTORY  Family History   Problem Relation Age of Onset    Breast cancer Sister 55    Breast cancer Sister 50    Cancer Mother     Heart disease Mother     Hypertension Mother     Cancer Father        VITAL SIGNS (MOST RECENT)  Temp: 99.9 °F (37.7 °C) (10/30/20 1700)  Pulse: (!) 117 (10/30/20 1737)  Resp: (!) 37 (10/30/20 173)  BP: (!) 88/51 (10/30/20 173)  SpO2: 100 % (10/30/20  4133)    INTAKE AND OUTPUT (LAST 24 HOURS):No intake or output data in the 24 hours ending 10/30/20 1819    WEIGHT  Wt Readings from Last 1 Encounters:   10/30/20 64.9 kg (143 lb)       PHYSICAL EXAM  GENERAL: Older patient looking ill.  HEENT: Pupils equal and reactive. Extraocular movements intact. Nose intact. Pharynx dry.  NECK: Supple.   HEART: Regular rate and rhythm. No murmur or gallop auscultated.  LUNGS:  There are decreased breath sounds in the right mid lung.  The left lower lobe is clear.  Lung excursion symmetrical. No change in fremitus. No adventitial noises.  ABDOMEN: Bowel sounds present.  Abdomen was not palpated secondary to her repeated nausea and vomiting  : Normal anatomy.  EXTREMITIES: Normal muscle tone and joint movement, no cyanosis or clubbing.   LYMPHATICS: No adenopathy palpated, no edema.  SKIN: Dry, intact, no lesions.  Patient is losing her hair.  NEURO: Cranial nerves II-XII intact. Motor strength 5/5 bilaterally, upper and lower extremities.  PSYCH: Appropriate affect    CBC LAST (LAST 24 HOURS)  Recent Labs   Lab 10/30/20  1450   WBC 0.08*   RBC 1.97*   HGB 5.5*   HCT 16.9*   MCV 86   MCH 27.9   MCHC 32.5   RDW 14.1   PLT 15*   MPV 10.5   GRAN 10.0*   LYMPH 80.0*   MONO 10.0   NRBC 0       CHEMISTRY LAST (LAST 24 HOURS)  Recent Labs   Lab 10/30/20  1450   *   K 2.6*   CL 93*   CO2 25   ANIONGAP 12   BUN 10   CREATININE 0.9      CALCIUM 8.1*   MG 1.1*   ALBUMIN 2.7*   PROT 6.5   ALKPHOS 126   ALT 29   AST 30   BILITOT 0.9       COAGULATION LAST (LAST 24 HOURS)  Recent Labs   Lab 10/30/20  1450   LABPT 14.5   INR 1.2       CARDIAC PROFILE (LAST 24 HOURS)  No results for input(s): BNP, CPK, CPKMB, LDH, TROPONINI in the last 168 hours.    LAST 7 DAYS MICROBIOLOGY   Microbiology Results (last 7 days)     Procedure Component Value Units Date/Time    Culture, Respiratory with Gram Stain [[id number]]     Order Status: No result Specimen: Respiratory     Blood culture x two  cultures. Draw prior to antibiotics. [[id number]] Collected: 10/30/20 1430    Order Status: Sent Specimen: Blood from Peripheral, Upper Arm, Right Updated: 10/30/20 1442    Blood culture x two cultures. Draw prior to antibiotics. [[id number]] Collected: 10/30/20 1430    Order Status: Sent Specimen: Blood from Line, PICC Left Brachial Updated: 10/30/20 1442          MOST RECENT IMAGING  CT Abdomen Pelvis  Without Contrast  1.  No acute intra-abdominal abnormality identified.  2.  Small right pleural effusion.  3.  Consolidative process noted in the right lower lung.  4.  Moderate stool content throughout the large bowel may reflect  constipation.  5.  2 mm nonobstructing renal calculus at the midpole the left  kidney.    X-Ray Chest AP Portable    There are new left lower lung zone airspace opacities suspicious for  pneumonia, with decreased size of right middle lobe and perihilar  masslike opacity compatible with neoplasm and post obstructive  atelectasis. Minor right lung base opacities suggest subsegmental  atelectasis, with small pleural effusion blunting the right lateral  costophrenic angle. No evidence of interstitial pulmonary edema or  pneumothorax.    The bones are diffusely osteopenic, with no acute osseous abnormality.            CURRENT VISIT EKG  Results for orders placed or performed during the hospital encounter of 10/30/20   EKG 12-lead    Narrative    Test Reason : R50.9,    Vent. Rate : 123 BPM     Atrial Rate : 123 BPM     P-R Int : 134 ms          QRS Dur : 078 ms      QT Int : 318 ms       P-R-T Axes : 057 050 068 degrees     QTc Int : 455 ms    Sinus tachycardia  Nonspecific ST and T wave abnormality  Abnormal ECG  When compared with ECG of 18-AUG-2020 08:47,  Minimal criteria for Anterior infarct are no longer Present  ST now depressed in Lateral leads    Referred By: [email or web address]   SELF           Confirmed By:        ECHOCARDIOGRAM RESULTS  No results found for this or any previous  visit.      VENTILATOR INFORMATION          LAST ARTERIAL BLOOD GAS  ABG  No results for input(s): PH, PO2, PCO2, HCO3, BE in the last 168 hours.    IMPRESSION AND PLAN  Septic shock  Neutropenic pneumonia  Small cell carcinoma  Pancytopenia, severe  Fever  Intractable nausea and vomiting  Difficult to use PICC line  Anxiety  Hyponatremia  Hypokalemia  Hypomagnesemia  Moderate malnutrition    Patient has received her 30 cc/kg of IV fluids  Merrem and vanc and Levaquin are ordered  Levophed  Oxygen to keep sats greater than 90  Transfused 2 units packed red blood cells  Transfuse platelets  Replace electrolytes  Sputum for culture if it can be obtained  Bronchodilators  Serial labs  Ativan as needed for anxiety    Critical care time spent reviewing the chart, examining the patient, reviewing the labs, reviewing the radiological findings, discussing care with nursing, physicians, and respiratory and creating the note and  has been greater than 35 minutes  Patient wishes to be a full code.  She states she has years to live.

## 2020-10-30 NOTE — ED TRIAGE NOTES
"Present to the ER with c/o " I been throwing up and running fever" reports fever off and on x 2 wks, highest temp of " 102.5" reports vomiting " I been throwing up five days last week" reports vomiting started back again today, reports radiologist oncologist instructed her to come to the ER for further evaluation, pt had radiation friday for lung CA, last chemo last week,   "

## 2020-10-30 NOTE — PROGRESS NOTES
Pt called [facility] and had complaints of vomiting not controlled with compazine or phenergan for the last 2 days and stated that she had pink vomit today. I informed patient that she should go to the ED for evaluation. Pt verbalized understanding and stated she will go to ED today.

## 2020-10-30 NOTE — FIRST PROVIDER EVALUATION
Medical screening exam completed.  I have conducted a focused provider triage encounter, findings are as follows:    Brief history of present illness: n/V, and fever     There were no vitals filed for this visit.    Pertinent physical exam:  Patient has a history of lung cancer receive chemo last infusion was 1 week ago receives radiation daily.  Patient developed a fever T-max 102.5°.  Patient states she has not had radiation all week secondary to nausea and vomiting for 1 week.     Brief workup plan: labs, hydration     Preliminary workup initiated; this workup will be continued and followed by the physician or advanced practice provider that is assigned to the patient when roomed.

## 2020-10-30 NOTE — H&P
[facility] Medicine History & Physical Examination   Patient Name: Kendra Lawrence  MRN: [id number]  Patient Class: Emergency   Admission Date: 10/30/2020  1:06 PM  Length of Stay: 0  Attending Physician: Charisse Osullivan MD  Primary Care Provider: Louie Urena MD  Face-to-Face encounter date: 10/30/2020  Code Status: Full  Chief Complaint: Vomiting (fever, sent by her oncologist)      Covid test negative       Patient information was obtained from patient, daughter at bedside past medical records and ER records and ED physician sign out.   HISTORY OF PRESENT ILLNESS:   Kendra Lawrence is a 65 y.o.  female who  has a past medical history of  right small cell lung cancer s/p 3rd cycle chemo and radiation last week ,COPD , Coronary artery disease, GERD , History of drug dependence, Hypertension, and Hypocalcemia (8/13/2020).. The patient presented to [facility] on 10/30/2020 with a primary complaint of Vomiting (fever, sent by her oncologist)   Patient reports progressively worsening nausea vomiting since last chemo over the last 2 days with high-grade fever 102.5F with shortness of breath at baseline.Deneis any chage in breathing ,cough or hemoptysis.  Admits epigastric soreness, with chronically distended abdomen, denies any change in bowel or bladder habits, reports extremely weak unable to get out of the bed.  Clarified code status, confirmed full code.   In ED patient was tachypneic and hypotensive , blood pressure responded to fluid resuscitation, noted to be severely pancytopenic after chemo radiation therapy, received broad-spectrum IV antibiotic vanc ,efepime and azithromycin.   hypokalemia replaced.  She usually on 2 L oxygen at home currently on 4 L. also reports borderline blood pressure at home.    REVIEW OF SYSTEMS:   10 Point Review of System was performed and was found to be negative except for that mentioned already in the HPI above.     PAST MEDICAL  HISTORY:     Past Medical History:   Diagnosis Date    Asthma     Cardiac angina     Chronic abdominal pain     Claustrophobia     COPD (chronic obstructive pulmonary disease)     Coronary artery disease     GERD (gastroesophageal reflux disease)     History of drug dependence     Hypertension     Hypocalcemia 2020       PAST SURGICAL HISTORY:     Past Surgical History:   Procedure Laterality Date    BREAST BIOPSY Bilateral 20 yrs ago    benign    BRONCHOSCOPY N/A 2020    Procedure: Bronchoscopy- 730 or noon, floro not needed;  Surgeon: Andrew Brothers MD;  Location: [facility] ENDO;  Service: Endoscopy;  Laterality: N/A;     SECTION      CHOLECYSTECTOMY      ENDOSCOPIC ULTRASOUND OF UPPER GASTROINTESTINAL TRACT Left 2018    Procedure: ULTRASOUND, UPPER GI TRACT, ENDOSCOPIC;  Surgeon: Paras Negrete MD;  Location: [facility];  Service: Endoscopy;  Laterality: Left;    ESOPHAGOGASTRODUODENOSCOPY N/A 2018    Procedure: EGD (ESOPHAGOGASTRODUODENOSCOPY);  Surgeon: Paras Negrete MD;  Location: [facility];  Service: Endoscopy;  Laterality: N/A;    ESOPHAGOGASTRODUODENOSCOPY N/A 2018    Procedure: EGD (ESOPHAGOGASTRODUODENOSCOPY);  Surgeon: Paras Negrete MD;  Location: [facility];  Service: Endoscopy;  Laterality: N/A;    HYSTERECTOMY      MAGNETIC RESONANCE IMAGING N/A 2019    Procedure: MRI (Magnetic Resonance Imagine) needs anesthesia;  Surgeon: Raffy Charles MD;  Location: [facility];  Service: Anesthesiology;  Laterality: N/A;    OOPHORECTOMY      TEMPOROMANDIBULAR JOINT SURGERY      TONSILLECTOMY         ALLERGIES:   Patient has no known allergies.    FAMILY HISTORY:     Family History   Problem Relation Age of Onset    Breast cancer Sister 55    Breast cancer Sister 50    Cancer Mother     Heart disease Mother     Hypertension Mother     Cancer Father        SOCIAL HISTORY:     Social History     Tobacco Use    Smoking status:  Former Smoker     Years: 52.00     Types: Vaping w/o nicotine    Smokeless tobacco: Never Used   Substance Use Topics    Alcohol use: No     Frequency: Never        Social History     Substance and Sexual Activity   Sexual Activity Not on file        HOME MEDICATIONS:     Prior to Admission medications    Medication Sig Start Date End Date Taking? Authorizing Provider   albuterol-ipratropium (DUO-NEB) 2.5 mg-0.5 mg/3 mL nebulizer solution Take 3 mLs by nebulization every 4 (four) hours. Rescue 8/16/20 8/16/21  Natalia Guajardo NP   alendronate (FOSAMAX) 70 MG tablet TAKE ONE TABLET BY MOUTH ONCE EVERY 7 DAYS  Patient taking differently: Take 70 mg by mouth every 7 days. Sunday 6/25/20   Louie Urena MD   amitriptyline (ELAVIL) 10 MG tablet TAKE ONE TABLET BY MOUTH AT BEDTIME AS NEEDED FOR INSOMNIA  Patient not taking: No sig reported 8/14/19   Louie Urena MD   buprenorphine-naloxone (SUBOXONE) 8-2 mg Film Place 1 packet (1 each total) under the tongue 2 (two) times daily. 11/1/20   Louie Urena MD   busPIRone (BUSPAR) 10 MG tablet Take 1 tablet (10 mg total) by mouth 2 (two) times daily. 8/31/20 8/31/21  Louie Urena MD   DULoxetine (CYMBALTA) 60 MG capsule TAKE ONE CAPSULE BY MOUTH ONCE A DAY  Patient taking differently: Take 60 mg by mouth once daily.  1/28/20   Louie Urena MD   fluticasone furoate-vilanteroL (BREO) 200-25 mcg/dose DsDv diskus inhaler Inhale 1 puff into the lungs once daily. Controller 8/29/20   Nazario Wolfe MD   hydroCHLOROthiazide (HYDRODIURIL) 25 MG tablet TAKE ONE TABLET BY MOUTH EVERY DAY 8/4/20   Louie Urena MD   nitroGLYCERIN (NITROSTAT) 0.4 MG SL tablet Place 0.4 mg under the tongue every 5 (five) minutes as needed for Chest pain.    Historical Provider   ondansetron (ZOFRAN) 4 MG tablet  10/14/20   Historical Provider   ondansetron (ZOFRAN-ODT) 8 MG TbDL DISSOLVE 1 TABLET BY MOUTH EVERY 6 HOURS AS NEEDED 10/17/20   Berna Acuña MD   promethazine (PHENERGAN) 25 MG  "tablet Take 1 tablet (25 mg total) by mouth every 6 (six) hours as needed for Nausea. 10/20/20 10/20/21  Destin Valente PA-C   sucralfate (CARAFATE) 100 mg/mL suspension Take 10 mLs (1 g total) by mouth 4 (four) times daily. 10/13/20 10/13/21  Berna cAuña MD   TRELEGY ELLIPTA 100-62.5-25 mcg DsDv INHALE 1 PUFF INTO THE LUNGS NIGHTLY 6/25/20   Louie Urena MD         PHYSICAL EXAM:   BP (!) 97/54   Pulse (!) 131   Temp (!) 102.9 °F (39.4 °C) (Rectal)   Resp (!) 32   Ht 5' 4" (1.626 m)   Wt 64.9 kg (143 lb)   LMP  (LMP Unknown)   SpO2 100%   BMI 24.55 kg/m²   Vitals Reviewed  General appearance:  Chronic ill-looking malnourished alopecia post chemo, appears anxious.  Skin: No Rash.   Neuro: Motor and sensory exams grossly intact. Good tone. Power in all 4 extremities 5/5.   HENT: Atraumatic head. Moist mucous membranes of oral cavity.  Eyes: Normal extraocular movements.   Neck: Supple. No evidence of lymphadenopathy. No thyroidomegaly.  Lungs: Clear to auscultation bilaterally.  Occasional wheezing present on bilateral decreased breath sound.   Heart: Regular rate and rhythm. S1 and S2 present with no murmurs/gallop/rub. No pedal edema. No JVD present.   Abdomen: Soft, non-distended, mild epigastric tender. No rebound tenderness/guarding. No masses or organomegaly. Bowel sounds are normal. Bladder is not palpable.  :no silva ,no CVA tenderness  Extremities: No cyanosis, clubbing, or edema.  Left upper extremity-sided PICC line  Psych/mental status: Alert and oriented. Cooperative. Responds appropriately to questions.     EMERGENCY DEPARTMENT LABS AND IMAGING:     Labs Reviewed   CBC W/ AUTO DIFFERENTIAL - Abnormal; Notable for the following components:       Result Value    WBC 0.08 (*)     RBC 1.97 (*)     Hemoglobin 5.5 (*)     Hematocrit 16.9 (*)     Platelets 15 (*)     Gran % 10.0 (*)     Lymph % 80.0 (*)     Rouleaux Present (*)     All other components within normal limits    Narrative: "     Hgb, Hct, WBC. Plt critical result(s) repeated. Called and verbal   readback obtained from Mikal Ruano RN/ED by JB8 10/30/2020 15:30   COMPREHENSIVE METABOLIC PANEL - Abnormal; Notable for the following components:    Sodium 130 (*)     Potassium 2.6 (*)     Chloride 93 (*)     Calcium 8.1 (*)     Albumin 2.7 (*)     All other components within normal limits    Narrative:     Potassium critical result(s) repeated. Called and verbal readback   obtained from Andreia Schroeder - [facility].  by CW1 10/30/2020 15:37   MAGNESIUM - Abnormal; Notable for the following components:    Magnesium 1.1 (*)     All other components within normal limits   CULTURE, BLOOD   CULTURE, BLOOD   LACTIC ACID, PLASMA   LIPASE   PROTIME-INR   SARS-COV-2 RNA AMPLIFICATION, QUAL   URINALYSIS, REFLEX TO URINE CULTURE   LACTIC ACID, PLASMA       X-Ray Chest AP Portable   Final Result      CT Abdomen Pelvis  Without Contrast    (Results Pending)       ASSESSMENT & PLAN:   Kendra Lawrence is a 65 y.o. female admitted for    # Sepsis 2/2 LLL PNA with borderline blood pressure(at baseline her blood pressure <120) with negative lactic acid  # hypotension with  nausea vomiting  # SCC of right lung s/p chemo and radiation  # severe pancytopenia with WBC 0.08/Hb 5/plt 15, ANC calculated 8  # hypokalemia and hypomagnesemia  # hx of COPD and opioid dependance  # moderate malnutrition  # debelity    Plan:  Admit to icu  Oxy nc  Broad-spectrum IV antibiotic Vanco cefepime and azithromycin  Will obtain central line prn, pt has PICC line no got flow  Panculture blood and sputum  Will transfuse 2 unit PRBC  Monitor and replace electrolytes  Give IV magnesium 2 g  Restart home med  Oncology and  pulmonology consult  Zofran p.r.n.  Gentle hydration  cc/hour  Monitor intake and output  Will transfuse 2 unit PRBC leuko reduced  Neutropenic precaution  High risk for acute deterioration due to underlying cancer with severe pancytopenia  Pt/ot    DVT Prophylaxis: will be  placed on SCD for DVT prophylaxis and will be advised to be as mobile as possible and sit in a chair as tolerated.     INPATIENT LIST OF MEDICATIONS     Current Facility-Administered Medications:     0.9%  NaCl infusion (for blood administration), , Intravenous, Once, Berna Acuña MD    acetaminophen tablet 1,000 mg, 1,000 mg, Oral, ED 1 Time, Harpreet La MD    azithromycin 500 mg in dextrose 5 % 250 mL IVPB (ready to mix system), 500 mg, Intravenous, Once, Harpreet La MD    cefepime in dextrose 5 % IVPB 2 g, 2 g, Intravenous, Q12H, Harpreet La MD, 2 g at 10/30/20 1525    heparin, porcine (PF) 100 unit/mL injection flush 300 Units, 300 Units, Intravenous, PRN, Berna Acuña MD, 300 Units at 10/02/20 1544    ibuprofen tablet 600 mg, 600 mg, Oral, ED 1 Time, Harpreet La MD    lactated ringers bolus 1,947 mL, 30 mL/kg, Intravenous, ED 1 Time, REINA Daniels, Last Rate: 150 mL/hr at 10/30/20 1524, 1,947 mL at 10/30/20 1524    potassium chloride packet 40 mEq, 40 mEq, Oral, ED 1 Time, Harpreet La MD    Pharmacy to dose Vancomycin consult, , , Once **AND** vancomycin - pharmacy to dose, , Intravenous, pharmacy to manage frequency, Harpreet La MD    vancomycin in dextrose 5 % 1 gram/250 mL IVPB 1,000 mg, 1,000 mg, Intravenous, Once, Harpreet La MD    Current Outpatient Medications:     albuterol-ipratropium (DUO-NEB) 2.5 mg-0.5 mg/3 mL nebulizer solution, Take 3 mLs by nebulization every 4 (four) hours. Rescue, Disp: 1 Box, Rfl: 0    alendronate (FOSAMAX) 70 MG tablet, TAKE ONE TABLET BY MOUTH ONCE EVERY 7 DAYS (Patient taking differently: Take 70 mg by mouth every 7 days. Sunday), Disp: 12 tablet, Rfl: 3    amitriptyline (ELAVIL) 10 MG tablet, TAKE ONE TABLET BY MOUTH AT BEDTIME AS NEEDED FOR INSOMNIA (Patient not taking: No sig reported), Disp: 30 tablet, Rfl: 1    [START ON 11/1/2020] buprenorphine-naloxone (SUBOXONE) 8-2 mg Film, Place 1 packet (1 each  total) under the tongue 2 (two) times daily., Disp: 60 packet, Rfl: 0    busPIRone (BUSPAR) 10 MG tablet, Take 1 tablet (10 mg total) by mouth 2 (two) times daily., Disp: 60 tablet, Rfl: 11    DULoxetine (CYMBALTA) 60 MG capsule, TAKE ONE CAPSULE BY MOUTH ONCE A DAY (Patient taking differently: Take 60 mg by mouth once daily. ), Disp: 30 capsule, Rfl: 10    fluticasone furoate-vilanteroL (BREO) 200-25 mcg/dose DsDv diskus inhaler, Inhale 1 puff into the lungs once daily. Controller, Disp: 1 each, Rfl: 0    hydroCHLOROthiazide (HYDRODIURIL) 25 MG tablet, TAKE ONE TABLET BY MOUTH EVERY DAY, Disp: 90 tablet, Rfl: 1    nitroGLYCERIN (NITROSTAT) 0.4 MG SL tablet, Place 0.4 mg under the tongue every 5 (five) minutes as needed for Chest pain., Disp: , Rfl:     ondansetron (ZOFRAN) 4 MG tablet, , Disp: , Rfl:     ondansetron (ZOFRAN-ODT) 8 MG TbDL, DISSOLVE 1 TABLET BY MOUTH EVERY 6 HOURS AS NEEDED, Disp: 60 tablet, Rfl: 0    promethazine (PHENERGAN) 25 MG tablet, Take 1 tablet (25 mg total) by mouth every 6 (six) hours as needed for Nausea., Disp: 60 tablet, Rfl: 1    sucralfate (CARAFATE) 100 mg/mL suspension, Take 10 mLs (1 g total) by mouth 4 (four) times daily., Disp: 420 mL, Rfl: 1    TRELEGY ELLIPTA 100-62.5-25 mcg DsDv, INHALE 1 PUFF INTO THE LUNGS NIGHTLY, Disp: 60 each, Rfl: 11      Scheduled Meds:   sodium chloride   Intravenous Once    acetaminophen  1,000 mg Oral ED 1 Time    azithromycin  500 mg Intravenous Once    ceFEPime (MAXIPIME) IVPB  2 g Intravenous Q12H    ibuprofen  600 mg Oral ED 1 Time    lactated ringers  30 mL/kg Intravenous ED 1 Time    potassium chloride  40 mEq Oral ED 1 Time    vancomycin (VANCOCIN) IVPB  1,000 mg Intravenous Once     Continuous Infusions:  PRN Meds:.heparin, porcine (PF), Pharmacy to dose Vancomycin consult **AND** vancomycin - pharmacy to dose      Charisse [facility] Hospitalist  10/30/2020

## 2020-10-30 NOTE — ED PROVIDER NOTES
Encounter Date: 10/30/2020       History     Chief Complaint   Patient presents with    Vomiting     fever, sent by her oncologist     65-year-old female currently undergoing chemo and radiation for lung cancer presents with fever and vomiting.  Patient started to feel bad earlier this week with general malaise.  Fever at home today up to 102.5° F. She is also vomiting.  Shortness of breath is baseline.  Patient reports A previous episode of pneumonia, this feels similar.no Diarrhea.  Chronic abdominal distension per the patient.  No dysuria no flank pain.    The history is provided by the patient and a relative.     Review of patient's allergies indicates:  No Known Allergies  Past Medical History:   Diagnosis Date    Asthma     Cardiac angina     Chronic abdominal pain     Claustrophobia     COPD (chronic obstructive pulmonary disease)     Coronary artery disease     GERD (gastroesophageal reflux disease)     History of drug dependence     Hypertension     Hypocalcemia 2020     Past Surgical History:   Procedure Laterality Date    BREAST BIOPSY Bilateral 20 yrs ago    benign    BRONCHOSCOPY N/A 2020    Procedure: Bronchoscopy- 730 or noon, floro not needed;  Surgeon: Andrew Brothers MD;  Location: [facility];  Service: Endoscopy;  Laterality: N/A;     SECTION      CHOLECYSTECTOMY      ENDOSCOPIC ULTRASOUND OF UPPER GASTROINTESTINAL TRACT Left 2018    Procedure: ULTRASOUND, UPPER GI TRACT, ENDOSCOPIC;  Surgeon: Paras Negrete MD;  Location: [facility];  Service: Endoscopy;  Laterality: Left;    ESOPHAGOGASTRODUODENOSCOPY N/A 2018    Procedure: EGD (ESOPHAGOGASTRODUODENOSCOPY);  Surgeon: Paras Negrete MD;  Location: [facility];  Service: Endoscopy;  Laterality: N/A;    ESOPHAGOGASTRODUODENOSCOPY N/A 2018    Procedure: EGD (ESOPHAGOGASTRODUODENOSCOPY);  Surgeon: Paras Negrete MD;  Location: [facility];  Service: Endoscopy;  Laterality: N/A;     HYSTERECTOMY      MAGNETIC RESONANCE IMAGING N/A 2/19/2019    Procedure: MRI (Magnetic Resonance Imagine) needs anesthesia;  Surgeon: Raffy Charles MD;  Location: [facility];  Service: Anesthesiology;  Laterality: N/A;    OOPHORECTOMY      TEMPOROMANDIBULAR JOINT SURGERY      TONSILLECTOMY       Family History   Problem Relation Age of Onset    Breast cancer Sister 55    Breast cancer Sister 50    Cancer Mother     Heart disease Mother     Hypertension Mother     Cancer Father      Social History     Tobacco Use    Smoking status: Former Smoker     Years: 52.00     Types: Vaping w/o nicotine    Smokeless tobacco: Never Used   Substance Use Topics    Alcohol use: No     Frequency: Never    Drug use: Yes     Types: Hydrocodone     Review of Systems   Constitutional: Positive for activity change, appetite change, chills, fatigue and fever.   Respiratory: Positive for shortness of breath (baseline).    Gastrointestinal: Positive for abdominal distention, nausea and vomiting. Negative for abdominal pain.   Genitourinary: Negative.    All other systems reviewed and are negative.      Physical Exam     Initial Vitals [10/30/20 1240]   BP Pulse Resp Temp SpO2   101/72 (!) 134 20 98.9 °F (37.2 °C) 100 %      MAP       --         Physical Exam    Nursing note and vitals reviewed.  Constitutional: She appears well-developed and well-nourished. She is not diaphoretic. No distress.   HENT:   Head: Normocephalic and atraumatic.   Eyes: EOM are normal.   Neck: Normal range of motion. Neck supple.   Cardiovascular: Normal rate, regular rhythm and normal heart sounds. Exam reveals no gallop and no friction rub.    No murmur heard.  lue picc non tender   Pulmonary/Chest: Breath sounds normal. No respiratory distress. She has no wheezes. She has no rhonchi. She has no rales.   Abdominal: There is abdominal tenderness (R side).   Musculoskeletal: Normal range of motion.   Neurological: She is alert and oriented to  person, place, and time.   Skin: Skin is warm and dry.   Psychiatric: She has a normal mood and affect. Her behavior is normal. Judgment and thought content normal.         ED Course   Critical Care    Date/Time: 10/30/2020 6:17 PM  Performed by: Harpreet La MD  Authorized by: Charisse Osullivan MD   Direct patient critical care time: 35 minutes  Additional history critical care time: 8 minutes  Ordering / reviewing critical care time: 10 minutes  Documentation critical care time: 8 minutes  Consulting other physicians critical care time: 6 minutes  Consult with family critical care time: 5 minutes  Total critical care time (exclusive of procedural time) : 72 minutes  Critical care was necessary to treat or prevent imminent or life-threatening deterioration of the following conditions: sepsis (Neutropenic fever).  Critical care was time spent personally by me on the following activities: development of treatment plan with patient or surrogate, discussions with consultants, evaluation of patient's response to treatment, examination of patient, obtaining history from patient or surrogate, ordering and performing treatments and interventions, ordering and review of laboratory studies, ordering and review of radiographic studies, re-evaluation of patient's condition, review of old charts and pulse oximetry.        Labs Reviewed   CBC W/ AUTO DIFFERENTIAL - Abnormal; Notable for the following components:       Result Value    WBC 0.08 (*)     RBC 1.97 (*)     Hemoglobin 5.5 (*)     Hematocrit 16.9 (*)     Platelets 15 (*)     Gran % 10.0 (*)     Lymph % 80.0 (*)     Rouleaux Present (*)     All other components within normal limits    Narrative:     Hgb, Hct, WBC. Plt critical result(s) repeated. Called and verbal   readback obtained from Mikal Ruano RN/ED by VAZQUEZ 10/30/2020 15:30   COMPREHENSIVE METABOLIC PANEL - Abnormal; Notable for the following components:    Sodium 130 (*)     Potassium 2.6 (*)     Chloride 93  (*)     Calcium 8.1 (*)     Albumin 2.7 (*)     All other components within normal limits    Narrative:     Potassium critical result(s) repeated. Called and verbal readback   obtained from Andreia Schroeder - [facility].  by CW1 10/30/2020 15:37   MAGNESIUM - Abnormal; Notable for the following components:    Magnesium 1.1 (*)     All other components within normal limits   PROCALCITONIN - Abnormal; Notable for the following components:    Procalcitonin 3.11 (*)     All other components within normal limits   LACTIC ACID, PLASMA   LIPASE   PROTIME-INR   SARS-COV-2 RNA AMPLIFICATION, QUAL   LACTIC ACID, PLASMA   PROCALCITONIN   TYPE & SCREEN        ECG Results          EKG 12-lead (Final result)  Result time 10/31/20 10:58:22    Final result by Interface, Lab In [facility] (10/31/20 10:58:22)                 Narrative:    Test Reason : R50.9,    Vent. Rate : 123 BPM     Atrial Rate : 123 BPM     P-R Int : 134 ms          QRS Dur : 078 ms      QT Int : 318 ms       P-R-T Axes : 057 050 068 degrees     QTc Int : 455 ms    Sinus tachycardia  Nonspecific ST and T wave abnormality  Abnormal ECG  When compared with ECG of 18-AUG-2020 08:47,  Minimal criteria for Anterior infarct are no longer Present  ST now depressed in Lateral leads  Confirmed by Aidan Fang MD ([id number]) on 10/31/2020 10:58:16 AM    Referred By: [email or web address]   SELF           Confirmed By:Aidan Fang MD                            Imaging Results          CT Abdomen Pelvis  Without Contrast (Final result)  Result time 10/30/20 16:05:24    Final result by Ryan Evans MD (10/30/20 16:05:24)                 Narrative:    CMS MANDATED QUALITY DATA - CT RADIATION - 436    All CT scans at this facility utilize dose modulation, iterative  reconstruction, and/or weight based dosing when appropriate to reduce  radiation dose to as low as reasonably achievable.      REASON: Abdominal pain, acute, nonlocalized    TECHNIQUE: Abdomen and pelvis CT without IV  contrast.    COMPARISON: CT abdomen and pelvis August 27, 2020.    FINDINGS:    Small right pleural effusion noted. Consolidative process noted in the  right lower lung. Visualized portions of the left lung clear.    Liver is normal size. No definite hepatic lesions identified. The  gallbladder is been removed. There is unchanged dilatation of the  common bile duct. The pancreas, spleen, and adrenal glands are  unremarkable. Kidneys are normal size. A 2 mm nonobstructing calculus  is noted at the midpole the left kidney. No hydronephrosis  bilaterally. No renal cysts or masses identified. The ureters are  normal caliber with no obstructions. The bladder is fluid-filled with  no focal wall abnormalities. Multiple pelvic phleboliths noted. No  free fluid in the pelvis. The uterus and adnexal structures are benign  removed.    There is moderate stool content throughout the large bowel no bowel  wall thickening or inflammatory changes identified. Small bowel is  normal caliber stomach is grossly unremarkable.    The thoracic aorta is normal caliber. No intra-abdominal  lymphadenopathy. No mesenteric fat stranding or free fluid identified.  The ventral abdominal wall is unremarkable. Degenerative changes of  the spine noted with no acute osseous abnormality.    IMPRESSION:    1.  No acute intra-abdominal abnormality identified.  2.  Small right pleural effusion.  3.  Consolidative process noted in the right lower lung.  4.  Moderate stool content throughout the large bowel may reflect  constipation.  5.  2 mm nonobstructing renal calculus at the midpole the left  kidney.    Electronically Signed by Ryan Evans on 10/30/2020 4:14 PM                             X-Ray Chest AP Portable (Final result)  Result time 10/30/20 13:13:14    Final result by Mo Oleary MD (10/30/20 13:13:14)                 Narrative:    HISTORY: Sepsis, nausea and vomiting, small cell lung carcinoma.    FINDINGS: Portable chest radiograph  at 1252 hours compared to PET CT  of 09/17/2020 shows left PICC with distal tip overlying the SVC. The  cardiomediastinal silhouette and pulmonary vasculature are stable,  with aortic vascular calcifications.    There are new left lower lung zone airspace opacities suspicious for  pneumonia, with decreased size of right middle lobe and perihilar  masslike opacity compatible with neoplasm and post obstructive  atelectasis. Minor right lung base opacities suggest subsegmental  atelectasis, with small pleural effusion blunting the right lateral  costophrenic angle. No evidence of interstitial pulmonary edema or  pneumothorax.    The bones are diffusely osteopenic, with no acute osseous abnormality.    IMPRESSION:  1. New left lower lung airspace opacities, suspicious for pneumonia in  the appropriate clinical setting. Follow-up PA and lateral chest  radiograph in 4-6 weeks after appropriate therapy is recommended to  document complete resolution.  2. Additional observations as above.    Electronically Signed by Mo ALATORRE on 10/30/2020 1:18 PM                                                 ED Course as of Nov 02 0914   Fri Oct 30, 2020   1413 SpO2: 100 % [EF]   1413 Resp: 20 [EF]   1413 Pulse(!): 134 [EF]   1413 Temp src: Oral [EF]   1413 Temp: 98.9 °F (37.2 °C) [EF]   1413 BP: 101/72 [EF]   1427 EKG sinus tachycardia normal axis 123 per no ST elevation depression T-wave inversion    [EF]   1511 Temp(!): 102.9 °F (39.4 °C) [EF]   1512 Temp src: Rectal [EF]   1512 Lactate, Eddie: 1.3 [EF]   1534 WBC(!!): 0.08 [EF]   1534 Hemoglobin(!!): 5.5 [EF]   1534 Platelets(!!): 15 [EF]   1544 Oncology, Hospital Medicine consult placed.    [EF]   1618 Dr fishman to see    [EF]   1647 Hospital medicine at bedside    [EF]   1835 Dr. Mello at the bedside.  She is requesting Levophed to be started. Nursing informed    [EF]      ED Course User Index  [EF] Harpreet La MD            Clinical Impression:       ICD-10-CM  ICD-9-CM   1. Severe neutropenia  D70.9 288.00   2. Fever  R50.9 780.60   3. Chest pain  R07.9 786.50                          ED Disposition Condition    Admit                    65-year-old female cancer patient on chemo presents with malaise fever vomiting.  Chest x-ray suspicious for pneumonia.  Also found to be pancytopenic.  Patient was given IV fluids broad-spectrum antibiotics.  Hospital Medicine was consulted.  They did see the patient in the ER.  As at this time I have not heard back from Oncology.  I did advise Hospital Medicine and Oncology has not yet discussed the case with me.  Patient remains very well-appearing smiling sitting up jovial pleasant.  No signs of distress.  Lactic acid is reassuring.         Harpreet La MD  10/30/20 2772       Harpreet La MD  11/02/20 [id number]

## 2020-10-31 PROBLEM — D61.818 PANCYTOPENIA: Status: ACTIVE | Noted: 2020-01-01

## 2020-10-31 PROBLEM — A41.9 SEPTIC SHOCK: Status: ACTIVE | Noted: 2020-01-01

## 2020-10-31 PROBLEM — R53.81 DEBILITY: Status: ACTIVE | Noted: 2020-01-01

## 2020-10-31 PROBLEM — D70.9 SEVERE NEUTROPENIA: Status: ACTIVE | Noted: 2020-01-01

## 2020-10-31 PROBLEM — R65.21 SEPTIC SHOCK: Status: ACTIVE | Noted: 2020-01-01

## 2020-10-31 PROBLEM — D61.811 DRUG-INDUCED PANCYTOPENIA: Status: ACTIVE | Noted: 2020-01-01

## 2020-10-31 NOTE — CONSULTS
HPI    Patient is 65 year old female seen followed by Dr. Domenico Goodman of small cell lung CA right, COPD, opiates dependent hypertension CAD acid reflux disease.  Last seen in clinic 10/22/2020 for treatment evaluation cycle 3 which consists of carboplatin etoposide q. 3 weeks.  At the time patient was presented to office in a wheelchair on nasal cannula.  She was complaining nausea was given refills at the time which consists of oral Phenergan 25 mg p.o. q.6 hours.  Patient was also instructed to alternate between Phenergan and Zofran p.r.n..       On this admission patient was admitted to the hospital for principal complaining of vomiting.  It was reported worsening nausea vomiting since last chemo over 2 days.  Patient also reported of fever of 102.5 at home with shortness of breath at baseline.  Denies any other change.  Patient was sent to the hospital for further evaluation.  Pulmonology was consulted.  Hematology oncology consultation request for assisting management due to above.    Past Medical History:   Diagnosis Date    Asthma     Cardiac angina     Chronic abdominal pain     Claustrophobia     COPD (chronic obstructive pulmonary disease)     Coronary artery disease     GERD (gastroesophageal reflux disease)     History of drug dependence     Hypertension     Hypocalcemia 2020       Past Surgical History:   Procedure Laterality Date    BREAST BIOPSY Bilateral 20 yrs ago    benign    BRONCHOSCOPY N/A 2020    Procedure: Bronchoscopy- 730 or noon, floro not needed;  Surgeon: Andrew Brothers MD;  Location: [facility];  Service: Endoscopy;  Laterality: N/A;     SECTION      CHOLECYSTECTOMY      ENDOSCOPIC ULTRASOUND OF UPPER GASTROINTESTINAL TRACT Left 2018    Procedure: ULTRASOUND, UPPER GI TRACT, ENDOSCOPIC;  Surgeon: Paras Negrete MD;  Location: [facility];  Service: Endoscopy;  Laterality: Left;    ESOPHAGOGASTRODUODENOSCOPY N/A 2018    Procedure: EGD  (ESOPHAGOGASTRODUODENOSCOPY);  Surgeon: Paras Negrete MD;  [facility]: [facility];  Service: Endoscopy;  Laterality: N/A;    ESOPHAGOGASTRODUODENOSCOPY N/A 12/21/2018    Procedure: EGD (ESOPHAGOGASTRODUODENOSCOPY);  Surgeon: Paras Negrete MD;  Location: [facility];  Service: Endoscopy;  Laterality: N/A;    HYSTERECTOMY      MAGNETIC RESONANCE IMAGING N/A 2/19/2019    Procedure: MRI (Magnetic Resonance Imagine) needs anesthesia;  Surgeon: Raffy Charles MD;  Location: [facility];  Service: Anesthesiology;  Laterality: N/A;    OOPHORECTOMY      TEMPOROMANDIBULAR JOINT SURGERY      TONSILLECTOMY       Social History     Socioeconomic History    Marital status:      Spouse name: Not on file    Number of children: Not on file    Years of education: Not on file    Highest education level: Not on file   Occupational History    Not on file   Social Needs    Financial resource strain: Not on file    Food insecurity     Worry: Not on file     Inability: Not on file    Transportation needs     Medical: Not on file     Non-medical: Not on file   Tobacco Use    Smoking status: Former Smoker     Years: 52.00     Types: Vaping w/o nicotine    Smokeless tobacco: Never Used   Substance and Sexual Activity    Alcohol use: No     Frequency: Never    Drug use: Yes     Types: Hydrocodone    Sexual activity: Not on file   Lifestyle    Physical activity     Days per week: Not on file     Minutes per session: Not on file    Stress: Not on file   Relationships    Social connections     Talks on phone: Not on file     Gets together: Not on file     Attends [employer] service: Not on file     Active member of club or organization: Not on file     Attends meetings of clubs or organizations: Not on file     Relationship status: Not on file   Other Topics Concern    Not on file   Social History Narrative    Not on file     Review of patient's allergies indicates:  No Known Allergies  Family History    Problem Relation Age of Onset    Breast cancer Sister 55    Breast cancer Sister 50    Cancer Mother     Heart disease Mother     Hypertension Mother     Cancer Father      physical exam  Vitals:    10/31/20 0631   BP:    Pulse: (!) 124   Resp: (!) 21   Temp:      Ill-appearing individual  Normocephalic atraumatic pupils equal round reactive  Tachycardic  decreased breath  Bowel sounds presents   defer  Normal muscle tone joint movement no cyanosis clubbing  Skin dry no lesion no rash  Normal logically grossly intact  Normal affect    CMP  Sodium   Date Value Ref Range Status   10/31/2020 132 (L) 136 - 145 mmol/L Final     Potassium   Date Value Ref Range Status   10/31/2020 4.2 3.5 - 5.1 mmol/L Final     Chloride   Date Value Ref Range Status   10/31/2020 95 95 - 110 mmol/L Final     CO2   Date Value Ref Range Status   10/31/2020 20 (L) 23 - 29 mmol/L Final     Glucose   Date Value Ref Range Status   10/31/2020 140 (H) 70 - 110 mg/dL Final     BUN   Date Value Ref Range Status   10/31/2020 16 8 - 23 mg/dL Final     Creatinine   Date Value Ref Range Status   10/31/2020 1.0 0.5 - 1.4 mg/dL Final     Calcium   Date Value Ref Range Status   10/31/2020 8.3 (L) 8.7 - 10.5 mg/dL Final     Total Protein   Date Value Ref Range Status   10/31/2020 6.6 6.0 - 8.4 g/dL Final     Albumin   Date Value Ref Range Status   10/31/2020 2.9 (L) 3.5 - 5.2 g/dL Final     Total Bilirubin   Date Value Ref Range Status   10/31/2020 1.2 (H) 0.1 - 1.0 mg/dL Final     Comment:     For infants and newborns, interpretation of results should be based  on gestational age, weight and in agreement with clinical  observations.  Premature Infant recommended reference ranges:  Up to 24 hours.............<8.0 mg/dL  Up to 48 hours............<12.0 mg/dL  3-5 days..................<15.0 mg/dL  6-29 days.................<15.0 mg/dL       Alkaline Phosphatase   Date Value Ref Range Status   10/31/2020 118 55 - 135 U/L Final     AST   Date Value  Ref Range Status   10/31/2020 26 10 - 40 U/L Final     ALT   Date Value Ref Range Status   10/31/2020 30 10 - 44 U/L Final     Anion Gap   Date Value Ref Range Status   10/31/2020 17 (H) 8 - 16 mmol/L Final     eGFR if    Date Value Ref Range Status   10/31/2020 >60.0 >60 mL/min/1.73 m^2 Final     eGFR if non    Date Value Ref Range Status   10/31/2020 58.4 (A) >60 mL/min/1.73 m^2 Final     Comment:     Calculation used to obtain the estimated glomerular filtration  rate (eGFR) is the CKD-EPI equation.        Lab Results   Component Value Date    WBC 0.08 (LL) 10/30/2020    HGB 5.5 (LL) 10/30/2020    HCT 15 (LL) 10/30/2020    MCV 86 10/30/2020    PLT 15 (LL) 10/30/2020       X-ray left lower lung zone airspace opacities suspicious for pneumonia.    CT abdomen pelvis without contrast  IMPRESSION:     1.  No acute intra-abdominal abnormality identified.  2.  Small right pleural effusion.  3.  Consolidative process noted in the right lower lung.  4.  Moderate stool content throughout the large bowel may reflect  constipation.  5.  2 mm nonobstructing renal calculus at the midpole the left  Kidney.    Blood culture  Blood culture x2 preliminary day 1.  G stain negative Rods  Respiratory culture pending    Assessment and plan    Small-cell lung cancer locally advanced receiving carboplatin etoposide q3w for palliative therapy.  Cycle#3 on 10/20/20.   > hold any going active chemo in acute hospital setting    Pancytopenia due to chemo suspected, status post transfusion of packed red blood cell and platelets.  > CBC with diff daily  > transfuse hemoglobin if less than 7 g/DL or symptomatic  > transfuse platelets if less than 20K  > Neupogen daily 480 mcg  to goals of ANC> 1500  > oncology will continue to follow    Neutropenic fever sepsis Gram-negative on preliminary blood culture Gram stain  > continue ongoing IV antibiotics  > add urine culture  > monitor fever.   > Check blood culture  from chemo port today and repeat culture in 3 days.  If persistent fever after 72hr or patient remain bacteremia despite IV antibiotics then add antifungal.   Of note patient do not have chem port she has picc   > patient is full code.  Family expect to be present tomorrow I will discuss with patient family in AM      > oncology will continue to follow        Please contact us for any concern

## 2020-10-31 NOTE — PROGRESS NOTES
[facility] Medicine  Progress Note    Patient name: Kendra Lawrence  MRN: [id number]  Admit Date: 10/30/2020   LOS: 1 day     SUBJECTIVE:     Principal problem: Septic shock    Interval History:    Appears restless, Remains febrile, on pressor Levophed, prelim blood culture Gram-negative edwin  Remains neutropenic appropriate rise in hemoglobin and platelet after transfusion  Hypokalemia resolved, will replace Mag  Adequate urine output  Will obtain culture from PICC line    Scheduled Meds:   albuterol-ipratropium  3 mL Nebulization Q4H    alteplase  2 mg Intra-Catheter Once    azithromycin  500 mg Intravenous Q24H    buprenorphine-naloxone  1 each Sublingual BID    busPIRone  10 mg Oral BID    ceFEPime (MAXIPIME) IVPB  2 g Intravenous Q12H    chlorhexidine  15 mL Mouth/Throat BID    mupirocin   Nasal BID    sucralfate  1 g Oral QID    tbo-filgrastim  480 mcg Subcutaneous Daily    vancomycin (VANCOCIN) IVPB  1,000 mg Intravenous Q24H     Continuous Infusions:   lactated ringers 100 mL/hr at 10/31/20 1318    norepinephrine bitartrate-D5W 0.1 mcg/kg/min (10/31/20 0606)     PRN Meds:sodium chloride, sodium chloride, acetaminophen, acetaminophen, acetaminophen, albuterol-ipratropium, calcium chloride IVPB, calcium chloride IVPB, calcium chloride IVPB, calcium chloride IVPB, calcium chloride IVPB, calcium chloride IVPB, dextrose 50%, dextrose 50%, glucagon (human recombinant), glucose, glucose, lorazepam, magnesium oxide, magnesium oxide, magnesium sulfate IVPB, magnesium sulfate IVPB, magnesium sulfate IVPB, magnesium sulfate IVPB, magnesium sulfate IVPB, magnesium sulfate IVPB, magnesium sulfate IVPB, magnesium sulfate IVPB, melatonin, ondansetron, ondansetron, ondansetron, potassium chloride in water, potassium chloride in water, potassium chloride in water, potassium chloride in water, potassium chloride in water, potassium chloride in water, potassium chloride in water, potassium  chloride in water, potassium chloride, potassium chloride, potassium chloride, potassium chloride, potassium chloride, potassium chloride, potassium chloride, potassium chloride, simethicone, sodium phosphate IVPB, sodium phosphate IVPB, sodium phosphate IVPB, sodium phosphate IVPB, sodium phosphate IVPB, sodium phosphate IVPB, sodium phosphate IVPB, sodium phosphate IVPB, sodium phosphate IVPB, sodium phosphate IVPB, Pharmacy to dose Vancomycin consult **AND** vancomycin - pharmacy to dose    Review of patient's allergies indicates:  No Known Allergies    Review of Systems: As per interval history    OBJECTIVE:     Vital Signs (Most Recent)  Temp: (!) 102.2 °F (39 °C) (10/31/20 0910)  Pulse: 106 (10/31/20 1500)  Resp: (!) 26 (10/31/20 1500)  BP: 120/66 (10/31/20 0945)  SpO2: 98 % (10/31/20 1500)    Vital Signs Range (Last 24H):  Temp:  [96.2 °F (35.7 °C)-102.2 °F (39 °C)]   Pulse:  []   Resp:  [19-63]   BP: ()/()   SpO2:  [66 %-100 %]     I & O (Last 24H):    Intake/Output Summary (Last 24 hours) at 10/31/2020 1605  Last data filed at 10/31/2020 0701  Gross per 24 hour   Intake 2599 ml   Output 2251 ml   Net 348 ml       Physical Exam:  General appearance:  Chronic ill-looking malnourished alopecia post chemo, appears anxious.  Skin: No Rash.   Neuro: Motor and sensory exams grossly intact. Good tone. Power in all 4 extremities 5/5.   HENT: Atraumatic head. Moist mucous membranes of oral cavity.  Eyes: Normal extraocular movements.   Neck: Supple. No evidence of lymphadenopathy. No thyroidomegaly.  Lungs: Clear to auscultation bilaterally.  Occasional wheezing present on bilateral decreased breath sound.   Heart: Regular rate and rhythm. S1 and S2 present with no murmurs/gallop/rub. No pedal edema. No JVD present.   Abdomen: Soft, non-distended, mild epigastric tender. No rebound tenderness/guarding. No masses or organomegaly. Bowel sounds are normal. Bladder is not palpable.  :no silva ,no CVA  tenderness  Extremities: No cyanosis, clubbing, or edema.  Left upper extremity-sided PICC line  Psych/mental status: Alert and oriented. Cooperative. Responds appropriately to questions.   Laboratory:  CBC:   Recent Labs   Lab 10/31/20  0805   WBC 0.09*   RBC 3.07*   HGB 8.6*   HCT 25.8*   PLT 36*   MCV 84   MCH 28.0   MCHC 33.3     CMP:   Recent Labs   Lab 10/31/20  0416   *   CALCIUM 8.3*   ALBUMIN 2.9*   PROT 6.6   *   K 4.2   CO2 20*   CL 95   BUN 16   CREATININE 1.0   ALKPHOS 118   ALT 30   AST 26   BILITOT 1.2*     Coagulation:   Recent Labs   Lab 10/30/20  1450   INR 1.2     Cardiac markers: No results for input(s): CKMB, CPKMB, TROPONINT, TROPONINI, MYOGLOBIN in the last 168 hours.  Recent Labs   Lab 10/30/20  2255   COLORU Yellow   SPECGRAV 1.010   PHUR 6.0   PROTEINUA Trace*   BACTERIA Negative   NITRITE Positive*   LEUKOCYTESUR Negative   UROBILINOGEN Negative   HYALINECASTS 8*       Diagnostic Results:  X-Ray: Reviewed  1. Small bilateral lower lung zone pulmonary infiltrates ar  1. Small bilateral lower lung zone pulmonary infiltrates are  suspicious for pneumonia, mildly increased when compared to October 30.  2. Right hilar mass and associated midlung zone atelectasis or  scarring are unchanged.  3. Tiny right pleural effusion, stable.  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         Plan:   Oxy nc   Broad-spectrum IV antibiotic Vanco  meropenem  and  azithromycin   pt has double-lumen PICC line   Panculture blood and sputum  S/p  2 unit PRBC and plt transfuse  Monitor and replace electrolytes  Monitor and replace magnesium  Restart home med  Oncology and  pulmonology consult appreciated  Zofran p.r.n.  Monitor intake and output  Neutropenic precaution  High risk for acute deterioration due to underlying cancer with severe pancytopenia  Pt/ot     DVT Prophylaxis: will be placed on SCD for DVT prophylaxis and will be advised to be as mobile as possible and sit in a chair as tolerated.       VTE Risk Mitigation (From admission, onward)         Ordered     IP VTE HIGH RISK PATIENT  Once      10/30/20 1719     Place sequential compression device  Until discontinued      10/30/20 1719                  Department Hospital Medicine  Formerly [facility]  Charisse Osullivan MD  Date of service: 10/31/2020

## 2020-10-31 NOTE — NURSING
Dr. Estes notified of consult. H&H, platelet count and lactic acid States that he will see her in the morning.

## 2020-10-31 NOTE — PLAN OF CARE
Safety precautions in effect. Patient able to demonstrate use of call bell.  BP stable on Levophed.  Receiving blood and platelets tonight.  Trending lactic acids. Last 3.9, on antibiotics.

## 2020-10-31 NOTE — NURSING
Dr. Reyna notified via secure chat the development of crackles in patient's lungs. Waiting response.

## 2020-10-31 NOTE — PROGRESS NOTES
Pharmacokinetic Initial Assessment: IV Vancomycin    Assessment/Plan:    Initiate intravenous vancomycin with loading dose of 1000 mg once followed by a maintenance dose of vancomycin 1000 mg IV every 24 hours  Desired empiric serum trough concentration is 10 to 15 mcg/mL  Draw vancomycin trough level 60 min prior to fourth dose on 11/2/20 at approximately 17:00.  Pharmacy will continue to follow and monitor vancomycin.      Please contact pharmacy at extension [phone] with any questions regarding this assessment.     Thank you for the consult,   Nevaeh Torres       Patient brief summary:  Kendra Lawrence is a 65 y.o. female initiated on antimicrobial therapy with IV Vancomycin for treatment of suspected lower respiratory infection    Drug Allergies:   Review of patient's allergies indicates:  No Known Allergies    Actual Body Weight:   64.9 kg    Renal Function:   Estimated Creatinine Clearance: 52.4 mL/min (based on SCr of 0.9 mg/dL).,       CBC (last 72 hours):  Recent Labs   Lab Result Units 10/30/20  1450   WBC K/uL 0.08*   Hemoglobin g/dL 5.5*   Hematocrit % 16.9*   Platelets K/uL 15*   Gran % % 10.0*   Lymph % % 80.0*   Mono % % 10.0   Eosinophil % % 0.0   Basophil % % 0.0   Differential Method  Manual       Metabolic Panel (last 72 hours):  Recent Labs   Lab Result Units 10/30/20  1450   Sodium mmol/L 130*   Potassium mmol/L 2.6*   Chloride mmol/L 93*   CO2 mmol/L 25   Glucose mg/dL 100   BUN mg/dL 10   Creatinine mg/dL 0.9   Albumin g/dL 2.7*   Total Bilirubin mg/dL 0.9   Alkaline Phosphatase U/L 126   AST U/L 30   ALT U/L 29   Magnesium mg/dL 1.1*       Drug levels (last 3 results):  No results for input(s): VANCOMYCINRA, VANCOMYCINPE, VANCOMYCINTR in the last 72 hours.    Microbiologic Results:  Microbiology Results (last 7 days)       Procedure Component Value Units Date/Time    Culture, Respiratory with Gram Stain [[id number]]     Order Status: No result Specimen: Respiratory     Blood culture x two cultures.  Draw prior to antibiotics. [[id number]] Collected: 10/30/20 1430    Order Status: Sent Specimen: Blood from Peripheral, Upper Arm, Right Updated: 10/30/20 1442    Blood culture x two cultures. Draw prior to antibiotics. [[id number]] Collected: 10/30/20 1430    Order Status: Sent Specimen: Blood from Line, PICC Left Brachial Updated: 10/30/20 1446

## 2020-10-31 NOTE — PLAN OF CARE
10/31/20 1851   Patient Assessment/Suction   Level of Consciousness (AVPU) alert   Respiratory Effort Normal;Unlabored   Expansion/Accessory Muscles/Retractions no use of accessory muscles   All Lung Fields Breath Sounds coarse   Rhythm/Pattern, Respiratory no shortness of breath reported   Cough Frequency infrequent   Cough Type nonproductive   PRE-TX-O2   O2 Device (Oxygen Therapy) nasal cannula   $ Is the patient on Low Flow Oxygen? Yes   Flow (L/min) 5   SpO2 99 %   Pulse Oximetry Type Continuous   $ Pulse Oximetry - Multiple Charge Pulse Oximetry - Multiple   Pulse (!) 129   Resp (!) 29   Aerosol Therapy   $ Aerosol Therapy Charges Aerosol Treatment   Daily Review of Necessity (SVN) completed   Respiratory Treatment Status (SVN) given   Treatment Route (SVN) oxygen;mask   Patient Position (SVN) HOB elevated   Post Treatment Assessment (SVN) congestion decreased   Signs of Intolerance (SVN) none   Respiratory Evaluation   $ Care Plan Tech Time 15 min

## 2020-10-31 NOTE — PROGRESS NOTES
Pulmonary/Critical Care progress note      PATIENT NAME: Kendra Lawrence  MRN: [id number]  TODAY'S DATE: 10/31/2020  6:19 PM  ADMIT DATE: 10/30/2020  AGE: 65 y.o. : 1955        HPI:  Patient is a 63-year-old female who completed her 3rd round of chemotherapy and presents to the hospital with nausea and vomiting and fever.  She is found to have significant pancytopenia and a new left lower lobe pneumonia.  Her right middle lobe is completely filled with tumor and postobstructive pneumonia which is PET hot.    10/31 the patient had pulmonary edema last night with her blood transfusion and volume loading for septic shock..  She remains significantly short of breath today.  She is still significantly neutropenic.  She has not had any Neupogen.  Her oncologist is supposed to see her today.  She states she is feeling better.    REVIEW OF SYSTEMS  GENERAL: Feeling a little better. She has been running fever.  EYES: Vision is good.  ENT: No sinusitis or pharyngitis.   HEART: No chest pain or palpitations.  LUNGS: She is coughing, no sputum  GI:  No further nausea or vomiting  : No dysuria, hesitancy, or nocturia.  SKIN: No lesions or rashes.  MUSCULOSKELETAL: No joint pain or myalgias.  NEURO: No headaches or neuropathy.  LYMPH: No edema or adenopathy.  PSYCH: No anxiety or depression.  ENDO: No weight change.      VITAL SIGNS (MOST RECENT)  Temp: (!) 102.2 °F (39 °C) (10/31/20 0910)  Pulse: (!) 123 (10/31/20 0945)  Resp: (!) 36 (10/31/20 0945)  BP: 120/66 (10/31/20 0945)  SpO2: 100 % (10/31/20 0945)    INTAKE AND OUTPUT (LAST 24 HOURS):    Intake/Output Summary (Last 24 hours) at 10/31/2020 1057  Last data filed at 10/31/2020 0701  Gross per 24 hour   Intake 2599 ml   Output 2251 ml   Net 348 ml       WEIGHT  Wt Readings from Last 1 Encounters:   10/31/20 67.3 kg (148 lb 5.9 oz)       PHYSICAL EXAM  GENERAL: Older patient looking ill.  HEENT: Pupils equal and reactive. Extraocular movements intact. Nose intact.  Pharynx dry.  Dentition poor  NECK: Supple.   HEART: Regular rate and rhythm. No murmur or gallop auscultated.  LUNGS:  There crackles in the bases.  There are decreased breath sounds in the right mid lung.  The left lower lobe is clear.  Lung excursion symmetrical. No change in fremitus.  ABDOMEN: Bowel sounds present.  Nontender, no masses.  : Normal anatomy.  [place] with yellow urine.  EXTREMITIES: Normal muscle tone and joint movement, no cyanosis or clubbing.   LYMPHATICS: No adenopathy palpated, no edema.  SKIN: Dry, intact, no lesions.  Patient is losing her hair.  NEURO: Cranial nerves II-XII intact. Motor strength 5/5 bilaterally, upper and lower extremities.  PSYCH: Appropriate affect    CBC LAST (LAST 24 HOURS)  Recent Labs   Lab 10/31/20  0805   WBC 0.09*   RBC 3.07*   HGB 8.6*   HCT 25.8*   MCV 84   MCH 28.0   MCHC 33.3   RDW 14.0   PLT 36*   MPV 10.1   GRAN 10.0*   LYMPH 83.0*   MONO 6.0   NRBC 0       CHEMISTRY LAST (LAST 24 HOURS)  Recent Labs   Lab 10/30/20  1955  10/31/20  0416   NA  --   --  132*   K  --    < > 4.2   CL  --   --  95   CO2  --   --  20*   ANIONGAP  --   --  17*   BUN  --   --  16   CREATININE  --   --  1.0   GLU  --   --  140*   CALCIUM  --   --  8.3*   PH 7.454*  --   --    MG  --    < > 1.5*   ALBUMIN  --   --  2.9*   PROT  --   --  6.6   ALKPHOS  --   --  118   ALT  --   --  30   AST  --   --  26   BILITOT  --   --  1.2*    < > = values in this interval not displayed.       COAGULATION LAST (LAST 24 HOURS)  Recent Labs   Lab 10/30/20  1450   LABPT 14.5   INR 1.2         LAST 7 DAYS MICROBIOLOGY   Microbiology Results (last 7 days)     Procedure Component Value Units Date/Time    Blood culture x two cultures. Draw prior to antibiotics. [[id number]] Collected: 10/30/20 1430    Order Status: Completed Specimen: Blood from Line, PICC Left Brachial Updated: 10/31/20 0709     Blood Culture, Routine Gram stain aer bottle: Gram negative rods      Results called to and read back  by:RAYRAY GonzalezU;  10/31/2020        03:35 CJD    Narrative:      Aerobic and anaerobic    Blood culture x two cultures. Draw prior to antibiotics. [[id number]] Collected: 10/30/20 1430    Order Status: Completed Specimen: Blood from Peripheral, Upper Arm, Right Updated: 10/31/20 0335     Blood Culture, Routine Gram stain aer bottle: Gram negative rods      Results called to and read back by:RICK Gonzalez;  10/31/2020        03:35 CJD    Narrative:      Aerobic and anaerobic    Culture, Respiratory with Gram Stain [[id number]]     Order Status: No result Specimen: Respiratory           MOST RECENT IMAGING  CT Abdomen Pelvis  Without Contrast  1.  No acute intra-abdominal abnormality identified.  2.  Small right pleural effusion.  3.  Consolidative process noted in the right lower lung.  4.  Moderate stool content throughout the large bowel may reflect  constipation.  5.  2 mm nonobstructing renal calculus at the midpole the left  kidney.    X-Ray Chest AP Portable    There are new left lower lung zone airspace opacities suspicious for  pneumonia, with decreased size of right middle lobe and perihilar  masslike opacity compatible with neoplasm and post obstructive  atelectasis. Minor right lung base opacities suggest subsegmental  atelectasis, with small pleural effusion blunting the right lateral  costophrenic angle. No evidence of interstitial pulmonary edema or  pneumothorax.    The bones are diffusely osteopenic, with no acute osseous abnormality.    10/31 Chest x-ray  There are increased markings in both lower lobes consistent with pulmonary edema.  The right middle lobe remains completely atelectatic.  She has a pneumonia in the left lower lobe.      CURRENT VISIT EKG  Results for orders placed or performed during the hospital encounter of 10/30/20   EKG 12-lead    Narrative    Test Reason : R50.9,    Vent. Rate : 123 BPM     Atrial Rate : 123 BPM     P-R Int : 134 ms          QRS Dur : 078 ms       QT Int : 318 ms       P-R-T Axes : 057 050 068 degrees     QTc Int : 455 ms    Sinus tachycardia  Nonspecific ST and T wave abnormality  Abnormal ECG  When compared with ECG of 18-AUG-2020 08:47,  Minimal criteria for Anterior infarct are no longer Present  ST now depressed in Lateral leads    Referred By: LAURAERR   SELF           Confirmed By:             LAST ARTERIAL BLOOD GAS  ABG  Recent Labs   Lab 10/30/20  1955   PH 7.454*   PO2 78*   PCO2 32.4*   HCO3 22.7*   BE -1       IMPRESSION AND PLAN  Septic shock, continuing  Neutropenic pneumonia, organism unknown  Lactic acidosis  Small cell carcinoma  Pancytopenia, severe, improved with transfusion of 2 units of packed red blood cells and 1 bag of platelets, leukocytes remain extremely low  Fever, continuing  Intractable nausea and vomiting, improved  PICC line in place  Anxiety  Hyponatremia  Hypokalemia, corrected  Hypomagnesemia  Metabolic acidosis, increased anion gap, lactic  Moderate malnutrition    Trend lactic acid  Merrem and vanc and azithromycin to continue  Levophed to keep mean of 65  Oxygen to keep sats greater than 90, currently on 2 L  Transfused 2 units packed red blood cells  Transfused platelets  Awaiting order for Neupogen  Replace electrolytes  Sputum for culture if it can be obtained  Bronchodilators  Serial labs  Ativan as needed for anxiety  Patient wishes to be a full code.  She states she has years to live.    Critical care time spent reviewing the chart, examining the patient, reviewing the labs, reviewing the radiological findings, discussing care with nursing, physicians, and respiratory and creating the note and  has been greater than 35 minutes

## 2020-10-31 NOTE — ED NOTES
Assumed pt care from JORDI Boswell. Updated pt on plan of care. Per Dr. Mello's orders, levaphed started at 0.2mcg/kg/hr. Pt has no needs at this time. Blood bank states blood is now ready, ICU aware. Calling report to [facility].

## 2020-10-31 NOTE — PROGRESS NOTES
Upon review of charts, I noticed pt was admitted to [facility] and consult was not given to [facility] hematology/oncology provider. Please send consult to Dr. Yemi Estes, who is on call this weekend, ASAP. Thank you. [facility] hematology/oncology has privileges at [facility] and hem/onc pts belonging to Dr. Yemi Estes, Berna Acuña, and Yasemin Sherman should always have consults sent to them if their hem/onc patients are admitted to [facility], as we share privileges with [facility] and [facility] and we value our patients and need to be updated on their medical status. Please send us all consults regarding our patients in the future. Thank you.

## 2020-10-31 NOTE — NURSING
Patient states that she is no longer taking the Fosamax, Buspar,Suboxone and the hydrochlorothiazide

## 2020-10-31 NOTE — NURSING
Dr. Mello updated on patient's status. Crackles developing, Lasix given, Lactic acid again rising.

## 2020-10-31 NOTE — PLAN OF CARE
10/31/20 1120   Patient Assessment/Suction   Level of Consciousness (AVPU) alert   Respiratory Effort Normal;Unlabored   Expansion/Accessory Muscles/Retractions no use of accessory muscles;expansion symmetric   All Lung Fields Breath Sounds coarse   SUDHEER Breath Sounds coarse   LLL Breath Sounds crackles   RUL Breath Sounds coarse   RML Breath Sounds coarse   RLL Breath Sounds crackles   Rhythm/Pattern, Respiratory unlabored   Cough Frequency infrequent   PRE-TX-O2   SpO2 95 %   Pulse 103   Resp (!) 25   Aerosol Therapy   $ Aerosol Therapy Charges Aerosol Treatment   Daily Review of Necessity (SVN) completed   Respiratory Treatment Status (SVN) given   Treatment Route (SVN) mask;oxygen   Patient Position (SVN) HOB elevated   Post Treatment Assessment (SVN) congestion decreased;increased aeration   Signs of Intolerance (SVN) none   Breath Sounds Post-Respiratory Treatment   Throughout All Fields Post-Treatment All Fields   Throughout All Fields Post-Treatment aeration increased   Post-treatment Heart Rate (beats/min) 104   Post-treatment Resp Rate (breaths/min) 26   CONTINUE TX Q4

## 2020-11-01 PROBLEM — C79.9 METASTATIC CANCER: Status: ACTIVE | Noted: 2020-01-01

## 2020-11-01 PROBLEM — Z51.5 COMFORT MEASURES ONLY STATUS: Status: ACTIVE | Noted: 2020-01-01

## 2020-11-01 NOTE — PROGRESS NOTES
HPI    Patient is 65 year old female seen followed by Dr. Domenico Goodman of small cell lung CA right, COPD, opiates dependent hypertension CAD acid reflux disease.  Last seen in clinic 10/22/2020 for treatment evaluation cycle 3 which consists of carboplatin etoposide q. 3 weeks.  At the time patient was presented to office in a wheelchair on nasal cannula.  She was complaining nausea was given refills at the time which consists of oral Phenergan 25 mg p.o. q.6 hours.  Patient was also instructed to alternate between Phenergan and Zofran p.r.n..       On this admission patient was admitted to the hospital for principal complaining of vomiting.  It was reported worsening nausea vomiting since last chemo over 2 days.  Patient also reported of fever of 102.5 at home with shortness of breath at baseline.  Denies any other change.  Patient was sent to the hospital for further evaluation.  Pulmonology was consulted.  Hematology oncology consultation request for assisting management due to above.    Past Medical History:   Diagnosis Date    Asthma     Cardiac angina     Chronic abdominal pain     Claustrophobia     COPD (chronic obstructive pulmonary disease)     Coronary artery disease     GERD (gastroesophageal reflux disease)     History of drug dependence     Hypertension     Hypocalcemia 2020       Past Surgical History:   Procedure Laterality Date    BREAST BIOPSY Bilateral 20 yrs ago    benign    BRONCHOSCOPY N/A 2020    Procedure: Bronchoscopy- 730 or noon, floro not needed;  Surgeon: Andrew Brothers MD;  Location: [facility];  Service: Endoscopy;  Laterality: N/A;     SECTION      CHOLECYSTECTOMY      ENDOSCOPIC ULTRASOUND OF UPPER GASTROINTESTINAL TRACT Left 2018    Procedure: ULTRASOUND, UPPER GI TRACT, ENDOSCOPIC;  Surgeon: Paras Negrete MD;  Location: [facility];  Service: Endoscopy;  Laterality: Left;    ESOPHAGOGASTRODUODENOSCOPY N/A 2018    Procedure: EGD  (ESOPHAGOGASTRODUODENOSCOPY);  Surgeon: Paras Negrete MD;  Location: [facility] ENDO;  Service: Endoscopy;  Laterality: N/A;    ESOPHAGOGASTRODUODENOSCOPY N/A 12/21/2018    Procedure: EGD (ESOPHAGOGASTRODUODENOSCOPY);  Surgeon: Paras Negrete MD;  Location: [facility];  Service: Endoscopy;  Laterality: N/A;    HYSTERECTOMY      MAGNETIC RESONANCE IMAGING N/A 2/19/2019    Procedure: MRI (Magnetic Resonance Imagine) needs anesthesia;  Surgeon: Raffy Charles MD;  Location: [facility];  Service: Anesthesiology;  Laterality: N/A;    OOPHORECTOMY      TEMPOROMANDIBULAR JOINT SURGERY      TONSILLECTOMY       Social History     Socioeconomic History    Marital status:      Spouse name: Not on file    Number of children: Not on file    Years of education: Not on file    Highest education level: Not on file   Occupational History    Not on file   Social Needs    Financial resource strain: Not on file    Food insecurity     Worry: Not on file     Inability: Not on file    Transportation needs     Medical: Not on file     Non-medical: Not on file   Tobacco Use    Smoking status: Former Smoker     Years: 52.00     Types: Vaping w/o nicotine    Smokeless tobacco: Never Used   Substance and Sexual Activity    Alcohol use: No     Frequency: Never    Drug use: Yes     Types: Hydrocodone    Sexual activity: Not on file   Lifestyle    Physical activity     Days per week: Not on file     Minutes per session: Not on file    Stress: Not on file   Relationships    Social connections     Talks on phone: Not on file     Gets together: Not on file     Attends Nondenominational service: Not on file     Active member of club or organization: Not on file     Attends meetings of clubs or organizations: Not on file     Relationship status: Not on file   Other Topics Concern    Not on file   Social History Narrative    Not on file     Review of patient's allergies indicates:  No Known Allergies  Family History    Problem Relation Age of Onset    Breast cancer Sister 55    Breast cancer Sister 50    Cancer Mother     Heart disease Mother     Hypertension Mother     Cancer Father      physical exam  Vitals:    11/01/20 0715   BP:    Pulse: (!) 128   Resp: (!) 30   Temp:      Ill-appearing individual  Normocephalic atraumatic pupils equal round reactive  Tachycardic  decreased breath  Bowel sounds presents   defer  Normal muscle tone joint movement no cyanosis clubbing  Skin dry no lesion no rash  Normal logically grossly intact  Normal affect    CMP  Sodium   Date Value Ref Range Status   11/01/2020 132 (L) 136 - 145 mmol/L Final     Potassium   Date Value Ref Range Status   11/01/2020 2.9 (LL) 3.5 - 5.1 mmol/L Final     Comment:     k critical result(s) repeated. Called and verbal readback obtained   from june roman rn mi2 by FRANKLYN 11/01/2020 05:38       Chloride   Date Value Ref Range Status   11/01/2020 91 (L) 95 - 110 mmol/L Final     CO2   Date Value Ref Range Status   11/01/2020 26 23 - 29 mmol/L Final     Glucose   Date Value Ref Range Status   11/01/2020 167 (H) 70 - 110 mg/dL Final     BUN   Date Value Ref Range Status   11/01/2020 19 8 - 23 mg/dL Final     Creatinine   Date Value Ref Range Status   11/01/2020 1.0 0.5 - 1.4 mg/dL Final     Calcium   Date Value Ref Range Status   11/01/2020 7.6 (L) 8.7 - 10.5 mg/dL Final     Total Protein   Date Value Ref Range Status   11/01/2020 6.3 6.0 - 8.4 g/dL Final     Albumin   Date Value Ref Range Status   11/01/2020 2.4 (L) 3.5 - 5.2 g/dL Final     Total Bilirubin   Date Value Ref Range Status   11/01/2020 1.0 0.1 - 1.0 mg/dL Final     Comment:     For infants and newborns, interpretation of results should be based  on gestational age, weight and in agreement with clinical  observations.  Premature Infant recommended reference ranges:  Up to 24 hours.............<8.0 mg/dL  Up to 48 hours............<12.0 mg/dL  3-5 days..................<15.0 mg/dL  6-29  days.................<15.0 mg/dL       Alkaline Phosphatase   Date Value Ref Range Status   11/01/2020 84 55 - 135 U/L Final     AST   Date Value Ref Range Status   11/01/2020 22 10 - 40 U/L Final     ALT   Date Value Ref Range Status   11/01/2020 30 10 - 44 U/L Final     Anion Gap   Date Value Ref Range Status   11/01/2020 15 8 - 16 mmol/L Final     eGFR if    Date Value Ref Range Status   11/01/2020 >60.0 >60 mL/min/1.73 m^2 Final     eGFR if non    Date Value Ref Range Status   11/01/2020 58.4 (A) >60 mL/min/1.73 m^2 Final     Comment:     Calculation used to obtain the estimated glomerular filtration  rate (eGFR) is the CKD-EPI equation.        Lab Results   Component Value Date    WBC 0.09 (LL) 11/01/2020    HGB 8.6 (L) 11/01/2020    HCT 25.5 (L) 11/01/2020    MCV 84 11/01/2020    PLT 17 (LL) 11/01/2020       X-ray left lower lung zone airspace opacities suspicious for pneumonia.    CT abdomen pelvis without contrast  IMPRESSION:     1.  No acute intra-abdominal abnormality identified.  2.  Small right pleural effusion.  3.  Consolidative process noted in the right lower lung.  4.  Moderate stool content throughout the large bowel may reflect  constipation.  5.  2 mm nonobstructing renal calculus at the midpole the left  Kidney.    Blood culture  Blood culture x2 preliminary day 1.  G stain negative Rods  Respiratory culture pending    Assessment and plan    Small-cell lung cancer locally advanced receiving carboplatin etoposide q3w for palliative therapy.  Cycle#3 on 10/20/20.   > hold any going active chemo in acute hospital setting  > very poor prognosis.  > I have reviewed pulmonology daily progress note.  Aware of the family will be here 9:00 a.m. today.  I will have discussion with family member regarding patient's medical care.  At this point in the sitting of severely sepsis and clinically deteriorate, with terminal ill disease, patient is hospice appropriate.  > patient  and family agree to hospice after discussion .  Will consult the hospice team.  All questions answered     Pancytopenia due to chemo suspected, status post transfusion of packed red blood cell and platelets.    > Neupogen daily 480 mcg  to goals of ANC> 1500 till finalized by hospice care team  > BP support   > of note patient wants to be home hospice.  Will have hospice team discuss such option     Hypotensive require pressor  > continue pressor for now      Neutropenic fever sepsis Gram-negative on preliminary blood culture Gram stain  > continue ongoing IV antibiotics for now    Please contact us for any concern

## 2020-11-01 NOTE — PLAN OF CARE
Safety precautions remain in effect.  Temp max tonight 100.2 orally..  Remains on Levophed.  Urinary output good.

## 2020-11-01 NOTE — NURSING
Dr. umaña notified via secure chat of fingerstick glucose again up to 411 and insulin rate. Waiting response.

## 2020-11-01 NOTE — HOSPITAL COURSE
Patient was admitted to ICU for septic shock with neutropenic fever ,  with severe pancytopenia found to have left lower lobe pneumonia with elevated procalcitonin.  Placed on reverse isolation, required Levophed given through her PICC line, oxygen nasal cannula 4 L.  Started on broad-spectrum antibiotic Vanco Merrem and azithromycin, blood culture positive for gram-negative rods, hospital course complicated with worsening shock in spite of adequate fluid resuscitation, received 2 units PRBC and 1 unit of plt, started on Neupogen, patient became anxious requires Ativan q.4, pulmonology and Oncology consult appreciated, Dr. Estes from Dr. Acuña 's oncology group, had a family discussion about the goal of care, family agreed to continue with inpatient hospice.  Pressors discontinued,  consulted, patient got accepted to [facility]. patient continued to be on oxygen and Ativan p.r.n..  Examination on the day of discharge:  General appearance:  Chronic ill-looking malnourished alopecia post chemo, appears short of breath and anxious.  Skin: No Rash.   Neuro: Motor and sensory exams grossly intact. Good tone. Power in all 4 extremities 5/5.   HENT: Atraumatic head. Moist mucous membranes of oral cavity.  Poor dentition  Eyes: Normal extraocular movements.   Neck: Supple. No evidence of lymphadenopathy. No thyroidomegaly.  Lungs: Clear to auscultation bilaterally.  Occasional wheezing present on bilateral decreased breath sound.  Rales on right side  Heart: Regular rate and rhythm. S1 and S2 present with no murmurs/gallop/rub. No pedal edema. No JVD present.   Abdomen: Soft, non-distended, mild epigastric tender. No rebound tenderness/guarding. No masses or organomegaly. Bowel sounds are normal. Bladder is not palpable.  :no silva ,no CVA tenderness  Extremities: No cyanosis, clubbing, or edema.  Left upper extremity-sided PICC line  Psych/mental status: Alert and oriented. Cooperative.  Responds appropriately to questions.

## 2020-11-01 NOTE — PROGRESS NOTES
[facility]  Progress Note    Patient name: Kendra Lawrence  MRN: [id number]  Admit Date: 10/30/2020   LOS: 2 days     SUBJECTIVE:     Principal problem: Septic shock    Interval History:    11/1: increased oxy requirement  hypokalemia with low plt,remains neutropenic  See d/renny frias for further detail  10/31 Appears restless, Remains febrile, on pressor Levophed, prelim blood culture Gram-negative edwin  Remains neutropenic appropriate rise in hemoglobin and platelet after transfusion  Hypokalemia resolved, will replace Mag  Adequate urine output  Will obtain culture from PICC line    Scheduled Meds:   norepinephrine        albuterol-ipratropium  3 mL Nebulization Q4H    azithromycin  500 mg Intravenous Q24H    buprenorphine-naloxone  1 each Sublingual BID    busPIRone  10 mg Oral BID    ceFEPime (MAXIPIME) IVPB  2 g Intravenous Q12H    chlorhexidine  15 mL Mouth/Throat BID    magnesium sulfate IVPB  1 g Intravenous Once    mupirocin   Nasal BID    potassium chloride  40 mEq Oral Q4H    sucralfate  1 g Oral QID    tbo-filgrastim  480 mcg Subcutaneous Daily    vancomycin (VANCOCIN) IVPB  1,000 mg Intravenous Q24H     Continuous Infusions:   lactated ringers Stopped (10/31/20 1602)    norepinephrine bitartrate-D5W 0.3 mcg/kg/min (11/01/20 0400)     PRN Meds:sodium chloride, acetaminophen, acetaminophen, acetaminophen, albuterol-ipratropium, calcium chloride IVPB, calcium chloride IVPB, calcium chloride IVPB, calcium chloride IVPB, calcium chloride IVPB, calcium chloride IVPB, dextrose 50%, dextrose 50%, glucagon (human recombinant), glucose, glucose, lorazepam, magnesium oxide, magnesium oxide, magnesium sulfate IVPB, magnesium sulfate IVPB, magnesium sulfate IVPB, magnesium sulfate IVPB, magnesium sulfate IVPB, magnesium sulfate IVPB, magnesium sulfate IVPB, magnesium sulfate IVPB, melatonin, ondansetron, ondansetron, ondansetron, potassium chloride in water, potassium  chloride in water, potassium chloride in water, potassium chloride in water, potassium chloride in water, potassium chloride in water, potassium chloride in water, potassium chloride in water, potassium chloride, potassium chloride, potassium chloride, potassium chloride, potassium chloride, potassium chloride, potassium chloride, potassium chloride, simethicone, sodium phosphate IVPB, sodium phosphate IVPB, sodium phosphate IVPB, sodium phosphate IVPB, sodium phosphate IVPB, sodium phosphate IVPB, sodium phosphate IVPB, sodium phosphate IVPB, sodium phosphate IVPB, sodium phosphate IVPB, Pharmacy to dose Vancomycin consult **AND** vancomycin - pharmacy to dose    Review of patient's allergies indicates:  No Known Allergies    Review of Systems: As per interval history    OBJECTIVE:     Vital Signs (Most Recent)  Temp: 99.3 °F (37.4 °C) (11/01/20 0300)  Pulse: (!) 126 (11/01/20 0400)  Resp: (!) 43 (11/01/20 0400)  BP: (!) 98/53 (11/01/20 0400)  SpO2: 98 % (11/01/20 0400)    Vital Signs Range (Last 24H):  Temp:  [98.9 °F (37.2 °C)-102.2 °F (39 °C)]   Pulse:  []   Resp:  [20-67]   BP: ()/(50-85)   SpO2:  [78 %-100 %]     I & O (Last 24H):    Intake/Output Summary (Last 24 hours) at 11/1/2020 0654  Last data filed at 11/1/2020 0400  Gross per 24 hour   Intake 1728.21 ml   Output 2900 ml   Net -1171.79 ml       Physical Exam:  General appearance:  Chronic ill-looking malnourished alopecia post chemo, appears anxious.  Skin: No Rash.   Neuro: Motor and sensory exams grossly intact. Good tone. Power in all 4 extremities 5/5.   HENT: Atraumatic head. Moist mucous membranes of oral cavity.  Eyes: Normal extraocular movements.   Neck: Supple. No evidence of lymphadenopathy. No thyroidomegaly.  Lungs: Clear to auscultation bilaterally.  Occasional wheezing present on bilateral decreased breath sound.   Heart: Regular rate and rhythm. S1 and S2 present with no murmurs/gallop/rub. No pedal edema. No JVD present.    Abdomen: Soft, non-distended, mild epigastric tender. No rebound tenderness/guarding. No masses or organomegaly. Bowel sounds are normal. Bladder is not palpable.  :no silva ,no CVA tenderness  Extremities: No cyanosis, clubbing, or edema.  Left upper extremity-sided PICC line  Psych/mental status: Alert and oriented. Cooperative. Responds appropriately to questions.   Laboratory:  CBC:   Recent Labs   Lab 11/01/20  0457   WBC 0.09*   RBC 3.03*   HGB 8.6*   HCT 25.5*   PLT 17*   MCV 84   MCH 28.4   MCHC 33.7     CMP:   Recent Labs   Lab 11/01/20  0457   *   CALCIUM 7.6*   ALBUMIN 2.4*   PROT 6.3   *   K 2.9*   CO2 26   CL 91*   BUN 19   CREATININE 1.0   ALKPHOS 84   ALT 30   AST 22   BILITOT 1.0     Coagulation:   Recent Labs   Lab 10/30/20  1450   INR 1.2     Cardiac markers: No results for input(s): CKMB, CPKMB, TROPONINT, TROPONINI, MYOGLOBIN in the last 168 hours.  Recent Labs   Lab 10/30/20  2255   COLORU Yellow   SPECGRAV 1.010   PHUR 6.0   PROTEINUA Trace*   BACTERIA Negative   NITRITE Positive*   LEUKOCYTESUR Negative   UROBILINOGEN Negative   HYALINECASTS 8*       Diagnostic Results:  X-Ray: Reviewed  1. Small bilateral lower lung zone pulmonary infiltrates ar  1. Small bilateral lower lung zone pulmonary infiltrates are  suspicious for pneumonia, mildly increased when compared to October 30.  2. Right hilar mass and associated midlung zone atelectasis or  scarring are unchanged.  3. Tiny right pleural effusion, stable.  ASSESSMENT/PLAN:         Active Hospital Problems    Diagnosis  POA    *Septic shock [A41.9, R65.21]  Yes     Due to LLL PNA      Debility [R53.81]  Unknown    Pancytopenia [D61.818]  Unknown    Severe neutropenia [D70.9]  Unknown    Hypomagnesemia [E83.42]  Yes    Fever [R50.9]  Yes    Hypokalemia [E87.6]  Yes    Anemia following use of chemotherapeutic drug [D64.81]  Yes    Small cell lung cancer, right [C34.91]  Yes    Moderate malnutrition [E44.0]  Yes     Opioid dependence [F11.20]  Yes      Resolved Hospital Problems   No resolved problems to display.         Plan:   Oxy nc 5l  Broad-spectrum IV antibiotic Vanco  meropenem  and azithromycin   pt has double-lumen PICC line   Panculture blood -gram neg edwin and sputum pending  procal elevated  S/p  2 unit PRBC and plt transfused 2 u  Day 2 neupgen  Monitor and replace electrolytes  Monitor and replace magnesium  Restart home med  Oncology and  pulmonology consult appreciated  Zofran p.r.n.  Monitor intake and output  Neutropenic precaution  High risk for acute deterioration due to underlying cancer with severe pancytopenia  Pt/ot     DVT Prophylaxis: will be placed on SCD for DVT prophylaxis and will be advised to be as mobile as possible and sit in a chair as tolerated.       VTE Risk Mitigation (From admission, onward)         Ordered     IP VTE HIGH RISK PATIENT  Once      10/30/20 1719     Place sequential compression device  Until discontinued      10/30/20 1719                  Department Hospital Medicine  [facility]  Charisse Osullivan MD  Date of service: 11/01/2020

## 2020-11-01 NOTE — PLAN OF CARE
Pt assessment completed at bedside w/ pt's daughter. Pt currently lives w/ her daughter. Pt does not have a current living will. Pt will not be d/c home. Pt is on HOSPICE. Pt has three children. Pt was getting her medications filled at [facility]. Pt's family denies any other barriers at the current. Case management will continue to support.        11/01/20 1320   Discharge Assessment   Assessment Type Discharge Planning Assessment   Confirmed/corrected address and phone number on facesheet? Yes   Assessment information obtained from? Caregiver;Medical Record   Communicated expected length of stay with patient/caregiver no   Prior to hospitilization cognitive status: Unable to Assess   Prior to hospitalization functional status: Completely Dependent   Current cognitive status: Unable to Assess   Current Functional Status: Completely Dependent   Facility Arrived From: home   Lives With child(ramesh), adult   Able to Return to Prior Arrangements no   Is patient able to care for self after discharge? No   Who are your caregiver(s) and their phone number(s)? elsa Mattson daughter [phone]   Patient's perception of discharge disposition hospice/medical facility   Readmission Within the Last 30 Days no previous admission in last 30 days   Patient currently being followed by outpatient case management? No   Equipment Currently Used at Home none   Do you have any problems affording any of your prescribed medications? No   Is the patient taking medications as prescribed? yes   Does the patient have transportation home?   (Pt's family rpeorts pt will not transport home.)   Transportation Anticipated other (see comments)  (Pt will not transport home)   Dialysis Name and Scheduled days n/a   Does the patient receive services at the [facility]? No   Discharge Plan A Inpatient Hospice   Discharge Plan B Inpatient Hospice   DME Needed Upon Discharge  none   Patient/Family in Agreement with Plan yes

## 2020-11-01 NOTE — PLAN OF CARE
11/01/20 0715   Patient Assessment/Suction   Level of Consciousness (AVPU) alert   Respiratory Effort Mild   Expansion/Accessory Muscles/Retractions expansion symmetric   All Lung Fields Breath Sounds diminished   SUDHEER Breath Sounds coarse   LLL Breath Sounds diminished   RUL Breath Sounds coarse   RML Breath Sounds diminished   RLL Breath Sounds diminished   Rhythm/Pattern, Respiratory no shortness of breath reported   Cough Frequency infrequent   Cough Type fair;nonproductive   PRE-TX-O2   O2 Device (Oxygen Therapy) nasal cannula   Flow (L/min) 3   SpO2 (!) 93 %   Pulse Oximetry Type Continuous   $ Pulse Oximetry - Multiple Charge Pulse Oximetry - Multiple   Pulse (!) 128   Resp (!) 30   Breath Sounds Post-Respiratory Treatment   Throughout All Fields Post-Treatment All Fields   Throughout All Fields Post-Treatment aeration increased   Post-treatment Heart Rate (beats/min) 129   Post-treatment Resp Rate (breaths/min) 22

## 2020-11-01 NOTE — PROGRESS NOTES
Pulmonary/Critical Care progress note      PATIENT NAME: Kendra Lawrence  MRN: [id number]  TODAY'S DATE: 2020  6:19 PM  ADMIT DATE: 10/30/2020  AGE: 65 y.o. : 1955        HPI:  Patient is a 63-year-old female who completed her 3rd round of chemotherapy and presents to the hospital with nausea and vomiting and fever.  She is found to have significant pancytopenia and a new left lower lobe pneumonia.  Her right middle lobe is completely filled with tumor and postobstructive pneumonia which is PET hot.    10/31 the patient had pulmonary edema last night with her blood transfusion and volume loading for septic shock..  She remains significantly short of breath today.  She is still significantly neutropenic.  She has not had any Neupogen.  Her oncologist is supposed to see her today.  She states she is feeling better.     the patient has worsening shock.  She is up 2.3 of Levophed at this time.  She is receiving LR at 100 cc/hour.  Platelets have been ordered but are not yet available.  The patient has a limited IV access.  She has no peripheral sites available.    REVIEW OF SYSTEMS  GENERAL:  She states she feels a little better.  She certainly does not look better  EYES: Vision is good.  ENT: No sinusitis or pharyngitis.   HEART: No chest pain or palpitations.  LUNGS: She is coughing, no sputum  GI:  No further nausea or vomiting  : No dysuria, hesitancy, or nocturia.  SKIN: No lesions or rashes.  MUSCULOSKELETAL:  Her back hurts.  NEURO: No headaches or neuropathy.  LYMPH: No edema or adenopathy.  PSYCH: No anxiety or depression.  ENDO: No weight change.      VITAL SIGNS (MOST RECENT)  Temp: 99.3 °F (37.4 °C) (20 0300)  Pulse: (!) 126 (20 040)  Resp: (!) 43 (20)  BP: (!) 98/53 (20 040)  SpO2: 98 % (20)    INTAKE AND OUTPUT (LAST 24 HOURS):    Intake/Output Summary (Last 24 hours) at 2020 0701  Last data filed at 2020 0400  Gross per 24 hour   Intake  1705.93 ml   Output 2900 ml   Net -1194.07 ml       WEIGHT  Wt Readings from Last 1 Encounters:   10/31/20 67.3 kg (148 lb 5.9 oz)       PHYSICAL EXAM  GENERAL: Older patient looking ill.  HEENT: Pupils equal and reactive. Extraocular movements intact. Nose intact. Pharynx dry.  Dentition poor  NECK: Supple.   HEART: Regular rate and rhythm. No murmur or gallop auscultated.  LUNGS:  There crackles in the bases.  There are decreased breath sounds in the right mid lung.  Lung excursion symmetrical. No change in fremitus.  ABDOMEN: Bowel sounds present.  Nontender, no masses.  : Normal anatomy.  [place] with yellow urine.  EXTREMITIES: Normal muscle tone and joint movement, no cyanosis or clubbing.   LYMPHATICS: No adenopathy palpated, no edema.  SKIN: Dry, intact, no lesions.  Patient is losing her hair.  NEURO: Cranial nerves II-XII intact. Motor strength 5/5 bilaterally, upper and lower extremities.  PSYCH: Appropriate affect    CBC LAST (LAST 24 HOURS)  Recent Labs   Lab 11/01/20  0457   WBC 0.09*   RBC 3.03*   HGB 8.6*   HCT 25.5*   MCV 84   MCH 28.4   MCHC 33.7   RDW 14.5   PLT 17*   MPV 9.3   GRAN 13.3*  CANCELED   LYMPH 40.0  CANCELED   MONO 13.3  CANCELED   BASO CANCELED   NRBC 0       CHEMISTRY LAST (LAST 24 HOURS)  Recent Labs   Lab 11/01/20  0457   *   K 2.9*   CL 91*   CO2 26   ANIONGAP 15   BUN 19   CREATININE 1.0   *   CALCIUM 7.6*   MG 1.6   ALBUMIN 2.4*   PROT 6.3   ALKPHOS 84   ALT 30   AST 22   BILITOT 1.0       COAGULATION LAST (LAST 24 HOURS)  No results for input(s): LABPT, INR, APTT in the last 24 hours.      LAST 7 DAYS MICROBIOLOGY   Microbiology Results (last 7 days)     Procedure Component Value Units Date/Time    Blood culture x two cultures. Draw prior to antibiotics. [[id number]]  (Abnormal) Collected: 10/30/20 1430    Order Status: Completed Specimen: Blood from Line, PICC Left Brachial Updated: 11/01/20 0655     Blood Culture, Routine Gram stain aer bottle: Gram negative  rods      Results called to and read back by:RICK Gonzalez;  10/31/2020        03:35 CJD      PRESUMPTIVE P. AERUGINOSA  For susceptibility see order #[id number]      Narrative:      Aerobic and anaerobic    Blood culture x two cultures. Draw prior to antibiotics. [[id number]]  (Abnormal) Collected: 10/30/20 1430    Order Status: Completed Specimen: Blood from Peripheral, Upper Arm, Right Updated: 11/01/20 0653     Blood Culture, Routine Gram stain aer bottle: Gram negative rods      Results called to and read back by:RICK Gonzalez;  10/31/2020        03:35 CJD      PRESUMPTIVE PSEUDOMONAS SPECIES  Identification and susceptibility pending      Narrative:      Aerobic and anaerobic    Blood culture [[id number]] Collected: 10/31/20 2053    Order Status: Completed Specimen: Blood Updated: 11/01/20 0358     Blood Culture, Routine No Growth to date    Narrative:      Collection has been rescheduled by [facility] at 10/31/2020 12:40 Reason:   [facility] will get the bld culture when putting IVs  Collection has been rescheduled by [facility] at 10/31/2020 12:40 Reason:   [facility] will get the bld culture when putting IVs    Urine Culture High Risk [[id number]] Collected: 10/31/20 1918    Order Status: Sent Specimen: Urine, Catheterized Updated: 10/31/20 1928    Culture, Respiratory with Gram Stain [[id number]]     Order Status: No result Specimen: Respiratory           MOST RECENT IMAGING  CT Abdomen Pelvis  Without Contrast  1.  No acute intra-abdominal abnormality identified.  2.  Small right pleural effusion.  3.  Consolidative process noted in the right lower lung.  4.  Moderate stool content throughout the large bowel may reflect  constipation.  5.  2 mm nonobstructing renal calculus at the midpole the left  kidney.    X-Ray Chest AP Portable    There are new left lower lung zone airspace opacities suspicious for  pneumonia, with decreased size of right middle lobe and perihilar  masslike opacity compatible with neoplasm and  post obstructive  atelectasis. Minor right lung base opacities suggest subsegmental  atelectasis, with small pleural effusion blunting the right lateral  costophrenic angle. No evidence of interstitial pulmonary edema or  pneumothorax.    The bones are diffusely osteopenic, with no acute osseous abnormality.    10/31 Chest x-ray  There are increased markings in both lower lobes consistent with pulmonary edema.  The right middle lobe remains completely atelectatic.  She has a pneumonia in the left lower lobe.      CURRENT VISIT EKG  Results for orders placed or performed during the hospital encounter of 10/30/20   EKG 12-lead    Narrative    Test Reason : R50.9,    Vent. Rate : 123 BPM     Atrial Rate : 123 BPM     P-R Int : 134 ms          QRS Dur : 078 ms      QT Int : 318 ms       P-R-T Axes : 057 050 068 degrees     QTc Int : 455 ms    Sinus tachycardia  Nonspecific ST and T wave abnormality  Abnormal ECG  When compared with ECG of 18-AUG-2020 08:47,  Minimal criteria for Anterior infarct are no longer Present  ST now depressed in Lateral leads    Referred By: [email or web address]   SELF           Confirmed By:             LAST ARTERIAL BLOOD GAS  ABG  Recent Labs   Lab 10/30/20  1953   PH 7.454*   PO2 78*   PCO2 32.4*   HCO3 22.7*   BE -1       IMPRESSION AND PLAN  Septic shock, continuing  Neutropenic pneumonia, Pseudomonas species  Bacteremia, Pseudomonas  Lactic acidosis  Small cell carcinoma  Pancytopenia, severe, anemia improved with transfusion of 2 units of packed red blood cells, thrombocytopenia severe, platelets pending, leukocytes remain extremely low on Neupogen  Fever, better  Intractable nausea and vomiting, resolved  PICC line in place, not working well  Anxiety  Hyponatremia  Hypokalemia  Hypomagnesemia  Metabolic acidosis, increased anion gap, lactic  Moderate malnutrition    Trend lactic acid  Merrem and vanc to continue  Change azithromycin to Levaquin for double Pseudomonas coverage  Levophed to keep  mean of 65  Oxygen to keep sats greater than 90, currently on 2 L  Transfused 2 units packed red blood cells  Transfuse platelets when they arrive from [facility]  Arterial line and central line as soon as platelets are infused  Continue Neupogen  Replace electrolytes  Bronchodilators  Serial labs  Ativan as needed for anxiety  Patient wishes to be a full code.  She states she has years to live.  Spoke with Lorelei who will bring her sister and be here by 9am so they can talk to Dr. Estes and myself    Critical care time spent reviewing the chart, examining the patient, reviewing the labs, reviewing the radiological findings, discussing care with nursing, physicians, and respiratory and creating the note and  has been greater than 35 minutes.

## 2020-11-01 NOTE — DISCHARGE SUMMARY
[facility] Medicine  Discharge Summary      Patient Name: Kendra Lawrence  MRN: [id number]  Admission Date: 10/30/2020  Hospital Length of Stay: 2 days  Discharge Date and Time:  11/01/2020 3:44 PM  Attending Physician: No att. providers found   Discharging Provider: Charisse Osullivan MD  Primary Care Provider: Louie Urena MD      HPI:   No notes on file    * No surgery found *      Hospital Course:   Patient was admitted to ICU for septic shock with neutropenic fever ,  with severe pancytopenia found to have left lower lobe pneumonia with elevated procalcitonin.  Placed on reverse isolation, required Levophed given through her PICC line, oxygen nasal cannula 4 L.  Started on broad-spectrum antibiotic Vanco Merrem and azithromycin, blood culture positive for gram-negative rods, hospital course complicated with worsening shock in spite of adequate fluid resuscitation, received 2 units PRBC and 1 unit of plt, started on Neupogen, patient became anxious requires Ativan q.4, pulmonology and Oncology consult appreciated, Dr. Estes from Dr. Acuña 's oncology group, had a family discussion about the goal of care, family agreed to continue with inpatient hospice.  Pressors discontinued,  consulted, [facility] got [facility]. patient continued to be on oxygen and Ativan p.r.n..  Examination on the day of discharge:  General appearance:  Chronic ill-looking malnourished alopecia post chemo, appears short of breath and anxious.  Skin: No Rash.   Neuro: Motor and sensory exams grossly intact. Good tone. Power in all 4 extremities 5/5.   HENT: Atraumatic head. Moist mucous membranes of oral cavity.  Poor dentition  Eyes: Normal extraocular movements.   Neck: Supple. No evidence of lymphadenopathy. No thyroidomegaly.  Lungs: Clear to auscultation bilaterally.  Occasional wheezing present on bilateral decreased breath sound.  Rales on right side  Heart: Regular rate and  rhythm. S1 and S2 present with no murmurs/gallop/rub. No pedal edema. No JVD present.   Abdomen: Soft, non-distended, mild epigastric tender. No rebound tenderness/guarding. No masses or organomegaly. Bowel sounds are normal. Bladder is not palpable.  :no silva ,no CVA tenderness  Extremities: No cyanosis, clubbing, or edema.  Left upper extremity-sided PICC line  Psych/mental status: Alert and oriented. Cooperative. Responds appropriately to questions.      Consults:   Consults (From admission, onward)        Status Ordering Provider     Inpatient consult to Hematology Oncology  Once     Provider:  Yemi Estes MD    Completed SUMAN HUANG     Inpatient consult to Hospitalist  Once     Provider:  Charisse Riley MD    Acknowledged CHARISSE RILEY     Inpatient consult to Oncology  Once     Provider:  SHERRELL Stapleton MD    Acknowledged CHARISSE RILEY     Inpatient consult to Pulmonology  Once     Provider:  Marcos Fermin MD    Completed CHARISSE RILEY     Inpatient consult to   Once     Provider:  (Not yet assigned)    Acknowledged CAHRISSE RILEY     Pharmacy to dose Vancomycin consult  Once     Provider:  (Not yet assigned)    Acknowledged CHARISSE RILEY          No new Assessment & Plan notes have been filed under this hospital service since the last note was generated.  Service: Hospital Medicine    Final Active Diagnoses:    Diagnosis Date Noted POA    PRINCIPAL PROBLEM:  Septic shock [A41.9, R65.21] 10/31/2020 Yes    Comfort measures only status [Z51.5] 11/01/2020 Not Applicable    Debility [R53.81] 10/31/2020 Unknown    Pancytopenia [D61.818] 10/31/2020 Unknown    Severe neutropenia [D70.9] 10/31/2020 Unknown    Hypomagnesemia [E83.42] 10/30/2020 Yes    Fever [R50.9] 10/30/2020 Yes    Hypokalemia [E87.6] 10/13/2020 Yes    Anemia following use of chemotherapeutic drug [D64.81] 10/13/2020 Yes    Small cell lung cancer, right [C34.91] 08/17/2020 Yes     Moderate malnutrition [E44.0] 08/15/2020 Yes    Opioid dependence [F11.20] 08/14/2020 Yes      Problems Resolved During this Admission:       Discharged Condition: critical    Disposition: Hospice/Medical Facility    Follow Up:    Patient Instructions:      Notify your health care provider if you experience any of the following:  temperature >100.4     Notify your health care provider if you experience any of the following:  persistent nausea and vomiting or diarrhea     Notify your health care provider if you experience any of the following:  persistent dizziness, light-headedness, or visual disturbances     Activity as tolerated       Significant Diagnostic Studies: Labs:   BMP:   Recent Labs   Lab 10/30/20  2311 10/31/20  0416 11/01/20  0457   GLU  --  140* 167*   NA  --  132* 132*   K 3.2* 4.2 2.9*   CL  --  95 91*   CO2  --  20* 26   BUN  --  16 19   CREATININE  --  1.0 1.0   CALCIUM  --  8.3* 7.6*   MG 1.7 1.5* 1.6    and CBC   Recent Labs   Lab 10/31/20  0805 11/01/20 0457   WBC 0.09* 0.09*   HGB 8.6* 8.6*   HCT 25.8* 25.5*   PLT 36* 17*       Pending Diagnostic Studies:     Procedure Component Value Units Date/Time    Lactic acid, plasma [[id number]]     Order Status: Sent Lab Status: No result     Specimen: Blood          Medications:  Reconciled Home Medications:      Medication List      ASK your doctor about these medications    acetaminophen 325 MG tablet  Commonly known as: TYLENOL  Take 325 mg by mouth every 6 (six) hours as needed for Pain.     albuterol-ipratropium 2.5 mg-0.5 mg/3 mL nebulizer solution  Commonly known as: DUO-NEB  Take 3 mLs by nebulization every 4 (four) hours. Rescue     alendronate 70 MG tablet  Commonly known as: FOSAMAX  TAKE ONE TABLET BY MOUTH ONCE EVERY 7 DAYS     ALPRAZolam 0.5 MG tablet  Commonly known as: XANAX  Take 0.5 mg by mouth 3 (three) times daily.     amitriptyline 10 MG tablet  Commonly known as: ELAVIL  TAKE ONE TABLET BY MOUTH AT BEDTIME AS NEEDED FOR  INSOMNIA     buprenorphine-naloxone 8-2 mg Film  Commonly known as: SUBOXONE  Place 1 packet (1 each total) under the tongue 2 (two) times daily.     busPIRone 10 MG tablet  Commonly known as: BUSPAR  Take 1 tablet (10 mg total) by mouth 2 (two) times daily.     DULoxetine 60 MG capsule  Commonly known as: CYMBALTA  TAKE ONE CAPSULE BY MOUTH ONCE A DAY     fluticasone furoate-vilanteroL 200-25 mcg/dose Dsdv diskus inhaler  Commonly known as: BREO  Inhale 1 puff into the lungs once daily. Controller     hydroCHLOROthiazide 25 MG tablet  Commonly known as: HYDRODIURIL  TAKE ONE TABLET BY MOUTH EVERY DAY     losartan 100 MG tablet  Commonly known as: COZAAR  Take 100 mg by mouth once daily.     nitroGLYCERIN 0.4 MG SL tablet  Commonly known as: NITROSTAT  Place 0.4 mg under the tongue every 5 (five) minutes as needed for Chest pain.     omeprazole 40 MG capsule  Commonly known as: PRILOSEC  Take 40 mg by mouth once daily.     ondansetron 4 MG tablet  Commonly known as: ZOFRAN     ondansetron 8 MG Tbdl  Commonly known as: ZOFRAN-ODT  DISSOLVE 1 TABLET BY MOUTH EVERY 6 HOURS AS NEEDED     promethazine 25 MG tablet  Commonly known as: PHENERGAN  Take 1 tablet (25 mg total) by mouth every 6 (six) hours as needed for Nausea.     sucralfate 100 mg/mL suspension  Commonly known as: CARAFATE  Take 10 mLs (1 g total) by mouth 4 (four) times daily.     TRELEGY ELLIPTA 100-62.5-25 mcg Dsdv  Generic drug: fluticasone-umeclidin-vilanter  INHALE 1 PUFF INTO THE LUNGS NIGHTLY            Indwelling Lines/Drains at time of discharge:   Lines/Drains/Airways     Peripherally Inserted Central Catheter Line            PICC Double Lumen 08/25/20 1510 left brachial 68 days                Time spent on the discharge of patient: 33  minutes  Patient was seen and examined on the date of discharge and determined to be suitable for discharge.         Charisse Osullivan MD  Department of Hospital Medicine  [facility]

## 2020-11-01 NOTE — CARE UPDATE
Referral sent to [facility] fax [phone]    Spoke with Nurse Lucas who is making contact with the family

## 2020-11-02 VITALS — WEIGHT: 151 LBS | OXYGEN SATURATION: 82 % | BODY MASS INDEX: 25.92 KG/M2

## 2020-11-02 LAB
BACTERIA BLD CULT: ABNORMAL

## 2020-11-02 NOTE — NURSING
Informed by hospice nurse that pt is ready to be transferred to the [facility].

## 2020-11-02 NOTE — SIGNIFICANT EVENT
I was called by the bedside RN to confirm the death of Mrs. Lawrence.    No family present at the bedside during my encounter and assessment.    Patient admitted for septic shock from pneumonia in a patient with small cell cancer. Family chose hospice given no clinical improvement.    Patient identified. Lying in the bed with eyes closed and no signs of life.  No respiratory effort noted. No response to verbal stimuli.  No carotid pulse palpable.  Pupils fixed and dilated bilaterally.  No heart or breath sounds audible during auscultation.    Death confirmed at 11: 32 PM on 11/01/2020    Vishal Reyna MD  Internal Medicine

## 2020-11-03 ENCOUNTER — DOCUMENT SCAN (OUTPATIENT)
Dept: HOME HEALTH SERVICES | Facility: HOSPITAL | Age: 65
End: 2020-11-03
Payer: MEDICARE

## 2020-11-03 LAB — BACTERIA UR CULT: NO GROWTH

## 2020-11-05 LAB — BACTERIA BLD CULT: NORMAL

## 2020-11-12 ENCOUNTER — DOCUMENT SCAN (OUTPATIENT)
Dept: HOME HEALTH SERVICES | Facility: HOSPITAL | Age: 65
End: 2020-11-12
Payer: MEDICARE

## 2022-01-05 NOTE — PLAN OF CARE
Bronch completed. Pt agitated, trying to get out of bed. Respirations labored, audible wheezing noted. Breath sounds diminished on right side. O2 sats mid 80's, 's. Color flushed. Pt speaking words, unintelligible. Reassurance provided. Called respiratory for breathing treatment.    Statement Selected

## 2022-12-07 NOTE — ASSESSMENT & PLAN NOTE
-- DO NOT REPLY / DO NOT REPLY ALL --  -- Message is from [employer] ([facility]) --    General Patient Message [facility] service sent messages requesting diabetic shoes and compression box and no response. Please call      Caller Information       Type Contact Phone/Fax    12/07/2022 03:47 PM CST Phone (Incoming) [facility]  (Other) [phone]        Alternative phone number: [phone]    Can a detailed message be left? Yes    Message Turnaround: [place]:    Refer to site's KB page for routing instructions    Please give this turnaround time to the caller:   \"You can expect to receive a response 2-3 business days after your provider's clinical team reviews the message\"               CXR reviewed.   will continue Zosyn for today.  Will try and deescalate antibiotics tomorrow  Duo nebs q4 hours.  Supplemental oxygen as needed to keep oxygen satruation >90%.  Pulmonary consulted

## 2023-02-15 NOTE — PATIENT INSTRUCTIONS
An order for a urine drug screen with buprenorphine was given.  The patient is to use the order when called, on a random day.       Thank you for choosing [facility].     Please fill out the patient experience survey.   Biopsy Photograph Reviewed: Yes Size Of Lesion In Cm: 0.6 X Size Of Lesion In Cm (Optional): 1.1 Size Of Margin In Cm: 0.3 Anesthesia Volume In Cc: 6 Was An Eye Clamp Used?: No Eye Clamp Note Details: An eye clamp was used during the procedure. Excision Method: Elliptical Saucerization Depth: dermis and superficial adipose tissue Repair Type: Intermediate Suturegard Retention Suture: 2-0 Nylon Retention Suture Bite Size: 3 mm Length To Time In Minutes Device Was In Place: 10 Number Of Hemigard Strips Per Side: 1 Intermediate / Complex Repair - Final Wound Length In Cm: 3.6 Undermining Type: Entire Wound Debridement Text: The wound edges were debrided prior to proceeding with the closure to facilitate wound healing. Helical Rim Text: The closure involved the helical rim. Vermilion Border Text: The closure involved the vermilion border. Nostril Rim Text: The closure involved the nostril rim. Retention Suture Text: Retention sutures were placed to support the closure and prevent dehiscence. Primary Defect Length (In Cm): 0 Suture Removal: 14 days Lab: [id number] Lab Facility: [id number] Graft Donor Site Bandage (Optional-Leave Blank If You Don't Want In Note): Steri-strips and a pressure bandage were applied to the donor site. Epidermal Closure Graft Donor Site (Optional): simple interrupted Billing Type: Third-Party Bill Excision Depth: adipose tissue Scalpel Size: 15 blade Anesthesia Type: 1% lidocaine with epinephrine and a 1:10 solution of 8.4% sodium bicarbonate Hemostasis: Electrocautery Estimated Blood Loss (Cc): minimal Detail Level: Detailed Deep Sutures: 4-0 PDO Epidermal Closure: running Wound Care: Petrolatum Dressing: pressure dressing Suturegard Intro: Intraoperative tissue expansion was performed, utilizing the SUTUREGARD device, in order to reduce wound tension. Suturegard Body: The suture ends were repeatedly re-tightened and re-clamped to achieve the desired tissue expansion. Hemigard Intro: Due to skin fragility and wound tension, it was decided to use HEMIGARD adhesive retention suture devices to permit a linear closure. The skin was cleaned and dried for a 6cm distance away from the wound. Excessive hair, if present, was removed to allow for adhesion. Hemigard Postcare Instructions: The HEMIGARD strips are to remain completely dry for at least 5-7 days. Positioning (Leave Blank If You Do Not Want): The patient was placed in a comfortable position exposing the surgical site. Pre-Excision Curettage Text (Leave Blank If You Do Not Want): Prior to drawing the surgical margin the visible lesion was removed with electrodesiccation and curettage to clearly define the lesion size. Complex Repair Preamble Text (Leave Blank If You Do Not Want): Extensive wide undermining was performed. Intermediate Repair Preamble Text (Leave Blank If You Do Not Want): Undermining was performed with blunt dissection. Curvilinear Excision Additional Text (Leave Blank If You Do Not Want): The margin was drawn around the clinically apparent lesion.  A curvilinear shape was then drawn on the skin incorporating the lesion and margins.  Incisions were then made along these lines to the appropriate tissue plane and the lesion was extirpated. Fusiform Excision Additional Text (Leave Blank If You Do Not Want): The margin was drawn around the clinically apparent lesion.  A fusiform shape was then drawn on the skin incorporating the lesion and margins.  Incisions were then made along these lines to the appropriate tissue plane and the lesion was extirpated. Elliptical Excision Additional Text (Leave Blank If You Do Not Want): The margin was drawn around the clinically apparent lesion.  An elliptical shape was then drawn on the skin incorporating the lesion and margins.  Incisions were then made along these lines to the appropriate tissue plane and the lesion was extirpated. Saucerization Excision Additional Text (Leave Blank If You Do Not Want): The margin was drawn around the clinically apparent lesion.  Incisions were then made along these lines, in a tangential fashion, to the appropriate tissue plane and the lesion was extirpated. Slit Excision Additional Text (Leave Blank If You Do Not Want): A linear line was drawn on the skin overlying the lesion. An incision was made slowly until the lesion was visualized.  Once visualized, the lesion was removed with blunt dissection. Excisional Biopsy Additional Text (Leave Blank If You Do Not Want): The margin was drawn around the clinically apparent lesion. An elliptical shape was then drawn on the skin incorporating the lesion and margins.  Incisions were then made along these lines to the appropriate tissue plane and the lesion was extirpated. Perilesional Excision Additional Text (Leave Blank If You Do Not Want): The margin was drawn around the clinically apparent lesion. Incisions were then made along these lines to the appropriate tissue plane and the lesion was extirpated. Repair Performed By Another Provider Text (Leave Blank If You Do Not Want): After the tissue was excised the defect was repaired by another provider. No Repair - Repaired With Adjacent Surgical Defect Text (Leave Blank If You Do Not Want): After the excision the defect was repaired concurrently with another surgical defect which was in close approximation. Adjacent Tissue Transfer Text: The defect edges were debeveled with a #15 scalpel blade.  Given the location of the defect and the proximity to free margins an adjacent tissue transfer was deemed most appropriate.  Using a sterile surgical marker, an appropriate flap was drawn incorporating the defect and placing the expected incisions within the relaxed skin tension lines where possible.    The area thus outlined was incised deep to adipose tissue with a #15 scalpel blade.  The skin margins were undermined to an appropriate distance in all directions utilizing iris scissors. Advancement Flap (Single) Text: The defect edges were debeveled with a #15 scalpel blade.  Given the location of the defect and the proximity to free margins a single advancement flap was deemed most appropriate.  Using a sterile surgical marker, an appropriate advancement flap was drawn incorporating the defect and placing the expected incisions within the relaxed skin tension lines where possible.    The area thus outlined was incised deep to adipose tissue with a #15 scalpel blade.  The skin margins were undermined to an appropriate distance in all directions utilizing iris scissors. Advancement Flap (Double) Text: The defect edges were debeveled with a #15 scalpel blade.  Given the location of the defect and the proximity to free margins a double advancement flap was deemed most appropriate.  Using a sterile surgical marker, the appropriate advancement flaps were drawn incorporating the defect and placing the expected incisions within the relaxed skin tension lines where possible.    The area thus outlined was incised deep to adipose tissue with a #15 scalpel blade.  The skin margins were undermined to an appropriate distance in all directions utilizing iris scissors. Burow's Advancement Flap Text: The defect edges were debeveled with a #15 scalpel blade.  Given the location of the defect and the proximity to free margins a Burow's advancement flap was deemed most appropriate.  Using a sterile surgical marker, the appropriate advancement flap was drawn incorporating the defect and placing the expected incisions within the relaxed skin tension lines where possible.    The area thus outlined was incised deep to adipose tissue with a #15 scalpel blade.  The skin margins were undermined to an appropriate distance in all directions utilizing iris scissors. Chonodrocutaneous Helical Advancement Flap Text: Nasalis-Muscle-Based Myocutaneous Island Pedicle Flap Text: Using a #15 blade, an incision was made around the donor flap to the level of the nasalis muscle. Wide lateral undermining was then performed in both the subcutaneous plane above the nasalis muscle, and in a submuscular plane just above periosteum. This allowed the formation of a free nasalis muscle axial pedicle (based on the angular artery) which was still attached to the actual cutaneous flap, increasing its mobility and vascular viability. Hemostasis was obtained with pinpoint electrocoagulation. The flap was mobilized into position and the pivotal anchor points positioned and stabilized with buried interrupted sutures. Subcutaneous and dermal tissues were closed in a multilayered fashion with sutures. Tissue redundancies were excised, and the epidermal edges were apposed without significant tension and sutured with sutures. Orbicularis Oris Muscle Flap Text: The defect edges were debeveled with a #15 scalpel blade.  Given that the defect affected the competency of the oral sphincter an orbicularis oris muscle flap was deemed most appropriate to restore this competency and normal muscle function.  Using a sterile surgical marker, an appropriate flap was drawn incorporating the defect. The area thus outlined was incised with a #15 scalpel blade. Melolabial Transposition Flap Text: The defect edges were debeveled with a #15 scalpel blade.  Given the location of the defect and the proximity to free margins a melolabial flap was deemed most appropriate.  Using a sterile surgical marker, an appropriate melolabial transposition flap was drawn incorporating the defect.    The area thus outlined was incised deep to adipose tissue with a #15 scalpel blade.  The skin margins were undermined to an appropriate distance in all directions utilizing iris scissors. Rhombic Flap Text: The defect edges were debeveled with a #15 scalpel blade.  Given the location of the defect and the proximity to free margins a rhombic flap was deemed most appropriate.  Using a sterile surgical marker, an appropriate rhombic flap was drawn incorporating the defect.    The area thus outlined was incised deep to adipose tissue with a #15 scalpel blade.  The skin margins were undermined to an appropriate distance in all directions utilizing iris scissors. Rhomboid Transposition Flap Text: The defect edges were debeveled with a #15 scalpel blade.  Given the location of the defect and the proximity to free margins a rhomboid transposition flap was deemed most appropriate.  Using a sterile surgical marker, an appropriate rhomboid flap was drawn incorporating the defect.    The area thus outlined was incised deep to adipose tissue with a #15 scalpel blade.  The skin margins were undermined to an appropriate distance in all directions utilizing iris scissors. Bi-Rhombic Flap Text: The defect edges were debeveled with a #15 scalpel blade.  Given the location of the defect and the proximity to free margins a bi-rhombic flap was deemed most appropriate.  Using a sterile surgical marker, an appropriate rhombic flap was drawn incorporating the defect. The area thus outlined was incised deep to adipose tissue with a #15 scalpel blade.  The skin margins were undermined to an appropriate distance in all directions utilizing iris scissors. Helical Rim Advancement Flap Text: The defect edges were debeveled with a #15 blade scalpel.  Given the location of the defect and the proximity to free margins (helical rim) a double helical rim advancement flap was deemed most appropriate.  Using a sterile surgical marker, the appropriate advancement flaps were drawn incorporating the defect and placing the expected incisions between the helical rim and antihelix where possible.  The area thus outlined was incised through and through with a #15 scalpel blade.  With a skin hook and iris scissors, the flaps were gently and sharply undermined and freed up. Bilateral Helical Rim Advancement Flap Text: The defect edges were debeveled with a #15 blade scalpel.  Given the location of the defect and the proximity to free margins (helical rim) a bilateral helical rim advancement flap was deemed most appropriate.  Using a sterile surgical marker, the appropriate advancement flaps were drawn incorporating the defect and placing the expected incisions between the helical rim and antihelix where possible.  The area thus outlined was incised through and through with a #15 scalpel blade.  With a skin hook and iris scissors, the flaps were gently and sharply undermined and freed up. Ear Star Wedge Flap Text: The defect edges were debeveled with a #15 blade scalpel.  Given the location of the defect and the proximity to free margins (helical rim) an ear star wedge flap was deemed most appropriate.  Using a sterile surgical marker, the appropriate flap was drawn incorporating the defect and placing the expected incisions between the helical rim and antihelix where possible.  The area thus outlined was incised through and through with a #15 scalpel blade. Banner Transposition Flap Text: The defect edges were debeveled with a #15 scalpel blade.  Given the location of the defect and the proximity to free margins a Banner transposition flap was deemed most appropriate.  Using a sterile surgical marker, an appropriate flap drawn around the defect. The area thus outlined was incised deep to adipose tissue with a #15 scalpel blade.  The skin margins were undermined to an appropriate distance in all directions utilizing iris scissors. Bilobed Flap Text: The defect edges were debeveled with a #15 scalpel blade.  Given the location of the defect and the proximity to free margins a bilobe flap was deemed most appropriate.  Using a sterile surgical marker, an appropriate bilobe flap drawn around the defect.    The area thus outlined was incised deep to adipose tissue with a #15 scalpel blade.  The skin margins were undermined to an appropriate distance in all directions utilizing iris scissors. Bilobed Transposition Flap Text: The defect edges were debeveled with a #15 scalpel blade.  Given the location of the defect and the proximity to free margins a bilobed transposition flap was deemed most appropriate.  Using a sterile surgical marker, an appropriate bilobe flap drawn around the defect.    The area thus outlined was incised deep to adipose tissue with a #15 scalpel blade.  The skin margins were undermined to an appropriate distance in all directions utilizing iris scissors. Trilobed Flap Text: The defect edges were debeveled with a #15 scalpel blade.  Given the location of the defect and the proximity to free margins a trilobed flap was deemed most appropriate.  Using a sterile surgical marker, an appropriate trilobed flap drawn around the defect.    The area thus outlined was incised deep to adipose tissue with a #15 scalpel blade.  The skin margins were undermined to an appropriate distance in all directions utilizing iris scissors. Dorsal Nasal Flap Text: The defect edges were debeveled with a #15 scalpel blade.  Given the location of the defect and the proximity to free margins a dorsal nasal flap was deemed most appropriate.  Using a sterile surgical marker, an appropriate dorsal nasal flap was drawn around the defect.    The area thus outlined was incised deep to adipose tissue with a #15 scalpel blade.  The skin margins were undermined to an appropriate distance in all directions utilizing iris scissors. Island Pedicle Flap Text: The defect edges were debeveled with a #15 scalpel blade.  Given the location of the defect, shape of the defect and the proximity to free margins an island pedicle advancement flap was deemed most appropriate.  Using a sterile surgical marker, an appropriate advancement flap was drawn incorporating the defect, outlining the appropriate donor tissue and placing the expected incisions within the relaxed skin tension lines where possible.    The area thus outlined was incised deep to adipose tissue with a #15 scalpel blade.  The skin margins were undermined to an appropriate distance in all directions around the primary defect and laterally outward around the island pedicle utilizing iris scissors.  There was minimal undermining beneath the pedicle flap. Island Pedicle Flap With Canthal Suspension Text: The defect edges were debeveled with a #15 scalpel blade.  Given the location of the defect, shape of the defect and the proximity to free margins an island pedicle advancement flap was deemed most appropriate.  Using a sterile surgical marker, an appropriate advancement flap was drawn incorporating the defect, outlining the appropriate donor tissue and placing the expected incisions within the relaxed skin tension lines where possible. The area thus outlined was incised deep to adipose tissue with a #15 scalpel blade.  The skin margins were undermined to an appropriate distance in all directions around the primary defect and laterally outward around the island pedicle utilizing iris scissors.  There was minimal undermining beneath the pedicle flap. A suspension suture was placed in the canthal tendon to prevent tension and prevent ectropion. Alar Island Pedicle Flap Text: The defect edges were debeveled with a #15 scalpel blade.  Given the location of the defect, shape of the defect and the proximity to the alar rim an island pedicle advancement flap was deemed most appropriate.  Using a sterile surgical marker, an appropriate advancement flap was drawn incorporating the defect, outlining the appropriate donor tissue and placing the expected incisions within the nasal ala running parallel to the alar rim. The area thus outlined was incised with a #15 scalpel blade.  The skin margins were undermined minimally to an appropriate distance in all directions around the primary defect and laterally outward around the island pedicle utilizing iris scissors.  There was minimal undermining beneath the pedicle flap. Double Island Pedicle Flap Text: The defect edges were debeveled with a #15 scalpel blade.  Given the location of the defect, shape of the defect and the proximity to free margins a double island pedicle advancement flap was deemed most appropriate.  Using a sterile surgical marker, an appropriate advancement flap was drawn incorporating the defect, outlining the appropriate donor tissue and placing the expected incisions within the relaxed skin tension lines where possible.    The area thus outlined was incised deep to adipose tissue with a #15 scalpel blade.  The skin margins were undermined to an appropriate distance in all directions around the primary defect and laterally outward around the island pedicle utilizing iris scissors.  There was minimal undermining beneath the pedicle flap. Island Pedicle Flap-Requiring Vessel Identification Text: The defect edges were debeveled with a #15 scalpel blade.  Given the location of the defect, shape of the defect and the proximity to free margins an island pedicle advancement flap was deemed most appropriate.  Using a sterile surgical marker, an appropriate advancement flap was drawn, based on the axial vessel mentioned above, incorporating the defect, outlining the appropriate donor tissue and placing the expected incisions within the relaxed skin tension lines where possible.    The area thus outlined was incised deep to adipose tissue with a #15 scalpel blade.  The skin margins were undermined to an appropriate distance in all directions around the primary defect and laterally outward around the island pedicle utilizing iris scissors.  There was minimal undermining beneath the pedicle flap. Keystone Flap Text: The defect edges were debeveled with a #15 scalpel blade.  Given the location of the defect, shape of the defect a keystone flap was deemed most appropriate.  Using a sterile surgical marker, an appropriate keystone flap was drawn incorporating the defect, outlining the appropriate donor tissue and placing the expected incisions within the relaxed skin tension lines where possible. The area thus outlined was incised deep to adipose tissue with a #15 scalpel blade.  The skin margins were undermined to an appropriate distance in all directions around the primary defect and laterally outward around the flap utilizing iris scissors. O-T Plasty Text: The defect edges were debeveled with a #15 scalpel blade.  Given the location of the defect, shape of the defect and the proximity to free margins an O-T plasty was deemed most appropriate.  Using a sterile surgical marker, an appropriate O-T plasty was drawn incorporating the defect and placing the expected incisions within the relaxed skin tension lines where possible.    The area thus outlined was incised deep to adipose tissue with a #15 scalpel blade.  The skin margins were undermined to an appropriate distance in all directions utilizing iris scissors. O-Z Plasty Text: The defect edges were debeveled with a #15 scalpel blade.  Given the location of the defect, shape of the defect and the proximity to free margins an O-Z plasty (double transposition flap) was deemed most appropriate.  Using a sterile surgical marker, the appropriate transposition flaps were drawn incorporating the defect and placing the expected incisions within the relaxed skin tension lines where possible.    The area thus outlined was incised deep to adipose tissue with a #15 scalpel blade.  The skin margins were undermined to an appropriate distance in all directions utilizing iris scissors.  Hemostasis was achieved with electrocautery.  The flaps were then transposed into place, one clockwise and the other counterclockwise, and anchored with interrupted buried subcutaneous sutures. Double O-Z Plasty Text: The defect edges were debeveled with a #15 scalpel blade.  Given the location of the defect, shape of the defect and the proximity to free margins a Double O-Z plasty (double transposition flap) was deemed most appropriate.  Using a sterile surgical marker, the appropriate transposition flaps were drawn incorporating the defect and placing the expected incisions within the relaxed skin tension lines where possible. The area thus outlined was incised deep to adipose tissue with a #15 scalpel blade.  The skin margins were undermined to an appropriate distance in all directions utilizing iris scissors.  Hemostasis was achieved with electrocautery.  The flaps were then transposed into place, one clockwise and the other counterclockwise, and anchored with interrupted buried subcutaneous sutures. V-Y Plasty Text: The defect edges were debeveled with a #15 scalpel blade.  Given the location of the defect, shape of the defect and the proximity to free margins an V-Y advancement flap was deemed most appropriate.  Using a sterile surgical marker, an appropriate advancement flap was drawn incorporating the defect and placing the expected incisions within the relaxed skin tension lines where possible.    The area thus outlined was incised deep to adipose tissue with a #15 scalpel blade.  The skin margins were undermined to an appropriate distance in all directions utilizing iris scissors. H Plasty Text: Given the location of the defect, shape of the defect and the proximity to free margins a H-plasty was deemed most appropriate for repair.  Using a sterile surgical marker, the appropriate advancement arms of the H-plasty were drawn incorporating the defect and placing the expected incisions within the relaxed skin tension lines where possible. The area thus outlined was incised deep to adipose tissue with a #15 scalpel blade. The skin margins were undermined to an appropriate distance in all directions utilizing iris scissors.  The opposing advancement arms were then advanced into place in opposite direction and anchored with interrupted buried subcutaneous sutures. W Plasty Text: The lesion was extirpated to the level of the fat with a #15 scalpel blade.  Given the location of the defect, shape of the defect and the proximity to free margins a W-plasty was deemed most appropriate for repair.  Using a sterile surgical marker, the appropriate transposition arms of the W-plasty were drawn incorporating the defect and placing the expected incisions within the relaxed skin tension lines where possible.    The area thus outlined was incised deep to adipose tissue with a #15 scalpel blade.  The skin margins were undermined to an appropriate distance in all directions utilizing iris scissors.  The opposing transposition arms were then transposed into place in opposite direction and anchored with interrupted buried subcutaneous sutures. Z Plasty Text: The lesion was extirpated to the level of the fat with a #15 scalpel blade.  Given the location of the defect, shape of the defect and the proximity to free margins a Z-plasty was deemed most appropriate for repair.  Using a sterile surgical marker, the appropriate transposition arms of the Z-plasty were drawn incorporating the defect and placing the expected incisions within the relaxed skin tension lines where possible.    The area thus outlined was incised deep to adipose tissue with a #15 scalpel blade.  The skin margins were undermined to an appropriate distance in all directions utilizing iris scissors.  The opposing transposition arms were then transposed into place in opposite direction and anchored with interrupted buried subcutaneous sutures. Zygomaticofacial Flap Text: Given the location of the defect, shape of the defect and the proximity to free margins a zygomaticofacial flap was deemed most appropriate for repair.  Using a sterile surgical marker, the appropriate flap was drawn incorporating the defect and placing the expected incisions within the relaxed skin tension lines where possible. The area thus outlined was incised deep to adipose tissue with a #15 scalpel blade with preservation of a vascular pedicle.  The skin margins were undermined to an appropriate distance in all directions utilizing iris scissors.  The flap was then placed into the defect and anchored with interrupted buried subcutaneous sutures. Cheek Interpolation Flap Text: A decision was made to reconstruct the defect utilizing an interpolation axial flap and a staged reconstruction.  A telfa template was made of the defect.  This telfa template was then used to outline the Cheek Interpolation flap.  The donor area for the pedicle flap was then injected with anesthesia.  The flap was excised through the skin and subcutaneous tissue down to the layer of the underlying musculature.  The interpolation flap was carefully excised within this deep plane to maintain its blood supply.  The edges of the donor site were undermined.   The donor site was closed in a primary fashion.  The pedicle was then rotated into position and sutured.  Once the tube was sutured into place, adequate blood supply was confirmed with blanching and refill.  The pedicle was then wrapped with xeroform gauze and dressed appropriately with a telfa and gauze bandage to ensure continued blood supply and protect the attached pedicle. Cheek-To-Nose Interpolation Flap Text: A decision was made to reconstruct the defect utilizing an interpolation axial flap and a staged reconstruction.  A telfa template was made of the defect.  This telfa template was then used to outline the Cheek-To-Nose Interpolation flap.  The donor area for the pedicle flap was then injected with anesthesia.  The flap was excised through the skin and subcutaneous tissue down to the layer of the underlying musculature.  The interpolation flap was carefully excised within this deep plane to maintain its blood supply.  The edges of the donor site were undermined.   The donor site was closed in a primary fashion.  The pedicle was then rotated into position and sutured.  Once the tube was sutured into place, adequate blood supply was confirmed with blanching and refill.  The pedicle was then wrapped with xeroform gauze and dressed appropriately with a telfa and gauze bandage to ensure continued blood supply and protect the attached pedicle. Interpolation Flap Text: A decision was made to reconstruct the defect utilizing an interpolation axial flap and a staged reconstruction.  A telfa template was made of the defect.  This telfa template was then used to outline the interpolation flap.  The donor area for the pedicle flap was then injected with anesthesia.  The flap was excised through the skin and subcutaneous tissue down to the layer of the underlying musculature.  The interpolation flap was carefully excised within this deep plane to maintain its blood supply.  The edges of the donor site were undermined.   The donor site was closed in a primary fashion.  The pedicle was then rotated into position and sutured.  Once the tube was sutured into place, adequate blood supply was confirmed with blanching and refill.  The pedicle was then wrapped with xeroform gauze and dressed appropriately with a telfa and gauze bandage to ensure continued blood supply and protect the attached pedicle. Melolabial Interpolation Flap Text: A decision was made to reconstruct the defect utilizing an interpolation axial flap and a staged reconstruction.  A telfa template was made of the defect.  This telfa template was then used to outline the melolabial interpolation flap.  The donor area for the pedicle flap was then injected with anesthesia.  The flap was excised through the skin and subcutaneous tissue down to the layer of the underlying musculature.  The pedicle flap was carefully excised within this deep plane to maintain its blood supply.  The edges of the donor site were undermined.   The donor site was closed in a primary fashion.  The pedicle was then rotated into position and sutured.  Once the tube was sutured into place, adequate blood supply was confirmed with blanching and refill.  The pedicle was then wrapped with xeroform gauze and dressed appropriately with a telfa and gauze bandage to ensure continued blood supply and protect the attached pedicle. Mastoid Interpolation Flap Text: A decision was made to reconstruct the defect utilizing an interpolation axial flap and a staged reconstruction.  A telfa template was made of the defect.  This telfa template was then used to outline the mastoid interpolation flap.  The donor area for the pedicle flap was then injected with anesthesia.  The flap was excised through the skin and subcutaneous tissue down to the layer of the underlying musculature.  The pedicle flap was carefully excised within this deep plane to maintain its blood supply.  The edges of the donor site were undermined.   The donor site was closed in a primary fashion.  The pedicle was then rotated into position and sutured.  Once the tube was sutured into place, adequate blood supply was confirmed with blanching and refill.  The pedicle was then wrapped with xeroform gauze and dressed appropriately with a telfa and gauze bandage to ensure continued blood supply and protect the attached pedicle. Posterior Auricular Interpolation Flap Text: A decision was made to reconstruct the defect utilizing an interpolation axial flap and a staged reconstruction.  A telfa template was made of the defect.  This telfa template was then used to outline the posterior auricular interpolation flap.  The donor area for the pedicle flap was then injected with anesthesia.  The flap was excised through the skin and subcutaneous tissue down to the layer of the underlying musculature.  The pedicle flap was carefully excised within this deep plane to maintain its blood supply.  The edges of the donor site were undermined.   The donor site was closed in a primary fashion.  The pedicle was then rotated into position and sutured.  Once the tube was sutured into place, adequate blood supply was confirmed with blanching and refill.  The pedicle was then wrapped with xeroform gauze and dressed appropriately with a telfa and gauze bandage to ensure continued blood supply and protect the attached pedicle. Paramedian Forehead Flap Text: A decision was made to reconstruct the defect utilizing an interpolation axial flap and a staged reconstruction.  A telfa template was made of the defect.  This telfa template was then used to outline the paramedian forehead pedicle flap.  The donor area for the pedicle flap was then injected with anesthesia.  The flap was excised through the skin and subcutaneous tissue down to the layer of the underlying musculature.  The pedicle flap was carefully excised within this deep plane to maintain its blood supply.  The edges of the donor site were undermined.   The donor site was closed in a primary fashion.  The pedicle was then rotated into position and sutured.  Once the tube was sutured into place, adequate blood supply was confirmed with blanching and refill.  The pedicle was then wrapped with xeroform gauze and dressed appropriately with a telfa and gauze bandage to ensure continued blood supply and protect the attached pedicle. Abbe Flap (Upper To Lower Lip) Text: The defect of the lower lip was assessed and measured.  Given the location and size of the defect, an Abbe flap was deemed most appropriate.  Using a sterile surgical marker, an appropriate Abbe flap was measured and drawn on the upper lip. Local anesthesia was then infiltrated.  A scalpel was then used to incise the upper lip through and through the skin, vermilion, muscle and mucosa, leaving the flap pedicled on the opposite side.  The flap was then rotated and transferred to the lower lip defect.  The flap was then sutured into place with a three layer technique, closing the orbicularis oris muscle layer with subcutaneous buried sutures, followed by a mucosal layer and an epidermal layer. Abbe Flap (Lower To Upper Lip) Text: The defect of the upper lip was assessed and measured.  Given the location and size of the defect, an Abbe flap was deemed most appropriate.  Using a sterile surgical marker, an appropriate Abbe flap was measured and drawn on the lower lip. Local anesthesia was then infiltrated. A scalpel was then used to incise the upper lip through and through the skin, vermilion, muscle and mucosa, leaving the flap pedicled on the opposite side.  The flap was then rotated and transferred to the lower lip defect.  The flap was then sutured into place with a three layer technique, closing the orbicularis oris muscle layer with subcutaneous buried sutures, followed by a mucosal layer and an epidermal layer. Estlander Flap (Upper To Lower Lip) Text: The defect of the lower lip was assessed and measured.  Given the location and size of the defect, an Estlander flap was deemed most appropriate.  Using a sterile surgical marker, an appropriate Estlander flap was measured and drawn on the upper lip. Local anesthesia was then infiltrated. A scalpel was then used to incise the lateral aspect of the flap, through skin, muscle and mucosa, leaving the flap pedicled medially.  The flap was then rotated and positioned to fill the lower lip defect.  The flap was then sutured into place with a three layer technique, closing the orbicularis oris muscle layer with subcutaneous buried sutures, followed by a mucosal layer and an epidermal layer. Lip Wedge Excision Repair Text: Given the location of the defect and the proximity to free margins a full thickness wedge repair was deemed most appropriate.  Using a sterile surgical marker, the appropriate repair was drawn incorporating the defect and placing the expected incisions perpendicular to the vermilion border.  The vermilion border was also meticulously outlined to ensure appropriate reapproximation during the repair.  The area thus outlined was incised through and through with a #15 scalpel blade.  The muscularis and dermis were reaproximated with deep sutures following hemostasis. Care was taken to realign the vermilion border before proceeding with the superficial closure.  Once the vermilion was realigned the superfical and mucosal closure was finished. Ftsg Text: The defect edges were debeveled with a #15 scalpel blade.  Given the location of the defect, shape of the defect and the proximity to free margins a full thickness skin graft was deemed most appropriate.  Using a sterile surgical marker, the primary defect shape was transferred to the donor site. The area thus outlined was incised deep to adipose tissue with a #15 scalpel blade.  The harvested graft was then trimmed of adipose tissue until only dermis and epidermis was left.  The skin margins of the secondary defect were undermined to an appropriate distance in all directions utilizing iris scissors.  The secondary defect was closed with interrupted buried subcutaneous sutures.  The skin edges were then re-apposed with running  sutures.  The skin graft was then placed in the primary defect and oriented appropriately. Split-Thickness Skin Graft Text: The defect edges were debeveled with a #15 scalpel blade.  Given the location of the defect, shape of the defect and the proximity to free margins a split thickness skin graft was deemed most appropriate.  Using a sterile surgical marker, the primary defect shape was transferred to the donor site. The split thickness graft was then harvested.  The skin graft was then placed in the primary defect and oriented appropriately. Burow's Graft Text: The defect edges were debeveled with a #15 scalpel blade.  Given the location of the defect, shape of the defect, the proximity to free margins and the presence of a standing cone deformity a Burow's skin graft was deemed most appropriate. The standing cone was removed and this tissue was then trimmed to the shape of the primary defect. The adipose tissue was also removed until only dermis and epidermis were left.  The skin margins of the secondary defect were undermined to an appropriate distance in all directions utilizing iris scissors.  The secondary defect was closed with interrupted buried subcutaneous sutures.  The skin edges were then re-apposed with running  sutures.  The skin graft was then placed in the primary defect and oriented appropriately. Cartilage Graft Text: The defect edges were debeveled with a #15 scalpel blade.  Given the location of the defect, shape of the defect, the fact the defect involved a full thickness cartilage defect a cartilage graft was deemed most appropriate.  An appropriate donor site was identified, cleansed, and anesthetized. The cartilage graft was then harvested and transferred to the recipient site, oriented appropriately and then sutured into place.  The secondary defect was then repaired using a primary closure. Composite Graft Text: The defect edges were debeveled with a #15 scalpel blade.  Given the location of the defect, shape of the defect, the proximity to free margins and the fact the defect was full thickness a composite graft was deemed most appropriate.  The defect was outline and then transferred to the donor site.  A full thickness graft was then excised from the donor site. The graft was then placed in the primary defect, oriented appropriately and then sutured into place.  The secondary defect was then repaired using a primary closure. Epidermal Autograft Text: The defect edges were debeveled with a #15 scalpel blade.  Given the location of the defect, shape of the defect and the proximity to free margins an epidermal autograft was deemed most appropriate.  Using a sterile surgical marker, the primary defect shape was transferred to the donor site. The epidermal graft was then harvested.  The skin graft was then placed in the primary defect and oriented appropriately. Dermal Autograft Text: The defect edges were debeveled with a #15 scalpel blade.  Given the location of the defect, shape of the defect and the proximity to free margins a dermal autograft was deemed most appropriate.  Using a sterile surgical marker, the primary defect shape was transferred to the donor site. The area thus outlined was incised deep to adipose tissue with a #15 scalpel blade.  The harvested graft was then trimmed of adipose and epidermal tissue until only dermis was left.  The skin graft was then placed in the primary defect and oriented appropriately. Skin Substitute Text: The defect edges were debeveled with a #15 scalpel blade.  Given the location of the defect, shape of the defect and the proximity to free margins a skin substitute graft was deemed most appropriate.  The graft material was trimmed to fit the size of the defect. The graft was then placed in the primary defect and oriented appropriately. Tissue Cultured Epidermal Autograft Text: The defect edges were debeveled with a #15 scalpel blade.  Given the location of the defect, shape of the defect and the proximity to free margins a tissue cultured epidermal autograft was deemed most appropriate.  The graft was then trimmed to fit the size of the defect.  The graft was then placed in the primary defect and oriented appropriately. Xenograft Text: The defect edges were debeveled with a #15 scalpel blade.  Given the location of the defect, shape of the defect and the proximity to free margins a xenograft was deemed most appropriate.  The graft was then trimmed to fit the size of the defect.  The graft was then placed in the primary defect and oriented appropriately. Purse String (Intermediate) Text: Given the location of the defect and the characteristics of the surrounding skin a purse string intermediate closure was deemed most appropriate.  Undermining was performed circumferentially around the surgical defect.  A purse string suture was then placed and tightened. Purse String (Simple) Text: Given the location of the defect and the characteristics of the surrounding skin a purse string simple closure was deemed most appropriate.  Undermining was performed circumferentially around the surgical defect.  A purse string suture was then placed and tightened. Partial Purse String (Intermediate) Text: Given the location of the defect and the characteristics of the surrounding skin an intermediate purse string closure was deemed most appropriate.  Undermining was performed circumferentially around the surgical defect.  A purse string suture was then placed and tightened. Wound tension of the circular defect prevented complete closure of the wound. Partial Purse String (Simple) Text: Given the location of the defect and the characteristics of the surrounding skin a simple purse string closure was deemed most appropriate.  Undermining was performed circumferentially around the surgical defect.  A purse string suture was then placed and tightened. Wound tension of the circular defect prevented complete closure of the wound. Complex Repair And Single Advancement Flap Text: The defect edges were debeveled with a #15 scalpel blade.  The primary defect was closed partially with a complex linear closure.  Given the location of the remaining defect, shape of the defect and the proximity to free margins a single advancement flap was deemed most appropriate for complete closure of the defect.  Using a sterile surgical marker, an appropriate advancement flap was drawn incorporating the defect and placing the expected incisions within the relaxed skin tension lines where possible.    The area thus outlined was incised deep to adipose tissue with a #15 scalpel blade.  The skin margins were undermined to an appropriate distance in all directions utilizing iris scissors. Complex Repair And Double Advancement Flap Text: The defect edges were debeveled with a #15 scalpel blade.  The primary defect was closed partially with a complex linear closure.  Given the location of the remaining defect, shape of the defect and the proximity to free margins a double advancement flap was deemed most appropriate for complete closure of the defect.  Using a sterile surgical marker, an appropriate advancement flap was drawn incorporating the defect and placing the expected incisions within the relaxed skin tension lines where possible.    The area thus outlined was incised deep to adipose tissue with a #15 scalpel blade.  The skin margins were undermined to an appropriate distance in all directions utilizing iris scissors. Complex Repair And Modified Advancement Flap Text: The defect edges were debeveled with a #15 scalpel blade.  The primary defect was closed partially with a complex linear closure.  Given the location of the remaining defect, shape of the defect and the proximity to free margins a modified advancement flap was deemed most appropriate for complete closure of the defect.  Using a sterile surgical marker, an appropriate advancement flap was drawn incorporating the defect and placing the expected incisions within the relaxed skin tension lines where possible.    The area thus outlined was incised deep to adipose tissue with a #15 scalpel blade.  The skin margins were undermined to an appropriate distance in all directions utilizing iris scissors. Complex Repair And A-T Advancement Flap Text: The defect edges were debeveled with a #15 scalpel blade.  The primary defect was closed partially with a complex linear closure.  Given the location of the remaining defect, shape of the defect and the proximity to free margins an A-T advancement flap was deemed most appropriate for complete closure of the defect.  Using a sterile surgical marker, an appropriate advancement flap was drawn incorporating the defect and placing the expected incisions within the relaxed skin tension lines where possible.    The area thus outlined was incised deep to adipose tissue with a #15 scalpel blade.  The skin margins were undermined to an appropriate distance in all directions utilizing iris scissors. Complex Repair And O-T Advancement Flap Text: The defect edges were debeveled with a #15 scalpel blade.  The primary defect was closed partially with a complex linear closure.  Given the location of the remaining defect, shape of the defect and the proximity to free margins an O-T advancement flap was deemed most appropriate for complete closure of the defect.  Using a sterile surgical marker, an appropriate advancement flap was drawn incorporating the defect and placing the expected incisions within the relaxed skin tension lines where possible.    The area thus outlined was incised deep to adipose tissue with a #15 scalpel blade.  The skin margins were undermined to an appropriate distance in all directions utilizing iris scissors. Complex Repair And O-L Flap Text: The defect edges were debeveled with a #15 scalpel blade.  The primary defect was closed partially with a complex linear closure.  Given the location of the remaining defect, shape of the defect and the proximity to free margins an O-L flap was deemed most appropriate for complete closure of the defect.  Using a sterile surgical marker, an appropriate flap was drawn incorporating the defect and placing the expected incisions within the relaxed skin tension lines where possible.    The area thus outlined was incised deep to adipose tissue with a #15 scalpel blade.  The skin margins were undermined to an appropriate distance in all directions utilizing iris scissors. Complex Repair And Bilobe Flap Text: The defect edges were debeveled with a #15 scalpel blade.  The primary defect was closed partially with a complex linear closure.  Given the location of the remaining defect, shape of the defect and the proximity to free margins a bilobe flap was deemed most appropriate for complete closure of the defect.  Using a sterile surgical marker, an appropriate advancement flap was drawn incorporating the defect and placing the expected incisions within the relaxed skin tension lines where possible.    The area thus outlined was incised deep to adipose tissue with a #15 scalpel blade.  The skin margins were undermined to an appropriate distance in all directions utilizing iris scissors. Complex Repair And Melolabial Flap Text: The defect edges were debeveled with a #15 scalpel blade.  The primary defect was closed partially with a complex linear closure.  Given the location of the remaining defect, shape of the defect and the proximity to free margins a melolabial flap was deemed most appropriate for complete closure of the defect.  Using a sterile surgical marker, an appropriate advancement flap was drawn incorporating the defect and placing the expected incisions within the relaxed skin tension lines where possible.    The area thus outlined was incised deep to adipose tissue with a #15 scalpel blade.  The skin margins were undermined to an appropriate distance in all directions utilizing iris scissors. Complex Repair And Rotation Flap Text: The defect edges were debeveled with a #15 scalpel blade.  The primary defect was closed partially with a complex linear closure.  Given the location of the remaining defect, shape of the defect and the proximity to free margins a rotation flap was deemed most appropriate for complete closure of the defect.  Using a sterile surgical marker, an appropriate advancement flap was drawn incorporating the defect and placing the expected incisions within the relaxed skin tension lines where possible.    The area thus outlined was incised deep to adipose tissue with a #15 scalpel blade.  The skin margins were undermined to an appropriate distance in all directions utilizing iris scissors. Complex Repair And Rhombic Flap Text: The defect edges were debeveled with a #15 scalpel blade.  The primary defect was closed partially with a complex linear closure.  Given the location of the remaining defect, shape of the defect and the proximity to free margins a rhombic flap was deemed most appropriate for complete closure of the defect.  Using a sterile surgical marker, an appropriate advancement flap was drawn incorporating the defect and placing the expected incisions within the relaxed skin tension lines where possible.    The area thus outlined was incised deep to adipose tissue with a #15 scalpel blade.  The skin margins were undermined to an appropriate distance in all directions utilizing iris scissors. Complex Repair And Transposition Flap Text: The defect edges were debeveled with a #15 scalpel blade.  The primary defect was closed partially with a complex linear closure.  Given the location of the remaining defect, shape of the defect and the proximity to free margins a transposition flap was deemed most appropriate for complete closure of the defect.  Using a sterile surgical marker, an appropriate advancement flap was drawn incorporating the defect and placing the expected incisions within the relaxed skin tension lines where possible.    The area thus outlined was incised deep to adipose tissue with a #15 scalpel blade.  The skin margins were undermined to an appropriate distance in all directions utilizing iris scissors. Complex Repair And V-Y Plasty Text: The defect edges were debeveled with a #15 scalpel blade.  The primary defect was closed partially with a complex linear closure.  Given the location of the remaining defect, shape of the defect and the proximity to free margins a V-Y plasty was deemed most appropriate for complete closure of the defect.  Using a sterile surgical marker, an appropriate advancement flap was drawn incorporating the defect and placing the expected incisions within the relaxed skin tension lines where possible.    The area thus outlined was incised deep to adipose tissue with a #15 scalpel blade.  The skin margins were undermined to an appropriate distance in all directions utilizing iris scissors. Complex Repair And M Plasty Text: The defect edges were debeveled with a #15 scalpel blade.  The primary defect was closed partially with a complex linear closure.  Given the location of the remaining defect, shape of the defect and the proximity to free margins an M plasty was deemed most appropriate for complete closure of the defect.  Using a sterile surgical marker, an appropriate advancement flap was drawn incorporating the defect and placing the expected incisions within the relaxed skin tension lines where possible.    The area thus outlined was incised deep to adipose tissue with a #15 scalpel blade.  The skin margins were undermined to an appropriate distance in all directions utilizing iris scissors. Complex Repair And Double M Plasty Text: The defect edges were debeveled with a #15 scalpel blade.  The primary defect was closed partially with a complex linear closure.  Given the location of the remaining defect, shape of the defect and the proximity to free margins a double M plasty was deemed most appropriate for complete closure of the defect.  Using a sterile surgical marker, an appropriate advancement flap was drawn incorporating the defect and placing the expected incisions within the relaxed skin tension lines where possible.    The area thus outlined was incised deep to adipose tissue with a #15 scalpel blade.  The skin margins were undermined to an appropriate distance in all directions utilizing iris scissors. Complex Repair And W Plasty Text: The defect edges were debeveled with a #15 scalpel blade.  The primary defect was closed partially with a complex linear closure.  Given the location of the remaining defect, shape of the defect and the proximity to free margins a W plasty was deemed most appropriate for complete closure of the defect.  Using a sterile surgical marker, an appropriate advancement flap was drawn incorporating the defect and placing the expected incisions within the relaxed skin tension lines where possible.    The area thus outlined was incised deep to adipose tissue with a #15 scalpel blade.  The skin margins were undermined to an appropriate distance in all directions utilizing iris scissors. Complex Repair And Z Plasty Text: The defect edges were debeveled with a #15 scalpel blade.  The primary defect was closed partially with a complex linear closure.  Given the location of the remaining defect, shape of the defect and the proximity to free margins a Z plasty was deemed most appropriate for complete closure of the defect.  Using a sterile surgical marker, an appropriate advancement flap was drawn incorporating the defect and placing the expected incisions within the relaxed skin tension lines where possible.    The area thus outlined was incised deep to adipose tissue with a #15 scalpel blade.  The skin margins were undermined to an appropriate distance in all directions utilizing iris scissors. Complex Repair And Dorsal Nasal Flap Text: The defect edges were debeveled with a #15 scalpel blade.  The primary defect was closed partially with a complex linear closure.  Given the location of the remaining defect, shape of the defect and the proximity to free margins a dorsal nasal flap was deemed most appropriate for complete closure of the defect.  Using a sterile surgical marker, an appropriate flap was drawn incorporating the defect and placing the expected incisions within the relaxed skin tension lines where possible.    The area thus outlined was incised deep to adipose tissue with a #15 scalpel blade.  The skin margins were undermined to an appropriate distance in all directions utilizing iris scissors. Complex Repair And Ftsg Text: The defect edges were debeveled with a #15 scalpel blade.  The primary defect was closed partially with a complex linear closure.  Given the location of the defect, shape of the defect and the proximity to free margins a full thickness skin graft was deemed most appropriate to repair the remaining defect.  The graft was trimmed to fit the size of the remaining defect.  The graft was then placed in the primary defect, oriented appropriately, and sutured into place. Complex Repair And Burow's Graft Text: The defect edges were debeveled with a #15 scalpel blade.  The primary defect was closed partially with a complex linear closure.  Given the location of the defect, shape of the defect, the proximity to free margins and the presence of a standing cone deformity a Burow's graft was deemed most appropriate to repair the remaining defect.  The graft was trimmed to fit the size of the remaining defect.  The graft was then placed in the primary defect, oriented appropriately, and sutured into place. Complex Repair And Split-Thickness Skin Graft Text: The defect edges were debeveled with a #15 scalpel blade.  The primary defect was closed partially with a complex linear closure.  Given the location of the defect, shape of the defect and the proximity to free margins a split thickness skin graft was deemed most appropriate to repair the remaining defect.  The graft was trimmed to fit the size of the remaining defect.  The graft was then placed in the primary defect, oriented appropriately, and sutured into place. Complex Repair And Epidermal Autograft Text: The defect edges were debeveled with a #15 scalpel blade.  The primary defect was closed partially with a complex linear closure.  Given the location of the defect, shape of the defect and the proximity to free margins an epidermal autograft was deemed most appropriate to repair the remaining defect.  The graft was trimmed to fit the size of the remaining defect.  The graft was then placed in the primary defect, oriented appropriately, and sutured into place. Complex Repair And Dermal Autograft Text: The defect edges were debeveled with a #15 scalpel blade.  The primary defect was closed partially with a complex linear closure.  Given the location of the defect, shape of the defect and the proximity to free margins an dermal autograft was deemed most appropriate to repair the remaining defect.  The graft was trimmed to fit the size of the remaining defect.  The graft was then placed in the primary defect, oriented appropriately, and sutured into place. Complex Repair And Tissue Cultured Epidermal Autograft Text: The defect edges were debeveled with a #15 scalpel blade.  The primary defect was closed partially with a complex linear closure.  Given the location of the defect, shape of the defect and the proximity to free margins an tissue cultured epidermal autograft was deemed most appropriate to repair the remaining defect.  The graft was trimmed to fit the size of the remaining defect.  The graft was then placed in the primary defect, oriented appropriately, and sutured into place. Complex Repair And Xenograft Text: The defect edges were debeveled with a #15 scalpel blade.  The primary defect was closed partially with a complex linear closure.  Given the location of the defect, shape of the defect and the proximity to free margins a xenograft was deemed most appropriate to repair the remaining defect.  The graft was trimmed to fit the size of the remaining defect.  The graft was then placed in the primary defect, oriented appropriately, and sutured into place. Complex Repair And Skin Substitute Graft Text: The defect edges were debeveled with a #15 scalpel blade.  The primary defect was closed partially with a complex linear closure.  Given the location of the remaining defect, shape of the defect and the proximity to free margins a skin substitute graft was deemed most appropriate to repair the remaining defect.  The graft was trimmed to fit the size of the remaining defect.  The graft was then placed in the primary defect, oriented appropriately, and sutured into place. Path Notes (To The Dermatopathologist): Please check margins. Consent was obtained from the patient. The risks and benefits to therapy were discussed in detail. Specifically, the risks of infection, scarring, bleeding, prolonged wound healing, incomplete removal, allergy to anesthesia, nerve injury and recurrence were addressed. Prior to the procedure, the treatment site was clearly identified and confirmed by the patient. All components of Universal Protocol/PAUSE Rule completed. Post-Care Instructions: I reviewed with the patient in detail post-care instructions. Patient is not to engage in any heavy lifting, exercise, or swimming for the next 14 days. Should the patient develop any fevers, chills, bleeding, severe pain patient will contact the office immediately. Home Suture Removal Text: Patient was provided a home suture removal kit and will remove their sutures at home.  If they have any questions or difficulties they will call the office. Where Do You Want The Question To Include Opioid Counseling Located?: Case Summary Tab Information: Selecting Yes will display possible errors in your note based on the variables you have selected. This validation is only offered as a suggestion for you. PLEASE NOTE THAT THE VALIDATION TEXT WILL BE REMOVED WHEN YOU FINALIZE YOUR NOTE. IF YOU WANT TO FAX A PRELIMINARY NOTE YOU WILL NEED TO TOGGLE THIS TO 'NO' IF YOU DO NOT WANT IT IN YOUR FAXED NOTE.

## 2023-10-09 NOTE — ASSESSMENT & PLAN NOTE
Continue home suboxone.  reviewed.   Libtayo Pregnancy And Lactation Text: This medication is contraindicated in pregnancy and when breast feeding.

## 2025-03-12 NOTE — PROGRESS NOTES
Subjective:       Patient ID: Kendra Lawrence is a 64 y.o. female.    Chief Complaint: No chief complaint on file.  Video visit:    The patient location is: [place]  The chief complaint leading to consultation is: narcotic dependence   Visit type: Virtual visit with synchronous audio and video  Total time spent with patient: 10 mins  Each patient to whom he or she provides medical services by telemedicine is:  (1) informed of the relationship between the physician and patient and the respective role of any other health care provider with respect to management of the patient; and (2) notified that he or she may decline to receive medical services by telemedicine and may withdraw from such care at any time.    Notes:       HPI     The patient presents for medical management of opioid dependency. she is receiving maintenance therapy with buprenorphine.      CHIEF COMPLAINT: opoid dependence  HPI:     ONSET/TIMING:     DURATION: . Continuous(+).    QUALITY/COURSE:  unchanged.     INTENSITY/SEVERITY:  controlled.    CONTEXT/WHEN:     MODIFIERS/TREATMENTS:  Taking medications(+) Suboxone  8/2 # 60,   last rx given 6.25.20    SYMPTOMS/RELATED: no withdrawal symptoms. no cravings . No substance abuse.  No alcohol use     pnp checked: last month:  Yes        Review of Systems   Constitutional: Negative for diaphoresis, fatigue and unexpected weight change.   Gastrointestinal: Positive for nausea (last week). Negative for constipation, diarrhea and vomiting.   Neurological: Positive for headaches. Negative for dizziness and light-headedness.   Psychiatric/Behavioral: Negative for dysphoric mood and sleep disturbance. The patient is not nervous/anxious.          Objective:      There were no vitals filed for this visit.   Urine drug screen negative except buprenorphine.           Assessment:       1. Narcotic dependence          Plan:       (+) pt taking medication as prescribed.    (+) dose is approproate.    (+) urine drug screen  done.   (+) discussed risks of buprenorphine, including to others.      (+) assessed if benefits outweigh risks of buprenorphine. .     (+) reviewed safe storage of medication.     (+) discussed proper use of buprenorphine, including missed doses.      Narcotic dependence  -     buprenorphine-naloxone (SUBOXONE) 8-2 mg Film; Place 1 packet (1 each total) under the tongue 2 (two) times daily.  Dispense: 60 packet; Refill: 0      Follow up in about 1 month (around 8/24/2020).      Physical Exam       Specialty Care and/or PCP (routine)...